# Patient Record
Sex: FEMALE | Race: WHITE | NOT HISPANIC OR LATINO | URBAN - METROPOLITAN AREA
[De-identification: names, ages, dates, MRNs, and addresses within clinical notes are randomized per-mention and may not be internally consistent; named-entity substitution may affect disease eponyms.]

---

## 2019-11-15 VITALS
SYSTOLIC BLOOD PRESSURE: 156 MMHG | DIASTOLIC BLOOD PRESSURE: 79 MMHG | TEMPERATURE: 98 F | WEIGHT: 125 LBS | RESPIRATION RATE: 19 BRPM | OXYGEN SATURATION: 98 % | HEART RATE: 91 BPM

## 2019-11-15 LAB
ALBUMIN SERPL ELPH-MCNC: 4.4 G/DL — SIGNIFICANT CHANGE UP (ref 3.3–5)
ALP SERPL-CCNC: 76 U/L — SIGNIFICANT CHANGE UP (ref 40–120)
ALT FLD-CCNC: 11 U/L — SIGNIFICANT CHANGE UP (ref 10–45)
ANION GAP SERPL CALC-SCNC: 12 MMOL/L — SIGNIFICANT CHANGE UP (ref 5–17)
APPEARANCE UR: CLEAR — SIGNIFICANT CHANGE UP
APTT BLD: 33 SEC — SIGNIFICANT CHANGE UP (ref 27.5–36.3)
AST SERPL-CCNC: 18 U/L — SIGNIFICANT CHANGE UP (ref 10–40)
BASOPHILS # BLD AUTO: 0.05 K/UL — SIGNIFICANT CHANGE UP (ref 0–0.2)
BASOPHILS NFR BLD AUTO: 0.6 % — SIGNIFICANT CHANGE UP (ref 0–2)
BILIRUB SERPL-MCNC: 0.3 MG/DL — SIGNIFICANT CHANGE UP (ref 0.2–1.2)
BILIRUB UR-MCNC: NEGATIVE — SIGNIFICANT CHANGE UP
BUN SERPL-MCNC: 20 MG/DL — SIGNIFICANT CHANGE UP (ref 7–23)
CALCIUM SERPL-MCNC: 10.1 MG/DL — SIGNIFICANT CHANGE UP (ref 8.4–10.5)
CHLORIDE SERPL-SCNC: 102 MMOL/L — SIGNIFICANT CHANGE UP (ref 96–108)
CO2 SERPL-SCNC: 26 MMOL/L — SIGNIFICANT CHANGE UP (ref 22–31)
COLOR SPEC: YELLOW — SIGNIFICANT CHANGE UP
CREAT SERPL-MCNC: 0.8 MG/DL — SIGNIFICANT CHANGE UP (ref 0.5–1.3)
DIFF PNL FLD: NEGATIVE — SIGNIFICANT CHANGE UP
EOSINOPHIL # BLD AUTO: 0.36 K/UL — SIGNIFICANT CHANGE UP (ref 0–0.5)
EOSINOPHIL NFR BLD AUTO: 4.3 % — SIGNIFICANT CHANGE UP (ref 0–6)
GLUCOSE SERPL-MCNC: 159 MG/DL — HIGH (ref 70–99)
GLUCOSE UR QL: NEGATIVE — SIGNIFICANT CHANGE UP
HCT VFR BLD CALC: 35.7 % — SIGNIFICANT CHANGE UP (ref 34.5–45)
HGB BLD-MCNC: 11.2 G/DL — LOW (ref 11.5–15.5)
IMM GRANULOCYTES NFR BLD AUTO: 0.5 % — SIGNIFICANT CHANGE UP (ref 0–1.5)
INR BLD: 0.97 — SIGNIFICANT CHANGE UP (ref 0.88–1.16)
KETONES UR-MCNC: 15 MG/DL
LEUKOCYTE ESTERASE UR-ACNC: NEGATIVE — SIGNIFICANT CHANGE UP
LIDOCAIN IGE QN: 30 U/L — SIGNIFICANT CHANGE UP (ref 7–60)
LYMPHOCYTES # BLD AUTO: 2.46 K/UL — SIGNIFICANT CHANGE UP (ref 1–3.3)
LYMPHOCYTES # BLD AUTO: 29.1 % — SIGNIFICANT CHANGE UP (ref 13–44)
MCHC RBC-ENTMCNC: 26.9 PG — LOW (ref 27–34)
MCHC RBC-ENTMCNC: 31.4 GM/DL — LOW (ref 32–36)
MCV RBC AUTO: 85.8 FL — SIGNIFICANT CHANGE UP (ref 80–100)
MONOCYTES # BLD AUTO: 0.76 K/UL — SIGNIFICANT CHANGE UP (ref 0–0.9)
MONOCYTES NFR BLD AUTO: 9 % — SIGNIFICANT CHANGE UP (ref 2–14)
NEUTROPHILS # BLD AUTO: 4.79 K/UL — SIGNIFICANT CHANGE UP (ref 1.8–7.4)
NEUTROPHILS NFR BLD AUTO: 56.5 % — SIGNIFICANT CHANGE UP (ref 43–77)
NITRITE UR-MCNC: NEGATIVE — SIGNIFICANT CHANGE UP
NRBC # BLD: 0 /100 WBCS — SIGNIFICANT CHANGE UP (ref 0–0)
PH UR: 5.5 — SIGNIFICANT CHANGE UP (ref 5–8)
PLATELET # BLD AUTO: 310 K/UL — SIGNIFICANT CHANGE UP (ref 150–400)
POTASSIUM SERPL-MCNC: 4 MMOL/L — SIGNIFICANT CHANGE UP (ref 3.5–5.3)
POTASSIUM SERPL-SCNC: 4 MMOL/L — SIGNIFICANT CHANGE UP (ref 3.5–5.3)
PROT SERPL-MCNC: 7.9 G/DL — SIGNIFICANT CHANGE UP (ref 6–8.3)
PROT UR-MCNC: 100 MG/DL
PROTHROM AB SERPL-ACNC: 11 SEC — SIGNIFICANT CHANGE UP (ref 10–12.9)
RBC # BLD: 4.16 M/UL — SIGNIFICANT CHANGE UP (ref 3.8–5.2)
RBC # FLD: 13.6 % — SIGNIFICANT CHANGE UP (ref 10.3–14.5)
SODIUM SERPL-SCNC: 140 MMOL/L — SIGNIFICANT CHANGE UP (ref 135–145)
SP GR SPEC: 1.02 — SIGNIFICANT CHANGE UP (ref 1–1.03)
UROBILINOGEN FLD QL: 0.2 E.U./DL — SIGNIFICANT CHANGE UP
WBC # BLD: 8.46 K/UL — SIGNIFICANT CHANGE UP (ref 3.8–10.5)
WBC # FLD AUTO: 8.46 K/UL — SIGNIFICANT CHANGE UP (ref 3.8–10.5)

## 2019-11-15 PROCEDURE — 74177 CT ABD & PELVIS W/CONTRAST: CPT | Mod: 26

## 2019-11-15 RX ORDER — FAMOTIDINE 10 MG/ML
20 INJECTION INTRAVENOUS ONCE
Refills: 0 | Status: COMPLETED | OUTPATIENT
Start: 2019-11-15 | End: 2019-11-15

## 2019-11-15 RX ORDER — SODIUM CHLORIDE 9 MG/ML
1000 INJECTION INTRAMUSCULAR; INTRAVENOUS; SUBCUTANEOUS ONCE
Refills: 0 | Status: COMPLETED | OUTPATIENT
Start: 2019-11-15 | End: 2019-11-15

## 2019-11-15 RX ORDER — IOHEXOL 300 MG/ML
30 INJECTION, SOLUTION INTRAVENOUS ONCE
Refills: 0 | Status: COMPLETED | OUTPATIENT
Start: 2019-11-15 | End: 2019-11-15

## 2019-11-15 RX ORDER — ONDANSETRON 8 MG/1
4 TABLET, FILM COATED ORAL ONCE
Refills: 0 | Status: COMPLETED | OUTPATIENT
Start: 2019-11-15 | End: 2019-11-15

## 2019-11-15 RX ADMIN — SODIUM CHLORIDE 1000 MILLILITER(S): 9 INJECTION INTRAMUSCULAR; INTRAVENOUS; SUBCUTANEOUS at 23:01

## 2019-11-15 RX ADMIN — ONDANSETRON 4 MILLIGRAM(S): 8 TABLET, FILM COATED ORAL at 21:48

## 2019-11-15 RX ADMIN — SODIUM CHLORIDE 1000 MILLILITER(S): 9 INJECTION INTRAMUSCULAR; INTRAVENOUS; SUBCUTANEOUS at 21:48

## 2019-11-15 RX ADMIN — FAMOTIDINE 20 MILLIGRAM(S): 10 INJECTION INTRAVENOUS at 21:48

## 2019-11-15 RX ADMIN — IOHEXOL 30 MILLILITER(S): 300 INJECTION, SOLUTION INTRAVENOUS at 21:48

## 2019-11-15 RX ADMIN — SODIUM CHLORIDE 1000 MILLILITER(S): 9 INJECTION INTRAMUSCULAR; INTRAVENOUS; SUBCUTANEOUS at 22:43

## 2019-11-15 NOTE — ED PROVIDER NOTE - CLINICAL SUMMARY MEDICAL DECISION MAKING FREE TEXT BOX
68 y/o F with PMHx of DM, HTN, HLD, hypothyroidism, diverticulitis with 4 days of intermittent worsening LLQ abdominal pain radiating to the RUQ. No fever, chills, diarrhea, nausea, vomiting, appetite changes, bloody urine or stool. LLQ and RUQ TTP on exam, no guarding, without CVAT. Plan for Abd CT with contrast, labs, meds for pain control. 68 y/o F with PMHx of diverticulosis with sudden onset of 4 days of intermittent worsening LLQ abdominal pain.. Concerned that pain persists and has become more diffuse and intense. No fever, chills, diarrhea, nausea, vomiting, appetite changes, bloody urine or stool. On exam, nontoxic appearing, abdomen soft but with LLQ and RUQ TTP, no guarding, no peritoneal signs, without CVAT. Plan for Abd CT with contrast, labs, meds, reevaluate.

## 2019-11-15 NOTE — ED PROVIDER NOTE - PROGRESS NOTE DETAILS
CT scan findings consistent with large renal mass with thrombus extending into the r renal vein and into IVC. additional thrombus is seen extending to the level of b/l common iliacs.  Vascular surgery and ortho team called for consults. as per consulting teams recommendation, will admit , start heparin.

## 2019-11-15 NOTE — ED PROVIDER NOTE - ATTENDING CONTRIBUTION TO CARE
69F htn, hld, dm, diverticulosis, c/o 4d acute intermittent nonradiating initially llq now diffuse abd pain. +abd distension. no fever/chills, no cp/sob, no n/v, no diarrhea/constipation, no hematochezi/melena, n dysuria, no phx ovarian cyst, no vaginal spotting, no rash, no trauma. avss. nontoxic. NAD. no acute surgical abd. 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 neurovasc intact. labs wnl. ua neg. urology/vascular consulted. s/p heparin. will admit to medicine w/ urology/vascular consult as per reccs.    I saw and discussed the care of the pt directly with the ACP while the pt was in the ED. i have reviewed the ACP note and agree w/ the history, exam and plan of care other than as noted above.

## 2019-11-15 NOTE — ED PROVIDER NOTE - GASTROINTESTINAL, MLM
Abdomen soft with diffuse tenderness, no guarding, no rebound. More tenderness to LLQ and RUQ. No CVAT.

## 2019-11-15 NOTE — ED PROVIDER NOTE - OBJECTIVE STATEMENT
68 y/o F with PMHx of DM, HTN, HLD, Hypothyroidism, diverticulitis, presents to the ED with complaints of sudden LLQ abdominal pain x 4 days, intermittent. Patient states it has begun radiating to her RUQ and has become more frequent. Pain is worsened with movement and improved with laying down. Patient feels bloated. Denies diarrhea, constipation, nausea, vomiting, fever, chills, appetite changes, urinary symptoms, blood in urine or stool. 70 y/o F with PMHx of DM, HTN, HLD, Hypothyroidism, diverticulosis, presents to the ED with complaints of sudden LLQ abdominal pain x 4 days, intermittent. Patient concerns that it has became more diffuse and has become more frequent. Pain is worsened with movement and improved with laying down. Patient feels bloated. Denies diarrhea, constipation, nausea, vomiting, fever, chills, appetite changes, urinary symptoms, blood in urine or stool.

## 2019-11-15 NOTE — ED PROVIDER NOTE - CHPI ED SYMPTOMS NEG
no burning urination/no chills/no vomiting/no blood in stool/no nausea/no dysuria/no diarrhea/no fever/no hematuria

## 2019-11-15 NOTE — ED ADULT TRIAGE NOTE - OTHER COMPLAINTS
CC of LLQ abd pain denies nausea and vomiting + flatus and last bowel movement is today, constipated. pan is aggravated by movement

## 2019-11-16 ENCOUNTER — INPATIENT (INPATIENT)
Facility: HOSPITAL | Age: 69
LOS: 10 days | Discharge: HOME | DRG: 657 | End: 2019-11-27
Attending: UROLOGY | Admitting: UROLOGY
Payer: MEDICARE

## 2019-11-16 DIAGNOSIS — E03.9 HYPOTHYROIDISM, UNSPECIFIED: ICD-10-CM

## 2019-11-16 DIAGNOSIS — Z91.89 OTHER SPECIFIED PERSONAL RISK FACTORS, NOT ELSEWHERE CLASSIFIED: ICD-10-CM

## 2019-11-16 DIAGNOSIS — I82.409 ACUTE EMBOLISM AND THROMBOSIS OF UNSPECIFIED DEEP VEINS OF UNSPECIFIED LOWER EXTREMITY: ICD-10-CM

## 2019-11-16 DIAGNOSIS — N28.89 OTHER SPECIFIED DISORDERS OF KIDNEY AND URETER: ICD-10-CM

## 2019-11-16 DIAGNOSIS — I10 ESSENTIAL (PRIMARY) HYPERTENSION: ICD-10-CM

## 2019-11-16 DIAGNOSIS — R10.9 UNSPECIFIED ABDOMINAL PAIN: ICD-10-CM

## 2019-11-16 DIAGNOSIS — R91.8 OTHER NONSPECIFIC ABNORMAL FINDING OF LUNG FIELD: ICD-10-CM

## 2019-11-16 DIAGNOSIS — E11.9 TYPE 2 DIABETES MELLITUS WITHOUT COMPLICATIONS: ICD-10-CM

## 2019-11-16 DIAGNOSIS — R63.8 OTHER SYMPTOMS AND SIGNS CONCERNING FOOD AND FLUID INTAKE: ICD-10-CM

## 2019-11-16 DIAGNOSIS — E78.5 HYPERLIPIDEMIA, UNSPECIFIED: ICD-10-CM

## 2019-11-16 LAB
ALBUMIN SERPL ELPH-MCNC: 4.4 G/DL — SIGNIFICANT CHANGE UP (ref 3.3–5)
ALP SERPL-CCNC: 75 U/L — SIGNIFICANT CHANGE UP (ref 40–120)
ALT FLD-CCNC: 10 U/L — SIGNIFICANT CHANGE UP (ref 10–45)
ANION GAP SERPL CALC-SCNC: 12 MMOL/L — SIGNIFICANT CHANGE UP (ref 5–17)
APTT BLD: 124 SEC — CRITICAL HIGH (ref 27.5–36.3)
APTT BLD: 80.3 SEC — HIGH (ref 27.5–36.3)
AST SERPL-CCNC: 15 U/L — SIGNIFICANT CHANGE UP (ref 10–40)
BASOPHILS # BLD AUTO: 0.04 K/UL — SIGNIFICANT CHANGE UP (ref 0–0.2)
BASOPHILS NFR BLD AUTO: 0.5 % — SIGNIFICANT CHANGE UP (ref 0–2)
BILIRUB SERPL-MCNC: 0.4 MG/DL — SIGNIFICANT CHANGE UP (ref 0.2–1.2)
BLD GP AB SCN SERPL QL: NEGATIVE — SIGNIFICANT CHANGE UP
BUN SERPL-MCNC: 14 MG/DL — SIGNIFICANT CHANGE UP (ref 7–23)
CALCIUM SERPL-MCNC: 9.8 MG/DL — SIGNIFICANT CHANGE UP (ref 8.4–10.5)
CHLORIDE SERPL-SCNC: 103 MMOL/L — SIGNIFICANT CHANGE UP (ref 96–108)
CO2 SERPL-SCNC: 26 MMOL/L — SIGNIFICANT CHANGE UP (ref 22–31)
CREAT SERPL-MCNC: 0.78 MG/DL — SIGNIFICANT CHANGE UP (ref 0.5–1.3)
EOSINOPHIL # BLD AUTO: 0.08 K/UL — SIGNIFICANT CHANGE UP (ref 0–0.5)
EOSINOPHIL NFR BLD AUTO: 1 % — SIGNIFICANT CHANGE UP (ref 0–6)
FERRITIN SERPL-MCNC: 89 NG/ML — SIGNIFICANT CHANGE UP (ref 15–150)
GLUCOSE BLDC GLUCOMTR-MCNC: 128 MG/DL — HIGH (ref 70–99)
GLUCOSE BLDC GLUCOMTR-MCNC: 134 MG/DL — HIGH (ref 70–99)
GLUCOSE BLDC GLUCOMTR-MCNC: 150 MG/DL — HIGH (ref 70–99)
GLUCOSE BLDC GLUCOMTR-MCNC: 192 MG/DL — HIGH (ref 70–99)
GLUCOSE SERPL-MCNC: 190 MG/DL — HIGH (ref 70–99)
HBA1C BLD-MCNC: 7 % — HIGH (ref 4–5.6)
HCT VFR BLD CALC: 35.6 % — SIGNIFICANT CHANGE UP (ref 34.5–45)
HCV AB S/CO SERPL IA: 0.16 S/CO — SIGNIFICANT CHANGE UP
HCV AB SERPL-IMP: SIGNIFICANT CHANGE UP
HGB BLD-MCNC: 11.1 G/DL — LOW (ref 11.5–15.5)
IMM GRANULOCYTES NFR BLD AUTO: 0.5 % — SIGNIFICANT CHANGE UP (ref 0–1.5)
IRON SATN MFR SERPL: 16 % — SIGNIFICANT CHANGE UP (ref 14–50)
IRON SATN MFR SERPL: 52 UG/DL — SIGNIFICANT CHANGE UP (ref 30–160)
LYMPHOCYTES # BLD AUTO: 1.52 K/UL — SIGNIFICANT CHANGE UP (ref 1–3.3)
LYMPHOCYTES # BLD AUTO: 18.7 % — SIGNIFICANT CHANGE UP (ref 13–44)
MCHC RBC-ENTMCNC: 26.7 PG — LOW (ref 27–34)
MCHC RBC-ENTMCNC: 31.2 GM/DL — LOW (ref 32–36)
MCV RBC AUTO: 85.8 FL — SIGNIFICANT CHANGE UP (ref 80–100)
MONOCYTES # BLD AUTO: 0.39 K/UL — SIGNIFICANT CHANGE UP (ref 0–0.9)
MONOCYTES NFR BLD AUTO: 4.8 % — SIGNIFICANT CHANGE UP (ref 2–14)
NEUTROPHILS # BLD AUTO: 6.05 K/UL — SIGNIFICANT CHANGE UP (ref 1.8–7.4)
NEUTROPHILS NFR BLD AUTO: 74.5 % — SIGNIFICANT CHANGE UP (ref 43–77)
NRBC # BLD: 0 /100 WBCS — SIGNIFICANT CHANGE UP (ref 0–0)
PLATELET # BLD AUTO: 309 K/UL — SIGNIFICANT CHANGE UP (ref 150–400)
POTASSIUM SERPL-MCNC: 4.3 MMOL/L — SIGNIFICANT CHANGE UP (ref 3.5–5.3)
POTASSIUM SERPL-SCNC: 4.3 MMOL/L — SIGNIFICANT CHANGE UP (ref 3.5–5.3)
PROT SERPL-MCNC: 7.7 G/DL — SIGNIFICANT CHANGE UP (ref 6–8.3)
RBC # BLD: 4.15 M/UL — SIGNIFICANT CHANGE UP (ref 3.8–5.2)
RBC # FLD: 13.7 % — SIGNIFICANT CHANGE UP (ref 10.3–14.5)
RH IG SCN BLD-IMP: POSITIVE — SIGNIFICANT CHANGE UP
SODIUM SERPL-SCNC: 141 MMOL/L — SIGNIFICANT CHANGE UP (ref 135–145)
TIBC SERPL-MCNC: 323 UG/DL — SIGNIFICANT CHANGE UP (ref 220–430)
TRANSFERRIN SERPL-MCNC: 283 MG/DL — SIGNIFICANT CHANGE UP (ref 200–360)
TSH SERPL-MCNC: 3.04 UIU/ML — SIGNIFICANT CHANGE UP (ref 0.35–4.94)
UIBC SERPL-MCNC: 271 UG/DL — SIGNIFICANT CHANGE UP (ref 110–370)
WBC # BLD: 8.12 K/UL — SIGNIFICANT CHANGE UP (ref 3.8–10.5)
WBC # FLD AUTO: 8.12 K/UL — SIGNIFICANT CHANGE UP (ref 3.8–10.5)

## 2019-11-16 PROCEDURE — 99285 EMERGENCY DEPT VISIT HI MDM: CPT

## 2019-11-16 PROCEDURE — 71260 CT THORAX DX C+: CPT | Mod: 26

## 2019-11-16 PROCEDURE — 99223 1ST HOSP IP/OBS HIGH 75: CPT | Mod: GC,AI

## 2019-11-16 PROCEDURE — 93970 EXTREMITY STUDY: CPT | Mod: 26

## 2019-11-16 RX ORDER — HEPARIN SODIUM 5000 [USP'U]/ML
1000 INJECTION INTRAVENOUS; SUBCUTANEOUS
Qty: 25000 | Refills: 0 | Status: DISCONTINUED | OUTPATIENT
Start: 2019-11-16 | End: 2019-11-17

## 2019-11-16 RX ORDER — GLUCAGON INJECTION, SOLUTION 0.5 MG/.1ML
1 INJECTION, SOLUTION SUBCUTANEOUS ONCE
Refills: 0 | Status: DISCONTINUED | OUTPATIENT
Start: 2019-11-16 | End: 2019-11-21

## 2019-11-16 RX ORDER — INSULIN LISPRO 100/ML
VIAL (ML) SUBCUTANEOUS
Refills: 0 | Status: DISCONTINUED | OUTPATIENT
Start: 2019-11-16 | End: 2019-11-21

## 2019-11-16 RX ORDER — LEVOTHYROXINE SODIUM 125 MCG
75 TABLET ORAL DAILY
Refills: 0 | Status: DISCONTINUED | OUTPATIENT
Start: 2019-11-16 | End: 2019-11-22

## 2019-11-16 RX ORDER — HEPARIN SODIUM 5000 [USP'U]/ML
900 INJECTION INTRAVENOUS; SUBCUTANEOUS
Qty: 25000 | Refills: 0 | Status: DISCONTINUED | OUTPATIENT
Start: 2019-11-16 | End: 2019-11-16

## 2019-11-16 RX ORDER — DEXTROSE 50 % IN WATER 50 %
12.5 SYRINGE (ML) INTRAVENOUS ONCE
Refills: 0 | Status: DISCONTINUED | OUTPATIENT
Start: 2019-11-16 | End: 2019-11-21

## 2019-11-16 RX ORDER — SIMVASTATIN 20 MG/1
1 TABLET, FILM COATED ORAL
Qty: 0 | Refills: 0 | DISCHARGE

## 2019-11-16 RX ORDER — AMLODIPINE BESYLATE 2.5 MG/1
5 TABLET ORAL DAILY
Refills: 0 | Status: DISCONTINUED | OUTPATIENT
Start: 2019-11-16 | End: 2019-11-22

## 2019-11-16 RX ORDER — SIMVASTATIN 20 MG/1
20 TABLET, FILM COATED ORAL AT BEDTIME
Refills: 0 | Status: DISCONTINUED | OUTPATIENT
Start: 2019-11-16 | End: 2019-11-16

## 2019-11-16 RX ORDER — HEPARIN SODIUM 5000 [USP'U]/ML
INJECTION INTRAVENOUS; SUBCUTANEOUS
Qty: 25000 | Refills: 0 | Status: DISCONTINUED | OUTPATIENT
Start: 2019-11-16 | End: 2019-11-16

## 2019-11-16 RX ORDER — DEXTROSE 50 % IN WATER 50 %
25 SYRINGE (ML) INTRAVENOUS ONCE
Refills: 0 | Status: DISCONTINUED | OUTPATIENT
Start: 2019-11-16 | End: 2019-11-21

## 2019-11-16 RX ORDER — SODIUM CHLORIDE 9 MG/ML
1000 INJECTION, SOLUTION INTRAVENOUS
Refills: 0 | Status: DISCONTINUED | OUTPATIENT
Start: 2019-11-16 | End: 2019-11-21

## 2019-11-16 RX ORDER — ACETAMINOPHEN 500 MG
650 TABLET ORAL EVERY 6 HOURS
Refills: 0 | Status: DISCONTINUED | OUTPATIENT
Start: 2019-11-16 | End: 2019-11-22

## 2019-11-16 RX ORDER — DEXTROSE 50 % IN WATER 50 %
15 SYRINGE (ML) INTRAVENOUS ONCE
Refills: 0 | Status: DISCONTINUED | OUTPATIENT
Start: 2019-11-16 | End: 2019-11-21

## 2019-11-16 RX ORDER — HEPARIN SODIUM 5000 [USP'U]/ML
4500 INJECTION INTRAVENOUS; SUBCUTANEOUS ONCE
Refills: 0 | Status: COMPLETED | OUTPATIENT
Start: 2019-11-16 | End: 2019-11-16

## 2019-11-16 RX ORDER — ATORVASTATIN CALCIUM 80 MG/1
20 TABLET, FILM COATED ORAL AT BEDTIME
Refills: 0 | Status: DISCONTINUED | OUTPATIENT
Start: 2019-11-16 | End: 2019-11-22

## 2019-11-16 RX ORDER — SIMVASTATIN 20 MG/1
10 TABLET, FILM COATED ORAL AT BEDTIME
Refills: 0 | Status: DISCONTINUED | OUTPATIENT
Start: 2019-11-16 | End: 2019-11-16

## 2019-11-16 RX ADMIN — HEPARIN SODIUM 4500 UNIT(S): 5000 INJECTION INTRAVENOUS; SUBCUTANEOUS at 03:27

## 2019-11-16 RX ADMIN — Medication 75 MICROGRAM(S): at 06:40

## 2019-11-16 RX ADMIN — ATORVASTATIN CALCIUM 20 MILLIGRAM(S): 80 TABLET, FILM COATED ORAL at 22:48

## 2019-11-16 RX ADMIN — AMLODIPINE BESYLATE 5 MILLIGRAM(S): 2.5 TABLET ORAL at 06:40

## 2019-11-16 RX ADMIN — Medication 2: at 09:15

## 2019-11-16 RX ADMIN — HEPARIN SODIUM 1000 UNIT(S)/HR: 5000 INJECTION INTRAVENOUS; SUBCUTANEOUS at 13:20

## 2019-11-16 RX ADMIN — HEPARIN SODIUM 1100 UNIT(S)/HR: 5000 INJECTION INTRAVENOUS; SUBCUTANEOUS at 03:28

## 2019-11-16 NOTE — CONSULT NOTE ADULT - SUBJECTIVE AND OBJECTIVE BOX
Vascular Attending:    Rachel    HPI:  69F, Azeri speaking, with PMH HTN, DM, HLD, hypothyroid, diverticulosis, presents to the ED with sudden LLQ abd pain for the past 4 days. States the pain has been intermittent, but has gotten progressively worse. States the pain is worse with movement but improved with laying down. Pt has pain to b/l LE x1wk.  She also feels associated bloating but denies any fevers, chills, weight loss, changes in appetite, dysuria, hematuria, urinary frequency, hematochezia, melena, n/v/d, constipation or any other complaints at this time.     PAST MEDICAL & SURGICAL HISTORY:  Diverticulosis  Hypothyroid  High cholesterol  HTN (hypertension)  Diabetes      REVIEW OF SYSTEMS  Neg Except as in HPI    MEDICATIONS  (STANDING):  amLODIPine   Tablet 5 milliGRAM(s) Oral daily  dextrose 5%. 1000 milliLiter(s) (50 mL/Hr) IV Continuous <Continuous>  dextrose 50% Injectable 12.5 Gram(s) IV Push once  dextrose 50% Injectable 25 Gram(s) IV Push once  dextrose 50% Injectable 25 Gram(s) IV Push once  heparin  Infusion.  Unit(s)/Hr (11 mL/Hr) IV Continuous <Continuous>  insulin lispro (HumaLOG) corrective regimen sliding scale   SubCutaneous Before meals and at bedtime  levothyroxine 75 MICROGram(s) Oral daily  simvastatin 10 milliGRAM(s) Oral at bedtime    MEDICATIONS  (PRN):  dextrose 40% Gel 15 Gram(s) Oral once PRN Blood Glucose LESS THAN 70 milliGRAM(s)/deciliter  glucagon  Injectable 1 milliGRAM(s) IntraMuscular once PRN Glucose LESS THAN 70 milligrams/deciliter      Allergies  No Known Allergies    Vital Signs Last 24 Hrs  T(C): 36.8 (2019 03:47), Max: 36.8 (2019 03:47)  T(F): 98.3 (2019 03:47), Max: 98.3 (2019 03:47)  HR: 76 (2019 03:47) (70 - 91)  BP: 148/73 (2019 03:47) (138/75 - 156/79)  BP(mean): --  RR: 18 (2019 03:47) (18 - 19)  SpO2: 96% (2019 03:47) (96% - 98%)    PHYSICAL EXAM:  Constitutional: Pt AXOX3 in NAD  Respiratory: Unlabored  Cardiovascular: S1S2  GI: nontender, soft,  Extremities: no edema or discolorations  Vascular: 2+ b/L    LABS:                        11.2   8.46  )-----------( 310      ( 15 Nov 2019 21:22 )             35.7     11-15    140  |  102  |  20  ----------------------------<  159<H>  4.0   |  26  |  0.80    Ca    10.1      15 Nov 2019 21:22    TPro  7.9  /  Alb  4.4  /  TBili  0.3  /  DBili  x   /  AST  18  /  ALT  11  /  AlkPhos  76  -15    PT/INR - ( 15 Nov 2019 21:22 )   PT: 11.0 sec;   INR: 0.97          PTT - ( 15 Nov 2019 21:22 )  PTT:33.0 sec  Urinalysis Basic - ( 15 Nov 2019 21:22 )    Color: Yellow / Appearance: Clear / S.025 / pH: x  Gluc: x / Ketone: 15 mg/dL  / Bili: Negative / Urobili: 0.2 E.U./dL   Blood: x / Protein: 100 mg/dL / Nitrite: NEGATIVE   Leuk Esterase: NEGATIVE / RBC: x / WBC < 5 /HPF   Sq Epi: x / Non Sq Epi: 0-5 /HPF / Bacteria: x        RADIOLOGY & ADDITIONAL STUDIES

## 2019-11-16 NOTE — PROGRESS NOTE ADULT - ASSESSMENT
69F, Syrian speaking, with PMH DM, HTN, HLD,  hypothyroid, diverticulosis, presents with 4 days of LLQ abd pain without associated signs of symptoms  admitted to [facility], found to have a new right upper pole renal mass with tumor thrombus extending in to bilateral renal veins and IVC, and multiple lung nodules, concerning for RCC with lung mets. Plan for IR-guided lung nodule biopsy on Monday 11/18

## 2019-11-16 NOTE — PROGRESS NOTE ADULT - PROBLEM SELECTOR PLAN 9
F: s/p 2L IV NS, none for now  E: replete PRN  N: DASH/TLC, carb consistent  DVT ppx: on heparin gtt  Code: FULL CODE (MOLST needs to be signed)  DIspo: [email or web address]

## 2019-11-16 NOTE — PROGRESS NOTE ADULT - PROBLEM SELECTOR PLAN 1
Pt with 4.2 cm right upper pole renal mass with central  necrosis, multiple pulmonary nodules seen in the lung bases, concerning for metastatic lesions. No lymphadenopathy, no lytic lesions. Patient will need biopsy of either renal mass or pulmonary nodule for diagnosis/staging.  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 Dr. Maldonado will see pt Mon  -f/u EPO level

## 2019-11-16 NOTE — PROGRESS NOTE ADULT - PROBLEM SELECTOR PLAN 3
CT Abd/Pelv with tumor thrombus extending in to bilateral renal veins and IVC, additional non-tumor thrombus seen extending inferiorly to level of bilateral common iliacs. s/p 4500 heparin IV bolus and started heparin gtt  - continue heparin gtt, f/u PTT and adjust per hep normogram  - f/u US dopplers LE  - Vascular following, f/u recs

## 2019-11-16 NOTE — H&P ADULT - PROBLEM SELECTOR PLAN 3
CT Abd/Pelv with tumor thrombus extending in to bilateral renal veins and IVC  - continue heparin gtt, f/u PTT  - f/u US dopplers  LE  - Vascular following, f/u recs CT Abd/Pelv with tumor thrombus extending in to bilateral renal veins and IVC  - continue heparin gtt, f/u PTT  - f/u US dopplers LE  - Vascular following, f/u recs CT Abd/Pelv with tumor thrombus extending in to bilateral renal veins and IVC, additional non-tumor thrombus seen extending inferiorly to level of bilateral common iliacs. s/p 4500 heparin IV bolus and started on heparin gtt 11/hr  - continue heparin gtt, f/u PTT at 10:00AM  - f/u US dopplers LE  - Vascular following, f/u recs

## 2019-11-16 NOTE — H&P ADULT - HISTORY OF PRESENT ILLNESS
69F, Polish speaking, with PMH HTN, DM, HLD, hypothyroid, diverticulosis, presents to the ED with sudden LLQ abd pain for the past 4 days. States the pain has been intermittent, but has gotten progressively worse. States the pain is worse with movement but improved with laying down. She also feel assocaited bloating but denies any fevers, chills, weight loss, changes in appetite, dysuria, hematuria, urinary frequency, hematochezia, melena, n/v/d, constipation or any other complaints at this time.   Colonoscopy?  Mammogram?  Pap smear?    In the ED, vitals were T: 98F, HR 91 /min, /79 mmHg, RR 19, O2 98% on room air. Labs were obtained, revealed Hb  11.2, unknown baseline. BMP unremarkable. CT Abd/pelv 69F, Wolof speaking, with PMH HTN, DM, HLD, hypothyroid, diverticulosis, presents to the ED with sudden LLQ abd pain for the past 4 days. States the pain has been intermittent, but has gotten progressively worse. States the pain is worse with movement but improved with laying down. She also feel assocaited bloating but denies any fevers, chills, weight loss, changes in appetite, dysuria, hematuria, urinary frequency, hematochezia, melena, n/v/d, constipation or any other complaints at this time.   Colonoscopy?  Mammogram?  Pap smear?    In the ED, vitals were T: 98F, HR 91 /min, /79 mmHg, RR 19, O2 98% on room air. Labs were obtained, revealed Hb  11.2, unknown baseline. BMP, coags, unremarkable. CT Abd/pelv performed which revealed RUL renal mass with tumor thrombus into bilateral renal veins. Urology and Vascular consulted. Patient started on heparin gtt, was also given heparin bolus prior to that. 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 States the pain has been intermittent, but has gotten progressively worse, and is now also in the RLQ. States the pain is worse with movement, radiates to the flanks bilaterally, but improved with laying down. She also feel associated bloating and reduced frequency of bowel movements,  but denies any fevers, chills, weight loss, changes in appetite, dysuria, hematuria, urinary frequency, hematochezia, melena, n/v/d, constipation or any other complaints at this time. Of note, patient follows pulmonologist Dr. Castaneda at [facility], pt has known pulmonary nodules and has serial CT Chest for monitoring. Pt's daughter states it may be from asbestosis, but this was never biopsied. For the patient's age appropriate cancer screening, her colonoscopy was done 2 years ago, which revealed benign polyps, last Mammogram 3 months ago which was negative, last pap smear a few months ago, positive for HPV.     In the ED, vitals were T: 98F, HR 91 /min, /79 mmHg, RR 19, O2 98% on room air. Labs were obtained, revealed Hb  11.2, unknown baseline. BMP, coags, unremarkable. CT Abd/pelv performed which revealed RUL renal mass with tumor thrombus into bilateral renal veins. Urology and Vascular consulted. Patient started on heparin gtt, was also given heparin bolus prior to that. 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 States the pain has been intermittent, but has gotten progressively worse, and is now also in the RLQ. States the pain is worse with movement, radiates to the flanks bilaterally, but improved with laying down. She also feel associated bloating and reduced frequency of bowel movements,  but denies any fevers, chills, weight loss, changes in appetite, dysuria, hematuria, urinary frequency, hematochezia, melena, n/v/d, constipation or any other complaints at this time. Of note, patient follows pulmonologist Dr. Castaneda at [facility], pt has known pulmonary nodules and has serial CT Chest for monitoring. Pt's daughter states it may be from asbestosis, but this was never biopsied. For the patient's age appropriate cancer screening, her colonoscopy was done 2 years ago, which revealed benign polyps, last Mammogram 3 months ago which was negative, last pap smear a few months ago, positive for HPV. Per the daughter, patient had similar symptoms 3-4 years ago, and at that time, her CT Abd /pelv revealed renal cysts. Had a PET/CT test ~6 years ago which was negative for any malignancy at that time.    In the ED, vitals were T: 98F, HR 91 /min, /79 mmHg, RR 19, O2 98% on room air. Labs were obtained, revealed Hb  11.2, unknown baseline. BMP, coags, unremarkable. CT Abd/pelv performed which revealed RUL renal mass with tumor thrombus into bilateral renal veins. Urology and Vascular consulted. Patient started on heparin gtt, was also given heparin bolus prior to that. 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 States the pain has been intermittent, but has gotten progressively worse, and is now also in the RLQ. States the pain is worse with movement, radiates to the flanks bilaterally, but improved with laying down. She states the sometimes the pain also radiates down the lower extremities bilaterally. She also feel associated bloating and reduced frequency of bowel movements,  but denies any fevers, chills, weight loss, changes in appetite, dysuria, hematuria, urinary frequency, hematochezia, melena, n/v/d, constipation or any other complaints at this time. Of note, patient follows pulmonologist Dr. Castaneda at [facility], pt has known pulmonary nodules and has serial CT Chest for monitoring. Pt's daughter states it may be from asbestosis, but this was never biopsied. For the patient's age appropriate cancer screening, her colonoscopy was done 2 years ago, which revealed benign polyps, last Mammogram 3 months ago which was negative, last pap smear a few months ago, positive for HPV. Per the daughter, patient had similar symptoms 3-4 years ago, and at that time, her CT Abd /pelv revealed renal cysts. Had a PET/CT test ~6 years ago which was negative for any malignancy at that time.    In the ED, vitals were T: 98F, HR 91 /min, /79 mmHg, RR 19, O2 98% on room air. Labs were obtained, revealed Hb  11.2, unknown baseline. BMP, coags, unremarkable. CT Abd/pelv performed which revealed RUL renal mass with tumor thrombus into bilateral renal veins. Urology and Vascular consulted. Patient started on heparin gtt, was also given heparin bolus prior to that.

## 2019-11-16 NOTE — PATIENT PROFILE ADULT - VISION (WITH CORRECTIVE LENSES IF THE PATIENT USUALLY WEARS THEM):
Partially impaired: cannot see medication labels or newsprint, but can see obstacles in path, and the surrounding layout; can count fingers at arm's length/left eye blind

## 2019-11-16 NOTE — PROGRESS NOTE ADULT - PROBLEM SELECTOR PLAN 2
LLQ and RLQ abd pain spasm like radiating to the flank. Pain likely related to newly found renal mass and tumor thrombus  - see plan as above  - tylenol for pain control

## 2019-11-16 NOTE — H&P ADULT - NSHPPHYSICALEXAM_GEN_ALL_CORE
.  VITAL SIGNS:  T(C): 36.4 (11-16-19 @ 00:32), Max: 36.7 (11-15-19 @ 20:43)  T(F): 97.6 (11-16-19 @ 00:32), Max: 98 (11-15-19 @ 20:43)  HR: 70 (11-16-19 @ 00:32) (70 - 91)  BP: 146/71 (11-16-19 @ 00:32) (138/75 - 156/79)  BP(mean): --  RR: 18 (11-16-19 @ 00:32) (18 - 19)  SpO2: 96% (11-16-19 @ 00:32) (96% - 98%)  Wt(kg): --    PHYSICAL EXAM:    Constitutional: WDWN resting comfortably in bed; NAD  Head: NC/AT  Eyes: PERRL, EOMI, anicteric sclera  ENT: no nasal discharge; uvula midline, no oropharyngeal erythema or exudates; MMM  Neck: supple; no JVD or thyromegaly  Respiratory: CTA B/L; no W/R/R, no retractions  Cardiac: +S1/S2; RRR; no M/R/G; PMI non-displaced  Gastrointestinal: soft, NT/ND; no rebound or guarding; +BSx4  Genitourinary: normal external genitalia  Back: spine midline, no bony tenderness or step-offs; no CVAT B/L  Extremities: WWP, no clubbing or cyanosis; no peripheral edema  Musculoskeletal: NROM x4; no joint swelling, tenderness or erythema  Vascular: 2+ radial, femoral, DP/PT pulses B/L  Dermatologic: skin warm, dry and intact; no rashes, wounds, or scars  Lymphatic: no submandibular or cervical LAD  Neurologic: AAOx3; CNII-XII grossly intact; no focal deficits  Psychiatric: affect and characteristics of appearance, verbalizations, behaviors are appropriate .  VITAL SIGNS:  T(C): 36.4 (11-16-19 @ 00:32), Max: 36.7 (11-15-19 @ 20:43)  T(F): 97.6 (11-16-19 @ 00:32), Max: 98 (11-15-19 @ 20:43)  HR: 70 (11-16-19 @ 00:32) (70 - 91)  BP: 146/71 (11-16-19 @ 00:32) (138/75 - 156/79)  BP(mean): --  RR: 18 (11-16-19 @ 00:32) (18 - 19)  SpO2: 96% (11-16-19 @ 00:32) (96% - 98%)  Wt(kg): --    PHYSICAL EXAM:    Constitutional: WDWN resting comfortably in bed; NAD  Head: NC/AT  Eyes: PERRL, EOMI, anicteric sclera  ENT: no nasal discharge; uvula midline, no oropharyngeal erythema or exudates; MMM  Neck: supple; no JVD or thyromegaly  Respiratory: CTA B/L; no W/R/R, no retractions  Cardiac: +S1/S2; RRR; no M/R/G; PMI non-displaced  Gastrointestinal: soft, RLQ/LLQ abd lower abd minimal tenderness to palpation, no rebound or guarding; +BSx4  Genitourinary: normal external genitalia  Back: spine midline, no bony tenderness or step-offs; no CVAT B/L  Extremities: WWP, no clubbing or cyanosis; no peripheral edema  Musculoskeletal: NROM x4; no joint swelling, tenderness or erythema  Vascular: 2+ radial, femoral, DP/PT pulses B/L  Dermatologic: skin warm, dry and intact; no rashes, wounds, or scars  Lymphatic: no submandibular or cervical LAD  Neurologic: AAOx3; CNII-XII grossly intact; no focal deficits  Psychiatric: affect and characteristics of appearance, verbalizations, behaviors are appropriate

## 2019-11-16 NOTE — PROGRESS NOTE ADULT - SUBJECTIVE AND OBJECTIVE BOX
OVERNIGHT EVENTS:  Patient arrived to the floors overnight    SUBJECTIVE/INTERVAL HPI: Patient was seen and examined at bedside this morning. Family/daughter at bedside providing translation. Pt reports no new complaints. Still endorses RLQ and LLQ abdominal pain with some radiation to L flank, not R flank. Denies any vision changes (baseline blindness in L eye), CP, SOB, back pain, dysuria, bleeding from any site.    VITALS  Vital Signs Last 24 Hrs  T(C): 36.9 (2019 10:13), Max: 36.9 (2019 10:13)  T(F): 98.4 (2019 10:13), Max: 98.4 (2019 10:13)  HR: 80 (2019 10:13) (70 - 91)  BP: 148/80 (2019 10:13) (138/75 - 162/74)  BP(mean): --  RR: 15 (2019 10:13) (15 - 19)  SpO2: 97% (2019 10:13) (95% - 98%)    I&O's Summary      CAPILLARY BLOOD GLUCOSE      POCT Blood Glucose.: 192 mg/dL (2019 08:43)      PHYSICAL EXAM  General: In NAD. Resting comfortably in bed.  HEENT: PERRL/ EOMI, no scleral icterus, MMM, no JVD. L eye blindness  Respiratory: Mild bibasilar crackles. No wheezing  Cardiovascular: RRR, +S1/S2. No murmurs, rubs, or gallops.  Abdomen: Soft, ND, normoactive bowel sounds, mild pain to palpation in lower abdominal quadrants. No rebound or guarding.  Extremities: WWP. No lower extremeity edema, pulses equal, no calf tenderness; no cyanosis  Skin: No rashes      MEDICATIONS  (STANDING):  amLODIPine   Tablet 5 milliGRAM(s) Oral daily  atorvastatin 20 milliGRAM(s) Oral at bedtime  dextrose 5%. 1000 milliLiter(s) (50 mL/Hr) IV Continuous <Continuous>  dextrose 50% Injectable 12.5 Gram(s) IV Push once  dextrose 50% Injectable 25 Gram(s) IV Push once  dextrose 50% Injectable 25 Gram(s) IV Push once  heparin  Infusion.  Unit(s)/Hr (11 mL/Hr) IV Continuous <Continuous>  insulin lispro (HumaLOG) corrective regimen sliding scale   SubCutaneous Before meals and at bedtime  levothyroxine 75 MICROGram(s) Oral daily    MEDICATIONS  (PRN):  acetaminophen   Tablet .. 650 milliGRAM(s) Oral every 6 hours PRN Mild Pain (1 - 3)  dextrose 40% Gel 15 Gram(s) Oral once PRN Blood Glucose LESS THAN 70 milliGRAM(s)/deciliter  glucagon  Injectable 1 milliGRAM(s) IntraMuscular once PRN Glucose LESS THAN 70 milligrams/deciliter      LABS                        11.1   8.12  )-----------( 309      ( 2019 07:08 )             35.6     11-    141  |  103  |  14  ----------------------------<  190<H>  4.3   |  26  |  0.78    Ca    9.8      2019 07:08    TPro  7.7  /  Alb  4.4  /  TBili  0.4  /  DBili  x   /  AST  15  /  ALT  10  /  AlkPhos  75  11-16    LIVER FUNCTIONS - ( 2019 07:08 )  Alb: 4.4 g/dL / Pro: 7.7 g/dL / ALK PHOS: 75 U/L / ALT: 10 U/L / AST: 15 U/L / GGT: x           PT/INR - ( 15 Nov 2019 21:22 )   PT: 11.0 sec;   INR: 0.97          PTT - ( 15 Nov 2019 21:22 )  PTT:33.0 sec  Urinalysis Basic - ( 15 Nov 2019 21:22 )    Color: Yellow / Appearance: Clear / S.025 / pH: x  Gluc: x / Ketone: 15 mg/dL  / Bili: Negative / Urobili: 0.2 E.U./dL   Blood: x / Protein: 100 mg/dL / Nitrite: NEGATIVE   Leuk Esterase: NEGATIVE / RBC: x / WBC < 5 /HPF   Sq Epi: x / Non Sq Epi: 0-5 /HPF / Bacteria: x        Radiology and other tests: Reviewed

## 2019-11-16 NOTE — H&P ADULT - NSHPSOCIALHISTORY_GEN_ALL_CORE
Marital Status:  (   )    (   ) Single    (   )    (  )   Lives with: (  ) alone  (  ) children   (  ) spouse   (  ) parents  (  ) other  Recent Travel: No recent travel  Occupation:    Substance Use (street drugs): ( x ) never used  (  ) other:  Tobacco Usage:  ( x  ) never smoked   (   ) former smoker   (   ) current smoker  (     ) pack year  Alcohol Usage: None Lives with: (  ) alone  ( X ) children   (  ) spouse   (  ) parents  (  ) other  Recent Travel: No recent travel  Functional, does not use any assistive devices.     Substance Use (street drugs): ( x ) never used  (  ) other:  Tobacco Usage:  (  ) never smoked   (  X ) former smoker   (   ) current smoker  (  46  ) pack year  Alcohol Usage: None

## 2019-11-16 NOTE — H&P ADULT - NSICDXPASTMEDICALHX_GEN_ALL_CORE_FT
PAST MEDICAL HISTORY:  Diabetes     Diverticulosis     High cholesterol     HTN (hypertension)     Hypothyroid

## 2019-11-16 NOTE — H&P ADULT - ASSESSMENT
69F, Lao speaking, with PMH DM, HTN, HLD,  hypothyroid, diverticulosis, presents with 4 days of LLQ abd pain without associated signs of symptoms  admitted to [facility], found to have a new right upper pole renal mass with tumor thrombus extending in to bilateral renal veins and  IVC. 69F, Azeri speaking, with PMH DM, HTN, HLD,  hypothyroid, diverticulosis, presents with 4 days of LLQ abd pain without associated signs of symptoms  admitted to [facility], found to have a new right upper pole renal mass with tumor thrombus extending in to bilateral renal veins and IVC.

## 2019-11-16 NOTE — H&P ADULT - PROBLEM SELECTOR PLAN 1
Pt with 4.2 cm right upper pole renal mass with central  necrosis, multiple pulmonary nodules seen in the lung bases, concerning for metastatic lesions. No lymphadenopathy, no lytic lesions. Patient will need biopsy of either renal mass or pulmonary nodule for diagnosis/staging.  DDx includes renal cell carcinoma, angiomyolipoma, renal adenoma.   - Urology following, pt may not be a surgical candidate if patient has metastases   - Heme/Onc consult in AM  - f/u CT chest w/ IV contrast. Pt with 4.2 cm right upper pole renal mass with central  necrosis, multiple pulmonary nodules seen in the lung bases, concerning for metastatic lesions. No lymphadenopathy, no lytic lesions. Patient will need biopsy of either renal mass or pulmonary nodule for diagnosis/staging.  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 Pt with 4.2 cm right upper pole renal mass with central  necrosis, multiple pulmonary nodules seen in the lung bases, concerning for metastatic lesions. No lymphadenopathy, no lytic lesions. Patient will need biopsy of either renal mass or pulmonary nodule for diagnosis/staging.  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

## 2019-11-16 NOTE — CONSULT NOTE ADULT - SUBJECTIVE AND OBJECTIVE BOX
Pt. is a 69yoF with a PMHJx. 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 States the pain has been intermittent, but has gotten progressively worse, and is now also in the RLQ. States the pain is worse with movement, radiates to the flanks bilaterally, but improved with laying down. She states the sometimes the pain also radiates down the lower extremities bilaterally. She also feel associated bloating and reduced frequency of bowel movements,  but denies any fevers, chills, weight loss, changes in appetite, dysuria, hematuria, urinary frequency, hematochezia, melena, n/v/d, constipation or any other complaints at this time. Of note, patient follows pulmonologist Dr. Castaneda at [facility], pt has known pulmonary nodules and has serial CT Chest for monitoring. Pt's daughter states it may be from asbestosis, but this was never biopsied. For the patient's age appropriate cancer screening, her colonoscopy was done 2 years ago, which revealed benign polyps, last Mammogram 3 months ago which was negative, last pap smear a few months ago, positive for HPV. Per the daughter, patient had similar symptoms 3-4 years ago, and at that time, her CT Abd /pelv revealed renal cysts. Had a PET/CT test ~6 years ago which was negative for any malignancy at that time.    In the ED, vitals were T: 98F, HR 91 /min, /79 mmHg, RR 19, O2 98% on room air. Labs were obtained, revealed Hb  11.2, unknown baseline. BMP, coags, unremarkable. CT Abd/pelv performed which revealed RUL renal mass with tumor thrombus into bilateral renal veins. Urology and Vascular consulted. Patient started on heparin gtt, was also given heparin bolus prior to that. (2019 03:09)    Urology Subjective  Patient with PMHx listed above presented with LLQ pain x 4 days. CT scan showed right renal mass with IVC thrombus and lower lung nodules suspicious for metastatic disease. Pt with previous PET/CT for lung nodules 6 years ago, however no renal mass at the time. no dysuria, hematuria, fevers. no family history of kidney cancer/ Previous smoker.      PAST MEDICAL & SURGICAL HISTORY:  Diverticulosis  Hypothyroid  High cholesterol  HTN (hypertension)  Diabetes          MEDICATIONS  (STANDING):  amLODIPine   Tablet 5 milliGRAM(s) Oral daily  atorvastatin 20 milliGRAM(s) Oral at bedtime  dextrose 5%. 1000 milliLiter(s) (50 mL/Hr) IV Continuous <Continuous>  dextrose 50% Injectable 12.5 Gram(s) IV Push once  dextrose 50% Injectable 25 Gram(s) IV Push once  dextrose 50% Injectable 25 Gram(s) IV Push once  heparin  Infusion.  Unit(s)/Hr (11 mL/Hr) IV Continuous <Continuous>  insulin lispro (HumaLOG) corrective regimen sliding scale   SubCutaneous Before meals and at bedtime  levothyroxine 75 MICROGram(s) Oral daily    MEDICATIONS  (PRN):  acetaminophen   Tablet .. 650 milliGRAM(s) Oral every 6 hours PRN Mild Pain (1 - 3)  dextrose 40% Gel 15 Gram(s) Oral once PRN Blood Glucose LESS THAN 70 milliGRAM(s)/deciliter  glucagon  Injectable 1 milliGRAM(s) IntraMuscular once PRN Glucose LESS THAN 70 milligrams/deciliter      Allergies    No Known Allergies    Intolerances        SOCIAL HISTORY:        Vital Signs Last 24 Hrs  T(C): 36.9 (2019 10:13), Max: 36.9 (2019 10:13)  T(F): 98.4 (2019 10:13), Max: 98.4 (2019 10:13)  HR: 80 (2019 10:13) (70 - 91)  BP: 148/80 (2019 10:13) (138/75 - 162/74)  BP(mean): --  RR: 15 (2019 10:13) (15 - 19)  SpO2: 97% (2019 10:13) (95% - 98%)    Physical Exam:  Gen: In bed, A/Ox3, NAD  Card: RRR, No m/r/g  Lungs: CTAB/L, no wheezes or crackles  Abdomen: Soft, nontender, nondistended  Extremities: No edema    LABS:                        11.1   8.12  )-----------( 309      ( 2019 07:08 )             35.6     11-16    141  |  103  |  14  ----------------------------<  190<H>  4.3   |  26  |  0.78    Ca    9.8      2019 07:08    TPro  7.7  /  Alb  4.4  /  TBili  0.4  /  DBili  x   /  AST  15  /  ALT  10  /  AlkPhos  75  11-16    PT/INR - ( 15 Nov 2019 21:22 )   PT: 11.0 sec;   INR: 0.97          PTT - ( 15 Nov 2019 21:22 )  PTT:33.0 sec  Urinalysis Basic - ( 15 Nov 2019 21:22 )    Color: Yellow / Appearance: Clear / S.025 / pH: x  Gluc: x / Ketone: 15 mg/dL  / Bili: Negative / Urobili: 0.2 E.U./dL   Blood: x / Protein: 100 mg/dL / Nitrite: NEGATIVE   Leuk Esterase: NEGATIVE / RBC: x / WBC < 5 /HPF   Sq Epi: x / Non Sq Epi: 0-5 /HPF / Bacteria: x        RADIOLOGY & ADDITIONAL STUDIES:

## 2019-11-16 NOTE — H&P ADULT - PROBLEM SELECTOR PLAN 10
1) PCP Contacted on Admission: (Y/N) --> Name & Phone #: Dr. Delaney Erazo   2) Date of Contact with PCP:  3) PCP Contacted at Discharge: (Y/N, N/A)  4) Summary of Handoff Given to PCP:   5) Post-Discharge Appointment Date and Location:

## 2019-11-16 NOTE — H&P ADULT - PROBLEM SELECTOR PLAN 2
Pt with 4.2 cm right upper pole renal mass with central  necrosis, multiple pulmonary nodules seen in the lung bases, concerning for metastatic lesions. No lymphadenopathy, no lytic lesions. Patient will need biopsy of either renal mass or pulmonary nodule for diagnosis   - Urology following, pt may not be a surgical candidate  - Heme/Onc consult in AM  - f/u CT chest w/ IV contrast LLQ and RLQ abd pain spasm like radiating to the flank. Pain likely related to newly found renal mass and tumor thrombus  - see plan as above  - tylenol for pain control

## 2019-11-16 NOTE — H&P ADULT - NSHPLABSRESULTS_GEN_ALL_CORE
11.2   8.46  )-----------( 310      ( 15 Nov 2019 21:22 )             35.7       11-15    140  |  102  |  20  ----------------------------<  159<H>  4.0   |  26  |  0.80    Ca    10.1      15 Nov 2019 21:22    TPro  7.9  /  Alb  4.4  /  TBili  0.3  /  DBili  x   /  AST  18  /  ALT  11  /  AlkPhos  76  11-15              Urinalysis Basic - ( 15 Nov 2019 21:22 )    Color: Yellow / Appearance: Clear / S.025 / pH: x  Gluc: x / Ketone: 15 mg/dL  / Bili: Negative / Urobili: 0.2 E.U./dL   Blood: x / Protein: 100 mg/dL / Nitrite: NEGATIVE   Leuk Esterase: NEGATIVE / RBC: x / WBC < 5 /HPF   Sq Epi: x / Non Sq Epi: 0-5 /HPF / Bacteria: x        PT/INR - ( 15 Nov 2019 21:22 )   PT: 11.0 sec;   INR: 0.97          PTT - ( 15 Nov 2019 21:22 )  PTT:33.0 sec    Lactate Trend            CAPILLARY BLOOD GLUCOSE 11.2   8.46  )-----------( 310      ( 15 Nov 2019 21:22 )             35.7       11-15    140  |  102  |  20  ----------------------------<  159<H>  4.0   |  26  |  0.80    Ca    10.1      15 Nov 2019 21:22    TPro  7.9  /  Alb  4.4  /  TBili  0.3  /  DBili  x   /  AST  18  /  ALT  11  /  AlkPhos  76  11-15        Urinalysis Basic - ( 15 Nov 2019 21:22 )    Color: Yellow / Appearance: Clear / S.025 / pH: x  Gluc: x / Ketone: 15 mg/dL  / Bili: Negative / Urobili: 0.2 E.U./dL   Blood: x / Protein: 100 mg/dL / Nitrite: NEGATIVE   Leuk Esterase: NEGATIVE / RBC: x / WBC < 5 /HPF   Sq Epi: x / Non Sq Epi: 0-5 /HPF / Bacteria: x        PT/INR - ( 15 Nov 2019 21:22 )   PT: 11.0 sec;   INR: 0.97          PTT - ( 15 Nov 2019 21:22 )  PTT:33.0 sec    Lactate Trend    CAPILLARY BLOOD GLUCOSE

## 2019-11-16 NOTE — H&P ADULT - PROBLEM SELECTOR PLAN 5
Pt with h/o hypothyroid.  - f/u TSH  - resume Levothyroxine 75mcg Pt with h/o HTN, on HCTZ and Norvasc at home  - resume Norvasc 5mg qd   - resume HCTZ 12.5mg qd

## 2019-11-16 NOTE — H&P ADULT - PROBLEM SELECTOR PLAN 7
Resume simvastatin 10mg qhs f/u A1c  Moderate ISS Pt with known history of diabetes, takes Januvia 100mg and Metformin 500mg BID at home. A1c 7.0   - hold oral antidiabetic agents   - Moderate ISS

## 2019-11-16 NOTE — PROGRESS NOTE ADULT - SUBJECTIVE AND OBJECTIVE BOX
Patient was seen and examined at bedside. No acute event overnight. No complaints. Mild dull pain on lower abdomen.     Vital Signs Last 24 Hrs  T(C): 36.9 (2019 10:13), Max: 36.9 (2019 10:13)  T(F): 98.4 (2019 10:13), Max: 98.4 (2019 10:13)  HR: 80 (2019 10:13) (70 - 91)  BP: 148/80 (2019 10:13) (138/75 - 162/74)  BP(mean): --  RR: 15 (2019 10:13) (15 - 19)  SpO2: 97% (2019 10:13) (95% - 98%)    Physical Exam:  General: NAD  Pulmonary: Nonlabored breathing, no respiratory distress  Cardiovascular: NSR  Abdominal: soft, NT/ND, Alicia negative  Extremities: WWP, normal strength, no clubbing/cyanosis/edema  Neuro: A/O x3, no focal deficits, normal sensation  Pulses: palpable distal pulses    Lines/drains/tubes:    I&O's Summary      LABS:                        11.1   8.12  )-----------( 309      ( 2019 07:08 )             35.6     11-16    141  |  103  |  14  ----------------------------<  190<H>  4.3   |  26  |  0.78    Ca    9.8      2019 07:08    TPro  7.7  /  Alb  4.4  /  TBili  0.4  /  DBili  x   /  AST  15  /  ALT  10  /  AlkPhos  75  11-16    PT/INR - ( 15 Nov 2019 21:22 )   PT: 11.0 sec;   INR: 0.97          PTT - ( 2019 12:20 )  PTT:124.0 sec  Urinalysis Basic - ( 15 Nov 2019 21:22 )    Color: Yellow / Appearance: Clear / S.025 / pH: x  Gluc: x / Ketone: 15 mg/dL  / Bili: Negative / Urobili: 0.2 E.U./dL   Blood: x / Protein: 100 mg/dL / Nitrite: NEGATIVE   Leuk Esterase: NEGATIVE / RBC: x / WBC < 5 /HPF   Sq Epi: x / Non Sq Epi: 0-5 /HPF / Bacteria: x      CAPILLARY BLOOD GLUCOSE      POCT Blood Glucose.: 128 mg/dL (2019 12:18)  POCT Blood Glucose.: 192 mg/dL (2019 08:43)    LIVER FUNCTIONS - ( 2019 07:08 )  Alb: 4.4 g/dL / Pro: 7.7 g/dL / ALK PHOS: 75 U/L / ALT: 10 U/L / AST: 15 U/L / GGT: x             RADIOLOGY & ADDITIONAL STUDIES:

## 2019-11-16 NOTE — CONSULT NOTE ADULT - SUBJECTIVE AND OBJECTIVE BOX
Hematology Oncology Consult Note (Dr. 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 States the pain has been intermittent, but has gotten progressively worse, and is now also in the RLQ. States the pain is worse with movement, radiates to the flanks bilaterally, but improved with laying down. She states the sometimes the pain also radiates down the lower extremities bilaterally. She also feel associated bloating and reduced frequency of bowel movements,  but denies any fevers, chills, weight loss, changes in appetite, dysuria, hematuria, urinary frequency, hematochezia, melena, n/v/d, constipation or any other complaints at this time. Of note, patient follows pulmonologist Dr. Castaneda at [facility], pt has known pulmonary nodules and has serial CT Chest for monitoring. Pt's daughter states it may be from asbestosis, but this was never biopsied. For the patient's age appropriate cancer screening, her colonoscopy was done 2 years ago, which revealed benign polyps, last Mammogram 3 months ago which was negative, last pap smear a few months ago, positive for HPV. Per the daughter, patient had similar symptoms 3-4 years ago, and at that time, her CT Abd /pelv revealed renal cysts. Had a PET/CT test ~6 years ago which was negative for any malignancy at that time.        PAST MEDICAL & SURGICAL HISTORY:  Diverticulosis  Hypothyroid  High cholesterol  HTN (hypertension)  Diabetes      Allergies: NKDA      Medications:  heparin  Infusion.  Unit(s)/Hr IV Continuous <Continuous>  MEDICATIONS  (STANDING):  amLODIPine   Tablet 5 milliGRAM(s) Oral daily  atorvastatin 20 milliGRAM(s) Oral at bedtime  dextrose 5%. 1000 milliLiter(s) (50 mL/Hr) IV Continuous <Continuous>  dextrose 50% Injectable 12.5 Gram(s) IV Push once  dextrose 50% Injectable 25 Gram(s) IV Push once  dextrose 50% Injectable 25 Gram(s) IV Push once  heparin  Infusion.  Unit(s)/Hr (11 mL/Hr) IV Continuous <Continuous>  insulin lispro (HumaLOG) corrective regimen sliding scale   SubCutaneous Before meals and at bedtime  levothyroxine 75 MICROGram(s) Oral daily    MEDICATIONS  (PRN):  acetaminophen   Tablet .. 650 milliGRAM(s) Oral every 6 hours PRN Mild Pain (1 - 3)  dextrose 40% Gel 15 Gram(s) Oral once PRN Blood Glucose LESS THAN 70 milliGRAM(s)/deciliter  glucagon  Injectable 1 milliGRAM(s) IntraMuscular once PRN Glucose LESS THAN 70 milligrams/deciliter      Social History: lives with children, former smoker, denies ilicit habits         PHYSICAL EXAM:    T(F): 98.4 (19 @ 10:13), Max: 98.4 (19 @ 10:13)  HR: 80 (19 @ 10:13) (70 - 91)  BP: 148/80 (19 @ 10:13) (138/75 - 162/74)  RR: 15 (19 @ 10:13) (15 - 19)  SpO2: 97% (19 @ 10:13) (95% - 98%)  Wt(kg): --    Daily Height in cm: 160.02 (2019 04:28)    Daily     GEN: NAD, resting  HEENT: AT/NC, EOMI  NECK: Supple  CVS:+S1S2   LUNG: CTA B/L   ABD: +BS, NT, ND  EXT: no c/c/e  NEURO: aaox3    Labs:                          11.1   8.12  )-----------( 309      ( 2019 07:08 )             35.6     CBC Full  -  ( 2019 07:08 )  WBC Count : 8.12 K/uL  RBC Count : 4.15 M/uL  Hemoglobin : 11.1 g/dL  Hematocrit : 35.6 %  Platelet Count - Automated : 309 K/uL  Mean Cell Volume : 85.8 fl  Mean Cell Hemoglobin : 26.7 pg  Mean Cell Hemoglobin Concentration : 31.2 gm/dL  Auto Neutrophil # : 6.05 K/uL  Auto Lymphocyte # : 1.52 K/uL  Auto Monocyte # : 0.39 K/uL  Auto Eosinophil # : 0.08 K/uL  Auto Basophil # : 0.04 K/uL  Auto Neutrophil % : 74.5 %  Auto Lymphocyte % : 18.7 %  Auto Monocyte % : 4.8 %  Auto Eosinophil % : 1.0 %  Auto Basophil % : 0.5 %    PT/INR - ( 15 Nov 2019 21:22 )   PT: 11.0 sec;   INR: 0.97          PTT - ( 2019 12:20 )  PTT:124.0 sec    11-    141  |  103  |  14  ----------------------------<  190<H>  4.3   |  26  |  0.78    Ca    9.8      2019 07:08    TPro  7.7  /  Alb  4.4  /  TBili  0.4  /  DBili  x   /  AST  15  /  ALT  10  /  AlkPhos  75  11-      Urinalysis Basic - ( 15 Nov 2019 21:22 )    Color: Yellow / Appearance: Clear / S.025 / pH: x  Gluc: x / Ketone: 15 mg/dL  / Bili: Negative / Urobili: 0.2 E.U./dL   Blood: x / Protein: 100 mg/dL / Nitrite: NEGATIVE   Leuk Esterase: NEGATIVE / RBC: x / WBC < 5 /HPF   Sq Epi: x / Non Sq Epi: 0-5 /HPF / Bacteria: x    11/15  CT CAP  Evaluation of the lower chest demonstrates normal heart size. There is no   pleural or pericardial effusion. There is coronary arterial   calcification. Innumerable bilateral pulmonary nodules along the pleural   surfaces, consistent with pleural-based metastasis measuring up to 15 mm.   Evaluation of the abdomen demonstrates that the liver is not enlarged.   The spleen, pancreas, gallbladder, bilateral adrenal glands and left   kidney are unremarkable aside from a cyst in the posterior interpolar   region of the left kidney measuring 4.5 cm. Evaluation of the right   kidney demonstrates an avidly enhancing solid and cystic mass within the   upper pole measuring 4.4 x 4.2 cm. There is enhancing tumor thrombus   extending into the right renal vein and superiorly into the infrahepatic   IVC; there is thrombus extending into the infracaval IVC and bilateral   common iliac veins with venous distention and surrounding edematous   infiltration. Tumor thrombosis also extends partially into the right   renal vein. Evaluation of the gastrointestinal tract demonstrates   diverticulosis. There is no bowel thickening or bowel obstruction. There   is appearance of the appendix. Evaluation of the pelvis demonstrates   hysterectomy. Bladder is unremarkable. Small left fat-containing inguinal   hernia. Edematous infiltration of the presacral region and   retroperitoneum. No adenopathy. Moderate aortic and arterial vascular   calcification. Opacified aspects of the portal, hepatic splenic superior   mesenteric veins demonstrates no sydney intraluminal thrombus. Evaluation   of the osseous structures are unremarkable    IMPRESSION:    Carcinoma of the upper pole of the right kidney with extensive tumor   thrombus extending into the left renal vein, IVC and bilateral iliac   veins.    Diffuse pleural-based pulmonary metastasis.    CT Chest w/ IVC #    Evaluation of the chest demonstrates that the visualized thyroid gland is   normal in appearance. There is normal heart size. There is moderate   coronary arterial calcification. Negative for thoracic inlet, axillary,   mediastinal or hilar lymphadenopathy. There is no sydney central or   segmental pulmonary embolus. Negative for intraluminal thrombus within   the left atrial appendage. Negative for mitral annular or aortic valvular   calcification. Evaluation of the lung parenchyma demonstrates mild   paraseptal and centrilobular edematous change. There are multiple   pleural-based pulmonary nodules which do not demonstrate avid   enhancement, predominantly along the bilateral lower lobes, largest which   measures 15 mm along the right lower lobe; there is a calcified   subpleural nodule along the right middle lobe measuring 6 mm. Some   pleural-based nodules demonstrates an oblong configuration and the   overall lack of avid enhancement suggests the possibility of multiple   nodular pleural plaques. There is no endobronchial lesion. Evaluation the   upper abdomen redemonstrates carcinoma within the upper pole the right   kidney measuring 4.3 cm with tumor thrombus extending into the IVC and   right renal vein. Evaluation of the osseous structures demonstrates no   destructive lesion.    IMPRESSION:    Pleural-based metastasis versus nodular pleural plaques; PET-CT   correlation is recommended.

## 2019-11-16 NOTE — H&P ADULT - PROBLEM SELECTOR PLAN 9
1) PCP Contacted on Admission: (Y/N) --> Name & Phone #:  2) Date of Contact with PCP:  3) PCP Contacted at Discharge: (Y/N, N/A)  4) Summary of Handoff Given to PCP:   5) Post-Discharge Appointment Date and Location: F: s/p 2L IV NS, none for now  E: replete PRN  N: DASH/TLC, carb consistent  DVT ppx: on heparin gtt  Code: FULL CODE  DIspo: ALBARO F: s/p 2L IV NS, none for now  E: replete PRN  N: DASH/TLC, carb consistent  DVT ppx: on heparin gtt  Code: FULL CODE (MOLST needs to be signed)  DIspo: [email or web address]

## 2019-11-16 NOTE — PROGRESS NOTE ADULT - ASSESSMENT
69F, Romansh speaking, with PMH HTN, DM, HLD, hypothyroid, diverticulosis, w/ 4.2 cm right upper pole renal mass with thrombus seen extending into   the right renal veins and into the IVC. Additional non-tumor thrombus is seen extending inferiorly to the level of the bilateral common iliacs.      Renal Mass extending to IVC-> Will coordinate w/ Urology if plans on taking to OR  Recommend HepGtt w/ Bolus for thrombus in b/l iliacs  Possible CTA chest/Abd/Pel once 24hr post contrast   vascular will follow

## 2019-11-16 NOTE — PROGRESS NOTE ADULT - PROBLEM SELECTOR PLAN 8
Pt on Simvastatin at home 20mg  - c/w lipitor 20mg qhs    #Normocytic anemia - Hb 11.2 on arrival, unknown baseline. No signs of bleeding  - monitor CBC  - f/u iron studies   - keep active type and screnn

## 2019-11-16 NOTE — H&P ADULT - PROBLEM SELECTOR PLAN 4
Pt with h/o HTN, on HCTZ and Norvasc at home  - resume Norvasc 5mg qd   - resume HCTZ 12.5mg qd Numerous subpleural and  parenchymal lung nodules in bilateral lung lobes, largest on right 11mm and left 9 mm. However patient has known history of pulmonary nodules, gets yearly CT scans for monitoring. Has a pulmonologist at [facility] Dr. Castaneda. Daughter to bring in report/CDs of prior scans for comparison  - obtain collateral from patient's pulmonologist  - Pt may need IR guided biopsy of pulmonary nodule   - Pulm consult in AM

## 2019-11-16 NOTE — H&P ADULT - PROBLEM SELECTOR PLAN 8
F: s/p 2L IV NS, none for now  E: replete PRN  N: DASH/TLC, carb consistent  DVT ppx: on heparin gtt  Code: FULL CODE  DIspo: ALBARO Resume simvastatin 10mg qhs Pt on Simvastatin at home 20mg  - will switch to lipitor 20mg qhs    #Normocytic anemia - Hb 11.2 on arrival, unknown baseline. No signs of bleeding  - monitor CBC  - f/u iron studies   - keep active type and screnn

## 2019-11-17 LAB
ANION GAP SERPL CALC-SCNC: 10 MMOL/L — SIGNIFICANT CHANGE UP (ref 5–17)
APTT BLD: 66.8 SEC — HIGH (ref 27.5–36.3)
APTT BLD: 74.8 SEC — HIGH (ref 27.5–36.3)
APTT BLD: 78.4 SEC — HIGH (ref 27.5–36.3)
BUN SERPL-MCNC: 11 MG/DL — SIGNIFICANT CHANGE UP (ref 7–23)
CALCIUM SERPL-MCNC: 9.6 MG/DL — SIGNIFICANT CHANGE UP (ref 8.4–10.5)
CHLORIDE SERPL-SCNC: 104 MMOL/L — SIGNIFICANT CHANGE UP (ref 96–108)
CO2 SERPL-SCNC: 29 MMOL/L — SIGNIFICANT CHANGE UP (ref 22–31)
CREAT SERPL-MCNC: 0.81 MG/DL — SIGNIFICANT CHANGE UP (ref 0.5–1.3)
CULTURE RESULTS: NO GROWTH — SIGNIFICANT CHANGE UP
GLUCOSE BLDC GLUCOMTR-MCNC: 122 MG/DL — HIGH (ref 70–99)
GLUCOSE BLDC GLUCOMTR-MCNC: 153 MG/DL — HIGH (ref 70–99)
GLUCOSE BLDC GLUCOMTR-MCNC: 184 MG/DL — HIGH (ref 70–99)
GLUCOSE BLDC GLUCOMTR-MCNC: 193 MG/DL — HIGH (ref 70–99)
GLUCOSE SERPL-MCNC: 163 MG/DL — HIGH (ref 70–99)
HCT VFR BLD CALC: 35.9 % — SIGNIFICANT CHANGE UP (ref 34.5–45)
HGB BLD-MCNC: 10.8 G/DL — LOW (ref 11.5–15.5)
MAGNESIUM SERPL-MCNC: 2.1 MG/DL — SIGNIFICANT CHANGE UP (ref 1.6–2.6)
MCHC RBC-ENTMCNC: 26.2 PG — LOW (ref 27–34)
MCHC RBC-ENTMCNC: 30.1 GM/DL — LOW (ref 32–36)
MCV RBC AUTO: 86.9 FL — SIGNIFICANT CHANGE UP (ref 80–100)
NRBC # BLD: 0 /100 WBCS — SIGNIFICANT CHANGE UP (ref 0–0)
PHOSPHATE SERPL-MCNC: 2.2 MG/DL — LOW (ref 2.5–4.5)
PLATELET # BLD AUTO: 300 K/UL — SIGNIFICANT CHANGE UP (ref 150–400)
POTASSIUM SERPL-MCNC: 4.6 MMOL/L — SIGNIFICANT CHANGE UP (ref 3.5–5.3)
POTASSIUM SERPL-SCNC: 4.6 MMOL/L — SIGNIFICANT CHANGE UP (ref 3.5–5.3)
RBC # BLD: 4.13 M/UL — SIGNIFICANT CHANGE UP (ref 3.8–5.2)
RBC # FLD: 13.5 % — SIGNIFICANT CHANGE UP (ref 10.3–14.5)
SODIUM SERPL-SCNC: 143 MMOL/L — SIGNIFICANT CHANGE UP (ref 135–145)
SPECIMEN SOURCE: SIGNIFICANT CHANGE UP
WBC # BLD: 5.83 K/UL — SIGNIFICANT CHANGE UP (ref 3.8–10.5)
WBC # FLD AUTO: 5.83 K/UL — SIGNIFICANT CHANGE UP (ref 3.8–10.5)

## 2019-11-17 PROCEDURE — 99231 SBSQ HOSP IP/OBS SF/LOW 25: CPT | Mod: GC

## 2019-11-17 PROCEDURE — 99233 SBSQ HOSP IP/OBS HIGH 50: CPT | Mod: GC

## 2019-11-17 RX ORDER — HEPARIN SODIUM 5000 [USP'U]/ML
1000 INJECTION INTRAVENOUS; SUBCUTANEOUS
Qty: 25000 | Refills: 0 | Status: DISCONTINUED | OUTPATIENT
Start: 2019-11-17 | End: 2019-11-18

## 2019-11-17 RX ADMIN — Medication 75 MICROGRAM(S): at 06:59

## 2019-11-17 RX ADMIN — HEPARIN SODIUM 1000 UNIT(S)/HR: 5000 INJECTION INTRAVENOUS; SUBCUTANEOUS at 13:01

## 2019-11-17 RX ADMIN — Medication 2: at 17:34

## 2019-11-17 RX ADMIN — ATORVASTATIN CALCIUM 20 MILLIGRAM(S): 80 TABLET, FILM COATED ORAL at 22:51

## 2019-11-17 RX ADMIN — Medication 2: at 12:53

## 2019-11-17 RX ADMIN — AMLODIPINE BESYLATE 5 MILLIGRAM(S): 2.5 TABLET ORAL at 06:59

## 2019-11-17 RX ADMIN — Medication 2: at 09:29

## 2019-11-17 NOTE — PROGRESS NOTE ADULT - ASSESSMENT
69F, [place] speaking, with PMH DM, HTN, HLD,  hypothyroid, diverticulosis, presents with 4 days of LLQ abd pain without associated signs of symptoms  admitted to [facility], found to have a new right upper pole renal mass with tumor thrombus extending in to bilateral renal veins and IVC, and multiple lung nodules, concerning for RCC with lung mets. Plan for IR-guided lung nodule biopsy on Monday 11/18

## 2019-11-17 NOTE — PROGRESS NOTE ADULT - SUBJECTIVE AND OBJECTIVE BOX
pt with no complaints  no bleeding  no sob/cp  ros otherwise negative      VITALS  Vital Signs Last 24 Hrs  T(C): 36.9 (16 Nov 2019 10:13), Max: 36.9 (16 Nov 2019 10:13)  T(F): 98.4 (16 Nov 2019 10:13), Max: 98.4 (16 Nov 2019 10:13)  HR: 80 (16 Nov 2019 10:13) (70 - 91)  BP: 148/80 (16 Nov 2019 10:13) (138/75 - 162/74)  BP(mean): --  RR: 15 (16 Nov 2019 10:13) (15 - 19)  SpO2: 97% (16 Nov 2019 10:13) (95% - 98%)    I&O's Summary      CAPILLARY BLOOD GLUCOSE      POCT Blood Glucose.: 192 mg/dL (16 Nov 2019 08:43)      PHYSICAL EXAM  General: In NAD. Resting comfortably in bed.  HEENT: PERRL/ EOMI, no scleral icterus, MMM, no JVD. L eye blindness  Respiratory: Mild bibasilar crackles. No wheezing  Cardiovascular: RRR, +S1/S2. No murmurs, rubs, or gallops.  Abdomen: Soft, ND, normoactive bowel sounds, mild pain to palpation in lower abdominal quadrants. No rebound or guarding.  Extremities: WWP. No lower extremeity edema, pulses equal, no calf tenderness; no cyanosis  Skin: No rashes          Gluc: x / Ketone: 15 mg/dL  / Bili: Negative / Urobili: 0.2 E.U./dL   Blood: x / Protein: 100 mg/dL / Nitrite: NEGATIVE   Leuk Esterase: NEGATIVE / RBC: x / WBC < 5 /HPF   Sq Epi: x / Non Sq Epi: 0-5 /HPF / Bacteria: x        Radiology and other tests: Reviewed

## 2019-11-18 PROBLEM — I10 ESSENTIAL (PRIMARY) HYPERTENSION: Chronic | Status: ACTIVE | Noted: 2019-11-15

## 2019-11-18 PROBLEM — E78.00 PURE HYPERCHOLESTEROLEMIA, UNSPECIFIED: Chronic | Status: ACTIVE | Noted: 2019-11-15

## 2019-11-18 PROBLEM — E03.9 HYPOTHYROIDISM, UNSPECIFIED: Chronic | Status: ACTIVE | Noted: 2019-11-15

## 2019-11-18 PROBLEM — E11.9 TYPE 2 DIABETES MELLITUS WITHOUT COMPLICATIONS: Chronic | Status: ACTIVE | Noted: 2019-11-15

## 2019-11-18 PROBLEM — K57.90 DIVERTICULOSIS OF INTESTINE, PART UNSPECIFIED, WITHOUT PERFORATION OR ABSCESS WITHOUT BLEEDING: Chronic | Status: ACTIVE | Noted: 2019-11-15

## 2019-11-18 LAB
ANION GAP SERPL CALC-SCNC: 10 MMOL/L — SIGNIFICANT CHANGE UP (ref 5–17)
APTT BLD: 54.1 SEC — HIGH (ref 27.5–36.3)
APTT BLD: 66.5 SEC — HIGH (ref 27.5–36.3)
APTT BLD: 70.3 SEC — HIGH (ref 27.5–36.3)
BUN SERPL-MCNC: 9 MG/DL — SIGNIFICANT CHANGE UP (ref 7–23)
CALCIUM SERPL-MCNC: 9.5 MG/DL — SIGNIFICANT CHANGE UP (ref 8.4–10.5)
CHLORIDE SERPL-SCNC: 105 MMOL/L — SIGNIFICANT CHANGE UP (ref 96–108)
CO2 SERPL-SCNC: 26 MMOL/L — SIGNIFICANT CHANGE UP (ref 22–31)
CREAT SERPL-MCNC: 0.83 MG/DL — SIGNIFICANT CHANGE UP (ref 0.5–1.3)
GLUCOSE BLDC GLUCOMTR-MCNC: 111 MG/DL — HIGH (ref 70–99)
GLUCOSE BLDC GLUCOMTR-MCNC: 138 MG/DL — HIGH (ref 70–99)
GLUCOSE BLDC GLUCOMTR-MCNC: 167 MG/DL — HIGH (ref 70–99)
GLUCOSE BLDC GLUCOMTR-MCNC: 170 MG/DL — HIGH (ref 70–99)
GLUCOSE SERPL-MCNC: 170 MG/DL — HIGH (ref 70–99)
HCT VFR BLD CALC: 36.6 % — SIGNIFICANT CHANGE UP (ref 34.5–45)
HGB BLD-MCNC: 11 G/DL — LOW (ref 11.5–15.5)
MAGNESIUM SERPL-MCNC: 2 MG/DL — SIGNIFICANT CHANGE UP (ref 1.6–2.6)
MCHC RBC-ENTMCNC: 26.1 PG — LOW (ref 27–34)
MCHC RBC-ENTMCNC: 30.1 GM/DL — LOW (ref 32–36)
MCV RBC AUTO: 86.9 FL — SIGNIFICANT CHANGE UP (ref 80–100)
NRBC # BLD: 0 /100 WBCS — SIGNIFICANT CHANGE UP (ref 0–0)
PLATELET # BLD AUTO: 315 K/UL — SIGNIFICANT CHANGE UP (ref 150–400)
POTASSIUM SERPL-MCNC: 4.2 MMOL/L — SIGNIFICANT CHANGE UP (ref 3.5–5.3)
POTASSIUM SERPL-SCNC: 4.2 MMOL/L — SIGNIFICANT CHANGE UP (ref 3.5–5.3)
RBC # BLD: 4.21 M/UL — SIGNIFICANT CHANGE UP (ref 3.8–5.2)
RBC # FLD: 13.5 % — SIGNIFICANT CHANGE UP (ref 10.3–14.5)
SODIUM SERPL-SCNC: 141 MMOL/L — SIGNIFICANT CHANGE UP (ref 135–145)
WBC # BLD: 6.5 K/UL — SIGNIFICANT CHANGE UP (ref 3.8–10.5)
WBC # FLD AUTO: 6.5 K/UL — SIGNIFICANT CHANGE UP (ref 3.8–10.5)

## 2019-11-18 PROCEDURE — 99223 1ST HOSP IP/OBS HIGH 75: CPT | Mod: 57

## 2019-11-18 PROCEDURE — 99233 SBSQ HOSP IP/OBS HIGH 50: CPT | Mod: GC

## 2019-11-18 RX ORDER — HEPARIN SODIUM 5000 [USP'U]/ML
1000 INJECTION INTRAVENOUS; SUBCUTANEOUS
Qty: 25000 | Refills: 0 | Status: DISCONTINUED | OUTPATIENT
Start: 2019-11-18 | End: 2019-11-19

## 2019-11-18 RX ADMIN — Medication 2: at 09:17

## 2019-11-18 RX ADMIN — Medication 75 MICROGRAM(S): at 07:10

## 2019-11-18 RX ADMIN — Medication 2: at 22:40

## 2019-11-18 RX ADMIN — ATORVASTATIN CALCIUM 20 MILLIGRAM(S): 80 TABLET, FILM COATED ORAL at 22:40

## 2019-11-18 RX ADMIN — HEPARIN SODIUM 1000 UNIT(S)/HR: 5000 INJECTION INTRAVENOUS; SUBCUTANEOUS at 15:27

## 2019-11-18 RX ADMIN — AMLODIPINE BESYLATE 5 MILLIGRAM(S): 2.5 TABLET ORAL at 07:10

## 2019-11-18 NOTE — PROGRESS NOTE ADULT - PROBLEM SELECTOR PLAN 2
Numerous subpleural and  parenchymal lung nodules in bilateral lung lobes, largest on right 11mm and left 9 mm. However patient has known history of pulmonary nodules, gets yearly CT scans for monitoring. Has a pulmonologist at [facility] Dr. Castaneda.  Plan same as above for renal mass  -IR guided pulm nodule biopsy Numerous subpleural and  parenchymal lung nodules in bilateral lung lobes, largest on right 11mm and left 9 mm. However patient has known history of pulmonary nodules, gets yearly CT scans for monitoring. Has a pulmonologist at [facility] Dr. Castaneda.  Plan same as above for renal mass  -IR guided pulm nodule biopsy today Numerous subpleural and  parenchymal lung nodules in bilateral lung lobes, largest on right 11mm and left 9 mm. However patient has known history of pulmonary nodules, gets yearly CT scans for monitoring. Has a pulmonologist at [facility] Dr. Castaneda.  - IR guided biopsy planned for tomorrow  - plan as per above

## 2019-11-18 NOTE — PROGRESS NOTE ADULT - ASSESSMENT
69F, Irish speaking, with PMH DM, HTN, HLD,  hypothyroid, diverticulosis, presents with 4 days of LLQ abd pain without associated signs of symptoms  admitted to [facility], found to have a new right upper pole renal mass with tumor thrombus extending in to bilateral renal veins and IVC, and multiple lung nodules, concerning for RCC with lung mets. 69F, Japanese speaking, with PMH DM, HTN, HLD,  hypothyroid, diverticulosis, presents with 4 days of LLQ abd pain without associated signs of symptoms  admitted to [facility], found to have a new right upper pole renal mass with tumor thrombus extending in to bilateral renal veins and IVC, and multiple lung nodules, concerning for RCC with lung mets. Currently pending IR-guided biopsy of lung nodule.

## 2019-11-18 NOTE — CONSULT NOTE ADULT - SUBJECTIVE AND OBJECTIVE BOX
Surgeon: Dr. Feliciano    Requesting Physician: Dr. 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 Also notes the pain radiating down both legs. The pain progressively worsened and  she presented to [facility] ED. She denies any associated symptoms including fevers, chills, weight loss, dysuria, hematuria. Of note, patient follows pulmonologist Dr. Castaneda at [facility] for known pulmonary nodules and has serial CT Chest for monitoring. Pt's daughter states it may be from asbestosis, but this was never biopsied. CT chest/abd/pelvis significant for carcinoma of upper pole of right kidney measuring 4.3cm and tumor thrombus extending into renal vein, infrarenal IVC, b/l common iliac veins. Vascular and Urology follow, CT surgery consulted for possible surgical intervention.     For the patient's age appropriate cancer screening, her colonoscopy was done 2 years ago, which revealed benign polyps, last Mammogram 3 months ago which was negative, last pap smear a few months ago, positive for HPV. Per the daughter, patient had similar symptoms 3-4 years ago, and at that time, her CT Abd /pelv revealed renal cysts. Had a PET/CT test ~6 years ago which was negative for any malignancy at that time.      PAST MEDICAL & SURGICAL HISTORY:  Diverticulosis  Hypothyroid  High cholesterol  HTN (hypertension)  Diabetes      MEDICATIONS  (STANDING):  amLODIPine   Tablet 5 milliGRAM(s) Oral daily  atorvastatin 20 milliGRAM(s) Oral at bedtime  dextrose 5%. 1000 milliLiter(s) (50 mL/Hr) IV Continuous <Continuous>  dextrose 50% Injectable 12.5 Gram(s) IV Push once  dextrose 50% Injectable 25 Gram(s) IV Push once  dextrose 50% Injectable 25 Gram(s) IV Push once  heparin  Infusion. 1000 Unit(s)/Hr (10 mL/Hr) IV Continuous <Continuous>  insulin lispro (HumaLOG) corrective regimen sliding scale   SubCutaneous Before meals and at bedtime  levothyroxine 75 MICROGram(s) Oral daily    MEDICATIONS  (PRN):  acetaminophen   Tablet .. 650 milliGRAM(s) Oral every 6 hours PRN Mild Pain (1 - 3)  dextrose 40% Gel 15 Gram(s) Oral once PRN Blood Glucose LESS THAN 70 milliGRAM(s)/deciliter  glucagon  Injectable 1 milliGRAM(s) IntraMuscular once PRN Glucose LESS THAN 70 milligrams/deciliter      Allergies    No Known Allergies    Intolerances        SOCIAL HISTORY:  Smoker: yes former smoker, 1PPDx 40 years, quit 1 year ago  ETOH use:no  Ilicit Drug use:  no      FAMILY HISTORY:      Review of Systems (Need 10):  CONSTITUTIONAL: Denies fevers / chills, sweats, fatigue, weight loss, weight gain                                       NEURO:  Denies parathesias, seizures, syncope, confusion                                                                                  EYES:  Denies blurry vision, discharge, pain, loss of vision                                                                                    ENMT:  Denies difficulty hearing, vertigo, dysphagia, epistaxis, recent dental work                                       CV:  Denies chest pain, palpitations, [place], orthopnea                                                                                           RESPIRATORY:  Denies wWheezing, SOB, cough / sputum, hemoptysis                                                               GI:  Denies nausea, vomiting, diarrhea, constipation, melena                                                                          : Denies hematuria, dysuria, urgency, incontinence                                                                                          MUSKULOSKELETAL:  Denies arthritis, joint swelling, muscle weakness                                                             SKIN/BREAST:  Denies rash, itching, hair loss, masses                                                                                              PSYCH:  Denies depression, anxiety, suicidal ideation                                                                                                HEME/LYMPH:  Denies bruises easily, enlarged lymph nodes, tender lymph nodes                                          ENDOCRINE:  Denies cold intolerance, heat intolerance, polydipsia                                                                      Vital Signs Last 24 Hrs  T(C): 36.8 (18 Nov 2019 10:27), Max: 36.8 (18 Nov 2019 10:27)  T(F): 98.2 (18 Nov 2019 10:27), Max: 98.2 (18 Nov 2019 10:27)  HR: 72 (18 Nov 2019 10:27) (72 - 81)  BP: 140/72 (18 Nov 2019 10:27) (120/80 - 148/76)  BP(mean): --  RR: 16 (18 Nov 2019 10:27) (16 - 18)  SpO2: 96% (18 Nov 2019 10:27) (96% - 97%)    Physical Exam (Need 8)  General: Lying comfortably in bed, NAD    Neurological: visual deficit in L eye, alert and oriented, no focal deficits    Cardiovascular: RRR, normal s1 s2, no M/R/G    Respiratory: Non-labored breathing, chest expansion symmetric, CTA b/l, no W/R/R    Gastrointestinal: +BS x4 quadrant, soft, mildly tender to palpation, non-distended    Extremities: WWP, no pitting edema, no calf tenderness or erythema    Vascular: 2+radial pulses b/l, 2+PT pulses b/l                                                            LABS:                        11.0   6.50  )-----------( 315      ( 18 Nov 2019 06:43 )             36.6     11-18    141  |  105  |  9   ----------------------------<  170<H>  4.2   |  26  |  0.83    Ca    9.5      18 Nov 2019 06:43  Phos  2.2     11-17  Mg     2.0     11-18      PTT - ( 18 Nov 2019 06:43 )  PTT:70.3 sec            RADIOLOGY & ADDITIONAL STUDIES:  CT scan < from: CT Chest w/ IV Cont (11.16.19 @ 11:14) >  INTERPRETATION:  CLINICAL INDICATION: 69-year-old with pulmonary nodules   and renal mass.        FINDINGS: CT of the chest was performed with the administration of   intravenous contrast. Reconstructions were performed in the sagittal and   coronal planes. Reference is made to prior CT dated 11/15/2019.    Evaluation of the chest demonstrates that the visualized thyroid gland is   normal in appearance. There is normal heart size. There is moderate   coronary arterial calcification. Negative for thoracic inlet, axillary,   mediastinal or hilar lymphadenopathy. There is no sydney central or   segmental pulmonary embolus. Negative for intraluminal thrombus within   the left atrial appendage. Negative for mitral annular or aortic valvular   calcification. Evaluation of the lung parenchyma demonstrates mild   paraseptal and centrilobular edematous change. There are multiple   pleural-based pulmonary nodules which do not demonstrate avid   enhancement, predominantly along the bilateral lower lobes, largest which   measures 15 mm along the right lower lobe; there is a calcified   subpleural nodule along the right middle lobe measuring 6 mm. Some   pleural-based nodules demonstrates an oblong configuration and the   overall lack of avid enhancement suggests the possibility of multiple   nodular pleural plaques. There is no endobronchial lesion. Evaluation the   upper abdomen redemonstrates carcinoma within the upper pole the right   kidney measuring 4.3 cm with tumor thrombus extending into the IVC and   right renal vein. Evaluation of the osseous structures demonstrates no   destructive lesion.          IMPRESSION:    Pleural-based metastasis versus nodular pleural plaques; PET-CT   correlation is recommended.      < end of copied text >

## 2019-11-18 NOTE — PROGRESS NOTE ADULT - SUBJECTIVE AND OBJECTIVE BOX
Discussed case with Dr. Villafuerte. Pleural nodules are likely old. Will talk to daughter about obtaining old images for comparison. Will attempt IR biopsy of pleural nodules, however CT scanner for IR non-functional currently. If imaging shows that nodules existed before renal mass then likely can proceed with nephrectomy, ivc thrombectomy on Friday. Needs medical and cardiac clearance. Needs CT venogram of abdomen and pelvis to further evaluate IVC thrombus. Discussed with attending. Discussed case with Dr. Villafuerte. Pleural nodules are likely old. Will talk to daughter about obtaining old images for comparison. Will attempt IR biopsy of pleural nodules, however CT scanner for IR non-functional currently. If imaging shows that nodules existed before renal mass then likely can proceed with nephrectomy, ivc thrombectomy on Friday. Needs medical and cardiac clearance. Needs MR venogram of abdomen and pelvis to further evaluate IVC thrombus. Discussed with attending.

## 2019-11-18 NOTE — CONSULT NOTE ADULT - SUBJECTIVE AND OBJECTIVE BOX
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 States the pain has been intermittent, but has gotten progressively worse, and is now also in the RLQ. States the pain is worse with movement, radiates to the flanks bilaterally, but improved with laying down. She states the sometimes the pain also radiates down the lower extremities bilaterally. She also feel associated bloating and reduced frequency of bowel movements,  but denies any fevers, chills, weight loss, changes in appetite, dysuria, hematuria, urinary frequency, hematochezia, melena, n/v/d, constipation or any other complaints at this time. Of note, patient follows pulmonologist Dr. Castaneda at [facility], pt has known pulmonary nodules and has serial CT Chest for monitoring. Pt's daughter states it may be from asbestosis, but this was never biopsied. For the patient's age appropriate cancer screening, her colonoscopy was done 2 years ago, which revealed benign polyps, last Mammogram 3 months ago which was negative, last pap smear a few months ago, positive for HPV. Per the daughter, patient had similar symptoms 3-4 years ago, and at that time, her CT Abd /pelv revealed renal cysts. Had a PET/CT test ~6 years ago which was negative for any malignancy at that time.    In the ED, vitals were T: 98F, HR 91 /min, /79 mmHg, RR 19, O2 98% on room air. Labs were obtained, revealed Hb  11.2, unknown baseline. BMP, coags, unremarkable. CT Abd/pelv performed which revealed RUL renal mass with tumor thrombus into bilateral renal veins. Urology and Vascular consulted. Patient started on heparin gtt, was also given heparin bolus prior to that. 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   Urology, vascular surgery, cardiac surgery and hepato-biliary involved.     PAST MEDICAL & SURGICAL HISTORY:  Diverticulosis  Hypothyroid  High cholesterol  HTN (hypertension)  Diabetes      MEDICATIONS  (STANDING):  amLODIPine   Tablet 5 milliGRAM(s) Oral daily  atorvastatin 20 milliGRAM(s) Oral at bedtime  dextrose 5%. 1000 milliLiter(s) (50 mL/Hr) IV Continuous <Continuous>  dextrose 50% Injectable 12.5 Gram(s) IV Push once  dextrose 50% Injectable 25 Gram(s) IV Push once  dextrose 50% Injectable 25 Gram(s) IV Push once  heparin  Infusion. 1000 Unit(s)/Hr (10 mL/Hr) IV Continuous <Continuous>  insulin lispro (HumaLOG) corrective regimen sliding scale   SubCutaneous Before meals and at bedtime  levothyroxine 75 MICROGram(s) Oral daily    MEDICATIONS  (PRN):  acetaminophen   Tablet .. 650 milliGRAM(s) Oral every 6 hours PRN Mild Pain (1 - 3)  dextrose 40% Gel 15 Gram(s) Oral once PRN Blood Glucose LESS THAN 70 milliGRAM(s)/deciliter  glucagon  Injectable 1 milliGRAM(s) IntraMuscular once PRN Glucose LESS THAN 70 milligrams/deciliter      Allergies    No Known Allergies    Intolerances        SOCIAL HISTORY: 45 PY smoking history     FAMILY HISTORY: no history of malignant hyperthermia       Vital Signs Last 24 Hrs  T(C): 36.7 (18 Nov 2019 15:46), Max: 36.8 (18 Nov 2019 10:27)  T(F): 98.1 (18 Nov 2019 15:46), Max: 98.2 (18 Nov 2019 10:27)  HR: 83 (18 Nov 2019 15:46) (72 - 83)  BP: 131/79 (18 Nov 2019 15:46) (131/79 - 148/76)  BP(mean): --  RR: 16 (18 Nov 2019 15:46) (16 - 17)  SpO2: 96% (18 Nov 2019 15:46) (96% - 97%)    PHYSICAL EXAM  Neuro: awake and alert, no focal deficit   HEENT: normocephalic, no scleral ictera   Pulm: non labored breathing on room air, lungs are clear to auscultation   CV: regular rate and rhythm, no murmur to ausculation, no carotid bruit   GI: abdomen is soft non tender to palaption, no hepatomegaly  MSK: no swelling, no deformity  Skin: no rash, no wound     LABS:                        11.0   6.50  )-----------( 315      ( 18 Nov 2019 06:43 )             36.6     11-18    141  |  105  |  9   ----------------------------<  170<H>  4.2   |  26  |  0.83    Ca    9.5      18 Nov 2019 06:43  Phos  2.2     11-17  Mg     2.0     11-18      PTT - ( 18 Nov 2019 06:43 )  PTT:70.3 sec      RADIOLOGY & ADDITIONAL STUDIES:

## 2019-11-18 NOTE — PROGRESS NOTE ADULT - PROBLEM SELECTOR PLAN 1
Pt with 4.2 cm right upper pole renal mass with central  necrosis, multiple pulmonary nodules seen in the lung bases, concerning for metastatic lesions. No lymphadenopathy, no lytic lesions.  -IR guided biopsy of pulmonary nodule to r/o RCC with lung mets. If no mets, consider possible right nephroectomy and IVC thromboectomy  -F/u with urology, vascular, and heme-onc Pt with 4.2 cm right upper pole renal mass with central  necrosis, multiple pulmonary nodules seen in the lung bases, concerning for metastatic lesions. No lymphadenopathy, no lytic lesions.  -IR guided biopsy of pulmonary nodule to r/o RCC with lung mets. If no mets, consider possible right nephroectomy and IVC thromboectomy  -D/c heparin infusion to prevent bleeding risk from biopsy  -F/u with urology, vascular, and heme-onc Pt with 4.2 cm right upper pole renal mass with central  necrosis, multiple pulmonary nodules seen in the lung bases, concerning for metastatic lesions. No lymphadenopathy, no lytic lesions.  -IR guided biopsy of pulmonary nodule to r/o RCC with lung mets. If no mets, consider possible right nephroectomy and IVC thromboectomy. Procedure delay to tomorrow (11/19/19) due to CT scanner being down  -D/c heparin infusion to prevent bleeding risk from biopsy   -F/u with vascular to determine when heparin can be restarted   -F/u with heme-onc to determine If patient can be discharge for biopsy results f/u Pt with 4.2 cm right upper pole renal mass with central  necrosis, multiple pulmonary nodules seen in the lung bases, concerning for metastatic lesions. No lymphadenopathy, no lytic lesions.  -IR guided biopsy of pulmonary nodule to r/o RCC with lung mets. If no mets, consider possible right nephroectomy and IVC thromboectomy. Procedure delay to tomorrow (11/19/19) due to CT scanner being down  -heparin infusion restarted  -F/u with vascular to determine if repeat CT scan  -F/u  urology to see if PET is needed  -F/u with heme-onc to determine which anticoagulation pt should be on and If pt can be discharge while waiting for biopsy results Pt with 4.2 cm right upper pole renal mass with central  necrosis, multiple pulmonary nodules seen in the lung bases, concerning for metastatic lesions. No lymphadenopathy, no lytic lesions.  -IR guided biopsy of pulmonary nodule to r/o RCC with lung mets. If no mets, consider possible right nephroectomy and IVC thromboectomy.   - urology, vascular, heme/onc following  -IR guided biopsy delayed to tomorrow (11/19/19) due to CT scanner being down  -heparin gtt restarted, will stop at least 2 hrs prior to procedure tomorrow AM  -F/u with vascular to determine if repeat CT scan is needed  -F/u  urology to see if PET is needed  -F/u with heme-onc to determine which anticoagulation pt should be on and if pt can be discharge while waiting for biopsy results

## 2019-11-18 NOTE — PROGRESS NOTE ADULT - PROBLEM SELECTOR PLAN 5
Pt with known history of diabetes, takes Januvia 100mg and Metformin 500mg BID at home.  - hold oral antidiabetic agents   - Moderate ISS. Pt with known history of diabetes, takes Januvia 100mg and Metformin 500mg BID at home.  - hold oral antidiabetic agents   - c/w Moderate ISS.

## 2019-11-18 NOTE — CONSULT NOTE ADULT - SUBJECTIVE AND OBJECTIVE BOX
70 yo F presented with left abd pain. Imaging showed right kidney mass with extensive tumor thrombus along with what appears to be like pleural mets.     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 States the pain has been intermittent, but has gotten progressively worse, and is now also in the RLQ. States the pain is worse with movement, radiates to the flanks bilaterally, but improved with laying down. She states the sometimes the pain also radiates down the lower extremities bilaterally. She also feel associated bloating and reduced frequency of bowel movements,  but denies any fevers, chills, weight loss, changes in appetite, dysuria, hematuria, urinary frequency, hematochezia, melena, n/v/d, constipation or any other complaints at this time. Of note, patient follows pulmonologist Dr. Castaneda at [facility], pt has known pulmonary nodules and has serial CT Chest for monitoring. Pt's daughter states it may be from asbestosis, but this was never biopsied. For the patient's age appropriate cancer screening, her colonoscopy was done 2 years ago, which revealed benign polyps, last Mammogram 3 months ago which was negative, last pap smear a few months ago, positive for HPV. Per the daughter, patient had similar symptoms 3-4 years ago, and at that time, her CT Abd /pelv revealed renal cysts. Had a PET/CT test ~6 years ago which was negative for any malignancy at that time.    PAST MEDICAL & SURGICAL HISTORY:  Diverticulosis  Hypothyroid  High cholesterol  HTN (hypertension)  Diabetes    Social History: lives with children, former smoker, denies ilicit habits     Home Medications:  hydroCHLOROthiazide 12.5 mg oral capsule: 1 cap(s) orally once a day (16 Nov 2019 04:33)  Januvia 100 mg oral tablet: 1 tab(s) orally once a day (16 Nov 2019 04:33)  metFORMIN 500 mg oral tablet: 1 tab(s) orally 2 times a day (16 Nov 2019 04:33)  Norvasc 5 mg oral tablet: 1 tab(s) orally once a day (16 Nov 2019 04:33)  Prolia 60 mg/mL subcutaneous solution: subcutaneous every 6 months (16 Nov 2019 04:33)  simvastatin 20 mg oral tablet: 1 tab(s) orally once a day (at bedtime) (16 Nov 2019 04:33)  Synthroid 75 mcg (0.075 mg) oral tablet: 1 tab(s) orally once a day (16 Nov 2019 04:33)    Allergies  No Known Allergies    Exam:  Vital Signs Last 24 Hrs  T(C): 36.7 (18 Nov 2019 06:02), Max: 36.8 (17 Nov 2019 11:02)  T(F): 98.1 (18 Nov 2019 06:02), Max: 98.3 (17 Nov 2019 11:02)  HR: 75 (18 Nov 2019 06:02) (73 - 81)  BP: 148/76 (18 Nov 2019 06:02) (120/80 - 148/76)  BP(mean): --  RR: 17 (18 Nov 2019 06:02) (17 - 18)  SpO2: 96% (18 Nov 2019 06:02) (95% - 97%)  AAOx3- NAD, mainly Slovenian speaking.  Daughter by bedside this AM.  No scleral icterus  Reg rate.  Bibasilar crackles mild  +BS, soft, abd discomfort on left  +Pulses, equal.    Labs:  Reviewed.     Imaging reviewed.

## 2019-11-18 NOTE — PROGRESS NOTE ADULT - SUBJECTIVE AND OBJECTIVE BOX
Vascular Surgery Consult - Progress Note    Subjective:  Feels about the same but overall ok. Still has some abdominal pain. Denies CP/SOB, N/V.    Vital Signs Last 24 Hrs  T(C): 36.7 (18 Nov 2019 15:46), Max: 36.8 (18 Nov 2019 10:27)  T(F): 98.1 (18 Nov 2019 15:46), Max: 98.2 (18 Nov 2019 10:27)  HR: 83 (18 Nov 2019 15:46) (72 - 83)  BP: 131/79 (18 Nov 2019 15:46) (131/79 - 148/76)  BP(mean): --  RR: 16 (18 Nov 2019 15:46) (16 - 17)  SpO2: 96% (18 Nov 2019 15:46) (96% - 97%)    I&O's Summary  17 Nov 2019 07:01  -  18 Nov 2019 07:00  --------------------------------------------------------  IN: 240 mL / OUT: 0 mL / NET: 240 mL        Physical Exam:  General: NAD  Pulmonary: Nonlabored breathing, no respiratory distress  Cardiovascular: NSR  Abdominal: soft, NT/ND,  Extremities: WWP, normal strength, no clubbing/cyanosis/edema  Neuro: A/O x3, no focal deficits, normal sensation  Pulses: palpable distal pulses    LABS:                    11.0   6.50  )-----------( 315      ( 18 Nov 2019 06:43 )             36.6     11-18  141  |  105  |  9   ----------------------------<  170<H>  4.2   |  26  |  0.83    Ca    9.5      18 Nov 2019 06:43  Phos  2.2     11-17  Mg     2.0     11-18      PTT - ( 18 Nov 2019 16:51 )  PTT:54.1 sec    CAPILLARY BLOOD GLUCOSE  POCT Blood Glucose.: 138 mg/dL (18 Nov 2019 17:19)  POCT Blood Glucose.: 111 mg/dL (18 Nov 2019 12:08)  POCT Blood Glucose.: 170 mg/dL (18 Nov 2019 08:20)  POCT Blood Glucose.: 122 mg/dL (17 Nov 2019 22:16)

## 2019-11-18 NOTE — CONSULT NOTE ADULT - ASSESSMENT
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   Heme-onc, Vascular, Urology and Cardiac surgery involved.   Hepato-biliary consulted for help in mobilization of retrohepatic vena-cava and possible total vascular isolation of the Liver     Plan:  - Await result of the staging work up   - Surgery team 1c will follow
68 yo F with renal mass/ tumor thrombus/ pleural lesions.    Imaging findings consistent with malignant process.  Will have to determine if primary kidney cancer is localized vs metastatic vs 2 primary malignancies (1 kidney ca and 2 lung ca).  Agree with biopsy of lung lesions.   If negative pt may potentially be a candidate for nephrectomy.   Tumor thrombus is of concern.   Urology following.   Vascular following.   Will follow closely with you.   Discussed with pt's daughter today by bedside.   Cont supportive care.     Kirk Jasmine MD.
69F, Georgian speaking with multiple comorbidities, presenting with 4 days of LLQ abd pain,, found to have a new right upper pole renal mass with tumor thrombus extending in to bilateral renal veins and IVC.    #R Renal Mass   #Extensive Tumor thrombus   #Multiple pleural based lesions/nodules     -suspicion for advanced renal malignancy, however given long standing history of copd/nodules and possible asbestus exposure, will require biopsy most adequate pulmonary site   -obtain collateral CT scan from [facility] to have radiology compare imaging   -plan for pulmonary biopsy, npo Sunday   -if c/w primary renal cell carcinoma, pt likely to fall in favorable/intermediate risk based on IMDC criteria  -will consider brain MRI if stage IV disease confirmed given high rate of neuro mets  -f/u urology, f/u vascular - currently on Hep gtt for tumor thrombus   -add on ldh, uric acid, monitor for B symptoms  -c/w dvt ppx on AC        to d/w Dr. Yanes
69F, German speaking, with PMH HTN, DM, HLD, hypothyroid, diverticulosis, w/ 4.2 cm right upper pole renal mass with thrombus seen extending into   the right renal veins and into the IVC. Additional non-tumor thrombus is seen extending inferiorly to the level of the bilateral common iliacs.      Renal Mass extending to IVC-> Will coordinate w/ Urology if plans on taking to OR  Recommend HepGtt w/ Bolus for thrombus in b/l iliacs  Duplex of LE  Possible CTA chest/Abd/Pel once 24hr post contrast
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 Castaneda at [facility] for known pulmonary nodules and has serial CT Chest for monitoring. per pt's daughter likely related to asbestosis, but this was never biopsied. CT chest/abd/pelvis significant for carcinoma of upper pole of right kidney measuring 4.3cm and tumor thrombus extending from renal vein, infrarenal IVC, b/l common iliac veins. Vascular and Urology follow, CT surgery consulted for possible combination surgical intervention, nephrectomy and IVC reconstruction      Plan:  Problem 1: Renal vein/IVC, common iliac tumor thrombus  - Vascular, urology following for possible surgical intervention, appreciate reccs  - Pt may need combination nephrectomy and IVC thrombectomy and IVC reconstruction  - Please obtain echocardiogram  -Continue heparin drip, PTT therapeutic today  - Dr. Feliciano to see pt    Problem 2: Lung nodules  - Followed by Dr. Castaneda for lung nodules with serial CT scans. Per pt daughter lung nodules likely related to asbestos exposure. Nodules have no bee biopsied   - PET scan 2012 negative  - Pt planned to go for IR guided lung biopsy however CT scanner not functioning and procedure postponed   - If lung nodules benign pt candidate for above procedure    Problem 3: Renal carinoma  - Heme/onc following. At this time uncertain is renal cancer is a metastatic or primary process      Problem 4: Hypothyroidism  - Continue home dose of synthroid    Problem 5: DM  - Hga1c 7.0  - Pt on mISS, blood sugars 110-170s today. Management per primary team     I have reviewed clinical labs tests and reports, radiology tests and reports, as well as old patient medical records, and discussed with the refering physician.
69F with right renal mass supicious for T3+RCC with thrombus into renal vein and IVC with possible lung metastasis    Needs staging imaging with Chest CT with IV contrast  medical oncology  Vascular recs for IVC thrombus  Likely will need systemic therapy  no surgical plan at this time unless metasatic workup with IR biopsy is negative  if significant metastatic disease would not benefit from surgical treatment  will follow

## 2019-11-18 NOTE — PROGRESS NOTE ADULT - ASSESSMENT
69F, Khmer speaking, with PMH HTN, DM, HLD, hypothyroid, diverticulosis, w/ 4.2 cm right upper pole renal mass with thrombus seen extending into   the right renal veins and into the IVC. Additional non-tumor thrombus is seen extending inferiorly to the level of the bilateral common iliacs.    Renal Mass extending to IVC-> Will coordinate w/ Urology likely OR this Friday  Continue Hep gtt  Will continue to follow  Call [phone] with questions

## 2019-11-18 NOTE — PROGRESS NOTE ADULT - PROBLEM SELECTOR PLAN 6
Pt on Simvastatin at home 20mg  - c/w lipitor 20mg qhs    #Normocytic anemia - Hb 11.2 on arrival, unknown baseline. No signs of bleeding  - monitor CBC  - f/u iron studies   - keep active type and screen. Pt on Simvastatin at home 20mg  - c/w lipitor 20mg qhs    #Normocytic anemia - Hb 11.2 on arrival, unknown baseline. No signs of bleeding  - monitor CBC  - iron studies wnl, suggestive of AOCD  - keep active type and screen.

## 2019-11-18 NOTE — PROGRESS NOTE ADULT - SUBJECTIVE AND OBJECTIVE BOX
OVERNIGHT EVENTS:    SUBJECTIVE/INTERVAL HPI: Patient was seen and examined at bedside.    VITALS  Vital Signs Last 24 Hrs  T(C): 36.8 (18 Nov 2019 10:27), Max: 36.8 (17 Nov 2019 11:02)  T(F): 98.2 (18 Nov 2019 10:27), Max: 98.3 (17 Nov 2019 11:02)  HR: 72 (18 Nov 2019 10:27) (72 - 81)  BP: 140/72 (18 Nov 2019 10:27) (120/80 - 148/76)  BP(mean): --  RR: 16 (18 Nov 2019 10:27) (16 - 18)  SpO2: 96% (18 Nov 2019 10:27) (95% - 97%)    I&O's Summary    17 Nov 2019 07:01  -  18 Nov 2019 07:00  --------------------------------------------------------  IN: 240 mL / OUT: 0 mL / NET: 240 mL        CAPILLARY BLOOD GLUCOSE      POCT Blood Glucose.: 170 mg/dL (18 Nov 2019 08:20)  POCT Blood Glucose.: 122 mg/dL (17 Nov 2019 22:16)  POCT Blood Glucose.: 193 mg/dL (17 Nov 2019 17:19)  POCT Blood Glucose.: 184 mg/dL (17 Nov 2019 12:20)      PHYSICAL EXAM      MEDICATIONS  (STANDING):  amLODIPine   Tablet 5 milliGRAM(s) Oral daily  atorvastatin 20 milliGRAM(s) Oral at bedtime  dextrose 5%. 1000 milliLiter(s) (50 mL/Hr) IV Continuous <Continuous>  dextrose 50% Injectable 12.5 Gram(s) IV Push once  dextrose 50% Injectable 25 Gram(s) IV Push once  dextrose 50% Injectable 25 Gram(s) IV Push once  insulin lispro (HumaLOG) corrective regimen sliding scale   SubCutaneous Before meals and at bedtime  levothyroxine 75 MICROGram(s) Oral daily    MEDICATIONS  (PRN):  acetaminophen   Tablet .. 650 milliGRAM(s) Oral every 6 hours PRN Mild Pain (1 - 3)  dextrose 40% Gel 15 Gram(s) Oral once PRN Blood Glucose LESS THAN 70 milliGRAM(s)/deciliter  glucagon  Injectable 1 milliGRAM(s) IntraMuscular once PRN Glucose LESS THAN 70 milligrams/deciliter      LABS                        11.0   6.50  )-----------( 315      ( 18 Nov 2019 06:43 )             36.6     11-18    141  |  105  |  9   ----------------------------<  170<H>  4.2   |  26  |  0.83    Ca    9.5      18 Nov 2019 06:43  Phos  2.2     11-17  Mg     2.0     11-18        PTT - ( 18 Nov 2019 06:43 )  PTT:70.3 sec          Radiology and other tests: Reviewed OVERNIGHT EVENTS: No acute overnight events    SUBJECTIVE/INTERVAL HPI: Patient was seen and examined at bedside. No new complaints. Patient denies changes in vision (baseline blindness in L eye), abdominal pain. Denies SOB, CP, SOB, N/V/D, fevers and chills.     VITALS  Vital Signs Last 24 Hrs  T(C): 36.8 (18 Nov 2019 10:27), Max: 36.8 (17 Nov 2019 11:02)  T(F): 98.2 (18 Nov 2019 10:27), Max: 98.3 (17 Nov 2019 11:02)  HR: 72 (18 Nov 2019 10:27) (72 - 81)  BP: 140/72 (18 Nov 2019 10:27) (120/80 - 148/76)  BP(mean): --  RR: 16 (18 Nov 2019 10:27) (16 - 18)  SpO2: 96% (18 Nov 2019 10:27) (95% - 97%)    I&O's Summary    17 Nov 2019 07:01  -  18 Nov 2019 07:00  --------------------------------------------------------  IN: 240 mL / OUT: 0 mL / NET: 240 mL        CAPILLARY BLOOD GLUCOSE      POCT Blood Glucose.: 170 mg/dL (18 Nov 2019 08:20)  POCT Blood Glucose.: 122 mg/dL (17 Nov 2019 22:16)  POCT Blood Glucose.: 193 mg/dL (17 Nov 2019 17:19)  POCT Blood Glucose.: 184 mg/dL (17 Nov 2019 12:20)      PHYSICAL EXAM  General: In NAD. Resting comfortably in bed.  HEENT: PERRL/ EOMI, no scleral icterus, MMM, no JVD. L eye blindness  Respiratory: Mild bibasilar crackles. No wheezing  Cardiovascular: RRR, +S1/S2. No murmurs, rubs, or gallops.  Abdomen: Soft, ND, normoactive bowel sounds, No tenderness to palpation in all four quadrants. No rebound or guarding.  Extremities: WWP. No lower extremeity edema, pulses equal, no calf tenderness; no cyanosis    MEDICATIONS  (STANDING):  amLODIPine   Tablet 5 milliGRAM(s) Oral daily  atorvastatin 20 milliGRAM(s) Oral at bedtime  dextrose 5%. 1000 milliLiter(s) (50 mL/Hr) IV Continuous <Continuous>  dextrose 50% Injectable 12.5 Gram(s) IV Push once  dextrose 50% Injectable 25 Gram(s) IV Push once  dextrose 50% Injectable 25 Gram(s) IV Push once  insulin lispro (HumaLOG) corrective regimen sliding scale   SubCutaneous Before meals and at bedtime  levothyroxine 75 MICROGram(s) Oral daily    MEDICATIONS  (PRN):  acetaminophen   Tablet .. 650 milliGRAM(s) Oral every 6 hours PRN Mild Pain (1 - 3)  dextrose 40% Gel 15 Gram(s) Oral once PRN Blood Glucose LESS THAN 70 milliGRAM(s)/deciliter  glucagon  Injectable 1 milliGRAM(s) IntraMuscular once PRN Glucose LESS THAN 70 milligrams/deciliter      LABS                        11.0   6.50  )-----------( 315      ( 18 Nov 2019 06:43 )             36.6     11-18    141  |  105  |  9   ----------------------------<  170<H>  4.2   |  26  |  0.83    Ca    9.5      18 Nov 2019 06:43  Phos  2.2     11-17  Mg     2.0     11-18        PTT - ( 18 Nov 2019 06:43 )  PTT:70.3 sec          Radiology and other tests: Reviewed

## 2019-11-18 NOTE — CONSULT NOTE ADULT - ATTENDING COMMENTS
Thank you.  History and exam as noted.  Right renal mass with caval extension, up into the intrahepatic cava.  Currently being worked up for surgical intervention.    Long discussion with patient and her daughter about potential need for median sternotomy/cardiopulmonary bypass/circulatory arrest if surgery should proceed; discussed potential indications thereof, and risks involved.    Will continue to follow and help facilitate OR as appropriate.

## 2019-11-19 ENCOUNTER — RESULT REVIEW (OUTPATIENT)
Age: 69
End: 2019-11-19

## 2019-11-19 LAB
ANION GAP SERPL CALC-SCNC: 10 MMOL/L — SIGNIFICANT CHANGE UP (ref 5–17)
APTT BLD: 30.8 SEC — SIGNIFICANT CHANGE UP (ref 27.5–36.3)
APTT BLD: 50.1 SEC — HIGH (ref 27.5–36.3)
BUN SERPL-MCNC: 9 MG/DL — SIGNIFICANT CHANGE UP (ref 7–23)
CALCIUM SERPL-MCNC: 9.2 MG/DL — SIGNIFICANT CHANGE UP (ref 8.4–10.5)
CHLORIDE SERPL-SCNC: 105 MMOL/L — SIGNIFICANT CHANGE UP (ref 96–108)
CO2 SERPL-SCNC: 27 MMOL/L — SIGNIFICANT CHANGE UP (ref 22–31)
CREAT SERPL-MCNC: 0.75 MG/DL — SIGNIFICANT CHANGE UP (ref 0.5–1.3)
EPO SERPL-MCNC: 5.5 MIU/ML — SIGNIFICANT CHANGE UP (ref 2.6–18.5)
GLUCOSE BLDC GLUCOMTR-MCNC: 129 MG/DL — HIGH (ref 70–99)
GLUCOSE BLDC GLUCOMTR-MCNC: 129 MG/DL — HIGH (ref 70–99)
GLUCOSE BLDC GLUCOMTR-MCNC: 173 MG/DL — HIGH (ref 70–99)
GLUCOSE BLDC GLUCOMTR-MCNC: 184 MG/DL — HIGH (ref 70–99)
GLUCOSE SERPL-MCNC: 167 MG/DL — HIGH (ref 70–99)
HCT VFR BLD CALC: 37.9 % — SIGNIFICANT CHANGE UP (ref 34.5–45)
HGB BLD-MCNC: 11.6 G/DL — SIGNIFICANT CHANGE UP (ref 11.5–15.5)
MAGNESIUM SERPL-MCNC: 2 MG/DL — SIGNIFICANT CHANGE UP (ref 1.6–2.6)
MCHC RBC-ENTMCNC: 26.2 PG — LOW (ref 27–34)
MCHC RBC-ENTMCNC: 30.6 GM/DL — LOW (ref 32–36)
MCV RBC AUTO: 85.6 FL — SIGNIFICANT CHANGE UP (ref 80–100)
NRBC # BLD: 0 /100 WBCS — SIGNIFICANT CHANGE UP (ref 0–0)
PHOSPHATE SERPL-MCNC: 2.5 MG/DL — SIGNIFICANT CHANGE UP (ref 2.5–4.5)
PLATELET # BLD AUTO: 317 K/UL — SIGNIFICANT CHANGE UP (ref 150–400)
POTASSIUM SERPL-MCNC: 4.1 MMOL/L — SIGNIFICANT CHANGE UP (ref 3.5–5.3)
POTASSIUM SERPL-SCNC: 4.1 MMOL/L — SIGNIFICANT CHANGE UP (ref 3.5–5.3)
RBC # BLD: 4.43 M/UL — SIGNIFICANT CHANGE UP (ref 3.8–5.2)
RBC # FLD: 13.5 % — SIGNIFICANT CHANGE UP (ref 10.3–14.5)
SODIUM SERPL-SCNC: 142 MMOL/L — SIGNIFICANT CHANGE UP (ref 135–145)
WBC # BLD: 6.65 K/UL — SIGNIFICANT CHANGE UP (ref 3.8–10.5)
WBC # FLD AUTO: 6.65 K/UL — SIGNIFICANT CHANGE UP (ref 3.8–10.5)

## 2019-11-19 PROCEDURE — 71045 X-RAY EXAM CHEST 1 VIEW: CPT | Mod: 26

## 2019-11-19 PROCEDURE — 77012 CT SCAN FOR NEEDLE BIOPSY: CPT | Mod: 26

## 2019-11-19 PROCEDURE — 99231 SBSQ HOSP IP/OBS SF/LOW 25: CPT

## 2019-11-19 PROCEDURE — 99152 MOD SED SAME PHYS/QHP 5/>YRS: CPT

## 2019-11-19 PROCEDURE — 99222 1ST HOSP IP/OBS MODERATE 55: CPT

## 2019-11-19 PROCEDURE — 32400 NEEDLE BIOPSY CHEST LINING: CPT | Mod: LT

## 2019-11-19 PROCEDURE — 93306 TTE W/DOPPLER COMPLETE: CPT | Mod: 26

## 2019-11-19 PROCEDURE — 32405: CPT | Mod: 53

## 2019-11-19 PROCEDURE — 99233 SBSQ HOSP IP/OBS HIGH 50: CPT | Mod: GC

## 2019-11-19 RX ORDER — METHOCARBAMOL 500 MG/1
1000 TABLET, FILM COATED ORAL EVERY 8 HOURS
Refills: 0 | Status: DISCONTINUED | OUTPATIENT
Start: 2019-11-19 | End: 2019-11-22

## 2019-11-19 RX ORDER — HEPARIN SODIUM 5000 [USP'U]/ML
1100 INJECTION INTRAVENOUS; SUBCUTANEOUS
Qty: 25000 | Refills: 0 | Status: DISCONTINUED | OUTPATIENT
Start: 2019-11-19 | End: 2019-11-20

## 2019-11-19 RX ORDER — HEPARIN SODIUM 5000 [USP'U]/ML
1000 INJECTION INTRAVENOUS; SUBCUTANEOUS
Qty: 25000 | Refills: 0 | Status: DISCONTINUED | OUTPATIENT
Start: 2019-11-19 | End: 2019-11-19

## 2019-11-19 RX ADMIN — Medication 2: at 22:20

## 2019-11-19 RX ADMIN — AMLODIPINE BESYLATE 5 MILLIGRAM(S): 2.5 TABLET ORAL at 06:45

## 2019-11-19 RX ADMIN — HEPARIN SODIUM 10 UNIT(S)/HR: 5000 INJECTION INTRAVENOUS; SUBCUTANEOUS at 14:25

## 2019-11-19 RX ADMIN — METHOCARBAMOL 1000 MILLIGRAM(S): 500 TABLET, FILM COATED ORAL at 23:07

## 2019-11-19 RX ADMIN — Medication 2: at 08:38

## 2019-11-19 RX ADMIN — ATORVASTATIN CALCIUM 20 MILLIGRAM(S): 80 TABLET, FILM COATED ORAL at 21:50

## 2019-11-19 RX ADMIN — Medication 75 MICROGRAM(S): at 06:45

## 2019-11-19 RX ADMIN — HEPARIN SODIUM 1100 UNIT(S)/HR: 5000 INJECTION INTRAVENOUS; SUBCUTANEOUS at 20:04

## 2019-11-19 NOTE — PROGRESS NOTE ADULT - ASSESSMENT
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 Castaneda at [facility] for known pulmonary nodules and has serial CT Chest for monitoring. Per pt's daughter likely related to asbestosis, but this was never biopsied. CT chest/abd/pelvis significant for carcinoma of upper pole of right kidney measuring 4.3cm and tumor thrombus extending from renal vein, infrarenal IVC, b/l common iliac veins. Vascular and Urology follow, CT surgery consulted for possible combination surgical intervention, nephrectomy and IVC reconstruction      Plan:  Problem 1: Renal vein/IVC, common iliac tumor thrombus  - Vascular, urology following for possible surgical intervention, appreciate reccs  - Pt may need combination nephrectomy and IVC thrombectomy and IVC reconstruction   - Please obtain echocardiogram  - Awaiting venogram af abd/pelvis to evaluate IVC thrombus, will f/u results  - Continue heparin drip    Problem 2: Lung nodules  - Pt underwent biopsy of lung nodules today to help determined if renal cancer is primary or metastatic process  - F/u bx reults  - Heme/Onc following, appreciate reccs   - Followed by Dr. Castaneda for lung nodules with serial CT scans. Per pt daughter lung nodules likely related to asbestos exposure. Nodules have no bee biopsied   - PET scan 2012 negative    Problem 3: Renal carcinoma  - Heme/onc and Urology following. At this time uncertain is renal cancer is a metastatic or primary process    Problem 4: Hypothyroidism  - Continue home dose of synthroid    Problem 5: DM  - Hga1c 7.0  - Pt on mISS, blood sugars 120-170s today. Management per primary team

## 2019-11-19 NOTE — PROGRESS NOTE ADULT - SUBJECTIVE AND OBJECTIVE BOX
Patient discussed on morning rounds with Dr. Feliciano    IVC thrombus, renal carcinoma    SUBJECTIVE ASSESSMENT:  69y Female seen and examined at the bedside with daughter. She reports having continued abdominal pain, unchanged in nature or severity. She denies n/v. States she has been eating well but is hungry from being NPO today.       Vital Signs Last 24 Hrs  T(C): 36.5 (19 Nov 2019 15:23), Max: 36.7 (19 Nov 2019 09:19)  T(F): 97.7 (19 Nov 2019 15:23), Max: 98.1 (19 Nov 2019 09:19)  HR: 75 (19 Nov 2019 15:23) (71 - 76)  BP: 129/79 (19 Nov 2019 15:23) (129/79 - 156/88)  BP(mean): --  RR: 18 (19 Nov 2019 15:23) (17 - 18)  SpO2: 99% (19 Nov 2019 15:23) (97% - 99%)  I&O's Detail    18 Nov 2019 07:01  -  19 Nov 2019 07:00  --------------------------------------------------------  IN:    heparin  Infusion.: 80 mL  Total IN: 80 mL    OUT:  Total OUT: 0 mL    Total NET: 80 mL          CHEST TUBE:no  NORMA DRAIN:  No.  EPICARDIAL WIRES: No.  TIE DOWNS: No.  CHANDLER: No.    PHYSICAL EXAM:    General: Lying comfortably in bed, NAD    HEENT: NC/AT, MMM    Neurological: A&O x3, no focal deficits, strength 5/5 throughout    Cardiovascular: RRR, normal s1 s2, no M/R/G    Respiratory: Non-labored breathing, chest expansion symmetric, CTA b/l, no W/R/R    Gastrointestinal: +BS x4 quadrant, soft, mildly tender to palpation in RLQ and LLQ, non-distended    Extremities: WWP, no pitting edema, no calf tenderness or erythema    Vascular: 2+radial pulses b/l, 2+DP pulses b/l    LABS:                        11.6   6.65  )-----------( 317      ( 19 Nov 2019 07:19 )             37.9       COUMADIN:  NO    PTT - ( 19 Nov 2019 07:19 )  PTT:30.8 sec    11-19    142  |  105  |  9   ----------------------------<  167<H>  4.1   |  27  |  0.75    Ca    9.2      19 Nov 2019 07:19  Phos  2.5     11-19  Mg     2.0     11-19            MEDICATIONS  (STANDING):  amLODIPine   Tablet 5 milliGRAM(s) Oral daily  atorvastatin 20 milliGRAM(s) Oral at bedtime  dextrose 5%. 1000 milliLiter(s) (50 mL/Hr) IV Continuous <Continuous>  dextrose 50% Injectable 12.5 Gram(s) IV Push once  dextrose 50% Injectable 25 Gram(s) IV Push once  dextrose 50% Injectable 25 Gram(s) IV Push once  heparin  Infusion 1000 Unit(s)/Hr (10 mL/Hr) IV Continuous <Continuous>  insulin lispro (HumaLOG) corrective regimen sliding scale   SubCutaneous Before meals and at bedtime  levothyroxine 75 MICROGram(s) Oral daily    MEDICATIONS  (PRN):  acetaminophen   Tablet .. 650 milliGRAM(s) Oral every 6 hours PRN Mild Pain (1 - 3)  dextrose 40% Gel 15 Gram(s) Oral once PRN Blood Glucose LESS THAN 70 milliGRAM(s)/deciliter  glucagon  Injectable 1 milliGRAM(s) IntraMuscular once PRN Glucose LESS THAN 70 milligrams/deciliter        RADIOLOGY & ADDITIONAL TESTS:    < from: CT Chest w/ IV Cont (11.16.19 @ 11:14) >      FINDINGS: CT of the chest was performed with the administration of   intravenous contrast. Reconstructions were performed in the sagittal and   coronal planes. Reference is made to prior CT dated 11/15/2019.    Evaluation of the chest demonstrates that the visualized thyroid gland is   normal in appearance. There is normal heart size. There is moderate   coronary arterial calcification. Negative for thoracic inlet, axillary,   mediastinal or hilar lymphadenopathy. There is no sydney central or   segmental pulmonary embolus. Negative for intraluminal thrombus within   the left atrial appendage. Negative for mitral annular or aortic valvular   calcification. Evaluation of the lung parenchyma demonstrates mild   paraseptal and centrilobular edematous change. There are multiple   pleural-based pulmonary nodules which do not demonstrate avid   enhancement, predominantly along the bilateral lower lobes, largest which   measures 15 mm along the right lower lobe; there is a calcified   subpleural nodule along the right middle lobe measuring 6 mm. Some   pleural-based nodules demonstrates an oblong configuration and the   overall lack of avid enhancement suggests the possibility of multiple   nodular pleural plaques. There is no endobronchial lesion. Evaluation the   upper abdomen redemonstrates carcinoma within the upper pole the right   kidney measuring 4.3 cm with tumor thrombus extending into the IVC and   right renal vein. Evaluation of the osseous structures demonstrates no     < end of copied text >

## 2019-11-19 NOTE — PROGRESS NOTE ADULT - PROBLEM SELECTOR PLAN 6
Pt on Simvastatin at home 20mg  - c/w lipitor 20mg qhs    #Normocytic anemia - Hb 11.2 on arrival, unknown baseline. No signs of bleeding  - monitor CBC  - iron studies wnl, suggestive of AOCD  - keep active type and screen.

## 2019-11-19 NOTE — PROGRESS NOTE ADULT - SUBJECTIVE AND OBJECTIVE BOX
Plan as per Urology is potential OR Friday. Patient is to have IR biopsy of lung nodules today and have the surgical plan clarified following the results.   Surgery is waiting on the results of the biopsy and will be involved in the OR if the patient does indeed, have the procedure done on Friday.   - Consider CTV to evaluate vena cava in light of significant tumor thrombus burden as seen on the CT scan.   - Discussed with attending.

## 2019-11-19 NOTE — PROGRESS NOTE ADULT - PROBLEM SELECTOR PLAN 1
Pt with 4.2 cm right upper pole renal mass with central  necrosis, multiple pulmonary nodules seen in the lung bases, concerning for metastatic lesions. No lymphadenopathy, no lytic lesions.  -IR guided biopsy of pulmonary nodule to r/o RCC with lung mets. If no mets, consider possible right nephroectomy and IVC thromboectomy.   - urology, vascular, heme/onc following  -IR guided biopsy delayed to tomorrow (11/19/19) due to CT scanner being down  -heparin gtt restarted, will stop at least 2 hrs prior to procedure tomorrow AM  -F/u with vascular to determine if repeat CT scan is needed  -F/u  urology to see if PET is needed  -F/u with heme-onc to determine which anticoagulation pt should be on and if pt can be discharge while waiting for biopsy results Pt with 4.2 cm right upper pole renal mass with central  necrosis, multiple pulmonary nodules seen in the lung bases.  - IR guided biopsy of pulmonary nodule to r/o RCC with lung mets today  - urology, vascular, CT surgery, Gen surgery, heme/onc following  - possible right nephroectomy and IVC thromboectomy planned for this Friday  - PET scan scheduled for 11/20 in the PM  - pending MR venogram of abdomen/pelvis for thrombus mapping  - hep gtt restarted today after IR guided biopsy, f/u PTT

## 2019-11-19 NOTE — PROGRESS NOTE ADULT - SUBJECTIVE AND OBJECTIVE BOX
OVERNIGHT EVENTS:    SUBJECTIVE/INTERVAL HPI: Patient was seen and examined at bedside this morning. Patient reports no new complaints and is currently waiting for IR-guided biopsy of lung nodule. Denies any fever, chills, CP, SOB, abdominal pain, dysuria.    VITALS  Vital Signs Last 24 Hrs  T(C): 36.3 (19 Nov 2019 04:57), Max: 36.8 (18 Nov 2019 10:27)  T(F): 97.4 (19 Nov 2019 04:57), Max: 98.2 (18 Nov 2019 10:27)  HR: 76 (19 Nov 2019 04:57) (71 - 83)  BP: 146/79 (19 Nov 2019 04:57) (131/79 - 156/88)  BP(mean): --  RR: 18 (19 Nov 2019 04:57) (16 - 18)  SpO2: 98% (19 Nov 2019 04:57) (96% - 98%)    I&O's Summary    18 Nov 2019 07:01  -  19 Nov 2019 07:00  --------------------------------------------------------  IN: 80 mL / OUT: 0 mL / NET: 80 mL        CAPILLARY BLOOD GLUCOSE      POCT Blood Glucose.: 173 mg/dL (19 Nov 2019 08:13)  POCT Blood Glucose.: 167 mg/dL (18 Nov 2019 22:05)  POCT Blood Glucose.: 138 mg/dL (18 Nov 2019 17:19)  POCT Blood Glucose.: 111 mg/dL (18 Nov 2019 12:08)      PHYSICAL EXAM    MEDICATIONS  (STANDING):  amLODIPine   Tablet 5 milliGRAM(s) Oral daily  atorvastatin 20 milliGRAM(s) Oral at bedtime  dextrose 5%. 1000 milliLiter(s) (50 mL/Hr) IV Continuous <Continuous>  dextrose 50% Injectable 12.5 Gram(s) IV Push once  dextrose 50% Injectable 25 Gram(s) IV Push once  dextrose 50% Injectable 25 Gram(s) IV Push once  insulin lispro (HumaLOG) corrective regimen sliding scale   SubCutaneous Before meals and at bedtime  levothyroxine 75 MICROGram(s) Oral daily    MEDICATIONS  (PRN):  acetaminophen   Tablet .. 650 milliGRAM(s) Oral every 6 hours PRN Mild Pain (1 - 3)  dextrose 40% Gel 15 Gram(s) Oral once PRN Blood Glucose LESS THAN 70 milliGRAM(s)/deciliter  glucagon  Injectable 1 milliGRAM(s) IntraMuscular once PRN Glucose LESS THAN 70 milligrams/deciliter      LABS                        11.6   6.65  )-----------( 317      ( 19 Nov 2019 07:19 )             37.9     11-19    142  |  105  |  9   ----------------------------<  167<H>  4.1   |  27  |  0.75    Ca    9.2      19 Nov 2019 07:19  Phos  2.5     11-19  Mg     2.0     11-19        PTT - ( 19 Nov 2019 07:19 )  PTT:30.8 sec          Radiology and other tests: Reviewed 4UR TO UROLOGY TRANSFER NOTE    HOSPITAL COURSE:      OVERNIGHT EVENTS:    SUBJECTIVE/INTERVAL HPI: Patient was seen and examined at bedside this morning. Patient reports no new complaints and is currently waiting for IR-guided biopsy of lung nodule. Denies any fever, chills, CP, SOB, abdominal pain, dysuria.    VITALS  Vital Signs Last 24 Hrs  T(C): 36.3 (19 Nov 2019 04:57), Max: 36.8 (18 Nov 2019 10:27)  T(F): 97.4 (19 Nov 2019 04:57), Max: 98.2 (18 Nov 2019 10:27)  HR: 76 (19 Nov 2019 04:57) (71 - 83)  BP: 146/79 (19 Nov 2019 04:57) (131/79 - 156/88)  BP(mean): --  RR: 18 (19 Nov 2019 04:57) (16 - 18)  SpO2: 98% (19 Nov 2019 04:57) (96% - 98%)    I&O's Summary    18 Nov 2019 07:01  -  19 Nov 2019 07:00  --------------------------------------------------------  IN: 80 mL / OUT: 0 mL / NET: 80 mL        CAPILLARY BLOOD GLUCOSE      POCT Blood Glucose.: 173 mg/dL (19 Nov 2019 08:13)  POCT Blood Glucose.: 167 mg/dL (18 Nov 2019 22:05)  POCT Blood Glucose.: 138 mg/dL (18 Nov 2019 17:19)  POCT Blood Glucose.: 111 mg/dL (18 Nov 2019 12:08)      PHYSICAL EXAM  General: In NAD. Resting comfortably in bed.  HEENT: PERRL/ EOMI, no scleral icterus, MMM, no JVD. L eye blindness  Respiratory: Mild bibasilar crackles. No wheezing  Cardiovascular: RRR, +S1/S2. No murmurs, rubs, or gallops.  Abdomen: Soft, ND, normoactive bowel sounds, No tenderness to palpation in all four quadrants. No rebound or guarding.  Extremities: WWP. No lower extremeity edema, pulses equal, no calf tenderness; no cyanosis    MEDICATIONS  (STANDING):  amLODIPine   Tablet 5 milliGRAM(s) Oral daily  atorvastatin 20 milliGRAM(s) Oral at bedtime  dextrose 5%. 1000 milliLiter(s) (50 mL/Hr) IV Continuous <Continuous>  dextrose 50% Injectable 12.5 Gram(s) IV Push once  dextrose 50% Injectable 25 Gram(s) IV Push once  dextrose 50% Injectable 25 Gram(s) IV Push once  insulin lispro (HumaLOG) corrective regimen sliding scale   SubCutaneous Before meals and at bedtime  levothyroxine 75 MICROGram(s) Oral daily    MEDICATIONS  (PRN):  acetaminophen   Tablet .. 650 milliGRAM(s) Oral every 6 hours PRN Mild Pain (1 - 3)  dextrose 40% Gel 15 Gram(s) Oral once PRN Blood Glucose LESS THAN 70 milliGRAM(s)/deciliter  glucagon  Injectable 1 milliGRAM(s) IntraMuscular once PRN Glucose LESS THAN 70 milligrams/deciliter      LABS                        11.6   6.65  )-----------( 317      ( 19 Nov 2019 07:19 )             37.9     11-19    142  |  105  |  9   ----------------------------<  167<H>  4.1   |  27  |  0.75    Ca    9.2      19 Nov 2019 07:19  Phos  2.5     11-19  Mg     2.0     11-19        PTT - ( 19 Nov 2019 07:19 )  PTT:30.8 sec          Radiology and other tests: Reviewed 4UR TO UROLOGY TRANSFER NOTE    HOSPITAL COURSE:  69F, Brazilian speaking, with PMH DM, HTN, HLD, hypothyroid, diverticulosis, presents with 4 days of LLQ abd pain, found to have R renal mass with thrombus from b/l renal veins extending to IVC and multiple lung nodules on CT imaging. Urology, vascular, and heme/onc consulted. Hep gtt initiated for thrombus. Patient is s/p IR guided biopsy of lung nodule on 11/19. Patient is currently pending PET scan scheduled for 11/19, and cardiac clearance for possible surgical intervention of thrombus and R renal mass. Patient is medically optimized for the procedures at this time and is being transferred to Urology under Dr. Villafuerte's service.      OVERNIGHT EVENTS:    SUBJECTIVE/INTERVAL HPI: Patient was seen and examined at bedside this morning. Patient reports no new complaints and is currently waiting for IR-guided biopsy of lung nodule. Denies any fever, chills, CP, SOB, abdominal pain, dysuria.    VITALS  Vital Signs Last 24 Hrs  T(C): 36.3 (19 Nov 2019 04:57), Max: 36.8 (18 Nov 2019 10:27)  T(F): 97.4 (19 Nov 2019 04:57), Max: 98.2 (18 Nov 2019 10:27)  HR: 76 (19 Nov 2019 04:57) (71 - 83)  BP: 146/79 (19 Nov 2019 04:57) (131/79 - 156/88)  BP(mean): --  RR: 18 (19 Nov 2019 04:57) (16 - 18)  SpO2: 98% (19 Nov 2019 04:57) (96% - 98%)    I&O's Summary    18 Nov 2019 07:01  -  19 Nov 2019 07:00  --------------------------------------------------------  IN: 80 mL / OUT: 0 mL / NET: 80 mL        CAPILLARY BLOOD GLUCOSE      POCT Blood Glucose.: 173 mg/dL (19 Nov 2019 08:13)  POCT Blood Glucose.: 167 mg/dL (18 Nov 2019 22:05)  POCT Blood Glucose.: 138 mg/dL (18 Nov 2019 17:19)  POCT Blood Glucose.: 111 mg/dL (18 Nov 2019 12:08)      PHYSICAL EXAM  General: In NAD. Resting comfortably in bed.  HEENT: PERRL/ EOMI, no scleral icterus, MMM, no JVD. L eye blindness  Respiratory: Mild bibasilar crackles. No wheezing  Cardiovascular: RRR, +S1/S2. No murmurs, rubs, or gallops.  Abdomen: Soft, ND, normoactive bowel sounds, No tenderness to palpation in all four quadrants. No rebound or guarding.  Extremities: WWP. No lower extremeity edema, pulses equal, no calf tenderness; no cyanosis    MEDICATIONS  (STANDING):  amLODIPine   Tablet 5 milliGRAM(s) Oral daily  atorvastatin 20 milliGRAM(s) Oral at bedtime  dextrose 5%. 1000 milliLiter(s) (50 mL/Hr) IV Continuous <Continuous>  dextrose 50% Injectable 12.5 Gram(s) IV Push once  dextrose 50% Injectable 25 Gram(s) IV Push once  dextrose 50% Injectable 25 Gram(s) IV Push once  insulin lispro (HumaLOG) corrective regimen sliding scale   SubCutaneous Before meals and at bedtime  levothyroxine 75 MICROGram(s) Oral daily    MEDICATIONS  (PRN):  acetaminophen   Tablet .. 650 milliGRAM(s) Oral every 6 hours PRN Mild Pain (1 - 3)  dextrose 40% Gel 15 Gram(s) Oral once PRN Blood Glucose LESS THAN 70 milliGRAM(s)/deciliter  glucagon  Injectable 1 milliGRAM(s) IntraMuscular once PRN Glucose LESS THAN 70 milligrams/deciliter      LABS                        11.6   6.65  )-----------( 317      ( 19 Nov 2019 07:19 )             37.9     11-19    142  |  105  |  9   ----------------------------<  167<H>  4.1   |  27  |  0.75    Ca    9.2      19 Nov 2019 07:19  Phos  2.5     11-19  Mg     2.0     11-19        PTT - ( 19 Nov 2019 07:19 )  PTT:30.8 sec          Radiology and other tests: Reviewed 4UR TO UROLOGY TRANSFER NOTE    HOSPITAL COURSE:  69F, Guatemalan speaking, with PMH DM, HTN, HLD, hypothyroid, diverticulosis, presents with 4 days of LLQ abd pain, found to have R renal mass with thrombus from b/l renal veins extending to IVC and multiple lung nodules on CT imaging. Urology, vascular, and heme/onc consulted. Hep gtt initiated for thrombus. Patient is s/p IR guided biopsy of lung nodule on 11/19. Patient is currently pending PET scan scheduled for 11/19, and cardiac clearance for possible surgical intervention of thrombus and R renal mass. Patient is medically optimized for the procedures at this time and is being transferred to Urology under Dr. Villafuerte's service.      OVERNIGHT EVENTS: ENRIQUE overnight.    SUBJECTIVE/INTERVAL HPI: Patient was seen and examined at bedside this morning. Patient reports no new complaints and is currently waiting for IR-guided biopsy of lung nodule. Denies any fever, chills, CP, SOB, abdominal pain, dysuria.    VITALS  Vital Signs Last 24 Hrs  T(C): 36.3 (19 Nov 2019 04:57), Max: 36.8 (18 Nov 2019 10:27)  T(F): 97.4 (19 Nov 2019 04:57), Max: 98.2 (18 Nov 2019 10:27)  HR: 76 (19 Nov 2019 04:57) (71 - 83)  BP: 146/79 (19 Nov 2019 04:57) (131/79 - 156/88)  BP(mean): --  RR: 18 (19 Nov 2019 04:57) (16 - 18)  SpO2: 98% (19 Nov 2019 04:57) (96% - 98%)    I&O's Summary    18 Nov 2019 07:01  -  19 Nov 2019 07:00  --------------------------------------------------------  IN: 80 mL / OUT: 0 mL / NET: 80 mL        CAPILLARY BLOOD GLUCOSE      POCT Blood Glucose.: 173 mg/dL (19 Nov 2019 08:13)  POCT Blood Glucose.: 167 mg/dL (18 Nov 2019 22:05)  POCT Blood Glucose.: 138 mg/dL (18 Nov 2019 17:19)  POCT Blood Glucose.: 111 mg/dL (18 Nov 2019 12:08)      PHYSICAL EXAM  General: In NAD. Resting comfortably in bed.  HEENT: PERRL/ EOMI, no scleral icterus, MMM, no JVD. L eye blindness (baseline)  Respiratory: CTAB. No wheezing, rubs, gallops.  Cardiovascular: RRR, +S1/S2. No murmurs, rubs, or gallops.  Abdomen: Soft, ND, normoactive bowel sounds, No tenderness to palpation in all four quadrants. No rebound or guarding.  Extremities: WWP. No lower extremity edema, pulses equal, no calf tenderness; no cyanosis    MEDICATIONS  (STANDING):  amLODIPine   Tablet 5 milliGRAM(s) Oral daily  atorvastatin 20 milliGRAM(s) Oral at bedtime  dextrose 5%. 1000 milliLiter(s) (50 mL/Hr) IV Continuous <Continuous>  dextrose 50% Injectable 12.5 Gram(s) IV Push once  dextrose 50% Injectable 25 Gram(s) IV Push once  dextrose 50% Injectable 25 Gram(s) IV Push once  insulin lispro (HumaLOG) corrective regimen sliding scale   SubCutaneous Before meals and at bedtime  levothyroxine 75 MICROGram(s) Oral daily    MEDICATIONS  (PRN):  acetaminophen   Tablet .. 650 milliGRAM(s) Oral every 6 hours PRN Mild Pain (1 - 3)  dextrose 40% Gel 15 Gram(s) Oral once PRN Blood Glucose LESS THAN 70 milliGRAM(s)/deciliter  glucagon  Injectable 1 milliGRAM(s) IntraMuscular once PRN Glucose LESS THAN 70 milligrams/deciliter      LABS                        11.6   6.65  )-----------( 317      ( 19 Nov 2019 07:19 )             37.9     11-19    142  |  105  |  9   ----------------------------<  167<H>  4.1   |  27  |  0.75    Ca    9.2      19 Nov 2019 07:19  Phos  2.5     11-19  Mg     2.0     11-19        PTT - ( 19 Nov 2019 07:19 )  PTT:30.8 sec      Radiology and other tests: Reviewed

## 2019-11-19 NOTE — PROGRESS NOTE ADULT - PROBLEM SELECTOR PLAN 3
Pt with h/o HTN, on HCTZ and Norvasc at home  - c/w Norvasc 5mg qd and HCTZ 12.5mg qd. Pt with h/o HTN, on HCTZ and Norvasc at home  - c/w Norvasc 5mg qd and HCTZ 12.5mg qd.  - please hold HCTZ 24 hours prior to planned surgical procedure

## 2019-11-19 NOTE — PROGRESS NOTE ADULT - PROBLEM SELECTOR PLAN 5
Pt with known history of diabetes, takes Januvia 100mg and Metformin 500mg BID at home.  - c/w Moderate ISS.

## 2019-11-19 NOTE — PROGRESS NOTE ADULT - SUBJECTIVE AND OBJECTIVE BOX
Heme/Onc Progress Note (Dr. Jasmine)    Interval History: Pt seen and examined. No new complaints overnight. Patient denies any bleeding/bruising. No fevers noted, no b symptoms.     ROS is otherwise negative.    Allergies    No Known Allergies    Intolerances        Medications:  MEDICATIONS  (STANDING):  amLODIPine   Tablet 5 milliGRAM(s) Oral daily  atorvastatin 20 milliGRAM(s) Oral at bedtime  dextrose 5%. 1000 milliLiter(s) (50 mL/Hr) IV Continuous <Continuous>  dextrose 50% Injectable 12.5 Gram(s) IV Push once  dextrose 50% Injectable 25 Gram(s) IV Push once  dextrose 50% Injectable 25 Gram(s) IV Push once  insulin lispro (HumaLOG) corrective regimen sliding scale   SubCutaneous Before meals and at bedtime  levothyroxine 75 MICROGram(s) Oral daily    MEDICATIONS  (PRN):  acetaminophen   Tablet .. 650 milliGRAM(s) Oral every 6 hours PRN Mild Pain (1 - 3)  dextrose 40% Gel 15 Gram(s) Oral once PRN Blood Glucose LESS THAN 70 milliGRAM(s)/deciliter  glucagon  Injectable 1 milliGRAM(s) IntraMuscular once PRN Glucose LESS THAN 70 milligrams/deciliter    PHYSICAL EXAM:    T(F): 98.1 (11-19-19 @ 09:19), Max: 98.1 (11-18-19 @ 15:46)  HR: 76 (11-19-19 @ 09:19) (71 - 83)  BP: 135/75 (11-19-19 @ 09:19) (131/79 - 156/88)  RR: 18 (11-19-19 @ 09:19) (16 - 18)  SpO2: 98% (11-19-19 @ 09:19) (96% - 98%)  Wt(kg): --    Daily     Daily     GEN: NAD, resting  HEENT: AT/NC, EOMI  NECK: Supple  CVS:+S1S2   LUNG: CTA B/L   ABD: +BS, NT, ND  EXT: no c/c/e  NEURO: aaox3      Labs:                          11.6   6.65  )-----------( 317      ( 19 Nov 2019 07:19 )             37.9     CBC Full  -  ( 19 Nov 2019 07:19 )  WBC Count : 6.65 K/uL  RBC Count : 4.43 M/uL  Hemoglobin : 11.6 g/dL  Hematocrit : 37.9 %  Platelet Count - Automated : 317 K/uL  Mean Cell Volume : 85.6 fl  Mean Cell Hemoglobin : 26.2 pg  Mean Cell Hemoglobin Concentration : 30.6 gm/dL  Auto Neutrophil # : x  Auto Lymphocyte # : x  Auto Monocyte # : x  Auto Eosinophil # : x  Auto Basophil # : x  Auto Neutrophil % : x  Auto Lymphocyte % : x  Auto Monocyte % : x  Auto Eosinophil % : x  Auto Basophil % : x    PTT - ( 19 Nov 2019 07:19 )  PTT:30.8 sec    11-19    142  |  105  |  9   ----------------------------<  167<H>  4.1   |  27  |  0.75    Ca    9.2      19 Nov 2019 07:19  Phos  2.5     11-19  Mg     2.0     11-19          11/15  CT CAP  Evaluation of the lower chest demonstrates normal heart size. There is no   pleural or pericardial effusion. There is coronary arterial   calcification. Innumerable bilateral pulmonary nodules along the pleural   surfaces, consistent with pleural-based metastasis measuring up to 15 mm.   Evaluation of the abdomen demonstrates that the liver is not enlarged.   The spleen, pancreas, gallbladder, bilateral adrenal glands and left   kidney are unremarkable aside from a cyst in the posterior interpolar   region of the left kidney measuring 4.5 cm. Evaluation of the right   kidney demonstrates an avidly enhancing solid and cystic mass within the   upper pole measuring 4.4 x 4.2 cm. There is enhancing tumor thrombus   extending into the right renal vein and superiorly into the infrahepatic   IVC; there is thrombus extending into the infracaval IVC and bilateral   common iliac veins with venous distention and surrounding edematous   infiltration. Tumor thrombosis also extends partially into the right   renal vein. Evaluation of the gastrointestinal tract demonstrates   diverticulosis. There is no bowel thickening or bowel obstruction. There   is appearance of the appendix. Evaluation of the pelvis demonstrates   hysterectomy. Bladder is unremarkable. Small left fat-containing inguinal   hernia. Edematous infiltration of the presacral region and   retroperitoneum. No adenopathy. Moderate aortic and arterial vascular   calcification. Opacified aspects of the portal, hepatic splenic superior   mesenteric veins demonstrates no sydney intraluminal thrombus. Evaluation   of the osseous structures are unremarkable    IMPRESSION:    Carcinoma of the upper pole of the right kidney with extensive tumor   thrombus extending into the left renal vein, IVC and bilateral iliac   veins.    Diffuse pleural-based pulmonary metastasis.    CT Chest w/ IVC 11/16#    Evaluation of the chest demonstrates that the visualized thyroid gland is   normal in appearance. There is normal heart size. There is moderate   coronary arterial calcification. Negative for thoracic inlet, axillary,   mediastinal or hilar lymphadenopathy. There is no sydney central or   segmental pulmonary embolus. Negative for intraluminal thrombus within   the left atrial appendage. Negative for mitral annular or aortic valvular   calcification. Evaluation of the lung parenchyma demonstrates mild   paraseptal and centrilobular edematous change. There are multiple   pleural-based pulmonary nodules which do not demonstrate avid   enhancement, predominantly along the bilateral lower lobes, largest which   measures 15 mm along the right lower lobe; there is a calcified   subpleural nodule along the right middle lobe measuring 6 mm. Some   pleural-based nodules demonstrates an oblong configuration and the   overall lack of avid enhancement suggests the possibility of multiple   nodular pleural plaques. There is no endobronchial lesion. Evaluation the   upper abdomen redemonstrates carcinoma within the upper pole the right   kidney measuring 4.3 cm with tumor thrombus extending into the IVC and   right renal vein. Evaluation of the osseous structures demonstrates no   destructive lesion.    IMPRESSION:    Pleural-based metastasis versus nodular pleural plaques; PET-CT   correlation is recommended.

## 2019-11-19 NOTE — CONSULT NOTE ADULT - SUBJECTIVE AND OBJECTIVE BOX
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 Also notes the pain radiating down both legs. The pain progressively worsened and  she presented to [facility] ED. She denies any associated symptoms including fevers, chills, weight loss, dysuria, hematuria. Of note, patient follows pulmonologist Dr. Castaneda at [facility] for known pulmonary nodules and has serial CT Chest for monitoring. Pt's daughter states it may be from asbestosis, but this was never biopsied. CT chest/abd/pelvis significant for carcinoma of upper pole of right kidney measuring 4.3cm and tumor thrombus extending into renal vein, infrarenal IVC, b/l common iliac veins. Vascular and Urology follow, CT surgery consulted for possible surgical intervention.     PAST MEDICAL & SURGICAL HISTORY:  Diverticulosis  Hypothyroid  High cholesterol  HTN (hypertension)  Diabetes      [ ] Diabetes   [ ] Hypertension  [ ] Hyperlipidemia  [ ] CAD  [ ] PCI  [ ] CABG    PREVIOUS DIAGNOSTIC TESTING:    [ ] Echocardiogram:  [ ]  Catheterization:  [ ] Stress Test:  	    MEDICATIONS:  amLODIPine   Tablet 5 milliGRAM(s) Oral daily        acetaminophen   Tablet .. 650 milliGRAM(s) Oral every 6 hours PRN      atorvastatin 20 milliGRAM(s) Oral at bedtime  dextrose 40% Gel 15 Gram(s) Oral once PRN  dextrose 50% Injectable 12.5 Gram(s) IV Push once  dextrose 50% Injectable 25 Gram(s) IV Push once  dextrose 50% Injectable 25 Gram(s) IV Push once  glucagon  Injectable 1 milliGRAM(s) IntraMuscular once PRN  insulin lispro (HumaLOG) corrective regimen sliding scale   SubCutaneous Before meals and at bedtime  levothyroxine 75 MICROGram(s) Oral daily    dextrose 5%. 1000 milliLiter(s) IV Continuous <Continuous>      FAMILY HISTORY:      SOCIAL HISTORY:    [ x] Non-smoker  [ ] Smoker  [ ] Alcohol    FAMILY HX: no cad in first degree relatives    Allergies    No Known Allergies    Intolerances    	    REVIEW OF SYSTEMS:    [x] as per HPI  CONSTITUTIONAL: No fever, weight loss, or fatigue  ENT:  No difficulty hearing, tinnitus, vertigo; No sinus or throat pain  RESPIRATORY: No cough, wheezing, chills or hemoptysis; No Shortness of Breath  CARDIOVASCULAR: No chest pain, palpitations, dizziness, or leg swelling  GASTROINTESTINAL: No abdominal or epigastric pain. No nausea, vomiting, or hematemesis; No diarrhea or constipation. No melena or hematochezia.  GENITOURINARY: No dysuria, frequency, hematuria, or incontinence  NEUROLOGICAL: No headaches, memory loss, loss of strength, numbness, or tremors  MUSCULOSKELETAL: No joint pain or swelling; No muscle, back, or extremity pain  [x] All others negative	  [ ] Unable to obtain    PHYSICAL EXAM:  T(C): 36.7 (11-19-19 @ 09:19), Max: 36.7 (11-18-19 @ 15:46)  HR: 76 (11-19-19 @ 09:19) (71 - 83)  BP: 135/75 (11-19-19 @ 09:19) (131/79 - 156/88)  RR: 18 (11-19-19 @ 09:19) (16 - 18)  SpO2: 98% (11-19-19 @ 09:19) (96% - 98%)  Wt(kg): --  I&O's Summary    18 Nov 2019 07:01  -  19 Nov 2019 07:00  --------------------------------------------------------  IN: 80 mL / OUT: 0 mL / NET: 80 mL        Appearance: Normal	  HEENT:   Normal oral mucosa, PERRL, EOMI	  Lymphatic: No lymphadenopathy  Cardiovascular: Normal S1 S2, No JVD, No murmurs, No edema  Respiratory: Lungs clear to auscultation	  Psychiatry: A & O x 3, Mood & affect appropriate  Gastrointestinal:  Soft, Non-tender, + BS	  Skin: No rashes, No ecchymoses, No cyanosis	  Neurologic: Non-focal  Extremities: Normal range of motion, No clubbing, cyanosis or edema  Vascular: Peripheral pulses palpable 2+ bilaterally    TELEMETRY: 	    ECG:    ECHO:  STRESS:  CATH:  	  RADIOLOGY:  CXR:  CT:  US:   	  	  LABS:	 	    CARDIAC MARKERS:                                  11.6   6.65  )-----------( 317      ( 19 Nov 2019 07:19 )             37.9     11-19    142  |  105  |  9   ----------------------------<  167<H>  4.1   |  27  |  0.75    Ca    9.2      19 Nov 2019 07:19  Phos  2.5     11-19  Mg     2.0     11-19      proBNP:   Lipid Profile:   HgA1c:   TSH:     ASSESSMENT/PLAN: 	      1. Preoperative eval - RCRI is one for DM, Class 2 Risk, surgery will be prolonged, recommend baseline 12 lead ecg and 2D echo to eval LVEF central pressures and PASP for anesthesia (ordered)    2. Renal vein/IVC, common iliac tumor thrombus  - Vascular, urology following  - Please obtain echocardiogram  -Continue heparin drip will need to d/w primary team re: long term AC and DOAC therapy    3. DM  - Hga1c 7.0  - Pt on mISS, blood sugars 110-170s today. Management per primary team

## 2019-11-19 NOTE — PROGRESS NOTE ADULT - ASSESSMENT
69F, [place] Islander speaking with multiple comorbidities, presenting with 4 days of LLQ abd pain,, found to have a new right upper pole renal mass with tumor thrombus extending in to bilateral renal veins and IVC.    #R Renal Mass   #Extensive Tumor thrombus   #Multiple pleural based lesions/nodules     -suspicion for advanced renal malignancy, however given long standing history of copd/nodules and possible asbestus exposure, will require biopsy most adequate pulmonary site, this will help determine whether pt has metastatic renal ca vs. localized vs. possibly two malignancies (renal+ lung), if metastatic disease is confirmed patient would candidate for systemic therapy, otherwise would be candidate for nephrectomy +thrombectomy  -collateral CT [facility] from [facility] for radiology   -plan for IR guided biospy today, planned for MR and PET imaging   -eval of osseous structures remains unremarkable on imaging   -if path c/w renal cell carcinoma, pt likely to fall in favorable/intermediate risk based on IMDC criteria  -will consider brain MRI if stage IV disease confirmed given high rate of neuro mets  -f/u urology, f/u vascular recs- currently on Hep gtt for tumor thrombus would likely need long term Lovenox   -c/w dvt ppx on AC    d/w Dr. Jasmine  arrange outpt f/u with Dr. Jasmine when planned for dc

## 2019-11-19 NOTE — PROGRESS NOTE ADULT - ASSESSMENT
69F, Uzbek speaking, with PMH DM, HTN, HLD, hypothyroid, diverticulosis, presents with 4 days of LLQ abd pain, found to have R renal mass with thrombus from b/l renal veins extending to IVC and multiple lung nodules on CT imaging. Urology, vascular, and heme/onc consulted. Hep gtt initiated for thrombus. Patient is s/p IR guided biopsy of lung nodule on 11/19. Patient is currently pending PET scan scheduled for 11/19, and cardiac clearance for possible surgical intervention of thrombus and R renal mass. Patient is medically optimized for the procedures at this time and is being transferred to Urology under Dr. Villafuerte's service. 69F, Gabonese speaking, with PMH DM, HTN, HLD, hypothyroid, diverticulosis, presents with 4 days of LLQ abd pain, found to have R renal mass with thrombus from b/l renal veins extending to IVC and multiple lung nodules on CT imaging. Urology, vascular, and heme/onc consulted. Hep gtt initiated for thrombus. Patient is s/p IR guided biopsy of lung nodule on 11/19. Patient is currently pending PET scan scheduled for 11/19, and cardiac clearance for possible surgical intervention of thrombus and R renal mass. Patient is medically optimized for the procedures at this time and is being transferred to Urology under Dr. Villafuerte's service. Patient is medically

## 2019-11-19 NOTE — PROGRESS NOTE ADULT - PROBLEM SELECTOR PLAN 2
Numerous subpleural and  parenchymal lung nodules in bilateral lung lobes, largest on right 11mm and left 9 mm. However patient has known history of pulmonary nodules, gets yearly CT scans for monitoring. Has a pulmonologist at [facility] Dr. Castaneda.  - IR guided biopsy planned for tomorrow  - plan as per above Numerous subpleural and  parenchymal lung nodules in bilateral lung lobes, largest on right 11mm and left 9 mm. However patient has known history of pulmonary nodules, gets yearly CT scans for monitoring. Has a pulmonologist at [facility] Dr. Castaneda.  - prior old CT chest and PET scan uploaded to [facility] system today  - s/p IR-guided biopsy today  - plan as per above

## 2019-11-20 DIAGNOSIS — D64.9 ANEMIA, UNSPECIFIED: ICD-10-CM

## 2019-11-20 DIAGNOSIS — I82.90 ACUTE EMBOLISM AND THROMBOSIS OF UNSPECIFIED VEIN: ICD-10-CM

## 2019-11-20 LAB
ANION GAP SERPL CALC-SCNC: 11 MMOL/L — SIGNIFICANT CHANGE UP (ref 5–17)
APTT BLD: 65.6 SEC — HIGH (ref 27.5–36.3)
APTT BLD: 83.1 SEC — HIGH (ref 27.5–36.3)
BUN SERPL-MCNC: 8 MG/DL — SIGNIFICANT CHANGE UP (ref 7–23)
CALCIUM SERPL-MCNC: 9 MG/DL — SIGNIFICANT CHANGE UP (ref 8.4–10.5)
CHLORIDE SERPL-SCNC: 105 MMOL/L — SIGNIFICANT CHANGE UP (ref 96–108)
CO2 SERPL-SCNC: 23 MMOL/L — SIGNIFICANT CHANGE UP (ref 22–31)
CREAT SERPL-MCNC: 0.78 MG/DL — SIGNIFICANT CHANGE UP (ref 0.5–1.3)
GLUCOSE BLDC GLUCOMTR-MCNC: 145 MG/DL — HIGH (ref 70–99)
GLUCOSE BLDC GLUCOMTR-MCNC: 152 MG/DL — HIGH (ref 70–99)
GLUCOSE BLDC GLUCOMTR-MCNC: 179 MG/DL — HIGH (ref 70–99)
GLUCOSE BLDC GLUCOMTR-MCNC: 201 MG/DL — HIGH (ref 70–99)
GLUCOSE SERPL-MCNC: 161 MG/DL — HIGH (ref 70–99)
GRAM STN FLD: SIGNIFICANT CHANGE UP
HCT VFR BLD CALC: 37.7 % — SIGNIFICANT CHANGE UP (ref 34.5–45)
HGB BLD-MCNC: 11.4 G/DL — LOW (ref 11.5–15.5)
MAGNESIUM SERPL-MCNC: 2 MG/DL — SIGNIFICANT CHANGE UP (ref 1.6–2.6)
MCHC RBC-ENTMCNC: 26.5 PG — LOW (ref 27–34)
MCHC RBC-ENTMCNC: 30.2 GM/DL — LOW (ref 32–36)
MCV RBC AUTO: 87.5 FL — SIGNIFICANT CHANGE UP (ref 80–100)
NON-GYNECOLOGICAL CYTOLOGY STUDY: SIGNIFICANT CHANGE UP
NRBC # BLD: 0 /100 WBCS — SIGNIFICANT CHANGE UP (ref 0–0)
PHOSPHATE SERPL-MCNC: 2.4 MG/DL — LOW (ref 2.5–4.5)
PLATELET # BLD AUTO: 319 K/UL — SIGNIFICANT CHANGE UP (ref 150–400)
POTASSIUM SERPL-MCNC: 4 MMOL/L — SIGNIFICANT CHANGE UP (ref 3.5–5.3)
POTASSIUM SERPL-SCNC: 4 MMOL/L — SIGNIFICANT CHANGE UP (ref 3.5–5.3)
RBC # BLD: 4.31 M/UL — SIGNIFICANT CHANGE UP (ref 3.8–5.2)
RBC # FLD: 13.7 % — SIGNIFICANT CHANGE UP (ref 10.3–14.5)
SODIUM SERPL-SCNC: 139 MMOL/L — SIGNIFICANT CHANGE UP (ref 135–145)
SPECIMEN SOURCE: SIGNIFICANT CHANGE UP
SURGICAL PATHOLOGY STUDY: SIGNIFICANT CHANGE UP
WBC # BLD: 7.34 K/UL — SIGNIFICANT CHANGE UP (ref 3.8–10.5)
WBC # FLD AUTO: 7.34 K/UL — SIGNIFICANT CHANGE UP (ref 3.8–10.5)

## 2019-11-20 PROCEDURE — 72198 MR ANGIO PELVIS W/O & W/DYE: CPT | Mod: 26

## 2019-11-20 PROCEDURE — 99231 SBSQ HOSP IP/OBS SF/LOW 25: CPT

## 2019-11-20 PROCEDURE — 99233 SBSQ HOSP IP/OBS HIGH 50: CPT | Mod: GC

## 2019-11-20 PROCEDURE — 74185 MRA ABD W OR W/O CNTRST: CPT | Mod: 26

## 2019-11-20 RX ORDER — SODIUM CHLORIDE 9 MG/ML
1000 INJECTION INTRAMUSCULAR; INTRAVENOUS; SUBCUTANEOUS
Refills: 0 | Status: DISCONTINUED | OUTPATIENT
Start: 2019-11-20 | End: 2019-11-22

## 2019-11-20 RX ORDER — LIDOCAINE 4 G/100G
1 CREAM TOPICAL DAILY
Refills: 0 | Status: DISCONTINUED | OUTPATIENT
Start: 2019-11-20 | End: 2019-11-20

## 2019-11-20 RX ORDER — HEPARIN SODIUM 5000 [USP'U]/ML
INJECTION INTRAVENOUS; SUBCUTANEOUS
Qty: 25000 | Refills: 0 | Status: DISCONTINUED | OUTPATIENT
Start: 2019-11-20 | End: 2019-11-21

## 2019-11-20 RX ADMIN — ATORVASTATIN CALCIUM 20 MILLIGRAM(S): 80 TABLET, FILM COATED ORAL at 21:33

## 2019-11-20 RX ADMIN — HEPARIN SODIUM 1100 UNIT(S)/HR: 5000 INJECTION INTRAVENOUS; SUBCUTANEOUS at 13:32

## 2019-11-20 RX ADMIN — Medication 75 MICROGRAM(S): at 06:49

## 2019-11-20 RX ADMIN — SODIUM CHLORIDE 75 MILLILITER(S): 9 INJECTION INTRAMUSCULAR; INTRAVENOUS; SUBCUTANEOUS at 22:26

## 2019-11-20 RX ADMIN — AMLODIPINE BESYLATE 5 MILLIGRAM(S): 2.5 TABLET ORAL at 06:49

## 2019-11-20 RX ADMIN — Medication 4: at 21:32

## 2019-11-20 NOTE — PROGRESS NOTE ADULT - SUBJECTIVE AND OBJECTIVE BOX
Surgery awaiting additional imaging of MRV to further evaluate the tumor thrombus in the IVC and bilateral renal veins.   Patient is scheduled for tentative surgery on Friday with urology, vascular and general surgery.   Surgery will follow up the imaging results. No additional recs at this time. Discussed with Dr. Ortega

## 2019-11-20 NOTE — PROGRESS NOTE ADULT - SUBJECTIVE AND OBJECTIVE BOX
GENERAL SURGERY CONSULT PROGRESS NOTE    SUBJECTIVE:   Pt seen & examined at bedside with daughter present for translation. Patient endorses pain on the right side of the abdomen when she is standing or walking, but states that the pain does not bother her when she is sitting or lying down in bed.    MEDICATIONS:  ---NEURO-  acetaminophen   Tablet .. 650 milliGRAM(s) Oral every 6 hours PRN  methocarbamol 1000 milliGRAM(s) Oral every 8 hours PRN  ---CV-  amLODIPine   Tablet 5 milliGRAM(s) Oral daily  ---PULM-  ---GI/FEN-  dextrose 5%. 1000 milliLiter(s) IV Continuous <Continuous>  sodium chloride 0.9%. 1000 milliLiter(s) IV Continuous <Continuous>  ----  ---ID-   ---ENDO-  atorvastatin 20 milliGRAM(s) Oral at bedtime  dextrose 40% Gel 15 Gram(s) Oral once PRN  dextrose 50% Injectable 12.5 Gram(s) IV Push once  dextrose 50% Injectable 25 Gram(s) IV Push once  dextrose 50% Injectable 25 Gram(s) IV Push once  glucagon  Injectable 1 milliGRAM(s) IntraMuscular once PRN  insulin lispro (HumaLOG) corrective regimen sliding scale   SubCutaneous Before meals and at bedtime  levothyroxine 75 MICROGram(s) Oral daily  ---HEME-  heparin  Infusion.  Unit(s)/Hr IV Continuous <Continuous>  ---OTHER-    VOLUME STATUS:  I&O's Detail    19 Nov 2019 07:01  -  20 Nov 2019 07:00  --------------------------------------------------------  IN:    heparin  Infusion.: 121 mL  Total IN: 121 mL    OUT:    Voided: 1 mL  Total OUT: 1 mL    Total NET: 120 mL      VITALS:  Vital Signs Last 24 Hrs  T(C): 36.7 (20 Nov 2019 10:04), Max: 36.8 (20 Nov 2019 05:31)  T(F): 98.1 (20 Nov 2019 10:04), Max: 98.3 (20 Nov 2019 05:31)  HR: 81 (20 Nov 2019 10:04) (71 - 86)  BP: 125/81 (20 Nov 2019 10:04) (118/83 - 153/77)  BP(mean): --  RR: 16 (20 Nov 2019 10:04) (16 - 18)  SpO2: 96% (20 Nov 2019 10:04) (96% - 99%)    PHYSICAL EXAM:    Gen: awake, alert  Neuro: NAD  Resp: No incr WOB  CV: NSR, RRR   Abd: Soft, NT, ND  Extr: WWP, no edema      LABS:                        11.4   7.34  )-----------( 319      ( 20 Nov 2019 07:42 )             37.7     11-20    139  |  105  |  8   ----------------------------<  161<H>  4.0   |  23  |  0.78    Ca    9.0      20 Nov 2019 07:42  Phos  2.4     11-20  Mg     2.0     11-20      PTT - ( 20 Nov 2019 07:42 )  PTT:83.1 sec

## 2019-11-20 NOTE — PROGRESS NOTE ADULT - ASSESSMENT
69y Femal, Wolof speaking,  PMH HTN, DM, HLD, hypothyroid, diverticulosis, COPD/emphysema, former smoker (1ppd x 45 years, quit 1 year ago), with right renal mass.

## 2019-11-20 NOTE — PROGRESS NOTE ADULT - ASSESSMENT
69F, Lao speaking, with PMH HTN, DM, HLD, hypothyroid, diverticulosis, w/ 4.2 cm right upper pole renal mass with thrombus seen extending into   the left renal vein, infrahepatic IVC and bilateral iliac veins, and partially extending into the right renal vein.    Pending MRV abd/pelvis to further evaluate the tumor thrombus in IVC  Tentative plan for surgery coordinated with urology, general surgery and vascular this Friday for possible combination nephrectomy, IVC thrombectomy and IVC reconstruction.    Continue heparin gtt, therapeutic at PTT 83.1 this morning.

## 2019-11-20 NOTE — PROGRESS NOTE ADULT - PROBLEM SELECTOR PLAN 3
-Patient found to have extensive tumor thrombus extending into the left renal vein, IVC and bilateral iliac veins.  Currently on heparin drip.  Vascular following, patient likely for OR on Friday for thrombectomy and IVC reconstruction.

## 2019-11-20 NOTE — PROGRESS NOTE ADULT - PROBLEM SELECTOR PLAN 8
-Patient with normocytic anemia with Hemoglobin 11.2 on arrival, unknown baseline. Patient hemodynamically stable, no signs/symptoms of active bleeding.  Iron studies within normal limits.  Continue to trend CBC, maintain active type and screen.

## 2019-11-20 NOTE — PROGRESS NOTE ADULT - ASSESSMENT
69 year old female with PMH DM, HTN, HLD, hypothyroid, diverticulosis, presents with 4 days of LLQ abd pain, found to have R renal mass with thrombus from b/l renal veins extending to IVC and multiple lung nodules on CT imaging s/p lung nodule biopsy transferred from medicine to urology for possible OR on Friday for possible nephrectomy, thrombectomy and IVC reconstruction.

## 2019-11-20 NOTE — PROGRESS NOTE ADULT - SUBJECTIVE AND OBJECTIVE BOX
Heme/Onc Progress Note (Dr. Jasmine)    Interval History: Patient seen and examined. No acute events at this time. No fevers, no bleeding or bruising. No acute pain currently, resolved.     Allergies    No Known Allergies    Intolerances    Medications:  MEDICATIONS  (STANDING):  amLODIPine   Tablet 5 milliGRAM(s) Oral daily  atorvastatin 20 milliGRAM(s) Oral at bedtime  dextrose 5%. 1000 milliLiter(s) (50 mL/Hr) IV Continuous <Continuous>  dextrose 50% Injectable 12.5 Gram(s) IV Push once  dextrose 50% Injectable 25 Gram(s) IV Push once  dextrose 50% Injectable 25 Gram(s) IV Push once  heparin  Infusion.  Unit(s)/Hr (11 mL/Hr) IV Continuous <Continuous>  insulin lispro (HumaLOG) corrective regimen sliding scale   SubCutaneous Before meals and at bedtime  levothyroxine 75 MICROGram(s) Oral daily  sodium chloride 0.9%. 1000 milliLiter(s) (75 mL/Hr) IV Continuous <Continuous>    MEDICATIONS  (PRN):  acetaminophen   Tablet .. 650 milliGRAM(s) Oral every 6 hours PRN Mild Pain (1 - 3)  dextrose 40% Gel 15 Gram(s) Oral once PRN Blood Glucose LESS THAN 70 milliGRAM(s)/deciliter  glucagon  Injectable 1 milliGRAM(s) IntraMuscular once PRN Glucose LESS THAN 70 milligrams/deciliter  methocarbamol 1000 milliGRAM(s) Oral every 8 hours PRN neck pain    heparin  Infusion.  Unit(s)/Hr IV Continuous <Continuous>      PHYSICAL EXAM:    T(F): 98.9 (11-20-19 @ 15:32), Max: 98.9 (11-20-19 @ 15:32)  HR: 79 (11-20-19 @ 15:32) (71 - 86)  BP: 156/79 (11-20-19 @ 15:32) (118/83 - 156/79)  RR: 18 (11-20-19 @ 15:32) (16 - 18)  SpO2: 97% (11-20-19 @ 15:32) (96% - 97%)  Wt(kg): --    Daily     Daily     GEN: NAD, resting  HEENT: AT/NC, EOMI  NECK: Supple  CVS:+S1S2   LUNG: CTA B/L s/p bx site clean  ABD: +BS, NT, ND  EXT: no c/c/e  NEURO: aaox3    Labs:                          11.4   7.34  )-----------( 319      ( 20 Nov 2019 07:42 )             37.7     CBC Full  -  ( 20 Nov 2019 07:42 )  WBC Count : 7.34 K/uL  RBC Count : 4.31 M/uL  Hemoglobin : 11.4 g/dL  Hematocrit : 37.7 %  Platelet Count - Automated : 319 K/uL  Mean Cell Volume : 87.5 fl  Mean Cell Hemoglobin : 26.5 pg  Mean Cell Hemoglobin Concentration : 30.2 gm/dL  Auto Neutrophil # : x  Auto Lymphocyte # : x  Auto Monocyte # : x  Auto Eosinophil # : x  Auto Basophil # : x  Auto Neutrophil % : x  Auto Lymphocyte % : x  Auto Monocyte % : x  Auto Eosinophil % : x  Auto Basophil % : x    PTT - ( 20 Nov 2019 07:42 )  PTT:83.1 sec    11-20    139  |  105  |  8   ----------------------------<  161<H>  4.0   |  23  |  0.78    Ca    9.0      20 Nov 2019 07:42  Phos  2.4     11-20  Mg     2.0     11-20        11/15  CT CAP  Evaluation of the lower chest demonstrates normal heart size. There is no   pleural or pericardial effusion. There is coronary arterial   calcification. Innumerable bilateral pulmonary nodules along the pleural   surfaces, consistent with pleural-based metastasis measuring up to 15 mm.   Evaluation of the abdomen demonstrates that the liver is not enlarged.   The spleen, pancreas, gallbladder, bilateral adrenal glands and left   kidney are unremarkable aside from a cyst in the posterior interpolar   region of the left kidney measuring 4.5 cm. Evaluation of the right   kidney demonstrates an avidly enhancing solid and cystic mass within the   upper pole measuring 4.4 x 4.2 cm. There is enhancing tumor thrombus   extending into the right renal vein and superiorly into the infrahepatic   IVC; there is thrombus extending into the infracaval IVC and bilateral   common iliac veins with venous distention and surrounding edematous   infiltration. Tumor thrombosis also extends partially into the right   renal vein. Evaluation of the gastrointestinal tract demonstrates   diverticulosis. There is no bowel thickening or bowel obstruction. There   is appearance of the appendix. Evaluation of the pelvis demonstrates   hysterectomy. Bladder is unremarkable. Small left fat-containing inguinal   hernia. Edematous infiltration of the presacral region and   retroperitoneum. No adenopathy. Moderate aortic and arterial vascular   calcification. Opacified aspects of the portal, hepatic splenic superior   mesenteric veins demonstrates no sydney intraluminal thrombus. Evaluation   of the osseous structures are unremarkable    IMPRESSION:    Carcinoma of the upper pole of the right kidney with extensive tumor   thrombus extending into the left renal vein, IVC and bilateral iliac   veins.    Diffuse pleural-based pulmonary metastasis.    CT Chest w/ IVC 11/16#    Evaluation of the chest demonstrates that the visualized thyroid gland is   normal in appearance. There is normal heart size. There is moderate   coronary arterial calcification. Negative for thoracic inlet, axillary,   mediastinal or hilar lymphadenopathy. There is no sydney central or   segmental pulmonary embolus. Negative for intraluminal thrombus within   the left atrial appendage. Negative for mitral annular or aortic valvular   calcification. Evaluation of the lung parenchyma demonstrates mild   paraseptal and centrilobular edematous change. There are multiple   pleural-based pulmonary nodules which do not demonstrate avid   enhancement, predominantly along the bilateral lower lobes, largest which   measures 15 mm along the right lower lobe; there is a calcified   subpleural nodule along the right middle lobe measuring 6 mm. Some   pleural-based nodules demonstrates an oblong configuration and the   overall lack of avid enhancement suggests the possibility of multiple   nodular pleural plaques. There is no endobronchial lesion. Evaluation the   upper abdomen redemonstrates carcinoma within the upper pole the right   kidney measuring 4.3 cm with tumor thrombus extending into the IVC and   right renal vein. Evaluation of the osseous structures demonstrates no   destructive lesion.    IMPRESSION:    Pleural-based metastasis versus nodular pleural plaques; PET-CT   correlation is recommended.    Pathology 11/19    Final Diagnosis    Lung, left lower lobe, pleural based; core biopsy:  - Hyalinized and fibrotic tissue with adjacent chronic  inflammation, consistent with pleural plaque.  - Negative for carcinoma.

## 2019-11-20 NOTE — PROGRESS NOTE ADULT - PROBLEM SELECTOR PLAN 5
-Patient with history of hypothyroidism, TSH within normal limits, continue with synthroid 75mcg daily.

## 2019-11-20 NOTE — PROGRESS NOTE ADULT - PROBLEM SELECTOR PLAN 2
-Patient found to have numerous subpleural and  parenchymal lung nodules in bilateral lung lobes, largest on right 11mm and left 9 mm.  However patient has known history of pulmonary nodules, gets yearly CT scans for monitoring.  Has a pulmonologist at [facility] Dr. Castaneda.  Prior old CT chest and PET scan uploaded to [facility] system today.  S/p IR-guided biopsy 11/19-follow up biopsy results.

## 2019-11-20 NOTE — PROGRESS NOTE ADULT - PROBLEM SELECTOR PLAN 1
-Patient found to have 4.2 cm right upper pole renal mass with central necrosis, multiple pulmonary nodules seen in the lung bases.  Patient had IR guided biopsy of pulmonary nodule to rule out lung mets 11/19-follow up biopsy results.  Also found to have extensive tumor thrombus extending into the left renal vein, IVC and bilateral iliac veins.  Patient transferred to urology yesterday, patient possibly for OR Friday for nephrectomy, thrombectomy and IVC reconstruction with urology and vascular services.  For PET scan and MR venogram today, follow up results.  Heparin drip for extensive thrombus.

## 2019-11-20 NOTE — PROGRESS NOTE ADULT - PROBLEM SELECTOR PLAN 6
-Patient with history of DM, takes Januvia 100mg and Metformin 500mg BID at home.  Continue with moderate sliding scale.

## 2019-11-20 NOTE — PROGRESS NOTE ADULT - SUBJECTIVE AND OBJECTIVE BOX
INTERVAL HPI/OVERNIGHT EVENTS:  No acute events overnight. Patient reports to mild neck discomfort described as a muscle spasm.     VITALS:    T(F): 98.3 (11-20-19 @ 05:31), Max: 98.3 (11-20-19 @ 05:31)  HR: 86 (11-20-19 @ 05:31) (71 - 86)  BP: 118/83 (11-20-19 @ 05:31) (118/83 - 153/77)  RR: 16 (11-20-19 @ 05:31) (16 - 18)  SpO2: 96% (11-20-19 @ 05:31) (96% - 99%)  Wt(kg): --    I&O's Detail    18 Nov 2019 07:01  -  19 Nov 2019 07:00  --------------------------------------------------------  IN:    heparin  Infusion.: 80 mL  Total IN: 80 mL    OUT:  Total OUT: 0 mL    Total NET: 80 mL      19 Nov 2019 07:01  -  20 Nov 2019 05:52  --------------------------------------------------------  IN:    heparin  Infusion.: 77 mL  Total IN: 77 mL    OUT:    Voided: 1 mL  Total OUT: 1 mL    Total NET: 76 mL          MEDICATIONS:    ANTIBIOTICS:      PAIN CONTROL:  acetaminophen   Tablet .. 650 milliGRAM(s) Oral every 6 hours PRN  methocarbamol 1000 milliGRAM(s) Oral every 8 hours PRN       MEDS:      HEME/ONC  heparin  Infusion. 1100 Unit(s)/Hr IV Continuous <Continuous>        PHYSICAL EXAM:  General: No acute distress.  Alert and Oriented  Abdominal Exam: soft, ntnd   Exam: WNL, voiding clear      LABS:                        11.6   6.65  )-----------( 317      ( 19 Nov 2019 07:19 )             37.9     11-19    142  |  105  |  9   ----------------------------<  167<H>  4.1   |  27  |  0.75    Ca    9.2      19 Nov 2019 07:19  Phos  2.5     11-19  Mg     2.0     11-19      PTT - ( 20 Nov 2019 00:11 )  PTT:65.6 sec

## 2019-11-20 NOTE — PROGRESS NOTE ADULT - ASSESSMENT
69F, Czech speaking with multiple comorbidities, presenting with 4 days of LLQ abd pain,, found to have a new right upper pole renal mass with tumor thrombus extending in to bilateral renal veins and IVC.    #R Renal Mass   #Extensive Tumor thrombus   #Multiple pleural based lesions       -Upper pole R Kidney mass highly suspicious for malignancy, w/ extensive tumor thrombus extending to L renal vein, IVC and bilateral iliac veins   -awaiting MRV a/p per surgical service, tentatively planned for OR Friday for possible combination nephrectomy, IVC thrombectomy +reconstruction  -patient c/w Hep gtt (likely Lovenox for extended duration upon d/c)   -left lower lobe pleural based nodule core biopsy, negative for carcinoma, c/w hyalinized +fibrotic tissue, chronic inflammation  -collateral CT scan from [facility] for radiology received   -eval of osseous structures remains unremarkable on imaging   -urology/vascular recs   -dvt ppx on ac      d/w Dr. Jasmine  arrange outpt f/u with Dr. Jasmine when planned for dc

## 2019-11-20 NOTE — PROGRESS NOTE ADULT - SUBJECTIVE AND OBJECTIVE BOX
O/N Events: No acute events overnight    Review of systems: 12 point review of systems negative    VITALS  Vital Signs Last 24 Hrs  T(C): 36.7 (20 Nov 2019 10:04), Max: 36.8 (20 Nov 2019 05:31)  T(F): 98.1 (20 Nov 2019 10:04), Max: 98.3 (20 Nov 2019 05:31)  HR: 81 (20 Nov 2019 10:04) (71 - 86)  BP: 125/81 (20 Nov 2019 10:04) (118/83 - 153/77)  BP(mean): --  RR: 16 (20 Nov 2019 10:04) (16 - 18)  SpO2: 96% (20 Nov 2019 10:04) (96% - 99%)    I&O's Summary    19 Nov 2019 07:01  -  20 Nov 2019 07:00  --------------------------------------------------------  IN: 121 mL / OUT: 1 mL / NET: 120 mL        CAPILLARY BLOOD GLUCOSE      POCT Blood Glucose.: 152 mg/dL (20 Nov 2019 08:31)  POCT Blood Glucose.: 184 mg/dL (19 Nov 2019 22:10)  POCT Blood Glucose.: 129 mg/dL (19 Nov 2019 17:27)  POCT Blood Glucose.: 129 mg/dL (19 Nov 2019 13:41)      PHYSICAL EXAM  General: A&Ox 3; NAD  Head: NC/AT;   Lymph: No LAD  Eyes: PERRL; EOMI; anicteric sclera  Neck: Supple; no JVD  Respiratory: CTA B/L; no wheezes/crackles/rales auscultated w/ good air movement  Cardiovascular: Regular rhythm/rate; S1/S2; no gallops or murmurs auscultated  Gastrointestinal: Soft; NTND w/out rebound tenderness or guarding; bowel sounds normal  Extremities: WWP; no edema or cyanosis; radial/pedal pulses palpable  Musculoskeletal: No joint swelling  Neurological:  CNII-XII grossly intact; no obvious focal deficits    MEDICATIONS  (STANDING):  amLODIPine   Tablet 5 milliGRAM(s) Oral daily  atorvastatin 20 milliGRAM(s) Oral at bedtime  dextrose 5%. 1000 milliLiter(s) (50 mL/Hr) IV Continuous <Continuous>  dextrose 50% Injectable 12.5 Gram(s) IV Push once  dextrose 50% Injectable 25 Gram(s) IV Push once  dextrose 50% Injectable 25 Gram(s) IV Push once  heparin  Infusion. 1100 Unit(s)/Hr (11 mL/Hr) IV Continuous <Continuous>  insulin lispro (HumaLOG) corrective regimen sliding scale   SubCutaneous Before meals and at bedtime  levothyroxine 75 MICROGram(s) Oral daily  sodium chloride 0.9%. 1000 milliLiter(s) (75 mL/Hr) IV Continuous <Continuous>    MEDICATIONS  (PRN):  acetaminophen   Tablet .. 650 milliGRAM(s) Oral every 6 hours PRN Mild Pain (1 - 3)  dextrose 40% Gel 15 Gram(s) Oral once PRN Blood Glucose LESS THAN 70 milliGRAM(s)/deciliter  glucagon  Injectable 1 milliGRAM(s) IntraMuscular once PRN Glucose LESS THAN 70 milligrams/deciliter  methocarbamol 1000 milliGRAM(s) Oral every 8 hours PRN neck pain      LABS                        11.4   7.34  )-----------( 319      ( 20 Nov 2019 07:42 )             37.7     11-20    139  |  105  |  8   ----------------------------<  161<H>  4.0   |  23  |  0.78    Ca    9.0      20 Nov 2019 07:42  Phos  2.4     11-20  Mg     2.0     11-20        PTT - ( 20 Nov 2019 07:42 )  PTT:83.1 sec    < from: CT Chest w/ IV Cont (11.16.19 @ 11:14) >  EXAM:  CT CHEST IC                          PROCEDURE DATE:  11/16/2019          INTERPRETATION:  CLINICAL INDICATION: 69-year-old with pulmonary nodules   and renal mass.        FINDINGS: CT of the chest was performed with the administration of   intravenous contrast. Reconstructions were performed in the sagittal and   coronal planes. Reference is made to prior CT dated 11/15/2019.    Evaluation of the chest demonstrates that the visualized thyroid gland is   normal in appearance. There is normal heart size. There is moderate   coronary arterial calcification. Negative for thoracic inlet, axillary,   mediastinal or hilar lymphadenopathy. There is no sydney central or   segmental pulmonary embolus. Negative for intraluminal thrombus within   the left atrial appendage. Negative for mitral annular or aortic valvular   calcification. Evaluation of the lung parenchyma demonstrates mild   paraseptal and centrilobular edematous change. There are multiple   pleural-based pulmonary nodules which do not demonstrate avid   enhancement, predominantly along the bilateral lower lobes, largest which   measures 15 mm along the right lower lobe; there is a calcified   subpleural nodule along the right middle lobe measuring 6 mm. Some   pleural-based nodules demonstrates an oblong configuration and the   overall lack of avid enhancement suggests the possibility of multiple   nodular pleural plaques. There is no endobronchial lesion. Evaluation the   upper abdomen redemonstrates carcinoma within the upper pole the right   kidney measuring 4.3 cm with tumor thrombus extending into the IVC and   right renal vein. Evaluation of the osseous structures demonstrates no   destructive lesion.          IMPRESSION:    Pleural-based metastasis versus nodular pleural plaques; PET-CT   correlation is recommended.            Dr. Marlow can be contacted at [email or web address] with questions   regarding this report.            Thank you for the opportunity to participate in the care of this patient.        YADY MARLOW M.D., ATTENDING RADIOLOGIST  This document has been electronically signed. Nov 16 2019 11:43AM    < end of copied text >    < from: CT Abdomen and Pelvis w/ Oral Cont and w/ IV Cont (11.15.19 @ 22:52) >  EXAM:  CT ABDOMEN AND PELVIS OC IC                          PROCEDURE DATE:  11/15/2019          INTERPRETATION:  CLINICAL INDICATION: 69-year-old with diverticulosis,   left lower quadrant abdominal pain.        FINDINGS: CT of the abdomen and pelvis was performed with the   administration of intravenous contrast. Reconstructions were in the   sagittal and coronal planes. No prior studies are available for   comparison.    Evaluation of the lower chest demonstrates normal heart size. There is no   pleural or pericardial effusion. There is coronary arterial   calcification. Innumerable bilateral pulmonary nodules along the pleural   surfaces, consistent with pleural-based metastasis measuring up to 15 mm.   Evaluation of the abdomen demonstrates that the liver is not enlarged.   The spleen, pancreas, gallbladder, bilateral adrenal glands and left   kidney are unremarkable aside from a cyst in the posterior interpolar   region of the left kidney measuring 4.5 cm. Evaluation of the right   kidney demonstrates an avidly enhancing solid and cystic mass within the   upper pole measuring 4.4 x 4.2 cm. There is enhancing tumor thrombus   extending into the right renal vein and superiorly into the infrahepatic   IVC; there is thrombus extending into the infracaval IVC and bilateral   common iliac veins with venous distention and surrounding edematous   infiltration. Tumor thrombosis also extends partially into the right   renal vein. Evaluation of the gastrointestinal tract demonstrates  diverticulosis. There is no bowel thickening or bowel obstruction. There   is appearance of the appendix. Evaluation of the pelvis demonstrates   hysterectomy. Bladder is unremarkable. Small left fat-containing inguinal   hernia. Edematous infiltration of the presacral region and   retroperitoneum. No adenopathy. Moderate aortic and arterial vascular   calcification. Opacified aspects of the portal, hepatic splenic superior   mesenteric veins demonstrates no sydney intraluminal thrombus. Evaluation   of the osseous structures are unremarkable.          IMPRESSION:    Carcinoma of the upper pole of the right kidney with extensive tumor   thrombus extending into the left renal vein, IVC and bilateral iliac   veins.    Diffuse pleural-based pulmonary metastasis.    Findings were discussed with Dr. Beth at 12:15am on 11/16/19.           Dr. Marlow can be contacted at [email or web address] with questions   regarding this report.                 Thank you for the opportunity to participatein the care of this patient.    RAY MARIN M.D., RADIOLOGY RESIDENT  This document has been electronically signed.  YADY MARLOW M.D., ATTENDING RADIOLOGIST  This document has been electronically signed. Nov 16 2019 10:33AM    < end of copied text >

## 2019-11-21 LAB
ANION GAP SERPL CALC-SCNC: 8 MMOL/L — SIGNIFICANT CHANGE UP (ref 5–17)
APTT BLD: 80.5 SEC — HIGH (ref 27.5–36.3)
APTT BLD: 80.9 SEC — HIGH (ref 27.5–36.3)
APTT BLD: 81.5 SEC — HIGH (ref 27.5–36.3)
BLD GP AB SCN SERPL QL: NEGATIVE — SIGNIFICANT CHANGE UP
BUN SERPL-MCNC: 9 MG/DL — SIGNIFICANT CHANGE UP (ref 7–23)
CALCIUM SERPL-MCNC: 8.8 MG/DL — SIGNIFICANT CHANGE UP (ref 8.4–10.5)
CHLORIDE SERPL-SCNC: 106 MMOL/L — SIGNIFICANT CHANGE UP (ref 96–108)
CO2 SERPL-SCNC: 26 MMOL/L — SIGNIFICANT CHANGE UP (ref 22–31)
CREAT SERPL-MCNC: 0.74 MG/DL — SIGNIFICANT CHANGE UP (ref 0.5–1.3)
GLUCOSE BLDC GLUCOMTR-MCNC: 117 MG/DL — HIGH (ref 70–99)
GLUCOSE BLDC GLUCOMTR-MCNC: 150 MG/DL — HIGH (ref 70–99)
GLUCOSE SERPL-MCNC: 165 MG/DL — HIGH (ref 70–99)
HCT VFR BLD CALC: 37.1 % — SIGNIFICANT CHANGE UP (ref 34.5–45)
HGB BLD-MCNC: 11.1 G/DL — LOW (ref 11.5–15.5)
INR BLD: 1.1 — SIGNIFICANT CHANGE UP (ref 0.88–1.16)
MAGNESIUM SERPL-MCNC: 2.1 MG/DL — SIGNIFICANT CHANGE UP (ref 1.6–2.6)
MCHC RBC-ENTMCNC: 26.1 PG — LOW (ref 27–34)
MCHC RBC-ENTMCNC: 29.9 GM/DL — LOW (ref 32–36)
MCV RBC AUTO: 87.1 FL — SIGNIFICANT CHANGE UP (ref 80–100)
NRBC # BLD: 0 /100 WBCS — SIGNIFICANT CHANGE UP (ref 0–0)
PHOSPHATE SERPL-MCNC: 2.4 MG/DL — LOW (ref 2.5–4.5)
PLATELET # BLD AUTO: 306 K/UL — SIGNIFICANT CHANGE UP (ref 150–400)
POTASSIUM SERPL-MCNC: 4.3 MMOL/L — SIGNIFICANT CHANGE UP (ref 3.5–5.3)
POTASSIUM SERPL-SCNC: 4.3 MMOL/L — SIGNIFICANT CHANGE UP (ref 3.5–5.3)
PROTHROM AB SERPL-ACNC: 12.5 SEC — SIGNIFICANT CHANGE UP (ref 10–12.9)
RBC # BLD: 4.26 M/UL — SIGNIFICANT CHANGE UP (ref 3.8–5.2)
RBC # FLD: 13.9 % — SIGNIFICANT CHANGE UP (ref 10.3–14.5)
RH IG SCN BLD-IMP: POSITIVE — SIGNIFICANT CHANGE UP
SODIUM SERPL-SCNC: 140 MMOL/L — SIGNIFICANT CHANGE UP (ref 135–145)
WBC # BLD: 7.1 K/UL — SIGNIFICANT CHANGE UP (ref 3.8–10.5)
WBC # FLD AUTO: 7.1 K/UL — SIGNIFICANT CHANGE UP (ref 3.8–10.5)

## 2019-11-21 PROCEDURE — 78815 PET IMAGE W/CT SKULL-THIGH: CPT | Mod: 26

## 2019-11-21 RX ORDER — CHLORHEXIDINE GLUCONATE 213 G/1000ML
1 SOLUTION TOPICAL ONCE
Refills: 0 | Status: COMPLETED | OUTPATIENT
Start: 2019-11-21 | End: 2019-11-21

## 2019-11-21 RX ORDER — MULTIVIT WITH MIN/MFOLATE/K2 340-15/3 G
1 POWDER (GRAM) ORAL ONCE
Refills: 0 | Status: COMPLETED | OUTPATIENT
Start: 2019-11-21 | End: 2019-11-21

## 2019-11-21 RX ORDER — CHLORHEXIDINE GLUCONATE 213 G/1000ML
1 SOLUTION TOPICAL ONCE
Refills: 0 | Status: COMPLETED | OUTPATIENT
Start: 2019-11-21 | End: 2019-11-22

## 2019-11-21 RX ORDER — CHLORHEXIDINE GLUCONATE 213 G/1000ML
30 SOLUTION TOPICAL ONCE
Refills: 0 | Status: COMPLETED | OUTPATIENT
Start: 2019-11-21 | End: 2019-11-21

## 2019-11-21 RX ADMIN — Medication 75 MICROGRAM(S): at 05:22

## 2019-11-21 RX ADMIN — ATORVASTATIN CALCIUM 20 MILLIGRAM(S): 80 TABLET, FILM COATED ORAL at 21:44

## 2019-11-21 RX ADMIN — CHLORHEXIDINE GLUCONATE 30 MILLILITER(S): 213 SOLUTION TOPICAL at 12:07

## 2019-11-21 RX ADMIN — AMLODIPINE BESYLATE 5 MILLIGRAM(S): 2.5 TABLET ORAL at 05:22

## 2019-11-21 RX ADMIN — HEPARIN SODIUM 1100 UNIT(S)/HR: 5000 INJECTION INTRAVENOUS; SUBCUTANEOUS at 12:11

## 2019-11-21 RX ADMIN — Medication 1 BOTTLE: at 17:06

## 2019-11-21 RX ADMIN — CHLORHEXIDINE GLUCONATE 1 APPLICATION(S): 213 SOLUTION TOPICAL at 21:44

## 2019-11-21 RX ADMIN — Medication 62.5 MILLIMOLE(S): at 12:07

## 2019-11-21 NOTE — PROGRESS NOTE ADULT - ASSESSMENT
69F, Urdu speaking, with PMH HTN, DM, HLD, hypothyroid, diverticulosis, w/ 4.2 cm right upper pole renal mass with thrombus seen extending into   the left renal vein, infrahepatic IVC and bilateral iliac veins, and partially extending into the right renal vein.    Pending final read MRV abd/pelvis to further evaluate the tumor thrombus in IVC  Tentative plan for surgery coordinated with urology, general surgery and vascular this Friday for possible combination nephrectomy, IVC thrombectomy and IVC reconstruction.    Continue heparin gtt, therapeutic at PTT 81.5 this morning.

## 2019-11-21 NOTE — PROGRESS NOTE ADULT - SUBJECTIVE AND OBJECTIVE BOX
O/N Events: No acute events overnight    Review of systems: 12 point review of systems negative    VITALS  Vital Signs Last 24 Hrs  T(C): 36.5 (21 Nov 2019 05:53), Max: 37.2 (20 Nov 2019 15:32)  T(F): 97.7 (21 Nov 2019 05:53), Max: 98.9 (20 Nov 2019 15:32)  HR: 61 (21 Nov 2019 05:53) (61 - 86)  BP: 126/74 (21 Nov 2019 05:53) (125/81 - 156/79)  BP(mean): --  RR: 17 (21 Nov 2019 05:53) (16 - 18)  SpO2: 96% (21 Nov 2019 05:53) (95% - 97%)    I&O's Summary    20 Nov 2019 07:01  -  21 Nov 2019 07:00  --------------------------------------------------------  IN: 0 mL / OUT: 1000 mL / NET: -1000 mL    21 Nov 2019 07:01  -  21 Nov 2019 09:26  --------------------------------------------------------  IN: 0 mL / OUT: 150 mL / NET: -150 mL        CAPILLARY BLOOD GLUCOSE      POCT Blood Glucose.: 150 mg/dL (21 Nov 2019 07:19)  POCT Blood Glucose.: 201 mg/dL (20 Nov 2019 21:15)  POCT Blood Glucose.: 145 mg/dL (20 Nov 2019 17:04)  POCT Blood Glucose.: 179 mg/dL (20 Nov 2019 12:16)    PHYSICAL EXAM  General: A&Ox 3; NAD  Head: NC/AT;   Lymph: No LAD  Eyes: PERRL; EOMI; anicteric sclera  Neck: Supple; no JVD  Respiratory: CTA B/L; no wheezes/crackles/rales auscultated w/ good air movement  Cardiovascular: Regular rhythm/rate; S1/S2; no gallops or murmurs auscultated  Gastrointestinal: Soft; NTND w/out rebound tenderness or guarding; bowel sounds normal  Extremities: WWP; no edema or cyanosis; radial/pedal pulses palpable  Musculoskeletal: No joint swelling  Neurological:  CNII-XII grossly intact; no obvious focal deficits      MEDICATIONS  (STANDING):  amLODIPine   Tablet 5 milliGRAM(s) Oral daily  atorvastatin 20 milliGRAM(s) Oral at bedtime  heparin  Infusion.  Unit(s)/Hr (11 mL/Hr) IV Continuous <Continuous>  levothyroxine 75 MICROGram(s) Oral daily  sodium chloride 0.9%. 1000 milliLiter(s) (75 mL/Hr) IV Continuous <Continuous>  sodium phosphate IVPB 15 milliMole(s) IV Intermittent once    MEDICATIONS  (PRN):  acetaminophen   Tablet .. 650 milliGRAM(s) Oral every 6 hours PRN Mild Pain (1 - 3)  methocarbamol 1000 milliGRAM(s) Oral every 8 hours PRN neck pain      LABS                        11.1   7.10  )-----------( 306      ( 21 Nov 2019 06:28 )             37.1     11-21    140  |  106  |  9   ----------------------------<  165<H>  4.3   |  26  |  0.74    Ca    8.8      21 Nov 2019 06:28  Phos  2.4     11-21  Mg     2.1     11-21        PT/INR - ( 21 Nov 2019 06:28 )   PT: 12.5 sec;   INR: 1.10          PTT - ( 21 Nov 2019 06:28 )  PTT:81.5 sec    < from: CT Chest w/ IV Cont (11.16.19 @ 11:14) >  EXAM:  CT CHEST IC                          PROCEDURE DATE:  11/16/2019          INTERPRETATION:  CLINICAL INDICATION: 69-year-old with pulmonary nodules   and renal mass.        FINDINGS: CT of the chest was performed with the administration of   intravenous contrast. Reconstructions were performed in the sagittal and   coronal planes. Reference is made to prior CT dated 11/15/2019.    Evaluation of the chest demonstrates that the visualized thyroid gland is   normal in appearance. There is normal heart size. There is moderate   coronary arterial calcification. Negative for thoracic inlet, axillary,   mediastinal or hilar lymphadenopathy. There is no sydney central or   segmental pulmonary embolus. Negative for intraluminal thrombus within   the left atrial appendage. Negative for mitral annular or aortic valvular   calcification. Evaluation of the lung parenchyma demonstrates mild   paraseptal and centrilobular edematous change. There are multiple   pleural-based pulmonary nodules which do not demonstrate avid   enhancement, predominantly along the bilateral lower lobes, largest which   measures 15 mm along the right lower lobe; there is a calcified   subpleural nodule along the right middle lobe measuring 6 mm. Some   pleural-based nodules demonstrates an oblong configuration and the   overall lack of avid enhancement suggests the possibility of multiple   nodular pleural plaques. There is no endobronchial lesion. Evaluation the   upper abdomen redemonstrates carcinoma within the upper pole the right   kidney measuring 4.3 cm with tumor thrombus extending into the IVC and   right renal vein. Evaluation of the osseous structures demonstrates no   destructive lesion.          IMPRESSION:    Pleural-based metastasis versus nodular pleural plaques; PET-CT   correlation is recommended.            Dr. Kee can be contacted at [email or web address] with questions   regarding this report.            Thank you for the opportunity to participate in the care of this patient.        YADY KEE M.D., ATTENDING RADIOLOGIST  This document has been electronically signed. Nov 16 2019 11:43AM    < end of copied text >    < from: CT Abdomen and Pelvis w/ Oral Cont and w/ IV Cont (11.15.19 @ 22:52) >  EXAM:  CT ABDOMEN AND PELVIS OC IC                          PROCEDURE DATE:  11/15/2019          INTERPRETATION:  CLINICAL INDICATION: 69-year-old with diverticulosis,   left lower quadrant abdominal pain.        FINDINGS: CT of the abdomen and pelvis was performed with the   administration of intravenous contrast. Reconstructions were in the   sagittal and coronal planes. No prior studies are available for   comparison.    Evaluation of the lower chest demonstrates normal heart size. There is no   pleural or pericardial effusion. There is coronary arterial   calcification. Innumerable bilateral pulmonary nodules along the pleural   surfaces, consistent with pleural-based metastasis measuring up to 15 mm.   Evaluation of the abdomen demonstrates that the liver is not enlarged.   The spleen, pancreas, gallbladder, bilateral adrenal glands and left   kidney are unremarkable aside from a cyst in the posterior interpolar   region of the left kidney measuring 4.5 cm. Evaluation of the right   kidney demonstrates an avidly enhancing solid and cystic mass within the   upper pole measuring 4.4 x 4.2 cm. There is enhancing tumor thrombus   extending into the right renal vein and superiorly into the infrahepatic   IVC; there is thrombus extending into the infracaval IVC and bilateral   common iliac veins with venous distention and surrounding edematous   infiltration. Tumor thrombosis also extends partially into the right   renal vein. Evaluation of the gastrointestinal tract demonstrates  diverticulosis. There is no bowel thickening or bowel obstruction. There   is appearance of the appendix. Evaluation of the pelvis demonstrates   hysterectomy. Bladder is unremarkable. Small left fat-containing inguinal   hernia. Edematous infiltration of the presacral region and   retroperitoneum. No adenopathy. Moderate aortic and arterial vascular   calcification. Opacified aspects of the portal, hepatic splenic superior   mesenteric veins demonstrates no sydney intraluminal thrombus. Evaluation   of the osseous structures are unremarkable.          IMPRESSION:    Carcinoma of the upper pole of the right kidney with extensive tumor   thrombus extending into the left renal vein, IVC and bilateral iliac   veins.    Diffuse pleural-based pulmonary metastasis.    Findings were discussed with Dr. Beth at 12:15am on 11/16/19.           Dr. Kee can be contacted at [email or web address] with questions   regarding this report.                 Thank you for the opportunity to participatein the care of this patient.    RAY MARIN M.D., RADIOLOGY RESIDENT  This document has been electronically signed.  YADY KEE M.D., ATTENDING RADIOLOGIST  This document has been electronically signed. Nov 16 2019 10:33AM    < end of copied text >    < from: MR Venogram Pelvis w/ IV Cont (11.20.19 @ 23:55) >    ******PRELIMINARY REPORT******    ******PRELIMINARY REPORT******            EXAM:  MR VENOGRAM PELVIS IC                          EXAM:  MR VENOGRAM ABD IC                          PROCEDURE DATE:  11/20/2019    ******PRELIMINARY REPORT******    ******PRELIMINARY REPORT******              INTERPRETATION:  Brief resident preliminary report. Full dictation to be   issued in 24 hours or sooner upon request.  4.2 x 3.7 cm heterogeneous mass upper pole right kidney. Tumor thrombus   involves the inferior vena cava and extends from just above the   infrahepatic IVC to the level of the lowermost portion of the right renal   vein. The infrarenal IVC and iliac veins appear patent on this MRV study.            Thank you for the opportunity to participate in the care of this patient.  ******PRELIMINARY REPORT******    ******PRELIMINARY REPORT******          RONALD PICKERING M.D., RADIOLOGY RESIDENT    < end of copied text >

## 2019-11-21 NOTE — PROGRESS NOTE ADULT - SUBJECTIVE AND OBJECTIVE BOX
24 hr events    Subjective: Patient seen and examined bedside.       Vital Signs Last 24 Hrs  T(C): 36.3 (21 Nov 2019 09:10), Max: 37.2 (20 Nov 2019 15:32)  T(F): 97.3 (21 Nov 2019 09:10), Max: 98.9 (20 Nov 2019 15:32)  HR: 75 (21 Nov 2019 09:10) (61 - 86)  BP: 103/68 (21 Nov 2019 09:10) (103/68 - 156/79)  BP(mean): --  RR: 17 (21 Nov 2019 09:10) (17 - 18)  SpO2: 96% (21 Nov 2019 09:10) (95% - 97%)    I&O's Summary    20 Nov 2019 07:01  -  21 Nov 2019 07:00  --------------------------------------------------------  IN: 0 mL / OUT: 1000 mL / NET: -1000 mL    21 Nov 2019 07:01  -  21 Nov 2019 10:25  --------------------------------------------------------  IN: 0 mL / OUT: 150 mL / NET: -150 mL        Physical Exam:  General: NAD, resting comfortably  Pulmonary: normal resp effort  Cardiovascular: NSR  Abdominal: soft, NT/ND  Extremities: WWP, normal strength, no clubbing/cyanosis/edema  Neuro: A/O x 3, no focal deficits, sensation normal    Lines/drains/tubes:    LABS:                        11.1   7.10  )-----------( 306      ( 21 Nov 2019 06:28 )             37.1     11-21    140  |  106  |  9   ----------------------------<  165<H>  4.3   |  26  |  0.74    Ca    8.8      21 Nov 2019 06:28  Phos  2.4     11-21  Mg     2.1     11-21      PT/INR - ( 21 Nov 2019 06:28 )   PT: 12.5 sec;   INR: 1.10          PTT - ( 21 Nov 2019 06:28 )  PTT:81.5 sec      CAPILLARY BLOOD GLUCOSE      POCT Blood Glucose.: 150 mg/dL (21 Nov 2019 07:19)  POCT Blood Glucose.: 201 mg/dL (20 Nov 2019 21:15)  POCT Blood Glucose.: 145 mg/dL (20 Nov 2019 17:04)  POCT Blood Glucose.: 179 mg/dL (20 Nov 2019 12:16)      RADIOLOGY & ADDITIONAL TESTS:  ******PRELIMINARY REPORT******    ******PRELIMINARY REPORT******            EXAM:  MR VENOGRAM PELVIS IC                          EXAM:  MR VENOGRAM ABD IC                          PROCEDURE DATE:  11/20/2019    ******PRELIMINARY REPORT******    ******PRELIMINARY REPORT******              INTERPRETATION:  Brief resident preliminary report. Full dictation to be   issued in 24 hours or sooner upon request.  4.2 x 3.7 cm heterogeneous mass upper pole right kidney. Tumor thrombus   involves the inferior vena cava and extends from just above the   infrahepatic IVC to the level of the lowermost portion of the right renal   vein. The infrarenal IVC and iliac veins appear patent on this MRV study.            Thank you for the opportunity to participate in the care of this patient.  ******PRELIMINARY REPORT******    ******PRELIMINARY REPORT******

## 2019-11-21 NOTE — PROGRESS NOTE ADULT - PROBLEM SELECTOR PLAN 1
-stable  -Patient scheduled for PET SCAN thursday 11/21/19. To be NPO after midnight tonight (order in) and no insulin for 4hrs prior to 10am 11/21 and no medication/fluids with D5w/dextrose to be given 4hrs prior  -Diet: NPO since midnight  -OOB, IS  -DVT prophylaxis   -continue heparin-monitor PTT  -f/u am labs  -f/u MR venogram full report

## 2019-11-21 NOTE — DIETITIAN INITIAL EVALUATION ADULT. - PROBLEM SELECTOR PLAN 8
Pt on Simvastatin at home 20mg  - will switch to lipitor 20mg qhs    #Normocytic anemia - Hb 11.2 on arrival, unknown baseline. No signs of bleeding  - monitor CBC  - f/u iron studies   - keep active type and screnn

## 2019-11-21 NOTE — PROGRESS NOTE ADULT - PROBLEM SELECTOR PLAN 1
Ms. Radha Villarreal is a 54 y.o. female who is newly enrolled in the [facility].     The following information was reviewed/updated:  Preferred pharmacy   [id number] - [place] - [address]  [address]  [place] [address]  Phone: [phone] Fax: [phone]    Patient prefers a 90 days supply  Review of patient's allergies indicates:   Allergen Reactions    No known drug allergies      Current Outpatient Prescriptions   Medication Sig    aspirin 81 MG Chew Take 1 tablet (81 mg total) by mouth once daily.     No current facility-administered medications for this visit.        Reviewed non-pharmacologic therapies and impact on BP:    1. Low-sodium diet- 11 mmHg reduction 2-4 weeks. I have reviewed [facility].A[facility]S.[facility] diet sodium restrictions (<2000mg/day). Patient does not add salt to her foods. She does cook with sea salt. She does not count her sodium intake and does not choose low sodium options.  2. Exercise- 7 mmHg reduction 4-12 weeks. I have recommended patient engage in exercise as tolerated at least 30 minutes 5x per week to improve cardiovascular health.  Patient does not exercise at this time. She has a gym membership and wants to get back into exercising. She ordered a treadmill to increase her exercise.  3. Alcohol intake- 3 mmHg reduction 4-12 weeks. I have discussed with patient the maximum recommended number of 2 drink(s) per day for men/women. Patient does not consume alcohol.    Explained that we expect patient to obtain several blood pressures per week at random times of day.   Our goal is to get  BP to consistently below 130/80mmHg and make the process convenient so patient can avoid extra trips to the office. Getting your blood pressure below 130/80mmHg (definition of control) will reduce your risk for heart attack, kidney failure, stroke and death (as well as kidney failure, eye disease, & dementia).     Patient is not meeting the goal already.   When  asked what the patient thinks is causing BP to be elevated, she states: stress for the holidays, kids and Islam by volunteering.    Instructed patient not to allow anyone else to use phone and BP cuff.   I'm not available for emergencies. Patient will call [facility] on-call ([phone] or [phone]) or [phone] if needed.     Discussed appropriate BP measuring technique:  Before taking your blood pressure  Find a quiet place. You will need to listen for your heartbeat.  Roll up the sleeve on your left arm or remove any tight-sleeved clothing, if needed. (It's best to take your blood pressure from your left arm if you are right-handed.You can use the other arm if you have been told by your health care provider to do so.)  Rest in a chair next to a table for 5 to 10 minutes. (Your left arm should rest comfortably at heart level.)  Sit up straight with your back against the chair, legs uncrossed and on the ground.  Rest your forearm on the table with the palm of your hand facing up.  You should not talk, read the newspaper, or watch television during this process.      Patient and I agreed that she will continue to monitor blood pressure and sodium intake, and continue to remain adherent to medications.     I will plan to follow-up with the patient in 2 weeks.   Emailed patient link to [facility]'s [facility] webpage and my contact information in case she has any questions.     Last 5 Patient Entered Readings Current 30 Day Average: 149/112       Units 12/13/2017    Time - 12:36 PM    Systolic Blood Pressure - 149    Diastolic Blood Pressure - 112    Pulse bpm 94        Patient will test daily and we will follow-up in two weeks to obtain more readings. Patient is not taking BP medication at this time. Her BP reading yesterday at the [facility] was with her standing up and not rested for 5-10 minutes. She was in a rush.        -Patient found to have 4.2 cm right upper pole renal mass with central necrosis, multiple pulmonary nodules seen in the lung bases.  Patient had IR guided biopsy of pulmonary nodule to rule out lung mets 11/19-follow up biopsy results.  Also found to have extensive tumor thrombus extending into the left renal vein, IVC and bilateral iliac veins.  Patient transferred to urology yesterday, patient possibly for OR Friday for nephrectomy, thrombectomy and IVC reconstruction with urology and vascular services.  For PET scan and MR venogram today, follow up results.  Heparin drip for extensive thrombus.

## 2019-11-21 NOTE — DIETITIAN INITIAL EVALUATION ADULT. - PROBLEM SELECTOR PLAN 7
Pt with known history of diabetes, takes Januvia 100mg and Metformin 500mg BID at home. A1c 7.0   - hold oral antidiabetic agents   - Moderate ISS

## 2019-11-21 NOTE — DIETITIAN INITIAL EVALUATION ADULT. - ENERGY NEEDS
Height: 63" Weight: 133.3lbs, IBW 115lbs+/-10%, %.9%, BMI 23.6 kg/m2  ABW used for calculations as pt between % of IBW. Needs adjusted for advanced age and CA guidelines.

## 2019-11-21 NOTE — PROGRESS NOTE ADULT - ASSESSMENT
69F, Australian speaking with multiple comorbidities, presenting with 4 days of LLQ abd pain,, found to have a new right upper pole renal mass with tumor thrombus extending in to bilateral renal veins and IVC.    #R Renal Mass   #Extensive Tumor thrombus   #Multiple pleural based lesions     -Upper pole R Kidney mass highly suspicious for malignancy, w/ extensive tumor thrombus extending to L renal vein, IVC and bilateral iliac veins (currently, clinically appears to be at least stage 3)   -s/p MRV prelim report available, per surgical service, tentatively planned for OR Friday for possible combination nephrectomy, IVC thrombectomy +reconstruction  -patient c/w Hep gtt (likely Lovenox for extended duration upon d/c)   -left lower lobe pleural based nodule core biopsy, negative for carcinoma, c/w hyalinized +fibrotic tissue, chronic inflammation  -collateral CT scan from [facility] for radiology received   -eval of osseous structures remains unremarkable on imaging   -urology/vascular recs   -dvt ppx on ac      d/w Dr. Jasmine  arrange outpt f/u with Dr. Jasmine when planned for dc

## 2019-11-21 NOTE — PRE-OP CHECKLIST - INTERNAL PROSTHESES
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

## 2019-11-21 NOTE — PROGRESS NOTE ADULT - PROBLEM SELECTOR PLAN 2
-Patient found to have numerous subpleural and  parenchymal lung nodules in bilateral lung lobes, largest on right 11mm and left 9 mm.  However patient has known history of pulmonary nodules, gets yearly CT scans for monitoring.  Has a pulmonologist at [facility] Dr. Castaneda.  Prior old CT chest and PET scan uploaded to [facility] system today.  S/p IR-guided biopsy 11/19-follow up biopsy results.

## 2019-11-21 NOTE — DIETITIAN INITIAL EVALUATION ADULT. - OTHER INFO
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 Pain has been progressively worsening x1 week and now extending to RLQ worsening with movement. Pt denies wt loss or change in appetite PTA. CT revealing right upper pole renal mass with tumor thrombolus, multiple pulmonary nodes at lung base- pending IR guided bx of lung nodule results. Pt has been out of room all morning for PET exam (11/21) with plan for radical right nephrectomy, removal of IVC tumor, IVC reconstruction, possible median sternotomy with CPB 11/22. Unable to assess patient at this time- RD to follow up per protocol and reassess. Hx provided by EMR. Pt was on CSTCHO diet per hx of DM and tolerating well per discussion with RN, now NPO for procedure and planned OR tomorrow. NKFA. DM well controlled per A1C of 7.0% (11/15). NKFA. GI: WDL with abd discomfort per flowsheet. Skin: WDL, bx puncture wound, jimmie score 20. Pain: Denies pain/ discomfort per flowsheet. Please see nutrition recommendations below. RD to follow up per protocol.

## 2019-11-21 NOTE — PROGRESS NOTE ADULT - ASSESSMENT
69y Female, Syriac speaking,  PMH HTN, DM, HLD, hypothyroid, diverticulosis, COPD/emphysema, former smoker (1ppd x 45 years, quit 1 year ago), with right renal mass with IVC thrombus.

## 2019-11-21 NOTE — PROGRESS NOTE ADULT - SUBJECTIVE AND OBJECTIVE BOX
Heme/Onc Progress Note (Dr. Jasmine)      Interval History: Pt seen and examined. No acute events overnight. No bleeding or bruising noted. no fevers. Pt s/p MRV .     Allergies    No Known Allergies    Intolerances        Medications:  MEDICATIONS  (STANDING):  amLODIPine   Tablet 5 milliGRAM(s) Oral daily  atorvastatin 20 milliGRAM(s) Oral at bedtime  heparin  Infusion.  Unit(s)/Hr (11 mL/Hr) IV Continuous <Continuous>  levothyroxine 75 MICROGram(s) Oral daily  sodium chloride 0.9%. 1000 milliLiter(s) (75 mL/Hr) IV Continuous <Continuous>  sodium phosphate IVPB 15 milliMole(s) IV Intermittent once    MEDICATIONS  (PRN):  acetaminophen   Tablet .. 650 milliGRAM(s) Oral every 6 hours PRN Mild Pain (1 - 3)  methocarbamol 1000 milliGRAM(s) Oral every 8 hours PRN neck pain    heparin  Infusion.  Unit(s)/Hr IV Continuous <Continuous>      PHYSICAL EXAM:    T(F): 97.3 (11-21-19 @ 09:10), Max: 98.9 (11-20-19 @ 15:32)  HR: 75 (11-21-19 @ 09:10) (61 - 86)  BP: 103/68 (11-21-19 @ 09:10) (103/68 - 156/79)  RR: 17 (11-21-19 @ 09:10) (17 - 18)  SpO2: 96% (11-21-19 @ 09:10) (95% - 97%)  Wt(kg): --    Daily     Daily     GEN: NAD, resting  HEENT: AT/NC, EOMI  NECK: Supple  CVS:+S1S2   LUNG: CTA B/L s/p bx site clean  ABD: +BS, NT, ND  EXT: no c/c/e  NEURO: aaox3        Labs:                          11.1   7.10  )-----------( 306      ( 21 Nov 2019 06:28 )             37.1     CBC Full  -  ( 21 Nov 2019 06:28 )  WBC Count : 7.10 K/uL  RBC Count : 4.26 M/uL  Hemoglobin : 11.1 g/dL  Hematocrit : 37.1 %  Platelet Count - Automated : 306 K/uL  Mean Cell Volume : 87.1 fl  Mean Cell Hemoglobin : 26.1 pg  Mean Cell Hemoglobin Concentration : 29.9 gm/dL  Auto Neutrophil # : x  Auto Lymphocyte # : x  Auto Monocyte # : x  Auto Eosinophil # : x  Auto Basophil # : x  Auto Neutrophil % : x  Auto Lymphocyte % : x  Auto Monocyte % : x  Auto Eosinophil % : x  Auto Basophil % : x    PT/INR - ( 21 Nov 2019 06:28 )   PT: 12.5 sec;   INR: 1.10          PTT - ( 21 Nov 2019 06:28 )  PTT:81.5 sec    11-21    140  |  106  |  9   ----------------------------<  165<H>  4.3   |  26  |  0.74    Ca    8.8      21 Nov 2019 06:28  Phos  2.4     11-21  Mg     2.1     11-21            11/15  CT CAP  Evaluation of the lower chest demonstrates normal heart size. There is no   pleural or pericardial effusion. There is coronary arterial   calcification. Innumerable bilateral pulmonary nodules along the pleural   surfaces, consistent with pleural-based metastasis measuring up to 15 mm.   Evaluation of the abdomen demonstrates that the liver is not enlarged.   The spleen, pancreas, gallbladder, bilateral adrenal glands and left   kidney are unremarkable aside from a cyst in the posterior interpolar   region of the left kidney measuring 4.5 cm. Evaluation of the right   kidney demonstrates an avidly enhancing solid and cystic mass within the   upper pole measuring 4.4 x 4.2 cm. There is enhancing tumor thrombus   extending into the right renal vein and superiorly into the infrahepatic   IVC; there is thrombus extending into the infracaval IVC and bilateral   common iliac veins with venous distention and surrounding edematous   infiltration. Tumor thrombosis also extends partially into the right   renal vein. Evaluation of the gastrointestinal tract demonstrates   diverticulosis. There is no bowel thickening or bowel obstruction. There   is appearance of the appendix. Evaluation of the pelvis demonstrates   hysterectomy. Bladder is unremarkable. Small left fat-containing inguinal   hernia. Edematous infiltration of the presacral region and   retroperitoneum. No adenopathy. Moderate aortic and arterial vascular   calcification. Opacified aspects of the portal, hepatic splenic superior   mesenteric veins demonstrates no sydney intraluminal thrombus. Evaluation   of the osseous structures are unremarkable    IMPRESSION:    Carcinoma of the upper pole of the right kidney with extensive tumor   thrombus extending into the left renal vein, IVC and bilateral iliac   veins.    Diffuse pleural-based pulmonary metastasis.    CT Chest w/ IVC 11/16#    Evaluation of the chest demonstrates that the visualized thyroid gland is   normal in appearance. There is normal heart size. There is moderate   coronary arterial calcification. Negative for thoracic inlet, axillary,   mediastinal or hilar lymphadenopathy. There is no sydney central or   segmental pulmonary embolus. Negative for intraluminal thrombus within   the left atrial appendage. Negative for mitral annular or aortic valvular   calcification. Evaluation of the lung parenchyma demonstrates mild   paraseptal and centrilobular edematous change. There are multiple   pleural-based pulmonary nodules which do not demonstrate avid   enhancement, predominantly along the bilateral lower lobes, largest which   measures 15 mm along the right lower lobe; there is a calcified   subpleural nodule along the right middle lobe measuring 6 mm. Some   pleural-based nodules demonstrates an oblong configuration and the   overall lack of avid enhancement suggests the possibility of multiple   nodular pleural plaques. There is no endobronchial lesion. Evaluation the   upper abdomen redemonstrates carcinoma within the upper pole the right   kidney measuring 4.3 cm with tumor thrombus extending into the IVC and   right renal vein. Evaluation of the osseous structures demonstrates no   destructive lesion.    IMPRESSION:    Pleural-based metastasis versus nodular pleural plaques; PET-CT   correlation is recommended.    Pathology 11/19    Final Diagnosis    Lung, left lower lobe, pleural based; core biopsy:  - Hyalinized and fibrotic tissue with adjacent chronic  inflammation, consistent with pleural plaque.  - Negative for carcinoma.

## 2019-11-21 NOTE — DIETITIAN INITIAL EVALUATION ADULT. - ADD RECOMMEND
1. Recommend advance to CST CHO diet with evening snack 2. If appetite is poor on diet advancement, recommend add Glucerna Shakes BID (440 kcal, 20g protein, 400mL H2O)

## 2019-11-21 NOTE — DIETITIAN INITIAL EVALUATION ADULT. - PROBLEM SELECTOR PLAN 1
Pt with 4.2 cm right upper pole renal mass with central  necrosis, multiple pulmonary nodules seen in the lung bases, concerning for metastatic lesions. No lymphadenopathy, no lytic lesions. Patient will need biopsy of either renal mass or pulmonary nodule for diagnosis/staging.  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

## 2019-11-21 NOTE — DIETITIAN INITIAL EVALUATION ADULT. - PROBLEM SELECTOR PLAN 3
CT Abd/Pelv with tumor thrombus extending in to bilateral renal veins and IVC, additional non-tumor thrombus seen extending inferiorly to level of bilateral common iliacs. s/p 4500 heparin IV bolus and started on heparin gtt 11/hr  - continue heparin gtt, f/u PTT at 10:00AM  - f/u US dopplers LE  - Vascular following, f/u recs

## 2019-11-21 NOTE — PROGRESS NOTE ADULT - SUBJECTIVE AND OBJECTIVE BOX
INTERVAL HPI/OVERNIGHT EVENTS:  No acute events overnight.    VITALS:    T(F): 98.2 (11-20-19 @ 21:58), Max: 98.9 (11-20-19 @ 15:32)  HR: 80 (11-20-19 @ 21:58) (79 - 86)  BP: 150/66 (11-20-19 @ 21:58) (118/83 - 156/79)  RR: 17 (11-20-19 @ 21:58) (16 - 18)  SpO2: 96% (11-20-19 @ 21:58) (95% - 97%)  Wt(kg): --    I&O's Detail    19 Nov 2019 07:01  -  20 Nov 2019 07:00  --------------------------------------------------------  IN:    heparin  Infusion.: 121 mL  Total IN: 121 mL    OUT:    Voided: 1 mL  Total OUT: 1 mL    Total NET: 120 mL      20 Nov 2019 07:01  -  21 Nov 2019 05:20  --------------------------------------------------------  IN:  Total IN: 0 mL    OUT:    Voided: 1000 mL  Total OUT: 1000 mL    Total NET: -1000 mL        PAIN CONTROL:  acetaminophen   Tablet .. 650 milliGRAM(s) Oral every 6 hours PRN  methocarbamol 1000 milliGRAM(s) Oral every 8 hours PRN       MEDS:      HEME/ONC  heparin  Infusion.  Unit(s)/Hr IV Continuous <Continuous>        PHYSICAL EXAM:  General: No acute distress.  Alert and Oriented  Abdominal Exam: soft, ntnd   Exam: WNL, voiding clear      LABS:                        11.4   7.34  )-----------( 319      ( 20 Nov 2019 07:42 )             37.7     11-20    139  |  105  |  8   ----------------------------<  161<H>  4.0   |  23  |  0.78    Ca    9.0      20 Nov 2019 07:42  Phos  2.4     11-20  Mg     2.0     11-20      PTT - ( 20 Nov 2019 07:42 )  PTT:83.1 sec      RADIOLOGY & ADDITIONAL TESTS:      ******PRELIMINARY REPORT******    ******PRELIMINARY REPORT******            EXAM:  MR VENOGRAM PELVIS IC                          EXAM:  MR VENOGRAM ABD IC                          PROCEDURE DATE:  11/20/2019    ******PRELIMINARY REPORT******    ******PRELIMINARY REPORT******              INTERPRETATION:  Brief resident preliminary report. Full dictation to be   issued in 24 hours or sooner upon request.  4.2 x 3.7 cm heterogeneous mass upper pole right kidney. Tumor thrombus   involves the inferior vena cava and extends from just above the   infrahepatic IVC to the level of the lowermost portion of the right renal   vein. The infrarenal IVC and iliac veins appear patent on this MRV study.            Thank you for the opportunity to participate in the care of this patient.  ******PRELIMINARY REPORT******    ******PRELIMINARY REPORT******

## 2019-11-21 NOTE — PROGRESS NOTE ADULT - SUBJECTIVE AND OBJECTIVE BOX
Planned Date of Surgery: 11/22/19                                                                                                                 Surgeon: Dr. Feliciano, Dr. Duncan, Dr. Garcia, Dr. 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 Castaneda at [facility] for known pulmonary nodules and has serial CT Chest for monitoring. Per pt's daughter likely related to asbestosis, but this was never biopsied. CT chest/abd/pelvis significant for carcinoma of upper pole of right kidney measuring 4.3cm and tumor thrombus extending from renal vein, infrarenal IVC, b/l common iliac veins. Vascular and Urology follow, CT surgery consulted for combination surgical intervention, nephrectomy and IVC reconstruction    PAST MEDICAL & SURGICAL HISTORY:  Diverticulosis  Hypothyroid  High cholesterol  HTN (hypertension)  Diabetes    No Known Allergies    Physical Exam  T(C): 36.3 (11-21-19 @ 09:10), Max: 37.2 (11-20-19 @ 15:32)  HR: 75 (11-21-19 @ 09:10) (61 - 86)  BP: 103/68 (11-21-19 @ 09:10) (103/68 - 156/79)  RR: 17 (11-21-19 @ 09:10) (17 - 18)  SpO2: 96% (11-21-19 @ 09:10) (95% - 97%)  Constitutional: NAD  Neuro: A&Ox3  CV: S1S2 RRR  Pulmonary: CTA b/l no W/R/R  GI: soft NT/ND +BS  Extremeties: b/l LE edema    MEDICATIONS  (STANDING):  amLODIPine   Tablet 5 milliGRAM(s) Oral daily  atorvastatin 20 milliGRAM(s) Oral at bedtime  chlorhexidine 4% Liquid 1 Application(s) Topical once  chlorhexidine 4% Liquid 1 Application(s) Topical once  chlorhexidine 4% Liquid 1 Application(s) Topical once  heparin  Infusion.  Unit(s)/Hr (11 mL/Hr) IV Continuous <Continuous>  levothyroxine 75 MICROGram(s) Oral daily  sodium chloride 0.9%. 1000 milliLiter(s) (75 mL/Hr) IV Continuous <Continuous>    MEDICATIONS  (PRN):  acetaminophen   Tablet .. 650 milliGRAM(s) Oral every 6 hours PRN Mild Pain (1 - 3)  methocarbamol 1000 milliGRAM(s) Oral every 8 hours PRN neck pain      On Beta Blocker?  NO  Reason: no CAD, not on beta blocker pre-op    Labs:                        11.1   7.10  )-----------( 306      ( 21 Nov 2019 06:28 )             37.1     11-21    140  |  106  |  9   ----------------------------<  165<H>  4.3   |  26  |  0.74    Ca    8.8      21 Nov 2019 06:28  Phos  2.4     11-21  Mg     2.1     11-21      PT/INR - ( 21 Nov 2019 06:28 )   PT: 12.5 sec;   INR: 1.10          PTT - ( 21 Nov 2019 12:21 )  PTT:80.5 sec    ABO Interpretation: O (11-21-19 @ 05:30)    Hgb A1C: 7    CXR: no effusions/infiltrates/PTX    CT Scans:  carcinoma within the upper pole the right   kidney measuring 4.3 cm with tumor thrombus extending into the IVC and   right renal vein.     Echo: no valvular disease, EF 56%    Consult in Chart?  YES  Consent in Chart? YES  Pre-op Orders Placed? YES  Blood Prodeucts Ordered? YES  NPO ordered? YES

## 2019-11-22 ENCOUNTER — RESULT REVIEW (OUTPATIENT)
Age: 69
End: 2019-11-22

## 2019-11-22 ENCOUNTER — APPOINTMENT (OUTPATIENT)
Dept: CARDIOTHORACIC SURGERY | Facility: HOSPITAL | Age: 69
End: 2019-11-22

## 2019-11-22 LAB
ALBUMIN SERPL ELPH-MCNC: 2.6 G/DL — LOW (ref 3.3–5)
ALBUMIN SERPL ELPH-MCNC: 2.6 G/DL — LOW (ref 3.3–5)
ALP SERPL-CCNC: 51 U/L — SIGNIFICANT CHANGE UP (ref 40–120)
ALT FLD-CCNC: 109 U/L — HIGH (ref 10–45)
ANION GAP SERPL CALC-SCNC: 11 MMOL/L — SIGNIFICANT CHANGE UP (ref 5–17)
ANION GAP SERPL CALC-SCNC: 12 MMOL/L — SIGNIFICANT CHANGE UP (ref 5–17)
ANION GAP SERPL CALC-SCNC: 13 MMOL/L — SIGNIFICANT CHANGE UP (ref 5–17)
APTT BLD: 31.6 SEC — SIGNIFICANT CHANGE UP (ref 27.5–36.3)
APTT BLD: 35.1 SEC — SIGNIFICANT CHANGE UP (ref 27.5–36.3)
AST SERPL-CCNC: 118 U/L — HIGH (ref 10–40)
BASOPHILS # BLD AUTO: 0.05 K/UL — SIGNIFICANT CHANGE UP (ref 0–0.2)
BASOPHILS NFR BLD AUTO: 0.3 % — SIGNIFICANT CHANGE UP (ref 0–2)
BILIRUB SERPL-MCNC: 0.3 MG/DL — SIGNIFICANT CHANGE UP (ref 0.2–1.2)
BUN SERPL-MCNC: 10 MG/DL — SIGNIFICANT CHANGE UP (ref 7–23)
BUN SERPL-MCNC: 8 MG/DL — SIGNIFICANT CHANGE UP (ref 7–23)
BUN SERPL-MCNC: 8 MG/DL — SIGNIFICANT CHANGE UP (ref 7–23)
CALCIUM SERPL-MCNC: 6.3 MG/DL — CRITICAL LOW (ref 8.4–10.5)
CALCIUM SERPL-MCNC: 8.4 MG/DL — SIGNIFICANT CHANGE UP (ref 8.4–10.5)
CALCIUM SERPL-MCNC: 8.7 MG/DL — SIGNIFICANT CHANGE UP (ref 8.4–10.5)
CHLORIDE SERPL-SCNC: 109 MMOL/L — HIGH (ref 96–108)
CHLORIDE SERPL-SCNC: 110 MMOL/L — HIGH (ref 96–108)
CHLORIDE SERPL-SCNC: 111 MMOL/L — HIGH (ref 96–108)
CO2 SERPL-SCNC: 16 MMOL/L — LOW (ref 22–31)
CO2 SERPL-SCNC: 18 MMOL/L — LOW (ref 22–31)
CO2 SERPL-SCNC: 22 MMOL/L — SIGNIFICANT CHANGE UP (ref 22–31)
CREAT SERPL-MCNC: 0.62 MG/DL — SIGNIFICANT CHANGE UP (ref 0.5–1.3)
CREAT SERPL-MCNC: 0.63 MG/DL — SIGNIFICANT CHANGE UP (ref 0.5–1.3)
CREAT SERPL-MCNC: 0.66 MG/DL — SIGNIFICANT CHANGE UP (ref 0.5–1.3)
EOSINOPHIL # BLD AUTO: 0.03 K/UL — SIGNIFICANT CHANGE UP (ref 0–0.5)
EOSINOPHIL NFR BLD AUTO: 0.2 % — SIGNIFICANT CHANGE UP (ref 0–6)
GAS PNL BLDA: SIGNIFICANT CHANGE UP
GLUCOSE BLDC GLUCOMTR-MCNC: 169 MG/DL — HIGH (ref 70–99)
GLUCOSE BLDC GLUCOMTR-MCNC: 169 MG/DL — HIGH (ref 70–99)
GLUCOSE BLDC GLUCOMTR-MCNC: 229 MG/DL — HIGH (ref 70–99)
GLUCOSE SERPL-MCNC: 133 MG/DL — HIGH (ref 70–99)
GLUCOSE SERPL-MCNC: 145 MG/DL — HIGH (ref 70–99)
GLUCOSE SERPL-MCNC: 194 MG/DL — HIGH (ref 70–99)
HCT VFR BLD CALC: 33.9 % — LOW (ref 34.5–45)
HCT VFR BLD CALC: 42 % — SIGNIFICANT CHANGE UP (ref 34.5–45)
HGB BLD-MCNC: 10.4 G/DL — LOW (ref 11.5–15.5)
HGB BLD-MCNC: 12.6 G/DL — SIGNIFICANT CHANGE UP (ref 11.5–15.5)
IMM GRANULOCYTES NFR BLD AUTO: 0.8 % — SIGNIFICANT CHANGE UP (ref 0–1.5)
INR BLD: 1.05 — SIGNIFICANT CHANGE UP (ref 0.88–1.16)
INR BLD: 1.15 — SIGNIFICANT CHANGE UP (ref 0.88–1.16)
LACTATE SERPL-SCNC: 1.2 MMOL/L — SIGNIFICANT CHANGE UP (ref 0.5–2)
LYMPHOCYTES # BLD AUTO: 12.6 % — LOW (ref 13–44)
LYMPHOCYTES # BLD AUTO: 2 K/UL — SIGNIFICANT CHANGE UP (ref 1–3.3)
MAGNESIUM SERPL-MCNC: 2.1 MG/DL — SIGNIFICANT CHANGE UP (ref 1.6–2.6)
MAGNESIUM SERPL-MCNC: 2.4 MG/DL — SIGNIFICANT CHANGE UP (ref 1.6–2.6)
MCHC RBC-ENTMCNC: 26.4 PG — LOW (ref 27–34)
MCHC RBC-ENTMCNC: 26.9 PG — LOW (ref 27–34)
MCHC RBC-ENTMCNC: 30 GM/DL — LOW (ref 32–36)
MCHC RBC-ENTMCNC: 30.7 GM/DL — LOW (ref 32–36)
MCV RBC AUTO: 87.6 FL — SIGNIFICANT CHANGE UP (ref 80–100)
MCV RBC AUTO: 87.9 FL — SIGNIFICANT CHANGE UP (ref 80–100)
MONOCYTES # BLD AUTO: 0.82 K/UL — SIGNIFICANT CHANGE UP (ref 0–0.9)
MONOCYTES NFR BLD AUTO: 5.2 % — SIGNIFICANT CHANGE UP (ref 2–14)
NEUTROPHILS # BLD AUTO: 12.86 K/UL — HIGH (ref 1.8–7.4)
NEUTROPHILS NFR BLD AUTO: 80.9 % — HIGH (ref 43–77)
NRBC # BLD: 0 /100 WBCS — SIGNIFICANT CHANGE UP (ref 0–0)
NRBC # BLD: 0 /100 WBCS — SIGNIFICANT CHANGE UP (ref 0–0)
PHOSPHATE SERPL-MCNC: 2.3 MG/DL — LOW (ref 2.5–4.5)
PHOSPHATE SERPL-MCNC: 3.1 MG/DL — SIGNIFICANT CHANGE UP (ref 2.5–4.5)
PLATELET # BLD AUTO: 268 K/UL — SIGNIFICANT CHANGE UP (ref 150–400)
PLATELET # BLD AUTO: 305 K/UL — SIGNIFICANT CHANGE UP (ref 150–400)
POTASSIUM SERPL-MCNC: 3.6 MMOL/L — SIGNIFICANT CHANGE UP (ref 3.5–5.3)
POTASSIUM SERPL-MCNC: 3.8 MMOL/L — SIGNIFICANT CHANGE UP (ref 3.5–5.3)
POTASSIUM SERPL-MCNC: SIGNIFICANT CHANGE UP MMOL/L (ref 3.5–5.3)
POTASSIUM SERPL-SCNC: 3.6 MMOL/L — SIGNIFICANT CHANGE UP (ref 3.5–5.3)
POTASSIUM SERPL-SCNC: 3.8 MMOL/L — SIGNIFICANT CHANGE UP (ref 3.5–5.3)
POTASSIUM SERPL-SCNC: SIGNIFICANT CHANGE UP MMOL/L (ref 3.5–5.3)
PROT SERPL-MCNC: 4.7 G/DL — LOW (ref 6–8.3)
PROTHROM AB SERPL-ACNC: 11.9 SEC — SIGNIFICANT CHANGE UP (ref 10–12.9)
PROTHROM AB SERPL-ACNC: 13.1 SEC — HIGH (ref 10–12.9)
RBC # BLD: 3.87 M/UL — SIGNIFICANT CHANGE UP (ref 3.8–5.2)
RBC # BLD: 4.78 M/UL — SIGNIFICANT CHANGE UP (ref 3.8–5.2)
RBC # FLD: 14 % — SIGNIFICANT CHANGE UP (ref 10.3–14.5)
RBC # FLD: 14.7 % — HIGH (ref 10.3–14.5)
SODIUM SERPL-SCNC: 138 MMOL/L — SIGNIFICANT CHANGE UP (ref 135–145)
SODIUM SERPL-SCNC: 140 MMOL/L — SIGNIFICANT CHANGE UP (ref 135–145)
SODIUM SERPL-SCNC: 144 MMOL/L — SIGNIFICANT CHANGE UP (ref 135–145)
WBC # BLD: 15.88 K/UL — HIGH (ref 3.8–10.5)
WBC # BLD: 8.39 K/UL — SIGNIFICANT CHANGE UP (ref 3.8–10.5)
WBC # FLD AUTO: 15.88 K/UL — HIGH (ref 3.8–10.5)
WBC # FLD AUTO: 8.39 K/UL — SIGNIFICANT CHANGE UP (ref 3.8–10.5)

## 2019-11-22 PROCEDURE — 99233 SBSQ HOSP IP/OBS HIGH 50: CPT | Mod: GC

## 2019-11-22 PROCEDURE — 34401 REMOVAL OF VEIN CLOT: CPT | Mod: GC

## 2019-11-22 PROCEDURE — 50230 REMOVAL KIDNEY OPEN RADICAL: CPT | Mod: 62,RT

## 2019-11-22 PROCEDURE — 71045 X-RAY EXAM CHEST 1 VIEW: CPT | Mod: 26

## 2019-11-22 PROCEDURE — 34502 RECONSTRUCT VENA CAVA: CPT | Mod: GC

## 2019-11-22 RX ORDER — DEXTROSE 50 % IN WATER 50 %
25 SYRINGE (ML) INTRAVENOUS
Refills: 0 | Status: DISCONTINUED | OUTPATIENT
Start: 2019-11-22 | End: 2019-11-27

## 2019-11-22 RX ORDER — HEPARIN SODIUM 5000 [USP'U]/ML
5000 INJECTION INTRAVENOUS; SUBCUTANEOUS EVERY 8 HOURS
Refills: 0 | Status: DISCONTINUED | OUTPATIENT
Start: 2019-11-22 | End: 2019-11-23

## 2019-11-22 RX ORDER — SODIUM CHLORIDE 9 MG/ML
1000 INJECTION, SOLUTION INTRAVENOUS
Refills: 0 | Status: DISCONTINUED | OUTPATIENT
Start: 2019-11-22 | End: 2019-11-27

## 2019-11-22 RX ORDER — CEFAZOLIN SODIUM 1 G
2000 VIAL (EA) INJECTION EVERY 8 HOURS
Refills: 0 | Status: DISCONTINUED | OUTPATIENT
Start: 2019-11-22 | End: 2019-11-22

## 2019-11-22 RX ORDER — INSULIN LISPRO 100/ML
VIAL (ML) SUBCUTANEOUS
Refills: 0 | Status: DISCONTINUED | OUTPATIENT
Start: 2019-11-22 | End: 2019-11-27

## 2019-11-22 RX ORDER — DEXTROSE 50 % IN WATER 50 %
50 SYRINGE (ML) INTRAVENOUS
Refills: 0 | Status: DISCONTINUED | OUTPATIENT
Start: 2019-11-22 | End: 2019-11-27

## 2019-11-22 RX ORDER — IPRATROPIUM/ALBUTEROL SULFATE 18-103MCG
3 AEROSOL WITH ADAPTER (GRAM) INHALATION EVERY 6 HOURS
Refills: 0 | Status: DISCONTINUED | OUTPATIENT
Start: 2019-11-22 | End: 2019-11-27

## 2019-11-22 RX ORDER — INSULIN HUMAN 100 [IU]/ML
1 INJECTION, SOLUTION SUBCUTANEOUS
Qty: 50 | Refills: 0 | Status: DISCONTINUED | OUTPATIENT
Start: 2019-11-22 | End: 2019-11-22

## 2019-11-22 RX ORDER — CEFAZOLIN SODIUM 1 G
2000 VIAL (EA) INJECTION EVERY 8 HOURS
Refills: 0 | Status: COMPLETED | OUTPATIENT
Start: 2019-11-22 | End: 2019-11-24

## 2019-11-22 RX ORDER — DEXTROSE 50 % IN WATER 50 %
25 SYRINGE (ML) INTRAVENOUS ONCE
Refills: 0 | Status: DISCONTINUED | OUTPATIENT
Start: 2019-11-22 | End: 2019-11-27

## 2019-11-22 RX ORDER — DEXTROSE 50 % IN WATER 50 %
15 SYRINGE (ML) INTRAVENOUS ONCE
Refills: 0 | Status: DISCONTINUED | OUTPATIENT
Start: 2019-11-22 | End: 2019-11-27

## 2019-11-22 RX ORDER — DEXTROSE 50 % IN WATER 50 %
25 SYRINGE (ML) INTRAVENOUS ONCE
Refills: 0 | Status: DISCONTINUED | OUTPATIENT
Start: 2019-11-22 | End: 2019-11-22

## 2019-11-22 RX ORDER — BUPIVACAINE 13.3 MG/ML
20 INJECTION, SUSPENSION, LIPOSOMAL INFILTRATION ONCE
Refills: 0 | Status: DISCONTINUED | OUTPATIENT
Start: 2019-11-22 | End: 2019-11-22

## 2019-11-22 RX ORDER — SODIUM CHLORIDE 9 MG/ML
1000 INJECTION INTRAMUSCULAR; INTRAVENOUS; SUBCUTANEOUS
Refills: 0 | Status: DISCONTINUED | OUTPATIENT
Start: 2019-11-22 | End: 2019-11-22

## 2019-11-22 RX ORDER — GLUCAGON INJECTION, SOLUTION 0.5 MG/.1ML
1 INJECTION, SOLUTION SUBCUTANEOUS ONCE
Refills: 0 | Status: DISCONTINUED | OUTPATIENT
Start: 2019-11-22 | End: 2019-11-27

## 2019-11-22 RX ORDER — CALCIUM GLUCONATE 100 MG/ML
1 VIAL (ML) INTRAVENOUS ONCE
Refills: 0 | Status: COMPLETED | OUTPATIENT
Start: 2019-11-22 | End: 2019-11-22

## 2019-11-22 RX ORDER — HYDROMORPHONE HYDROCHLORIDE 2 MG/ML
1 INJECTION INTRAMUSCULAR; INTRAVENOUS; SUBCUTANEOUS EVERY 6 HOURS
Refills: 0 | Status: DISCONTINUED | OUTPATIENT
Start: 2019-11-22 | End: 2019-11-25

## 2019-11-22 RX ORDER — SODIUM CHLORIDE 9 MG/ML
1000 INJECTION, SOLUTION INTRAVENOUS
Refills: 0 | Status: DISCONTINUED | OUTPATIENT
Start: 2019-11-22 | End: 2019-11-23

## 2019-11-22 RX ORDER — LEVOTHYROXINE SODIUM 125 MCG
60 TABLET ORAL
Refills: 0 | Status: DISCONTINUED | OUTPATIENT
Start: 2019-11-22 | End: 2019-11-23

## 2019-11-22 RX ORDER — PANTOPRAZOLE SODIUM 20 MG/1
40 TABLET, DELAYED RELEASE ORAL DAILY
Refills: 0 | Status: DISCONTINUED | OUTPATIENT
Start: 2019-11-22 | End: 2019-11-23

## 2019-11-22 RX ORDER — CHLORHEXIDINE GLUCONATE 213 G/1000ML
5 SOLUTION TOPICAL EVERY 4 HOURS
Refills: 0 | Status: DISCONTINUED | OUTPATIENT
Start: 2019-11-22 | End: 2019-11-22

## 2019-11-22 RX ORDER — POTASSIUM CHLORIDE 20 MEQ
20 PACKET (EA) ORAL
Refills: 0 | Status: COMPLETED | OUTPATIENT
Start: 2019-11-22 | End: 2019-11-22

## 2019-11-22 RX ORDER — MAGNESIUM SULFATE 500 MG/ML
2 VIAL (ML) INJECTION ONCE
Refills: 0 | Status: DISCONTINUED | OUTPATIENT
Start: 2019-11-22 | End: 2019-11-22

## 2019-11-22 RX ORDER — HYDROMORPHONE HYDROCHLORIDE 2 MG/ML
0.5 INJECTION INTRAMUSCULAR; INTRAVENOUS; SUBCUTANEOUS EVERY 6 HOURS
Refills: 0 | Status: DISCONTINUED | OUTPATIENT
Start: 2019-11-22 | End: 2019-11-25

## 2019-11-22 RX ORDER — DEXMEDETOMIDINE HYDROCHLORIDE IN 0.9% SODIUM CHLORIDE 4 UG/ML
0.04 INJECTION INTRAVENOUS
Qty: 200 | Refills: 0 | Status: DISCONTINUED | OUTPATIENT
Start: 2019-11-22 | End: 2019-11-22

## 2019-11-22 RX ADMIN — Medication 50 MILLIEQUIVALENT(S): at 17:55

## 2019-11-22 RX ADMIN — HYDROMORPHONE HYDROCHLORIDE 1 MILLIGRAM(S): 2 INJECTION INTRAMUSCULAR; INTRAVENOUS; SUBCUTANEOUS at 17:04

## 2019-11-22 RX ADMIN — HYDROMORPHONE HYDROCHLORIDE 1 MILLIGRAM(S): 2 INJECTION INTRAMUSCULAR; INTRAVENOUS; SUBCUTANEOUS at 17:51

## 2019-11-22 RX ADMIN — CHLORHEXIDINE GLUCONATE 1 APPLICATION(S): 213 SOLUTION TOPICAL at 05:29

## 2019-11-22 RX ADMIN — Medication 3 MILLILITER(S): at 23:10

## 2019-11-22 RX ADMIN — HEPARIN SODIUM 5000 UNIT(S): 5000 INJECTION INTRAVENOUS; SUBCUTANEOUS at 22:50

## 2019-11-22 RX ADMIN — AMLODIPINE BESYLATE 5 MILLIGRAM(S): 2.5 TABLET ORAL at 05:29

## 2019-11-22 RX ADMIN — Medication 2000 MILLIGRAM(S): at 18:00

## 2019-11-22 RX ADMIN — Medication 200 GRAM(S): at 18:53

## 2019-11-22 RX ADMIN — Medication 75 MICROGRAM(S): at 05:29

## 2019-11-22 RX ADMIN — Medication 2: at 21:38

## 2019-11-22 RX ADMIN — Medication 2: at 17:53

## 2019-11-22 RX ADMIN — PANTOPRAZOLE SODIUM 40 MILLIGRAM(S): 20 TABLET, DELAYED RELEASE ORAL at 21:28

## 2019-11-22 RX ADMIN — Medication 50 MILLIEQUIVALENT(S): at 19:06

## 2019-11-22 NOTE — PROGRESS NOTE ADULT - SUBJECTIVE AND OBJECTIVE BOX
Vascular Surgery Post-Op Note, PCN:     Pre-Op Dx: RENAL MASS  Renal cell carcinoma  Renal mass    Procedure: Nephrectomy  Vascular surgery procedure, removal of thrombus from IVC and reconstruction    Surgeon: Rachel    Subjective:  Patient sleepy but can be awoken, was in pain but controlled with meds. Denies CP/SOB, N/V.     Vital Signs Last 24 Hrs  T(C): 34.8 (22 Nov 2019 17:51), Max: 36.8 (21 Nov 2019 20:57)  T(F): 94.7 (22 Nov 2019 17:51), Max: 98.2 (21 Nov 2019 20:57)  HR: 64 (22 Nov 2019 17:30) (52 - 83)  BP: 108/55 (22 Nov 2019 17:00) (91/48 - 153/77)  BP(mean): 66 (22 Nov 2019 17:00) (56 - 76)  RR: 10 (22 Nov 2019 17:30) (10 - 20)  SpO2: 96% (22 Nov 2019 17:30) (95% - 99%)    Physical Exam:  General: NAD, resting comfortably in bed  Pulmonary: Nonlabored breathing, no respiratory distress  Cardiovascular: NSR  Abdominal: soft, appropriately tender, non-distended; chevron incision dressing intact, with some spotting of dried blood  Extremities: WWP; 2+ DP pulses b/l; no LE edema  Neuro: A/O x 3    LABS:                   10.4   15.88 )-----------( 268      ( 22 Nov 2019 15:59 )             33.9     11-22  138  |  111<H>  |  10  ----------------------------<  194<H>  3.6   |  16<L>  |  0.63    Ca    6.3<LL>      22 Nov 2019 15:59  Phos  3.1     11-22  Mg     2.1     11-22    TPro  4.7<L>  /  Alb  2.6<L>  /  TBili  0.3  /  DBili  x   /  AST  118<H>  /  ALT  109<H>  /  AlkPhos  51  11-22    PT/INR - ( 22 Nov 2019 15:59 )   PT: 13.1 sec;   INR: 1.15     PTT - ( 22 Nov 2019 15:59 )  PTT:35.1 sec    CAPILLARY BLOOD GLUCOSE  POCT Blood Glucose.: 169 mg/dL (22 Nov 2019 17:14)  POCT Blood Glucose.: 229 mg/dL (22 Nov 2019 17:12)    LIVER FUNCTIONS - ( 22 Nov 2019 15:59 )  Alb: 2.6 g/dL / Pro: 4.7 g/dL / ALK PHOS: 51 U/L / ALT: 109 U/L / AST: 118 U/L / GGT: x           Assessment:69y Female s/p above procedure    Plan:  Pain/nausea control PRN  Continue DVT PPX  Care per SICU/Primary team

## 2019-11-22 NOTE — PROGRESS NOTE ADULT - PROBLEM SELECTOR PLAN 1
-stable  -NPO since midnight  -OR today  -Heparin stopped at midnight  -post op recovery + labs  -OOB, IS  -DVT prophylaxis

## 2019-11-22 NOTE — PROGRESS NOTE ADULT - ASSESSMENT
69y Female, Setswana speaking,  PMH HTN, DM, HLD, hypothyroid, diverticulosis, COPD/emphysema, former smoker (1ppd x 45 years, quit 1 year ago), with right renal mass with IVC thrombus.

## 2019-11-22 NOTE — BRIEF OPERATIVE NOTE - NSICDXBRIEFPROCEDURE_GEN_ALL_CORE_FT
PROCEDURES:  Nephrectomy 22-Nov-2019 13:41:07 right Bret Rivera  Vascular surgery procedure 22-Nov-2019 13:40:39 IVC tumor thrombectomy and patch repair Bret Rivera

## 2019-11-22 NOTE — PROGRESS NOTE ADULT - SUBJECTIVE AND OBJECTIVE BOX
Patient currently in the OR for nephrectomy, IVC tumor thrombus removal and reconstruction. Multidisciplenary approach with Urology (Noy), Vascular (Rachel), Hepatobiliary (Jordan), and CT Surgery (Jodie). Will evaluate patient in recovery after surgery.

## 2019-11-22 NOTE — PROGRESS NOTE ADULT - SUBJECTIVE AND OBJECTIVE BOX
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       PAST MEDICAL & SURGICAL HISTORY:  Diverticulosis  Hypothyroid  High cholesterol  HTN (hypertension)  Diabetes          Allergies    No Known Allergies    Intolerances        FAMILY HISTORY:      ROS: otherwise negative.    Vital Signs Last 24 Hrs  T(C): 36.8 (22 Nov 2019 04:29), Max: 36.8 (21 Nov 2019 20:57)  T(F): 98.2 (22 Nov 2019 04:29), Max: 98.2 (21 Nov 2019 20:57)  HR: 83 (22 Nov 2019 04:29) (65 - 83)  BP: 129/67 (22 Nov 2019 04:29) (123/64 - 153/77)  BP(mean): --  RR: 18 (22 Nov 2019 04:29) (18 - 18)  SpO2: 97% (22 Nov 2019 04:29) (95% - 98%)    I&O's Detail    21 Nov 2019 07:01  -  22 Nov 2019 07:00  --------------------------------------------------------  IN:    heparin  Infusion.: 154 mL    Oral Fluid: 300 mL    sodium chloride 0.9%: 1650 mL  Total IN: 2104 mL    OUT:    Voided: 1450 mL  Total OUT: 1450 mL    Total NET: 654 mL              Physical Exam:  General: NAD, resting comfortably in bed  HEENT: MMM  Pulmonary: nonlabored breathing, normal resp effort  Cardiovascular: RRR  Abdominal: soft, nontender, nondistended  Extremities: WWP, no edema, no calf tenderness  Neuro: no focal deficits  Psych: pleasant    LABS:                        12.6   8.39  )-----------( 305      ( 22 Nov 2019 06:21 )             42.0     11-22    144  |  110<H>  |  8   ----------------------------<  133<H>  3.8   |  22  |  0.66    Ca    8.4      22 Nov 2019 08:17  Phos  2.3     11-22  Mg     2.4     11-22      PT/INR - ( 22 Nov 2019 07:47 )   PT: 11.9 sec;   INR: 1.05          PTT - ( 22 Nov 2019 07:47 )  PTT:31.6 sec    CAPILLARY BLOOD GLUCOSE      POCT Blood Glucose.: 117 mg/dL (21 Nov 2019 16:38)        Cultures:      RADIOLOGY & ADDITIONAL STUDIES: 69F, Slovak speaking, with PMH of HTN, DM, HLD, Hypothyroid, diverticulosis, 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 , IVF 3L, 1PRBC. U/O260      PAST MEDICAL & SURGICAL HISTORY:  Diverticulosis  Hypothyroid  High cholesterol  HTN (hypertension)  Diabetes          Allergies    No Known Allergies    Intolerances        FAMILY HISTORY:      ROS: otherwise negative.    Vital Signs Last 24 Hrs  T(C): 36.8 (22 Nov 2019 04:29), Max: 36.8 (21 Nov 2019 20:57)  T(F): 98.2 (22 Nov 2019 04:29), Max: 98.2 (21 Nov 2019 20:57)  HR: 83 (22 Nov 2019 04:29) (65 - 83)  BP: 129/67 (22 Nov 2019 04:29) (123/64 - 153/77)  BP(mean): --  RR: 18 (22 Nov 2019 04:29) (18 - 18)  SpO2: 97% (22 Nov 2019 04:29) (95% - 98%)    I&O's Detail    21 Nov 2019 07:01  -  22 Nov 2019 07:00  --------------------------------------------------------  IN:    heparin  Infusion.: 154 mL    Oral Fluid: 300 mL    sodium chloride 0.9%: 1650 mL  Total IN: 2104 mL    OUT:    Voided: 1450 mL  Total OUT: 1450 mL    Total NET: 654 mL              Physical Exam:  General: NAD, resting comfortably in bed  HEENT: MMM  Pulmonary: nonlabored breathing, normal resp effort  Cardiovascular: RRR  Abdominal: soft, nontender, nondistended, Chevron dressing c/d/i  Extremities: WWP, no edema, no calf tenderness  Neuro: no focal deficits      LABS:                        12.6   8.39  )-----------( 305      ( 22 Nov 2019 06:21 )             42.0     11-22    144  |  110<H>  |  8   ----------------------------<  133<H>  3.8   |  22  |  0.66    Ca    8.4      22 Nov 2019 08:17  Phos  2.3     11-22  Mg     2.4     11-22      PT/INR - ( 22 Nov 2019 07:47 )   PT: 11.9 sec;   INR: 1.05          PTT - ( 22 Nov 2019 07:47 )  PTT:31.6 sec    CAPILLARY BLOOD GLUCOSE      POCT Blood Glucose.: 117 mg/dL (21 Nov 2019 16:38)        Cultures:      RADIOLOGY & ADDITIONAL STUDIES:

## 2019-11-22 NOTE — PROGRESS NOTE ADULT - ASSESSMENT
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     Plan  Neuro: dilaudid prn   CV: HD stable   Pulm: Satting well on NC Hx of COPD: cont Duonebs  GI: NPO, IVF   : Mccall s/p Rt Nephrectomy   ID: Ancef X3( 11/22-)   Endo: ISS Synthroid   PPx: SQH SCD  Lines: Jackson( 11/22-) TLC( 11/22-) Left femoral sheath (11/22-)  Wounds: Cheveron   PT/OT: OOB to chair PT to be ordered on 11/23

## 2019-11-22 NOTE — PROGRESS NOTE ADULT - SUBJECTIVE AND OBJECTIVE BOX
INTERVAL HPI/OVERNIGHT EVENTS:  No acute events overnight.    VITALS:    T(F): 98.2 (11-22-19 @ 04:29), Max: 98.2 (11-21-19 @ 20:57)  HR: 83 (11-22-19 @ 04:29) (61 - 83)  BP: 129/67 (11-22-19 @ 04:29) (103/68 - 153/77)  RR: 18 (11-22-19 @ 04:29) (17 - 18)  SpO2: 97% (11-22-19 @ 04:29) (95% - 98%)  Wt(kg): --    I&O's Detail    20 Nov 2019 07:01  -  21 Nov 2019 07:00  --------------------------------------------------------  IN:  Total IN: 0 mL    OUT:    Voided: 1000 mL  Total OUT: 1000 mL    Total NET: -1000 mL      21 Nov 2019 07:01  -  22 Nov 2019 05:12  --------------------------------------------------------  IN:    heparin  Infusion.: 154 mL    Oral Fluid: 300 mL    sodium chloride 0.9%.: 1575 mL  Total IN: 2029 mL    OUT:    Voided: 1450 mL  Total OUT: 1450 mL    Total NET: 579 mL      PAIN CONTROL:  acetaminophen   Tablet .. 650 milliGRAM(s) Oral every 6 hours PRN  methocarbamol 1000 milliGRAM(s) Oral every 8 hours PRN      Physical Exam  General: No acute distress.  Alert and Oriented  Abdominal Exam: soft, ntnd   Exam: WNL, voiding clear      LABS:                        11.1   7.10  )-----------( 306      ( 21 Nov 2019 06:28 )             37.1     11-21    140  |  106  |  9   ----------------------------<  165<H>  4.3   |  26  |  0.74    Ca    8.8      21 Nov 2019 06:28  Phos  2.4     11-21  Mg     2.1     11-21      PT/INR - ( 21 Nov 2019 06:28 )   PT: 12.5 sec;   INR: 1.10          PTT - ( 21 Nov 2019 18:15 )  PTT:80.9 sec    IMAGING:    EXAM:  PETCT SKUL-THI ONC FDG INIT                          PROCEDURE DATE:  11/21/2019          INTERPRETATION:  PET-CT Scan    History: Right renal mass with extension to right renal vein and the IVC.   Baseline study to help determine initial treatment strategy.     Procedure: Following at least 4 hour fasting, the patient's blood glucose   was 150 mg/dl.  The patient was injected with 9 mCi of F-18-FDG.     After resting in a quiet room for about one hour, the patient was   positioned on the scanning table and images were obtained using standard   PET/CT technique from the mid thigh to the skull base.    Simultaneous low-dose CT scanning is used for attenuation correction and   localization.    PET images were reconstructed in axial, sagittal and coronal planes using   CT based attenuation correction.  Images were displayed as PET, CT and   fused data sets as well as maximum intensity pixel projections.    The standard uptake values (SUV) reported below are maximum values within   the region of interest, expressed in gm/mL.    Comparison: CT abdomen pelvis 11/15/2019.    Findings:     Lower head and neck: Normal.    Lungs and large airways: Normal.     Pleura:  There are multiple bilateral pleural-based nodules which do not   demonstrate significant FDG activity. There is mild FDG uptake within a   left posterior pleural-based nodule along the posterior left ninth rib   (series 4 image 54) which likely represents the of biopsy.    Mediastinum and hilar regions: No thoracic lymphadenopathy.    Heart and pericardium:  Heart size is normal. No pericardial effusion.    Vessels:  There is FDG uptake within the superior right renal vein with   soft tissue expansion of IVC adjacent to the upper pole of the right   kidney, compatible with tumor thrombus (SUV 6.2). There is an additional   focus of increased FDG uptake within the IVC just inferior at the level   of the right lower pole (SUV 4.2). No abnormal uptake inferior to this   region, left renal, or iliac veins.    Liver:  Normal.    Gallbladder: No radiopaque stones gallbladder.    Spleen:  Normal.    Pancreas:  Normal.    Adrenal glands:  Normal.    Kidneys: 4.8 x 4.5 cm FDG avid soft tissue mass upper pole right kidney   with central necrosis (max SUV 5.2).     Abdominal and pelvic adenopathy:  No lymphadenopathy in abdomen or pelvis.    Ascites: None.    Gastrointestinal tract: Colonic diverticulosis. No abnormal FDG uptake.    Pelvic organs: No abnormal FDG uptake.    Soft tissues: Normal.    Bones: No abnormal FDG uptake.      Impression:    FDG avid right renal mass extending into the right renal vein and   adjacent IVC, as above.    No additional area of abnormal FDG uptake to suggest bilaterally tumor   activity.

## 2019-11-22 NOTE — BRIEF OPERATIVE NOTE - OPERATION/FINDINGS
Right nephrectomy, IVC tumor thrombectomy and bovine pericardial patch venoplasty. Entire large tumor removed from IVC grossly, did not extend supra-hepatically. Right nephrectomy, IVC tumor thrombectomy and bovine pericardial patch venoplasty. Large tumor grossly removed from IVC in its entirety, did not extend supra-hepatically. Right renal artery and vein suture ligated.

## 2019-11-23 LAB
ALBUMIN SERPL ELPH-MCNC: 3.1 G/DL — LOW (ref 3.3–5)
ALP SERPL-CCNC: 52 U/L — SIGNIFICANT CHANGE UP (ref 40–120)
ALT FLD-CCNC: 120 U/L — HIGH (ref 10–45)
ANION GAP SERPL CALC-SCNC: 11 MMOL/L — SIGNIFICANT CHANGE UP (ref 5–17)
APTT BLD: 26.7 SEC — LOW (ref 27.5–36.3)
AST SERPL-CCNC: 137 U/L — HIGH (ref 10–40)
BILIRUB SERPL-MCNC: 0.2 MG/DL — SIGNIFICANT CHANGE UP (ref 0.2–1.2)
BUN SERPL-MCNC: 11 MG/DL — SIGNIFICANT CHANGE UP (ref 7–23)
CALCIUM SERPL-MCNC: 7.2 MG/DL — LOW (ref 8.4–10.5)
CHLORIDE SERPL-SCNC: 109 MMOL/L — HIGH (ref 96–108)
CO2 SERPL-SCNC: 18 MMOL/L — LOW (ref 22–31)
CREAT SERPL-MCNC: 0.96 MG/DL — SIGNIFICANT CHANGE UP (ref 0.5–1.3)
GLUCOSE BLDC GLUCOMTR-MCNC: 123 MG/DL — HIGH (ref 70–99)
GLUCOSE BLDC GLUCOMTR-MCNC: 158 MG/DL — HIGH (ref 70–99)
GLUCOSE BLDC GLUCOMTR-MCNC: 162 MG/DL — HIGH (ref 70–99)
GLUCOSE BLDC GLUCOMTR-MCNC: 190 MG/DL — HIGH (ref 70–99)
GLUCOSE SERPL-MCNC: 138 MG/DL — HIGH (ref 70–99)
HCT VFR BLD CALC: 30.6 % — LOW (ref 34.5–45)
HGB BLD-MCNC: 9.6 G/DL — LOW (ref 11.5–15.5)
INR BLD: 1.1 — SIGNIFICANT CHANGE UP (ref 0.88–1.16)
MAGNESIUM SERPL-MCNC: 2.1 MG/DL — SIGNIFICANT CHANGE UP (ref 1.6–2.6)
MCHC RBC-ENTMCNC: 27.2 PG — SIGNIFICANT CHANGE UP (ref 27–34)
MCHC RBC-ENTMCNC: 31.4 GM/DL — LOW (ref 32–36)
MCV RBC AUTO: 86.7 FL — SIGNIFICANT CHANGE UP (ref 80–100)
NRBC # BLD: 0 /100 WBCS — SIGNIFICANT CHANGE UP (ref 0–0)
PHOSPHATE SERPL-MCNC: 3.5 MG/DL — SIGNIFICANT CHANGE UP (ref 2.5–4.5)
PLATELET # BLD AUTO: 256 K/UL — SIGNIFICANT CHANGE UP (ref 150–400)
POTASSIUM SERPL-MCNC: 4.8 MMOL/L — SIGNIFICANT CHANGE UP (ref 3.5–5.3)
POTASSIUM SERPL-SCNC: 4.8 MMOL/L — SIGNIFICANT CHANGE UP (ref 3.5–5.3)
PROT SERPL-MCNC: 5.6 G/DL — LOW (ref 6–8.3)
PROTHROM AB SERPL-ACNC: 12.5 SEC — SIGNIFICANT CHANGE UP (ref 10–12.9)
RBC # BLD: 3.53 M/UL — LOW (ref 3.8–5.2)
RBC # FLD: 14.9 % — HIGH (ref 10.3–14.5)
SODIUM SERPL-SCNC: 138 MMOL/L — SIGNIFICANT CHANGE UP (ref 135–145)
WBC # BLD: 13.63 K/UL — HIGH (ref 3.8–10.5)
WBC # FLD AUTO: 13.63 K/UL — HIGH (ref 3.8–10.5)

## 2019-11-23 PROCEDURE — 99233 SBSQ HOSP IP/OBS HIGH 50: CPT | Mod: GC

## 2019-11-23 RX ORDER — PANTOPRAZOLE SODIUM 20 MG/1
40 TABLET, DELAYED RELEASE ORAL
Refills: 0 | Status: DISCONTINUED | OUTPATIENT
Start: 2019-11-23 | End: 2019-11-27

## 2019-11-23 RX ORDER — LEVOTHYROXINE SODIUM 125 MCG
75 TABLET ORAL DAILY
Refills: 0 | Status: DISCONTINUED | OUTPATIENT
Start: 2019-11-23 | End: 2019-11-27

## 2019-11-23 RX ORDER — ENOXAPARIN SODIUM 100 MG/ML
40 INJECTION SUBCUTANEOUS DAILY
Refills: 0 | Status: DISCONTINUED | OUTPATIENT
Start: 2019-11-23 | End: 2019-11-27

## 2019-11-23 RX ADMIN — HYDROMORPHONE HYDROCHLORIDE 1 MILLIGRAM(S): 2 INJECTION INTRAMUSCULAR; INTRAVENOUS; SUBCUTANEOUS at 10:20

## 2019-11-23 RX ADMIN — Medication 2: at 11:46

## 2019-11-23 RX ADMIN — HYDROMORPHONE HYDROCHLORIDE 1 MILLIGRAM(S): 2 INJECTION INTRAMUSCULAR; INTRAVENOUS; SUBCUTANEOUS at 00:24

## 2019-11-23 RX ADMIN — Medication 2: at 22:00

## 2019-11-23 RX ADMIN — HYDROMORPHONE HYDROCHLORIDE 0.5 MILLIGRAM(S): 2 INJECTION INTRAMUSCULAR; INTRAVENOUS; SUBCUTANEOUS at 22:55

## 2019-11-23 RX ADMIN — HYDROMORPHONE HYDROCHLORIDE 1 MILLIGRAM(S): 2 INJECTION INTRAMUSCULAR; INTRAVENOUS; SUBCUTANEOUS at 07:56

## 2019-11-23 RX ADMIN — HYDROMORPHONE HYDROCHLORIDE 0.5 MILLIGRAM(S): 2 INJECTION INTRAMUSCULAR; INTRAVENOUS; SUBCUTANEOUS at 15:51

## 2019-11-23 RX ADMIN — Medication 60 MICROGRAM(S): at 06:37

## 2019-11-23 RX ADMIN — Medication 3 MILLILITER(S): at 09:53

## 2019-11-23 RX ADMIN — Medication 3 MILLILITER(S): at 06:48

## 2019-11-23 RX ADMIN — HYDROMORPHONE HYDROCHLORIDE 0.5 MILLIGRAM(S): 2 INJECTION INTRAMUSCULAR; INTRAVENOUS; SUBCUTANEOUS at 15:42

## 2019-11-23 RX ADMIN — HEPARIN SODIUM 5000 UNIT(S): 5000 INJECTION INTRAVENOUS; SUBCUTANEOUS at 07:55

## 2019-11-23 RX ADMIN — HYDROMORPHONE HYDROCHLORIDE 1 MILLIGRAM(S): 2 INJECTION INTRAMUSCULAR; INTRAVENOUS; SUBCUTANEOUS at 10:08

## 2019-11-23 RX ADMIN — Medication 3 MILLILITER(S): at 19:00

## 2019-11-23 RX ADMIN — Medication 2000 MILLIGRAM(S): at 10:18

## 2019-11-23 RX ADMIN — PANTOPRAZOLE SODIUM 40 MILLIGRAM(S): 20 TABLET, DELAYED RELEASE ORAL at 11:38

## 2019-11-23 RX ADMIN — HYDROMORPHONE HYDROCHLORIDE 0.5 MILLIGRAM(S): 2 INJECTION INTRAMUSCULAR; INTRAVENOUS; SUBCUTANEOUS at 21:55

## 2019-11-23 RX ADMIN — Medication 2000 MILLIGRAM(S): at 19:00

## 2019-11-23 RX ADMIN — ENOXAPARIN SODIUM 40 MILLIGRAM(S): 100 INJECTION SUBCUTANEOUS at 11:39

## 2019-11-23 RX ADMIN — Medication 2000 MILLIGRAM(S): at 01:35

## 2019-11-23 NOTE — PHYSICAL THERAPY INITIAL EVALUATION ADULT - GAIT DEVIATIONS NOTED, PT EVAL
decreased step length/decreased stride length/decreased BLE strength, endurance, and dynamic standing balance

## 2019-11-23 NOTE — PHYSICAL THERAPY INITIAL EVALUATION ADULT - PERTINENT HX OF CURRENT PROBLEM, REHAB EVAL
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

## 2019-11-23 NOTE — PROGRESS NOTE ADULT - SUBJECTIVE AND OBJECTIVE BOX
Patient was seen and examined at bedside. No acute event overnight. C/o of pain on R lateral abdomen. Steeped down to 8la    Vital Signs Last 24 Hrs  T(C): 37.2 (23 Nov 2019 18:13), Max: 37.4 (23 Nov 2019 14:09)  T(F): 99 (23 Nov 2019 18:13), Max: 99.3 (23 Nov 2019 14:09)  HR: 112 (23 Nov 2019 20:01) (80 - 124)  BP: 135/63 (23 Nov 2019 20:01) (108/65 - 143/61)  BP(mean): 90 (23 Nov 2019 20:01) (73 - 105)  RR: 18 (23 Nov 2019 20:01) (9 - 25)  SpO2: 99% (23 Nov 2019 20:01) (92% - 100%)    Physical Exam:  General: NAD  Pulmonary: Nonlabored breathing, no respiratory distress  Cardiovascular: NSR  Abdominal: soft, appropriately tender to palpation especially in the right side. Left side mildly tender. No distension. Incision dressing CDI. Drain in place - serous ouput.   Extremities: WWP, normal strength, no clubbing/cyanosis/edema, no signs of DVT  Neuro: A/O x3, no focal deficits, normal sensation  Pulses: palpable distal pulses    Lines/drains/tubes:    I&O's Summary    22 Nov 2019 07:01  -  23 Nov 2019 07:00  --------------------------------------------------------  IN: 1275 mL / OUT: 1210 mL / NET: 65 mL    23 Nov 2019 07:01  -  23 Nov 2019 21:10  --------------------------------------------------------  IN: 150 mL / OUT: 760 mL / NET: -610 mL        LABS:                        9.6    13.63 )-----------( 256      ( 23 Nov 2019 04:38 )             30.6     11-23    138  |  109<H>  |  11  ----------------------------<  138<H>  4.8   |  18<L>  |  0.96    Ca    7.2<L>      23 Nov 2019 04:38  Phos  3.5     11-23  Mg     2.1     11-23    TPro  5.6<L>  /  Alb  3.1<L>  /  TBili  0.2  /  DBili  x   /  AST  137<H>  /  ALT  120<H>  /  AlkPhos  52  11-23    PT/INR - ( 23 Nov 2019 04:38 )   PT: 12.5 sec;   INR: 1.10          PTT - ( 23 Nov 2019 04:38 )  PTT:26.7 sec    CAPILLARY BLOOD GLUCOSE      POCT Blood Glucose.: 158 mg/dL (23 Nov 2019 16:33)  POCT Blood Glucose.: 162 mg/dL (23 Nov 2019 11:40)  POCT Blood Glucose.: 123 mg/dL (23 Nov 2019 06:56)  POCT Blood Glucose.: 169 mg/dL (22 Nov 2019 21:31)    LIVER FUNCTIONS - ( 23 Nov 2019 04:38 )  Alb: 3.1 g/dL / Pro: 5.6 g/dL / ALK PHOS: 52 U/L / ALT: 120 U/L / AST: 137 U/L / GGT: x             RADIOLOGY & ADDITIONAL STUDIES: Patient was seen and examined at bedside. No acute event overnight. C/o of pain on R lateral abdomen. Steeped down to 8la    Vital Signs Last 24 Hrs  T(C): 37.2 (23 Nov 2019 18:13), Max: 37.4 (23 Nov 2019 14:09)  T(F): 99 (23 Nov 2019 18:13), Max: 99.3 (23 Nov 2019 14:09)  HR: 112 (23 Nov 2019 20:01) (80 - 124)  BP: 135/63 (23 Nov 2019 20:01) (108/65 - 143/61)  BP(mean): 90 (23 Nov 2019 20:01) (73 - 105)  RR: 18 (23 Nov 2019 20:01) (9 - 25)  SpO2: 99% (23 Nov 2019 20:01) (92% - 100%)    Physical Exam:  General: NAD  Pulmonary: Nonlabored breathing, no respiratory distress  Cardiovascular: NSR  Abdominal: soft, appropriately tender to palpation especially in the right side. Left side mildly tender. No distension. Incision dressing CDI.   Extremities: WWP, normal strength, no clubbing/cyanosis/edema, no signs of DVT  Neuro: A/O x3, no focal deficits, normal sensation  Pulses: palpable distal pulses    Lines/drains/tubes:    I&O's Summary    22 Nov 2019 07:01  -  23 Nov 2019 07:00  --------------------------------------------------------  IN: 1275 mL / OUT: 1210 mL / NET: 65 mL    23 Nov 2019 07:01  -  23 Nov 2019 21:10  --------------------------------------------------------  IN: 150 mL / OUT: 760 mL / NET: -610 mL        LABS:                        9.6    13.63 )-----------( 256      ( 23 Nov 2019 04:38 )             30.6     11-23    138  |  109<H>  |  11  ----------------------------<  138<H>  4.8   |  18<L>  |  0.96    Ca    7.2<L>      23 Nov 2019 04:38  Phos  3.5     11-23  Mg     2.1     11-23    TPro  5.6<L>  /  Alb  3.1<L>  /  TBili  0.2  /  DBili  x   /  AST  137<H>  /  ALT  120<H>  /  AlkPhos  52  11-23    PT/INR - ( 23 Nov 2019 04:38 )   PT: 12.5 sec;   INR: 1.10          PTT - ( 23 Nov 2019 04:38 )  PTT:26.7 sec    CAPILLARY BLOOD GLUCOSE      POCT Blood Glucose.: 158 mg/dL (23 Nov 2019 16:33)  POCT Blood Glucose.: 162 mg/dL (23 Nov 2019 11:40)  POCT Blood Glucose.: 123 mg/dL (23 Nov 2019 06:56)  POCT Blood Glucose.: 169 mg/dL (22 Nov 2019 21:31)    LIVER FUNCTIONS - ( 23 Nov 2019 04:38 )  Alb: 3.1 g/dL / Pro: 5.6 g/dL / ALK PHOS: 52 U/L / ALT: 120 U/L / AST: 137 U/L / GGT: x             RADIOLOGY & ADDITIONAL STUDIES:

## 2019-11-23 NOTE — PHYSICAL THERAPY INITIAL EVALUATION ADULT - ADDITIONAL COMMENTS
Patient lives in first-floor walk-up apartment. Patient denies home health assistance, DME, or history of falls.

## 2019-11-23 NOTE — PROGRESS NOTE ADULT - ASSESSMENT
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     Plan  Neuro: dilaudid prn   CV: HD stable   Pulm: Satting well on NC Hx of COPD: cont Duonebs  GI: Start CLD ; Pantoprazole   : s/p Rt Nephrectomy ; D/C brunner   ID: Ancef X3( 11/22-24)   Endo: ISS, start Lantus if continued insulin need ; Synthroid   PPx: Lovenox ; SCD  Lines: Steph( 11/22-) TLC( 11/22-) Left femoral sheath (11/22-23)  Wounds: Cheveron, clean  PT/OT: OOB to chair PT to be ordered on 11/23  Dispo: stepdown today

## 2019-11-23 NOTE — PHYSICAL THERAPY INITIAL EVALUATION ADULT - GENERAL OBSERVATIONS, REHAB EVAL
Patient received semi-supine, No apparent distress, +tele, +HEP lock, +2L of O2 ( nasal cannula). Patient's daughter translating secondary to declining language line.

## 2019-11-23 NOTE — PROGRESS NOTE ADULT - SUBJECTIVE AND OBJECTIVE BOX
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 , IVF 3L, 1PRBC. U/O260    Patient feeling well this morning.   Pain at the incision site with deep breathing   Wants to eat   No acute events per RN      Vital Signs Last 24 Hrs  T(C): 36.8 (22 Nov 2019 04:29), Max: 36.8 (21 Nov 2019 20:57)  T(F): 98.2 (22 Nov 2019 04:29), Max: 98.2 (21 Nov 2019 20:57)  HR: 83 (22 Nov 2019 04:29) (65 - 83)  BP: 129/67 (22 Nov 2019 04:29) (123/64 - 153/77)  BP(mean): --  RR: 18 (22 Nov 2019 04:29) (18 - 18)  SpO2: 97% (22 Nov 2019 04:29) (95% - 98%)    I&O's Detail    21 Nov 2019 07:01  -  22 Nov 2019 07:00  --------------------------------------------------------  IN:    heparin  Infusion.: 154 mL    Oral Fluid: 300 mL    sodium chloride 0.9%: 1650 mL  Total IN: 2104 mL    OUT:    Voided: 1450 mL  Total OUT: 1450 mL    Total NET: 654 mL      Physical Exam:  General: NAD, resting comfortably in bed  HEENT: normocephalic, no scleral ictera   Pulmonary: nonlabored breathing, lungs are clear to auscultation   Cardiovascular: regular rate and rhythm, no murmur to auscultation, no carotid bruit   Abdominal: soft, nontender, nondistended, Chevron dressing c/d/i  Extremities: WWP, no edema, no calf tenderness  Neuro: no focal deficits      LABS:                        12.6   8.39  )-----------( 305      ( 22 Nov 2019 06:21 )             42.0     11-22    144  |  110<H>  |  8   ----------------------------<  133<H>  3.8   |  22  |  0.66    Ca    8.4      22 Nov 2019 08:17  Phos  2.3     11-22  Mg     2.4     11-22      PT/INR - ( 22 Nov 2019 07:47 )   PT: 11.9 sec;   INR: 1.05          PTT - ( 22 Nov 2019 07:47 )  PTT:31.6 sec    CAPILLARY BLOOD GLUCOSE      POCT Blood Glucose.: 117 mg/dL (21 Nov 2019 16:38)        Cultures:      RADIOLOGY & ADDITIONAL STUDIES:

## 2019-11-23 NOTE — PROGRESS NOTE ADULT - ASSESSMENT
Assessment:69y Female s/p above procedure    Plan:  Pain/nausea control PRN  Continue DVT PPX - SCDs  Care per primary team

## 2019-11-23 NOTE — PROGRESS NOTE ADULT - SUBJECTIVE AND OBJECTIVE BOX
Heme/Onc Progress Note (Dr. Jasmine)    Interval History: Pt seen and examined. S/p right nephrectomy, IVC tumour thrombectomy and bovine pericardial patch venoplasty.    Pain at incision site but doing fairly well. Sitting up in chair. Breathing comfortably. Daughter by bedside.     Allergies:  No Known Allergies    Medications:  MEDICATIONS  (STANDING):  amLODIPine   Tablet 5 milliGRAM(s) Oral daily  atorvastatin 20 milliGRAM(s) Oral at bedtime  heparin  Infusion.  Unit(s)/Hr (11 mL/Hr) IV Continuous <Continuous>  levothyroxine 75 MICROGram(s) Oral daily  sodium chloride 0.9%. 1000 milliLiter(s) (75 mL/Hr) IV Continuous <Continuous>  sodium phosphate IVPB 15 milliMole(s) IV Intermittent once    MEDICATIONS  (PRN):  acetaminophen   Tablet .. 650 milliGRAM(s) Oral every 6 hours PRN Mild Pain (1 - 3)  methocarbamol 1000 milliGRAM(s) Oral every 8 hours PRN neck pain    heparin  Infusion.  Unit(s)/Hr IV Continuous <Continuous>      PHYSICAL EXAM:    Vital Signs Last 24 Hrs  T(C): 37.4 (23 Nov 2019 14:09), Max: 37.4 (23 Nov 2019 14:09)  T(F): 99.3 (23 Nov 2019 14:09), Max: 99.3 (23 Nov 2019 14:09)  HR: 111 (23 Nov 2019 16:04) (58 - 124)  BP: 135/70 (23 Nov 2019 16:04) (93/50 - 143/61)  BP(mean): 93 (23 Nov 2019 16:04) (62 - 105)  RR: 20 (23 Nov 2019 16:04) (9 - 25)  SpO2: 97% (23 Nov 2019 16:04) (92% - 100%)    Daily     Daily     GEN: NAD, resting  HEENT: AT/NC, EOMI  NECK: Supple  CVS:+S1S2   LUNG: CTA B/L s/p bx site clean  ABD: +BS, NT, ND  EXT: no c/c/e  NEURO: aaox3        Labs:  13.6, 9.6, 256,   0.96        11/15  CT CAP  Evaluation of the lower chest demonstrates normal heart size. There is no   pleural or pericardial effusion. There is coronary arterial   calcification. Innumerable bilateral pulmonary nodules along the pleural   surfaces, consistent with pleural-based metastasis measuring up to 15 mm.   Evaluation of the abdomen demonstrates that the liver is not enlarged.   The spleen, pancreas, gallbladder, bilateral adrenal glands and left   kidney are unremarkable aside from a cyst in the posterior interpolar   region of the left kidney measuring 4.5 cm. Evaluation of the right   kidney demonstrates an avidly enhancing solid and cystic mass within the   upper pole measuring 4.4 x 4.2 cm. There is enhancing tumor thrombus   extending into the right renal vein and superiorly into the infrahepatic   IVC; there is thrombus extending into the infracaval IVC and bilateral   common iliac veins with venous distention and surrounding edematous   infiltration. Tumor thrombosis also extends partially into the right   renal vein. Evaluation of the gastrointestinal tract demonstrates   diverticulosis. There is no bowel thickening or bowel obstruction. There   is appearance of the appendix. Evaluation of the pelvis demonstrates   hysterectomy. Bladder is unremarkable. Small left fat-containing inguinal   hernia. Edematous infiltration of the presacral region and   retroperitoneum. No adenopathy. Moderate aortic and arterial vascular   calcification. Opacified aspects of the portal, hepatic splenic superior   mesenteric veins demonstrates no sydney intraluminal thrombus. Evaluation   of the osseous structures are unremarkable    IMPRESSION:    Carcinoma of the upper pole of the right kidney with extensive tumor   thrombus extending into the left renal vein, IVC and bilateral iliac   veins.    Diffuse pleural-based pulmonary metastasis.    CT Chest w/ IVC 11/16#    Evaluation of the chest demonstrates that the visualized thyroid gland is   normal in appearance. There is normal heart size. There is moderate   coronary arterial calcification. Negative for thoracic inlet, axillary,   mediastinal or hilar lymphadenopathy. There is no sydney central or   segmental pulmonary embolus. Negative for intraluminal thrombus within   the left atrial appendage. Negative for mitral annular or aortic valvular   calcification. Evaluation of the lung parenchyma demonstrates mild   paraseptal and centrilobular edematous change. There are multiple   pleural-based pulmonary nodules which do not demonstrate avid   enhancement, predominantly along the bilateral lower lobes, largest which   measures 15 mm along the right lower lobe; there is a calcified   subpleural nodule along the right middle lobe measuring 6 mm. Some   pleural-based nodules demonstrates an oblong configuration and the   overall lack of avid enhancement suggests the possibility of multiple   nodular pleural plaques. There is no endobronchial lesion. Evaluation the   upper abdomen redemonstrates carcinoma within the upper pole the right   kidney measuring 4.3 cm with tumor thrombus extending into the IVC and   right renal vein. Evaluation of the osseous structures demonstrates no   destructive lesion.    IMPRESSION:    Pleural-based metastasis versus nodular pleural plaques; PET-CT   correlation is recommended.    Pathology 11/19    Final Diagnosis    Lung, left lower lobe, pleural based; core biopsy:  - Hyalinized and fibrotic tissue with adjacent chronic  inflammation, consistent with pleural plaque.  - Negative for carcinoma.

## 2019-11-23 NOTE — PROGRESS NOTE ADULT - ASSESSMENT
69F, Irish speaking with multiple comorbidities, presenting with 4 days of LLQ abd pain,, found to have a new right upper pole renal mass with tumor thrombus extending in to bilateral renal veins and IVC.    #R Renal Mass   #Extensive Tumor thrombus   #Multiple pleural based lesions negative    -Upper pole R Kidney mass w/ extensive tumor thrombus extending to L renal vein, IVC and bilateral iliac veins (currently, clinically appears to be at least stage 3).  -S/p nephrectomy and thrombectomy on 11/22/19. Awaiting final path.    -C/w lovenox.  -left lower lobe pleural based nodule core biopsy, negative for carcinoma, c/w hyalinized +fibrotic tissue, chronic inflammation  -PET/CT without evidence of distant mets.  -Will follow with you.    Thank you  Kirk Jasmine MD.

## 2019-11-24 LAB
ANION GAP SERPL CALC-SCNC: 11 MMOL/L — SIGNIFICANT CHANGE UP (ref 5–17)
BUN SERPL-MCNC: 12 MG/DL — SIGNIFICANT CHANGE UP (ref 7–23)
CALCIUM SERPL-MCNC: 7.8 MG/DL — LOW (ref 8.4–10.5)
CHLORIDE SERPL-SCNC: 104 MMOL/L — SIGNIFICANT CHANGE UP (ref 96–108)
CO2 SERPL-SCNC: 23 MMOL/L — SIGNIFICANT CHANGE UP (ref 22–31)
CREAT SERPL-MCNC: 1.15 MG/DL — SIGNIFICANT CHANGE UP (ref 0.5–1.3)
CULTURE RESULTS: NO GROWTH — SIGNIFICANT CHANGE UP
GLUCOSE BLDC GLUCOMTR-MCNC: 178 MG/DL — HIGH (ref 70–99)
GLUCOSE BLDC GLUCOMTR-MCNC: 180 MG/DL — HIGH (ref 70–99)
GLUCOSE BLDC GLUCOMTR-MCNC: 186 MG/DL — HIGH (ref 70–99)
GLUCOSE BLDC GLUCOMTR-MCNC: 191 MG/DL — HIGH (ref 70–99)
GLUCOSE BLDC GLUCOMTR-MCNC: 207 MG/DL — HIGH (ref 70–99)
GLUCOSE SERPL-MCNC: 199 MG/DL — HIGH (ref 70–99)
HCT VFR BLD CALC: 27.8 % — LOW (ref 34.5–45)
HGB BLD-MCNC: 8.9 G/DL — LOW (ref 11.5–15.5)
MAGNESIUM SERPL-MCNC: 2.2 MG/DL — SIGNIFICANT CHANGE UP (ref 1.6–2.6)
MCHC RBC-ENTMCNC: 27.6 PG — SIGNIFICANT CHANGE UP (ref 27–34)
MCHC RBC-ENTMCNC: 32 GM/DL — SIGNIFICANT CHANGE UP (ref 32–36)
MCV RBC AUTO: 86.3 FL — SIGNIFICANT CHANGE UP (ref 80–100)
NRBC # BLD: 0 /100 WBCS — SIGNIFICANT CHANGE UP (ref 0–0)
PHOSPHATE SERPL-MCNC: 1.7 MG/DL — LOW (ref 2.5–4.5)
PLATELET # BLD AUTO: 254 K/UL — SIGNIFICANT CHANGE UP (ref 150–400)
POTASSIUM SERPL-MCNC: 4.9 MMOL/L — SIGNIFICANT CHANGE UP (ref 3.5–5.3)
POTASSIUM SERPL-SCNC: 4.9 MMOL/L — SIGNIFICANT CHANGE UP (ref 3.5–5.3)
RBC # BLD: 3.22 M/UL — LOW (ref 3.8–5.2)
RBC # FLD: 15.7 % — HIGH (ref 10.3–14.5)
SODIUM SERPL-SCNC: 138 MMOL/L — SIGNIFICANT CHANGE UP (ref 135–145)
SPECIMEN SOURCE: SIGNIFICANT CHANGE UP
WBC # BLD: 12.94 K/UL — HIGH (ref 3.8–10.5)
WBC # FLD AUTO: 12.94 K/UL — HIGH (ref 3.8–10.5)

## 2019-11-24 PROCEDURE — 93010 ELECTROCARDIOGRAM REPORT: CPT

## 2019-11-24 PROCEDURE — 71275 CT ANGIOGRAPHY CHEST: CPT | Mod: 26

## 2019-11-24 RX ORDER — SODIUM CHLORIDE 9 MG/ML
500 INJECTION INTRAMUSCULAR; INTRAVENOUS; SUBCUTANEOUS ONCE
Refills: 0 | Status: COMPLETED | OUTPATIENT
Start: 2019-11-24 | End: 2019-11-24

## 2019-11-24 RX ADMIN — Medication 3 MILLILITER(S): at 00:28

## 2019-11-24 RX ADMIN — HYDROMORPHONE HYDROCHLORIDE 0.5 MILLIGRAM(S): 2 INJECTION INTRAMUSCULAR; INTRAVENOUS; SUBCUTANEOUS at 06:23

## 2019-11-24 RX ADMIN — Medication 4: at 11:53

## 2019-11-24 RX ADMIN — HYDROMORPHONE HYDROCHLORIDE 1 MILLIGRAM(S): 2 INJECTION INTRAMUSCULAR; INTRAVENOUS; SUBCUTANEOUS at 01:15

## 2019-11-24 RX ADMIN — SODIUM CHLORIDE 500 MILLILITER(S): 9 INJECTION INTRAMUSCULAR; INTRAVENOUS; SUBCUTANEOUS at 22:02

## 2019-11-24 RX ADMIN — Medication 75 MICROGRAM(S): at 06:24

## 2019-11-24 RX ADMIN — ENOXAPARIN SODIUM 40 MILLIGRAM(S): 100 INJECTION SUBCUTANEOUS at 11:54

## 2019-11-24 RX ADMIN — HYDROMORPHONE HYDROCHLORIDE 1 MILLIGRAM(S): 2 INJECTION INTRAMUSCULAR; INTRAVENOUS; SUBCUTANEOUS at 02:15

## 2019-11-24 RX ADMIN — Medication 2: at 19:29

## 2019-11-24 RX ADMIN — Medication 3 MILLILITER(S): at 19:31

## 2019-11-24 RX ADMIN — HYDROMORPHONE HYDROCHLORIDE 1 MILLIGRAM(S): 2 INJECTION INTRAMUSCULAR; INTRAVENOUS; SUBCUTANEOUS at 13:02

## 2019-11-24 RX ADMIN — HYDROMORPHONE HYDROCHLORIDE 1 MILLIGRAM(S): 2 INJECTION INTRAMUSCULAR; INTRAVENOUS; SUBCUTANEOUS at 22:02

## 2019-11-24 RX ADMIN — HYDROMORPHONE HYDROCHLORIDE 1 MILLIGRAM(S): 2 INJECTION INTRAMUSCULAR; INTRAVENOUS; SUBCUTANEOUS at 13:56

## 2019-11-24 RX ADMIN — HYDROMORPHONE HYDROCHLORIDE 1 MILLIGRAM(S): 2 INJECTION INTRAMUSCULAR; INTRAVENOUS; SUBCUTANEOUS at 22:15

## 2019-11-24 RX ADMIN — Medication 2: at 22:02

## 2019-11-24 RX ADMIN — Medication 3 MILLILITER(S): at 06:23

## 2019-11-24 RX ADMIN — HYDROMORPHONE HYDROCHLORIDE 0.5 MILLIGRAM(S): 2 INJECTION INTRAMUSCULAR; INTRAVENOUS; SUBCUTANEOUS at 07:16

## 2019-11-24 RX ADMIN — PANTOPRAZOLE SODIUM 40 MILLIGRAM(S): 20 TABLET, DELAYED RELEASE ORAL at 06:24

## 2019-11-24 RX ADMIN — Medication 2000 MILLIGRAM(S): at 01:00

## 2019-11-24 NOTE — PROGRESS NOTE ADULT - ASSESSMENT
69y Female, Frisian speaking,  PMH HTN, DM, HLD, hypothyroid, diverticulosis, COPD/emphysema, former smoker (1ppd x 45 years, quit 1 year ago), with right renal mass with IVC thrombus.  S/P radical nx, IVC thrombectomy, venoplasty with patch 11/22

## 2019-11-24 NOTE — PROGRESS NOTE ADULT - SUBJECTIVE AND OBJECTIVE BOX
Patient examined bedside. She reports that she is having minimal LLQ pain. Patient reports she is not passing flatus or having bowel movement. Patient reports she is tolerating diet. Patient denies  SOB, chest pain, nausea and emesis.         PAST MEDICAL & SURGICAL HISTORY:  Diverticulosis  Hypothyroid  High cholesterol  HTN (hypertension)  Diabetes          SOCIAL HISTORY:  Smoke: Never Smoker  EtOH: occasional    Vital Signs Last 24 Hrs  T(C): 37.1 (24 Nov 2019 13:27), Max: 38.1 (23 Nov 2019 21:45)  T(F): 98.7 (24 Nov 2019 13:27), Max: 100.5 (23 Nov 2019 21:45)  HR: 94 (24 Nov 2019 16:19) (94 - 126)  BP: 138/63 (24 Nov 2019 16:19) (119/57 - 138/63)  BP(mean): 90 (24 Nov 2019 16:19) (80 - 91)  RR: 18 (24 Nov 2019 16:19) (18 - 19)  SpO2: 100% (24 Nov 2019 16:19) (96% - 100%)    PHYSICAL EXAM    Physical Exam:  General: NAD, resting comfortably in bed  Abdominal: soft, nontender, nondistended, Chevron dressing c/d/i  Extremities: WWP, no edema, no calf tenderness        LABS:                        8.9    12.94 )-----------( 254      ( 24 Nov 2019 06:19 )             27.8     11-24    138  |  104  |  12  ----------------------------<  199<H>  4.9   |  23  |  1.15    Ca    7.8<L>      24 Nov 2019 06:19  Phos  1.7     11-24  Mg     2.2     11-24    TPro  5.6<L>  /  Alb  3.1<L>  /  TBili  0.2  /  DBili  x   /  AST  137<H>  /  ALT  120<H>  /  AlkPhos  52  11-23    PT/INR - ( 23 Nov 2019 04:38 )   PT: 12.5 sec;   INR: 1.10          PTT - ( 23 Nov 2019 04:38 )  PTT:26.7 sec      RADIOLOGY & ADDITIONAL STUDIES:

## 2019-11-24 NOTE — CHART NOTE - NSCHARTNOTEFT_GEN_A_CORE
Patient is s/p S/P radical nx, IVC thrombectomy, venoplasty with patch 11/22.  CTS on standby during surgery in case need for bypass arose.  Patient never placed on bypass and CTS never involved intraop.  Discussed with Dr. Feliciano, will sign off at this time.  Please feel free to re-consult if needed.

## 2019-11-24 NOTE — PROVIDER CONTACT NOTE (CHANGE IN STATUS NOTIFICATION) - ACTION/TREATMENT ORDERED:
CT PE protocol ordered  Pain medications given.  Will continue to monitor.
Continue to monitor. Provider stated they would come assess pt at bedside.

## 2019-11-24 NOTE — PROGRESS NOTE ADULT - SUBJECTIVE AND OBJECTIVE BOX
AM Note    No acute events overnight.      Vital Signs Last 24 Hrs  T(C): 37.6 (11-24-19 @ 00:16), Max: 38.1 (11-23-19 @ 21:45)  T(F): 99.7 (11-24-19 @ 00:16), Max: 100.5 (11-23-19 @ 21:45)  HR: 106 (11-24-19 @ 04:05) (82 - 124)  BP: 125/58 (11-24-19 @ 04:05) (108/65 - 143/61)  BP(mean): 83 (11-24-19 @ 04:05) (73 - 105)  RR: 18 (11-24-19 @ 04:05) (9 - 25)  SpO2: 96% (11-24-19 @ 04:05) (92% - 100%)     23 Nov 2019 04:38    138    |  109    |  11     ----------------------------<  138    4.8     |  18     |  0.96     Ca    7.2        23 Nov 2019 04:38  Phos  3.5       23 Nov 2019 04:38  Mg     2.1       23 Nov 2019 04:38    TPro  5.6    /  Alb  3.1    /  TBili  0.2    /  DBili  x      /  AST  137    /  ALT  120    /  AlkPhos  52     23 Nov 2019 04:38                          9.6    13.63 )-----------( 256      ( 23 Nov 2019 04:38 )             30.6         I&O's Summary    22 Nov 2019 07:01  -  23 Nov 2019 07:00  --------------------------------------------------------  IN: 1275 mL / OUT: 1210 mL / NET: 65 mL    23 Nov 2019 07:01  -  24 Nov 2019 04:47  --------------------------------------------------------  IN: 150 mL / OUT: 760 mL / NET: -610 mL          PHYSICAL EXAM:    GEN: NAD  ABD: incision C/D/I  : voiding

## 2019-11-24 NOTE — PROGRESS NOTE ADULT - ASSESSMENT
69F, Persian speaking, with PMH of HTN, DM, HLD, Hypothyroid, diverticulosis, 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 presents with 4 days history of LLQ abd pain,  Continue CLD  F/U bowel function  Surgery will continue to follow

## 2019-11-24 NOTE — PROGRESS NOTE ADULT - SUBJECTIVE AND OBJECTIVE BOX
s: admits to incisional pain in abd, denies CP, SOB, denies bilateral foot edema    enoxaparin Injectable 40      Allergies    No Known Allergies    Intolerances        Vital Signs Last 24 Hrs  T(C): 36.6 (24 Nov 2019 17:52), Max: 38.1 (23 Nov 2019 21:45)  T(F): 97.9 (24 Nov 2019 17:52), Max: 100.5 (23 Nov 2019 21:45)  HR: 94 (24 Nov 2019 16:19) (94 - 126)  BP: 138/63 (24 Nov 2019 16:19) (119/57 - 138/63)  BP(mean): 90 (24 Nov 2019 16:19) (81 - 91)  RR: 18 (24 Nov 2019 16:19) (18 - 18)  SpO2: 100% (24 Nov 2019 16:19) (96% - 100%)  I&O's Summary    23 Nov 2019 07:01  -  24 Nov 2019 07:00  --------------------------------------------------------  IN: 150 mL / OUT: 760 mL / NET: -610 mL    24 Nov 2019 07:01  -  24 Nov 2019 19:12  --------------------------------------------------------  IN: 0 mL / OUT: 700 mL / NET: -700 mL        Physical Exam:  General: nad  Pulmonary: d/c breath sounds noted   Cardiovascular:s1s2 tachycardic   Abdominal: incision clean and dry   Extremities: bilateral feet without edema   palpable DP pulses bilaterally       LABS:                        8.9    12.94 )-----------( 254      ( 24 Nov 2019 06:19 )             27.8     11-24    138  |  104  |  12  ----------------------------<  199<H>  4.9   |  23  |  1.15    Ca    7.8<L>      24 Nov 2019 06:19  Phos  1.7     11-24  Mg     2.2     11-24    TPro  5.6<L>  /  Alb  3.1<L>  /  TBili  0.2  /  DBili  x   /  AST  137<H>  /  ALT  120<H>  /  AlkPhos  52  11-23    PT/INR - ( 23 Nov 2019 04:38 )   PT: 12.5 sec;   INR: 1.10          PTT - ( 23 Nov 2019 04:38 )  PTT:26.7 sec    Radiology and Additional Studies:    69y Female, Amharic speaking,  PMH HTN, DM, HLD, hypothyroid, diverticulosis, COPD/emphysema, former smoker (1ppd x 45 years, quit 1 year ago), with right renal mass with IVC thrombus.  S/P radical nx, IVC thrombectomy, venoplasty with patch 11/22    -pain control  -tachycardic today   -CT PE protocol pending-will follow results

## 2019-11-24 NOTE — PROVIDER CONTACT NOTE (CHANGE IN STATUS NOTIFICATION) - BACKGROUND
69F, Divehi speaking, with PMH DM, HTN, HLD,  hypothyroid, diverticulosis, presents with 4 days of LLQ abd pain without associated signs of symptoms  admitted to [facility], found to have a new right upper pole renal mass with tumor thrombus extending in to bilateral renal veins and IVC.

## 2019-11-24 NOTE — PROVIDER CONTACT NOTE (CHANGE IN STATUS NOTIFICATION) - SITUATION
Pt's HR went tachycardic to 145 while pt sleeping in bed. Pt asymptomatic. Urology made aware. EKG performed. Pt HR staying high at in 120s. Pt asymptomatic.

## 2019-11-25 DIAGNOSIS — R00.0 TACHYCARDIA, UNSPECIFIED: ICD-10-CM

## 2019-11-25 LAB
ANION GAP SERPL CALC-SCNC: 11 MMOL/L — SIGNIFICANT CHANGE UP (ref 5–17)
BLD GP AB SCN SERPL QL: NEGATIVE — SIGNIFICANT CHANGE UP
BUN SERPL-MCNC: 7 MG/DL — SIGNIFICANT CHANGE UP (ref 7–23)
CALCIUM SERPL-MCNC: 8.1 MG/DL — LOW (ref 8.4–10.5)
CHLORIDE SERPL-SCNC: 105 MMOL/L — SIGNIFICANT CHANGE UP (ref 96–108)
CO2 SERPL-SCNC: 23 MMOL/L — SIGNIFICANT CHANGE UP (ref 22–31)
CREAT SERPL-MCNC: 0.92 MG/DL — SIGNIFICANT CHANGE UP (ref 0.5–1.3)
GLUCOSE BLDC GLUCOMTR-MCNC: 144 MG/DL — HIGH (ref 70–99)
GLUCOSE BLDC GLUCOMTR-MCNC: 166 MG/DL — HIGH (ref 70–99)
GLUCOSE BLDC GLUCOMTR-MCNC: 200 MG/DL — HIGH (ref 70–99)
GLUCOSE BLDC GLUCOMTR-MCNC: 227 MG/DL — HIGH (ref 70–99)
GLUCOSE SERPL-MCNC: 150 MG/DL — HIGH (ref 70–99)
HCT VFR BLD CALC: 27 % — LOW (ref 34.5–45)
HGB BLD-MCNC: 8.5 G/DL — LOW (ref 11.5–15.5)
MAGNESIUM SERPL-MCNC: 2.1 MG/DL — SIGNIFICANT CHANGE UP (ref 1.6–2.6)
MCHC RBC-ENTMCNC: 27.4 PG — SIGNIFICANT CHANGE UP (ref 27–34)
MCHC RBC-ENTMCNC: 31.5 GM/DL — LOW (ref 32–36)
MCV RBC AUTO: 87.1 FL — SIGNIFICANT CHANGE UP (ref 80–100)
NRBC # BLD: 0 /100 WBCS — SIGNIFICANT CHANGE UP (ref 0–0)
PHOSPHATE SERPL-MCNC: 1.3 MG/DL — LOW (ref 2.5–4.5)
PLATELET # BLD AUTO: 245 K/UL — SIGNIFICANT CHANGE UP (ref 150–400)
POTASSIUM SERPL-MCNC: 4.2 MMOL/L — SIGNIFICANT CHANGE UP (ref 3.5–5.3)
POTASSIUM SERPL-SCNC: 4.2 MMOL/L — SIGNIFICANT CHANGE UP (ref 3.5–5.3)
RBC # BLD: 3.1 M/UL — LOW (ref 3.8–5.2)
RBC # FLD: 15.3 % — HIGH (ref 10.3–14.5)
RH IG SCN BLD-IMP: POSITIVE — SIGNIFICANT CHANGE UP
SODIUM SERPL-SCNC: 139 MMOL/L — SIGNIFICANT CHANGE UP (ref 135–145)
WBC # BLD: 10.38 K/UL — SIGNIFICANT CHANGE UP (ref 3.8–10.5)
WBC # FLD AUTO: 10.38 K/UL — SIGNIFICANT CHANGE UP (ref 3.8–10.5)

## 2019-11-25 PROCEDURE — 99233 SBSQ HOSP IP/OBS HIGH 50: CPT

## 2019-11-25 RX ORDER — HYDROMORPHONE HYDROCHLORIDE 2 MG/ML
2 INJECTION INTRAMUSCULAR; INTRAVENOUS; SUBCUTANEOUS EVERY 4 HOURS
Refills: 0 | Status: DISCONTINUED | OUTPATIENT
Start: 2019-11-25 | End: 2019-11-27

## 2019-11-25 RX ORDER — ACETAMINOPHEN 500 MG
1000 TABLET ORAL EVERY 6 HOURS
Refills: 0 | Status: DISCONTINUED | OUTPATIENT
Start: 2019-11-25 | End: 2019-11-27

## 2019-11-25 RX ORDER — HYDROMORPHONE HYDROCHLORIDE 2 MG/ML
4 INJECTION INTRAMUSCULAR; INTRAVENOUS; SUBCUTANEOUS EVERY 4 HOURS
Refills: 0 | Status: DISCONTINUED | OUTPATIENT
Start: 2019-11-25 | End: 2019-11-27

## 2019-11-25 RX ORDER — KETOROLAC TROMETHAMINE 30 MG/ML
30 SYRINGE (ML) INJECTION EVERY 6 HOURS
Refills: 0 | Status: DISCONTINUED | OUTPATIENT
Start: 2019-11-25 | End: 2019-11-27

## 2019-11-25 RX ADMIN — HYDROMORPHONE HYDROCHLORIDE 4 MILLIGRAM(S): 2 INJECTION INTRAMUSCULAR; INTRAVENOUS; SUBCUTANEOUS at 22:40

## 2019-11-25 RX ADMIN — ENOXAPARIN SODIUM 40 MILLIGRAM(S): 100 INJECTION SUBCUTANEOUS at 11:40

## 2019-11-25 RX ADMIN — Medication 3 MILLILITER(S): at 13:17

## 2019-11-25 RX ADMIN — Medication 1000 MILLIGRAM(S): at 17:47

## 2019-11-25 RX ADMIN — Medication 3 MILLILITER(S): at 00:56

## 2019-11-25 RX ADMIN — Medication 2: at 17:48

## 2019-11-25 RX ADMIN — Medication 4: at 12:32

## 2019-11-25 RX ADMIN — HYDROMORPHONE HYDROCHLORIDE 4 MILLIGRAM(S): 2 INJECTION INTRAMUSCULAR; INTRAVENOUS; SUBCUTANEOUS at 07:03

## 2019-11-25 RX ADMIN — Medication 1000 MILLIGRAM(S): at 20:13

## 2019-11-25 RX ADMIN — Medication 3 MILLILITER(S): at 20:24

## 2019-11-25 RX ADMIN — Medication 75 MICROGRAM(S): at 06:29

## 2019-11-25 RX ADMIN — Medication 2: at 23:09

## 2019-11-25 RX ADMIN — Medication 64.25 MILLIMOLE(S): at 09:03

## 2019-11-25 RX ADMIN — HYDROMORPHONE HYDROCHLORIDE 4 MILLIGRAM(S): 2 INJECTION INTRAMUSCULAR; INTRAVENOUS; SUBCUTANEOUS at 15:22

## 2019-11-25 RX ADMIN — HYDROMORPHONE HYDROCHLORIDE 4 MILLIGRAM(S): 2 INJECTION INTRAMUSCULAR; INTRAVENOUS; SUBCUTANEOUS at 16:30

## 2019-11-25 RX ADMIN — PANTOPRAZOLE SODIUM 40 MILLIGRAM(S): 20 TABLET, DELAYED RELEASE ORAL at 06:29

## 2019-11-25 RX ADMIN — HYDROMORPHONE HYDROCHLORIDE 4 MILLIGRAM(S): 2 INJECTION INTRAMUSCULAR; INTRAVENOUS; SUBCUTANEOUS at 22:29

## 2019-11-25 RX ADMIN — Medication 3 MILLILITER(S): at 06:29

## 2019-11-25 RX ADMIN — HYDROMORPHONE HYDROCHLORIDE 4 MILLIGRAM(S): 2 INJECTION INTRAMUSCULAR; INTRAVENOUS; SUBCUTANEOUS at 07:15

## 2019-11-25 NOTE — PROGRESS NOTE ADULT - PROBLEM SELECTOR PLAN 4
-Patient found to have extensive tumor thrombus extending into the left renal vein, IVC and bilateral iliac veins. Now s/p thrombectomy  - currently on lovenox for DVT ppx, 40 mg subq daily, f/u vascular/heme recs for further AC recs
-Patient with history of HTN on HCTZ and Norvasc at home, currently only on amlodipine 5mg and BP within goal, hold HCTZ as patient planned for possible nephrectomy.
-Patient with history of HTN on HCTZ and Norvasc at home, currently only on amlodipine 5mg and BP within goal, hold HCTZ as patient planned for possible nephrectomy.
Numerous subpleural and  parenchymal lung nodules in bilateral lung lobes, largest on right 11mm and left 9 mm. However patient has known history of pulmonary nodules, gets yearly CT scans for monitoring. Has a pulmonologist at [facility] Dr. Castaneda. Daughter to bring in report/CDs of prior scans for comparison  - obtain collateral from patient's pulmonologist  - Plan for IR guided pulm nodule biopsy on Mon 11/18
Pt with h/o hypothyroid.  - TSH wnl  - c/w Levothyroxine 75mcg.
Pt with h/o hypothyroid.  - TSH wnl  - c/w Levothyroxine 75mcg.

## 2019-11-25 NOTE — PROGRESS NOTE ADULT - SUBJECTIVE AND OBJECTIVE BOX
Heme/Onc Progress Note (Dr. Jasmine)    Interval History: Pt seen and examined. Resting comfortably. Without fevers. No gross bleeding/bruising noted.         Allergies    No Known Allergies    Intolerances        Medications:  MEDICATIONS  (STANDING):  albuterol/ipratropium for Nebulization 3 milliLiter(s) Nebulizer every 6 hours  dextrose 5%. 1000 milliLiter(s) (50 mL/Hr) IV Continuous <Continuous>  dextrose 50% Injectable 25 Gram(s) IV Push once  dextrose 50% Injectable 50 milliLiter(s) IV Push every 15 minutes  dextrose 50% Injectable 25 milliLiter(s) IV Push every 15 minutes  enoxaparin Injectable 40 milliGRAM(s) SubCutaneous daily  insulin lispro (HumaLOG) corrective regimen sliding scale   SubCutaneous Before meals and at bedtime  levothyroxine 75 MICROGram(s) Oral daily  pantoprazole    Tablet 40 milliGRAM(s) Oral before breakfast    MEDICATIONS  (PRN):  dextrose 40% Gel 15 Gram(s) Oral once PRN Blood Glucose LESS THAN 70 milliGRAM(s)/deciliter  glucagon  Injectable 1 milliGRAM(s) IntraMuscular once PRN Glucose LESS THAN 70 milligrams/deciliter  HYDROmorphone   Tablet 2 milliGRAM(s) Oral every 4 hours PRN Moderate Pain (4 - 6)  HYDROmorphone   Tablet 4 milliGRAM(s) Oral every 4 hours PRN Severe Pain (7 - 10)    enoxaparin Injectable 40 milliGRAM(s) SubCutaneous daily          PHYSICAL EXAM:    T(F): 98.9 (11-25-19 @ 13:45), Max: 98.9 (11-25-19 @ 13:45)  HR: 94 (11-25-19 @ 12:41) (82 - 120)  BP: 128/60 (11-25-19 @ 12:41) (117/57 - 144/68)  RR: 18 (11-25-19 @ 12:41) (16 - 18)  SpO2: 96% (11-25-19 @ 12:41) (95% - 100%)  Wt(kg): --    Daily     Daily     GEN: NAD, resting  HEENT: AT/NC, EOMI  NECK: Supple  CVS:+S1S2   LUNG: CTA B/L  ABD: +BS, incision site clean   EXT: no c/c/e  NEURO: aaox3      Labs:                          8.5    10.38 )-----------( 245      ( 25 Nov 2019 06:06 )             27.0     CBC Full  -  ( 25 Nov 2019 06:06 )  WBC Count : 10.38 K/uL  RBC Count : 3.10 M/uL  Hemoglobin : 8.5 g/dL  Hematocrit : 27.0 %  Platelet Count - Automated : 245 K/uL  Mean Cell Volume : 87.1 fl  Mean Cell Hemoglobin : 27.4 pg  Mean Cell Hemoglobin Concentration : 31.5 gm/dL  Auto Neutrophil # : x  Auto Lymphocyte # : x  Auto Monocyte # : x  Auto Eosinophil # : x  Auto Basophil # : x  Auto Neutrophil % : x  Auto Lymphocyte % : x  Auto Monocyte % : x  Auto Eosinophil % : x  Auto Basophil % : x        11-25    139  |  105  |  7   ----------------------------<  150<H>  4.2   |  23  |  0.92    Ca    8.1<L>      25 Nov 2019 06:06  Phos  1.3     11-25  Mg     2.1     11-25      11/15  CT CAP  Evaluation of the lower chest demonstrates normal heart size. There is no   pleural or pericardial effusion. There is coronary arterial   calcification. Innumerable bilateral pulmonary nodules along the pleural   surfaces, consistent with pleural-based metastasis measuring up to 15 mm.   Evaluation of the abdomen demonstrates that the liver is not enlarged.   The spleen, pancreas, gallbladder, bilateral adrenal glands and left   kidney are unremarkable aside from a cyst in the posterior interpolar   region of the left kidney measuring 4.5 cm. Evaluation of the right   kidney demonstrates an avidly enhancing solid and cystic mass within the   upper pole measuring 4.4 x 4.2 cm. There is enhancing tumor thrombus   extending into the right renal vein and superiorly into the infrahepatic   IVC; there is thrombus extending into the infracaval IVC and bilateral   common iliac veins with venous distention and surrounding edematous   infiltration. Tumor thrombosis also extends partially into the right   renal vein. Evaluation of the gastrointestinal tract demonstrates   diverticulosis. There is no bowel thickening or bowel obstruction. There   is appearance of the appendix. Evaluation of the pelvis demonstrates   hysterectomy. Bladder is unremarkable. Small left fat-containing inguinal   hernia. Edematous infiltration of the presacral region and   retroperitoneum. No adenopathy. Moderate aortic and arterial vascular   calcification. Opacified aspects of the portal, hepatic splenic superior   mesenteric veins demonstrates no sydney intraluminal thrombus. Evaluation   of the osseous structures are unremarkable    IMPRESSION:    Carcinoma of the upper pole of the right kidney with extensive tumor   thrombus extending into the left renal vein, IVC and bilateral iliac   veins.    Diffuse pleural-based pulmonary metastasis.    CT Chest w/ IVC 11/16#    Evaluation of the chest demonstrates that the visualized thyroid gland is   normal in appearance. There is normal heart size. There is moderate   coronary arterial calcification. Negative for thoracic inlet, axillary,   mediastinal or hilar lymphadenopathy. There is no sydney central or   segmental pulmonary embolus. Negative for intraluminal thrombus within   the left atrial appendage. Negative for mitral annular or aortic valvular   calcification. Evaluation of the lung parenchyma demonstrates mild   paraseptal and centrilobular edematous change. There are multiple   pleural-based pulmonary nodules which do not demonstrate avid   enhancement, predominantly along the bilateral lower lobes, largest which   measures 15 mm along the right lower lobe; there is a calcified   subpleural nodule along the right middle lobe measuring 6 mm. Some   pleural-based nodules demonstrates an oblong configuration and the   overall lack of avid enhancement suggests the possibility of multiple   nodular pleural plaques. There is no endobronchial lesion. Evaluation the   upper abdomen redemonstrates carcinoma within the upper pole the right   kidney measuring 4.3 cm with tumor thrombus extending into the IVC and   right renal vein. Evaluation of the osseous structures demonstrates no   destructive lesion.    IMPRESSION:    Pleural-based metastasis versus nodular pleural plaques; PET-CT   correlation is recommended.    Pathology 11/19    Final Diagnosis    Lung, left lower lobe, pleural based; core biopsy:  - Hyalinized and fibrotic tissue with adjacent chronic  inflammation, consistent with pleural plaque.  - Negative for carcinoma.      11/24 CT Angio  IMPRESSION:   1. No evidence of pulmonary embolism.    2. Status post right nephrectomy, IVC thrombectomy, and venoplasty with   patch, with postoperative changes.    3. Small bilateral pleural effusions and bibasilar atelectasis.    4. Large and small airways disease.    5. Bilateral pleural plaques, consistent with asbestos related pleural   disease.

## 2019-11-25 NOTE — PROGRESS NOTE ADULT - ATTENDING COMMENTS
70 y/o female with    1- Renal mass/extension to IVC- thrombus  plan for IR guided biopsy tomorrow of pulm nodule  Urology following  AC for thrombus  vascular following  Heme-onc consulted  2- HTN- home regimen  3- thrombus b/l iliac- on AC
Met with patient and family this morning to discuss imaging findings thus far. Right renal mass with IVC thrombus extending to the liver. Pleural nodules which are known to be chronic and have been followed for many years, suspect that they are benign but biopsy indicated to rule out metastases. If benign and no other sites of metastases, surgery would be recommended. Reviewed radical nephrectomy and IVC thrombectomy including risks of complications. Will need to coordinate with multiple surgical services, possible OR this Friday morning if possible. Check MR venogram for further characterization of thrombus.
Patient was seen and examined with the resident team today.  I agree with Dr. Ng's assessment and plan with the following exceptions/additions:     Briefly, this is a 68yo woman, Kinyarwanda-speaking, with PMH of NIDDM2, HTN, HLD, hypothyroid, diverticulosis and pulmonary nodules (prior work-up at [facility], non-malignant) who p/w LLQ abdominal pain x several days and found to have a R-renal mass with extensive thrombus (iliacs, renal vein, IVC) and pulmonary nodules (unclear if new or chronic), now awaiting a biopsy of her lung nodule w/potential subsequent nephrectomy, thrombectomy and IVC reconstruction.  OR is pending rule-out of pulmonary mets from renal mass.     -- IR/CT-guided lung biopsy this AM  -- needs MRV for mapping of thrombus  -- Onc requesting PET CT  -- c/w Heparin gtt pending procedures   -- will upload CD's from [facility] today to compare pulm nodules w/our most recent chest CT  -- Urology, CT Surgery, Gen Surg and Onc following, appreciate assistance   -- DVT PPx - holding heparin gtt for biopsy  -- Dispo - TBD    Kena Lutz  [phone]
Continue pulm toilet and OOB. Pain control.
s/p pleural nodule biopsy. Awaiting MR venogram. If biopsy negative, would recommend proceeding with radical nephrectomy and IVC thrombectomy. Needs medical and cardiac clearance. TTE. Possible combined surgery 11/22 with CTS, Hepatobiliary surgery and Vascular surgery
Patient seen and examined at bedside. Agree with exam, a/p as above with the following addendum/edits:     Tachycardia likely combination of ELICEO (now resolving), anemia (s/p pRBC) and pain. HR improving. Otherwise she is stable. Please reconsult with questions. Can restart home statin and hold BP meds. No wheezing on exam, can change duonebs to prn.

## 2019-11-25 NOTE — PROGRESS NOTE ADULT - SUBJECTIVE AND OBJECTIVE BOX
Patient seen and examined at bedside - currently sitting in a chair comfortably. No significant pain or other complaints.     Afebrile overnight, no acute events.     Overnight Events:     Vital Signs Last 24 Hrs  T(C): 37 (25 Nov 2019 09:56), Max: 37.1 (24 Nov 2019 13:27)  T(F): 98.6 (25 Nov 2019 09:56), Max: 98.7 (24 Nov 2019 13:27)  HR: 104 (25 Nov 2019 09:00) (82 - 120)  BP: 123/60 (25 Nov 2019 09:00) (117/57 - 144/68)  BP(mean): 86 (25 Nov 2019 09:00) (81 - 98)  RR: 18 (25 Nov 2019 09:00) (16 - 18)  SpO2: 95% (25 Nov 2019 09:00) (95% - 100%)    24 Nov 2019 07:01  -  25 Nov 2019 07:00  --------------------------------------------------------  IN:    IV PiggyBack: 1000 mL  Total IN: 1000 mL    OUT:    Voided: 2650 mL  Total OUT: 2650 mL    Total NET: -1650 mL        Physical Exam:  General: NAD, resting comfortably in bed  Abdominal: soft, nontender, nondistended, Chevron dressing c/d/i  Extremities: WWP, no edema, no calf tenderness                              8.5    10.38 )-----------( 245      ( 25 Nov 2019 06:06 )             27.0   25 Nov 2019 06:06    139    |  105    |  7      ----------------------------<  150    4.2     |  23     |  0.92     Ca    8.1        25 Nov 2019 06:06  Phos  1.3       25 Nov 2019 06:06  Mg     2.1       25 Nov 2019 06:06    · Assessment		  69F, [place]orese speaking, with PMH of HTN, DM, HLD, Hypothyroid, diverticulosis, 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 presents with 4 days history of LLQ abd pain,s/p R nephrectomy, IVC tumor thrombectomy with patch venoplasty.   - patient continues to progress well post-operatively, remains afebrile, VSS, with downtrending leukocytosis.   - still awaiting bowel function in order to advance diet.   - recommend a 12 leak EKG to evaluate tachycardia and rule out postop afib.   - continue team as per primary, no new surgical recs.   - discussed with Jordan.   Continue CLD  F/U bowel function  Surgery will continue to follow

## 2019-11-25 NOTE — PROGRESS NOTE ADULT - ASSESSMENT
69 year old female with PMH DM, HTN, HLD, hypothyroid, diverticulosis, presents with 4 days of LLQ abd pain, found to have R renal mass with thrombus from b/l renal veins extending to IVC and multiple lung nodules on CT imaging s/p lung nodule biopsy transferred from medicine to urology now s/p radical nephrectomy , IVC thrombectomy and venoplasty done 11/22.

## 2019-11-25 NOTE — PROGRESS NOTE ADULT - PROBLEM SELECTOR PROBLEM 2
Pulmonary nodules/lesions, multiple
Pulmonary nodules/lesions, multiple
Renal mass
Abdominal pain
Pulmonary nodules/lesions, multiple
Pulmonary nodules/lesions, multiple

## 2019-11-25 NOTE — PROGRESS NOTE ADULT - ASSESSMENT
69y Female, Maori speaking,  PMH HTN, DM, HLD, hypothyroid, diverticulosis, COPD/emphysema, former smoker (1ppd x 45 years, quit 1 year ago), with right renal mass with IVC thrombus.  S/P radical nx, IVC thrombectomy, venoplasty with patch 11/22    -no PE per CT PE protocol  -pain control  -intermittently tachycardic today

## 2019-11-25 NOTE — PROGRESS NOTE ADULT - ASSESSMENT
69F, Lithuanian speaking with multiple comorbidities, presenting with 4 days of LLQ abd pain,, found to have a new right upper pole renal mass with tumor thrombus extending in to bilateral renal veins and IVC.    #R Renal Mass   #Extensive Tumor thrombus   #Multiple pleural based lesions (Negative path)  #Normocytic Anemia    -Upper pole R Kidney mass w/ extensive tumor thrombus extending to L renal vein, IVC and bilateral iliac veins (currently, clinically appears to be at least stage 3).  -S/p nephrectomy and thrombectomy on 11/22/19. Awaiting final path.    -C/w lovenox (will decide for treatment dose)  -left lower lobe pleural based nodule core biopsy, negative for carcinoma, c/w hyalinized +fibrotic tissue, chronic inflammation  -PET/CT without evidence of distant mets.  -anemia appears post op, toby neg, would obtain b12/folate, retic, bili   -Will follow with you.  -f/u Dr. Jasmine

## 2019-11-25 NOTE — PROGRESS NOTE ADULT - PROBLEM SELECTOR PLAN 9
-Patient with normocytic anemia with Hemoglobin 11.2 on arrival, unknown baseline. Patient hemodynamically stable, no signs/symptoms of active bleeding.  Iron studies within normal limits.  Continue to trend CBC, maintain active type and screen. -Patient with normocytic anemia with Hemoglobin 11.2 on arrival, unknown baseline. Patient hemodynamically stable, no signs/symptoms of active bleeding.  Iron studies within normal limits.  Continue to trend CBC, maintain active type and screen. 1 unit PRBC ordered post-op 11/25

## 2019-11-25 NOTE — PROGRESS NOTE ADULT - PROBLEM SELECTOR PLAN 3
-Patient found to have numerous subpleural and  parenchymal lung nodules in bilateral lung lobes, largest on right 11mm and left 9 mm.  However patient has known history of pulmonary nodules, gets yearly CT scans for monitoring.  Has a pulmonologist at [facility] Dr. Castaneda.  Prior old CT chest and PET scan uploaded to [facility] system today.  S/p IR-guided biopsy 11/19-follow up biopsy results.  - heme onc following, f/u recs

## 2019-11-25 NOTE — PROGRESS NOTE ADULT - PROBLEM SELECTOR PROBLEM 4
HTN (hypertension)
HTN (hypertension)
Thrombus
Pulmonary nodules/lesions, multiple
Hypothyroid
Hypothyroid

## 2019-11-25 NOTE — PROGRESS NOTE ADULT - PROBLEM SELECTOR PLAN 8
-Patient with history of hyperlipidemia, continue with home dose of simvastatin 20mg daily -Patient with history of hyperlipidemia  - Please order home dose of simvastatin 20mg daily

## 2019-11-25 NOTE — PROGRESS NOTE ADULT - SUBJECTIVE AND OBJECTIVE BOX
24 hr events    Subjective: Patient seen and examined bedside. Sitting comfortably in chair in NAD. Denies flatus, denies pain.       Vital Signs Last 24 Hrs  T(C): 37.2 (25 Nov 2019 13:45), Max: 37.2 (25 Nov 2019 13:45)  T(F): 98.9 (25 Nov 2019 13:45), Max: 98.9 (25 Nov 2019 13:45)  HR: 94 (25 Nov 2019 12:41) (82 - 120)  BP: 128/60 (25 Nov 2019 12:41) (117/57 - 144/68)  BP(mean): 86 (25 Nov 2019 12:41) (81 - 98)  RR: 18 (25 Nov 2019 12:41) (16 - 18)  SpO2: 96% (25 Nov 2019 12:41) (95% - 100%)    I&O's Summary    24 Nov 2019 07:01  -  25 Nov 2019 07:00  --------------------------------------------------------  IN: 1000 mL / OUT: 2650 mL / NET: -1650 mL    25 Nov 2019 07:01  -  25 Nov 2019 15:29  --------------------------------------------------------  IN: 0 mL / OUT: 800 mL / NET: -800 mL        Physical Exam:  General: nad  Pulmonary: d/c breath sounds noted   Cardiovascular:s1s2 tachycardic   Abdominal: incision clean and dry   Extremities: bilateral feet without edema   palpable DP pulses bilaterally       Lines/drains/tubes:    LABS:                        8.5    10.38 )-----------( 245      ( 25 Nov 2019 06:06 )             27.0     11-25    139  |  105  |  7   ----------------------------<  150<H>  4.2   |  23  |  0.92    Ca    8.1<L>      25 Nov 2019 06:06  Phos  1.3     11-25  Mg     2.1     11-25            CAPILLARY BLOOD GLUCOSE      POCT Blood Glucose.: 227 mg/dL (25 Nov 2019 11:51)  POCT Blood Glucose.: 144 mg/dL (25 Nov 2019 06:46)  POCT Blood Glucose.: 186 mg/dL (24 Nov 2019 21:23)  POCT Blood Glucose.: 191 mg/dL (24 Nov 2019 19:25)  POCT Blood Glucose.: 178 mg/dL (24 Nov 2019 16:45)      RADIOLOGY & ADDITIONAL TESTS:  EXAM:  CT ANGIO CHEST PE PROTOCOL IC                          PROCEDURE DATE:  11/24/2019          INTERPRETATION:  CTA (CT angiography) of the CHEST     INDICATION: Renal mass. IVC thrombus. Tachycardic. Assess for pulmonary   embolism.    TECHNIQUE: CT angiography of the chest was performed during bolus   injection of intravenous contrast.  Post-processing including the   production of axial, coronal and sagittal multiplanar reformatted images   and axial and coronal maximum intensity projections (MIPs) was performed.    PRIOR STUDY: Comparison made with most recent PET/CT from 11/21/2019 and   with multiple additional prior imaging studies dating back to 4/12/2012.    FINDINGS: No pulmonary embolism is seen.     Small calcified plaque aorta. Moderate coronary artery calcification. The   heart is normal in size. No pericardial effusion is seen.     No mediastinal, hilar or axillary lymphadenopathy is seen.    There are new small pleural effusions bilaterally, with associated   compressive atelectasis of small portions of each lower lobe. Multiple   calcified and noncalcified pleural plaques are again present bilaterally,   including along the hemidiaphragms bilaterally, consistent with asbestos   related pleural disease. There is large and small airways disease, with   cylindrical bronchiectasis bilaterally and a cluster of tree-in-bud   nodules in the right upper lobe, the largest nodule measuring 4 mm. There   are also one or two micronodules bilaterally.    Interval right nephrectomy, IVC thrombectomy, and venoplasty with patch.   Fluid and small amount of extraluminal gas at operative site right   nephrectomy bed. Multiple skin staples overlie the anterior abdominal   wall. Mild infiltration of subcutaneous fat and small subcutaneous gas   anterior abdominal wall consistent with postoperative change. 4.3 cm left   renal cyst again noted.    Right-sided central line has distal portion superior vena cava.      Evaluation of the osseous structures demonstrates mild degenerative   changes of the right shoulder.      IMPRESSION:   1. No evidence of pulmonary embolism.    2. Status post right nephrectomy, IVC thrombectomy, and venoplasty with   patch, with postoperative changes.    3. Small bilateral pleural effusions and bibasilar atelectasis.    4. Large and small airways disease.    5. Bilateral pleural plaques, consistent with asbestos related pleural   disease.

## 2019-11-25 NOTE — PROGRESS NOTE ADULT - PROBLEM SELECTOR PLAN 7
-Patient with history of DM, takes Januvia 100mg and Metformin 500mg BID at home.  Continue with moderate sliding scale.
-Patient with history of hyperlipidemia, continue with home dose of simvastatin 20mg daily
-Patient with history of hyperlipidemia, continue with home dose of simvastatin 20mg daily
Pt with known history of diabetes, takes Januvia 100mg and Metformin 500mg BID at home. A1c 7.0   - hold oral antidiabetic agents   - Moderate ISS

## 2019-11-25 NOTE — PROGRESS NOTE ADULT - PROBLEM SELECTOR PLAN 5
-Patient with history of HTN on HCTZ and Norvasc at home, currently normotensive and home amlodipine 5mg + HCTZ being held, normotensive.   - continue to hold anti-HTN meds for now in post-op setting until tachycardia resolves

## 2019-11-25 NOTE — PROGRESS NOTE ADULT - PROBLEM SELECTOR PROBLEM 3
Pulmonary nodules/lesions, multiple
Thrombus
Thrombus
DVT (deep venous thrombosis)
HTN (hypertension)
HTN (hypertension)

## 2019-11-25 NOTE — PROGRESS NOTE ADULT - SUBJECTIVE AND OBJECTIVE BOX
THIS NOTE IS IN PROGRESS  INTERNAL MEDICINE CONSULT NOTE     O/N Events:  Subjective:    VITALS  Vital Signs Last 24 Hrs  T(C): 37 (25 Nov 2019 09:56), Max: 37.1 (24 Nov 2019 13:27)  T(F): 98.6 (25 Nov 2019 09:56), Max: 98.7 (24 Nov 2019 13:27)  HR: 104 (25 Nov 2019 09:00) (82 - 120)  BP: 123/60 (25 Nov 2019 09:00) (117/57 - 144/68)  BP(mean): 86 (25 Nov 2019 09:00) (81 - 98)  RR: 18 (25 Nov 2019 09:00) (16 - 18)  SpO2: 95% (25 Nov 2019 09:00) (95% - 100%)    I&O's Summary    24 Nov 2019 07:01  -  25 Nov 2019 07:00  --------------------------------------------------------  IN: 1000 mL / OUT: 2650 mL / NET: -1650 mL        CAPILLARY BLOOD GLUCOSE      POCT Blood Glucose.: 227 mg/dL (25 Nov 2019 11:51)  POCT Blood Glucose.: 144 mg/dL (25 Nov 2019 06:46)  POCT Blood Glucose.: 186 mg/dL (24 Nov 2019 21:23)  POCT Blood Glucose.: 191 mg/dL (24 Nov 2019 19:25)  POCT Blood Glucose.: 178 mg/dL (24 Nov 2019 16:45)      PHYSICAL EXAM  General: A&Ox 3; NAD  Head: NC/AT;   Eyes: PERRL; EOMI; anicteric sclera  Neck: Supple; no JVD  Respiratory: CTA B/L; no wheezes/crackles/rales auscultated w/ good air movement  Cardiovascular: Regular rhythm/rate; S1/S2; no gallops or murmurs auscultated  Gastrointestinal: Soft; NTND w/out rebound tenderness or guarding; bowel sounds normal  Extremities: WWP; no edema or cyanosis; radial/pedal pulses palpable  Neurological:  CNII-XII grossly intact; no obvious focal deficits    MEDICATIONS  (STANDING):  albuterol/ipratropium for Nebulization 3 milliLiter(s) Nebulizer every 6 hours  dextrose 5%. 1000 milliLiter(s) (50 mL/Hr) IV Continuous <Continuous>  dextrose 50% Injectable 25 Gram(s) IV Push once  dextrose 50% Injectable 50 milliLiter(s) IV Push every 15 minutes  dextrose 50% Injectable 25 milliLiter(s) IV Push every 15 minutes  enoxaparin Injectable 40 milliGRAM(s) SubCutaneous daily  insulin lispro (HumaLOG) corrective regimen sliding scale   SubCutaneous Before meals and at bedtime  levothyroxine 75 MICROGram(s) Oral daily  pantoprazole    Tablet 40 milliGRAM(s) Oral before breakfast    MEDICATIONS  (PRN):  dextrose 40% Gel 15 Gram(s) Oral once PRN Blood Glucose LESS THAN 70 milliGRAM(s)/deciliter  glucagon  Injectable 1 milliGRAM(s) IntraMuscular once PRN Glucose LESS THAN 70 milligrams/deciliter  HYDROmorphone   Tablet 2 milliGRAM(s) Oral every 4 hours PRN Moderate Pain (4 - 6)  HYDROmorphone   Tablet 4 milliGRAM(s) Oral every 4 hours PRN Severe Pain (7 - 10)      LABS                        8.5    10.38 )-----------( 245      ( 25 Nov 2019 06:06 )             27.0     11-25    139  |  105  |  7   ----------------------------<  150<H>  4.2   |  23  |  0.92    Ca    8.1<L>      25 Nov 2019 06:06  Phos  1.3     11-25  Mg     2.1     11-25                  IMAGING/EKG/ETC  EKG:   Xray:  CT: INTERNAL MEDICINE CONSULT NOTE     O/N Events: Patient tachycardic to 120's, EKG showed NSR, CT PE negative for PE.   Subjective: Patient seen and examined at bedside. Complains of significant pain at post-op site. No BM yet. Walked this AM, tolerated well without CP or SOB. Patient's daughter at bedside assisted with history taking, declined Palestinian .     REVIEW OF SYSTEMS:  CONSTITUTIONAL: No fevers or chills  EYES/ENT: No visual changes;  No vertigo or throat pain   NECK: No pain or stiffness  RESPIRATORY: No cough, wheezing, hemoptysis; No shortness of breath  CARDIOVASCULAR: No chest pain or palpitations  GASTROINTESTINAL: As in HPI  GENITOURINARY: No dysuria, frequency or hematuria  NEUROLOGICAL: No numbness or weakness  SKIN: No itching, rashes      VITALS  Vital Signs Last 24 Hrs  T(C): 37 (25 Nov 2019 09:56), Max: 37.1 (24 Nov 2019 13:27)  T(F): 98.6 (25 Nov 2019 09:56), Max: 98.7 (24 Nov 2019 13:27)  HR: 104 (25 Nov 2019 09:00) (82 - 120)  BP: 123/60 (25 Nov 2019 09:00) (117/57 - 144/68)  BP(mean): 86 (25 Nov 2019 09:00) (81 - 98)  RR: 18 (25 Nov 2019 09:00) (16 - 18)  SpO2: 95% (25 Nov 2019 09:00) (95% - 100%)    I&O's Summary    24 Nov 2019 07:01  -  25 Nov 2019 07:00  --------------------------------------------------------  IN: 1000 mL / OUT: 2650 mL / NET: -1650 mL        CAPILLARY BLOOD GLUCOSE      POCT Blood Glucose.: 227 mg/dL (25 Nov 2019 11:51)  POCT Blood Glucose.: 144 mg/dL (25 Nov 2019 06:46)  POCT Blood Glucose.: 186 mg/dL (24 Nov 2019 21:23)  POCT Blood Glucose.: 191 mg/dL (24 Nov 2019 19:25)  POCT Blood Glucose.: 178 mg/dL (24 Nov 2019 16:45)      PHYSICAL EXAM  General: Awake and alert  NAD  Head: NC/AT;   Eyes: PERRL; EOMI; anicteric sclera  Neck: Supple; no JVD  Respiratory: CTA B/L; no wheezes/crackles/rales auscultated w/ good air movement  Cardiovascular: Regular rhythm/rate; S1/S2; no gallops or murmurs auscultated  Gastrointestinal: Soft; NTND w/out rebound tenderness or guarding; bowel sounds normal. Surgical site c/d/i with staples in place.   Extremities: WWP; no edema or cyanosis; radial/pedal pulses palpable      MEDICATIONS  (STANDING):  albuterol/ipratropium for Nebulization 3 milliLiter(s) Nebulizer every 6 hours  dextrose 5%. 1000 milliLiter(s) (50 mL/Hr) IV Continuous <Continuous>  dextrose 50% Injectable 25 Gram(s) IV Push once  dextrose 50% Injectable 50 milliLiter(s) IV Push every 15 minutes  dextrose 50% Injectable 25 milliLiter(s) IV Push every 15 minutes  enoxaparin Injectable 40 milliGRAM(s) SubCutaneous daily  insulin lispro (HumaLOG) corrective regimen sliding scale   SubCutaneous Before meals and at bedtime  levothyroxine 75 MICROGram(s) Oral daily  pantoprazole    Tablet 40 milliGRAM(s) Oral before breakfast    MEDICATIONS  (PRN):  dextrose 40% Gel 15 Gram(s) Oral once PRN Blood Glucose LESS THAN 70 milliGRAM(s)/deciliter  glucagon  Injectable 1 milliGRAM(s) IntraMuscular once PRN Glucose LESS THAN 70 milligrams/deciliter  HYDROmorphone   Tablet 2 milliGRAM(s) Oral every 4 hours PRN Moderate Pain (4 - 6)  HYDROmorphone   Tablet 4 milliGRAM(s) Oral every 4 hours PRN Severe Pain (7 - 10)      LABS                        8.5    10.38 )-----------( 245      ( 25 Nov 2019 06:06 )             27.0     11-25    139  |  105  |  7   ----------------------------<  150<H>  4.2   |  23  |  0.92    Ca    8.1<L>      25 Nov 2019 06:06  Phos  1.3     11-25  Mg     2.1     11-25                  IMAGING/EKG/ETC: reviewed    < from: CT Angio Chest PE Protocol w/ IV Cont (11.24.19 @ 18:57) >    IMPRESSION:   1. No evidence of pulmonary embolism.    2. Status post right nephrectomy, IVC thrombectomy, and venoplasty with   patch, with postoperative changes.    3. Small bilateral pleural effusions and bibasilar atelectasis.    4. Large and small airways disease.    5. Bilateral pleural plaques, consistent with asbestos related pleural   disease.    < end of copied text >

## 2019-11-25 NOTE — PROGRESS NOTE ADULT - SUBJECTIVE AND OBJECTIVE BOX
INTERVAL HPI/OVERNIGHT EVENTS:  No acute events overnight.    VITALS:    T(F): 98.6 (11-25-19 @ 04:39), Max: 99.6 (11-24-19 @ 09:06)  HR: 88 (11-25-19 @ 04:30) (82 - 126)  BP: 135/64 (11-25-19 @ 04:30) (117/57 - 144/68)  RR: 17 (11-25-19 @ 04:30) (16 - 18)  SpO2: 100% (11-25-19 @ 04:30) (96% - 100%)  Wt(kg): --    I&O's Detail    23 Nov 2019 07:01  -  24 Nov 2019 07:00  --------------------------------------------------------  IN:    lactated ringers.: 150 mL  Total IN: 150 mL    OUT:    Indwelling Catheter - Urethral: 300 mL    Voided: 460 mL  Total OUT: 760 mL    Total NET: -610 mL      24 Nov 2019 07:01  -  25 Nov 2019 06:11  --------------------------------------------------------  IN:    IV PiggyBack: 1000 mL  Total IN: 1000 mL    OUT:    Voided: 1750 mL  Total OUT: 1750 mL    Total NET: -750 mL          MEDICATIONS:    ANTIBIOTICS:      PAIN CONTROL:  HYDROmorphone  Injectable 0.5 milliGRAM(s) IV Push every 6 hours PRN  HYDROmorphone  Injectable 1 milliGRAM(s) IV Push every 6 hours PRN       MEDS:      HEME/ONC  enoxaparin Injectable 40 milliGRAM(s) SubCutaneous daily        PHYSICAL EXAM:  General: No acute distress.  Alert and Oriented  Abdominal Exam: soft, incision site clean and dry, clips intact, sl TTP.   Exam: no sp discomfort      LABS:                        8.9    12.94 )-----------( 254      ( 24 Nov 2019 06:19 )             27.8     11-24    138  |  104  |  12  ----------------------------<  199<H>  4.9   |  23  |  1.15    Ca    7.8<L>      24 Nov 2019 06:19  Phos  1.7     11-24  Mg     2.2     11-24            RADIOLOGY & ADDITIONAL TESTS:

## 2019-11-25 NOTE — PROGRESS NOTE ADULT - PROBLEM SELECTOR PLAN 1
Post-operatively HR has been elevated to 140's, most recently 120's overnight. EKG from 11/24 w/sinus tachycardia, RAD. CT PE study done 11/24 negative for PE. Patient mildly dry on exam, diet has recently been advanced. Anemic with Hb from 12.6 pre-procedure to 8.5 now with no signs of active bleeding. Patient complains of significant pain at post-operative site.   - management of postoperative pain per primary team, suspect this is significant contributor  - HR improving, when at bedside patient's HR 90.   - CT PE negative, PE unlikely to be etiology  - would repeat EKG to confirm still NSR  - may decrease frequency of duoneb, no wheezing on exam, encourage incentive spirometer Post-operatively HR has been elevated to 140's, most recently 120's overnight. EKG from 11/24 w/sinus tachycardia, RAD. CT PE study done 11/24 negative for PE. Patient mildly dry on exam, diet has recently been advanced. Anemic with Hb from 12.6 pre-procedure to 8.5 now with no signs of active bleeding. Patient complains of significant pain at post-operative site.   - management of postoperative pain per primary team, suspect this is significant contributor  - HR improving, when at bedside patient's HR 90.   - agree with transfusion given significant post-op Hb drop.  - encourage PO intake when can tolerate.   - CT PE negative, PE unlikely to be etiology  - would repeat EKG to confirm still NSR  - may decrease frequency of duoneb to PRN, no wheezing on exam, encourage incentive spirometer

## 2019-11-25 NOTE — PROGRESS NOTE ADULT - PROBLEM SELECTOR PLAN 2
-Patient found to have 4.2 cm right upper pole renal mass with central necrosis, multiple pulmonary nodules seen in the lung bases.  Patient had IR guided biopsy of pulmonary nodule to rule out lung mets 11/19-follow up biopsy results.  Also found to have extensive tumor thrombus extending into the left renal vein, IVC and bilateral iliac veins.  Patient transferred to urology now s/p radical nephrectomy , IVC thrombectomy and venoplasty done 11/22.

## 2019-11-26 LAB
ANION GAP SERPL CALC-SCNC: 10 MMOL/L — SIGNIFICANT CHANGE UP (ref 5–17)
BUN SERPL-MCNC: 5 MG/DL — LOW (ref 7–23)
CALCIUM SERPL-MCNC: 8.4 MG/DL — SIGNIFICANT CHANGE UP (ref 8.4–10.5)
CHLORIDE SERPL-SCNC: 105 MMOL/L — SIGNIFICANT CHANGE UP (ref 96–108)
CO2 SERPL-SCNC: 27 MMOL/L — SIGNIFICANT CHANGE UP (ref 22–31)
CREAT SERPL-MCNC: 0.89 MG/DL — SIGNIFICANT CHANGE UP (ref 0.5–1.3)
GLUCOSE BLDC GLUCOMTR-MCNC: 131 MG/DL — HIGH (ref 70–99)
GLUCOSE BLDC GLUCOMTR-MCNC: 159 MG/DL — HIGH (ref 70–99)
GLUCOSE BLDC GLUCOMTR-MCNC: 164 MG/DL — HIGH (ref 70–99)
GLUCOSE BLDC GLUCOMTR-MCNC: 203 MG/DL — HIGH (ref 70–99)
GLUCOSE SERPL-MCNC: 139 MG/DL — HIGH (ref 70–99)
HCT VFR BLD CALC: 34.5 % — SIGNIFICANT CHANGE UP (ref 34.5–45)
HGB BLD-MCNC: 10.6 G/DL — LOW (ref 11.5–15.5)
MAGNESIUM SERPL-MCNC: 2.2 MG/DL — SIGNIFICANT CHANGE UP (ref 1.6–2.6)
MCHC RBC-ENTMCNC: 27.4 PG — SIGNIFICANT CHANGE UP (ref 27–34)
MCHC RBC-ENTMCNC: 30.7 GM/DL — LOW (ref 32–36)
MCV RBC AUTO: 89.1 FL — SIGNIFICANT CHANGE UP (ref 80–100)
NRBC # BLD: 0 /100 WBCS — SIGNIFICANT CHANGE UP (ref 0–0)
PHOSPHATE SERPL-MCNC: 2.3 MG/DL — LOW (ref 2.5–4.5)
PLATELET # BLD AUTO: 311 K/UL — SIGNIFICANT CHANGE UP (ref 150–400)
POTASSIUM SERPL-MCNC: 4.2 MMOL/L — SIGNIFICANT CHANGE UP (ref 3.5–5.3)
POTASSIUM SERPL-SCNC: 4.2 MMOL/L — SIGNIFICANT CHANGE UP (ref 3.5–5.3)
RBC # BLD: 3.87 M/UL — SIGNIFICANT CHANGE UP (ref 3.8–5.2)
RBC # FLD: 15.3 % — HIGH (ref 10.3–14.5)
SODIUM SERPL-SCNC: 142 MMOL/L — SIGNIFICANT CHANGE UP (ref 135–145)
SURGICAL PATHOLOGY STUDY: SIGNIFICANT CHANGE UP
WBC # BLD: 7.47 K/UL — SIGNIFICANT CHANGE UP (ref 3.8–10.5)
WBC # FLD AUTO: 7.47 K/UL — SIGNIFICANT CHANGE UP (ref 3.8–10.5)

## 2019-11-26 RX ORDER — POTASSIUM PHOSPHATE, MONOBASIC POTASSIUM PHOSPHATE, DIBASIC 236; 224 MG/ML; MG/ML
15 INJECTION, SOLUTION INTRAVENOUS ONCE
Refills: 0 | Status: COMPLETED | OUTPATIENT
Start: 2019-11-26 | End: 2019-11-26

## 2019-11-26 RX ADMIN — Medication 1000 MILLIGRAM(S): at 18:00

## 2019-11-26 RX ADMIN — Medication 1000 MILLIGRAM(S): at 00:42

## 2019-11-26 RX ADMIN — Medication 3 MILLILITER(S): at 12:02

## 2019-11-26 RX ADMIN — Medication 1000 MILLIGRAM(S): at 12:00

## 2019-11-26 RX ADMIN — Medication 75 MICROGRAM(S): at 05:55

## 2019-11-26 RX ADMIN — Medication 1000 MILLIGRAM(S): at 00:55

## 2019-11-26 RX ADMIN — POTASSIUM PHOSPHATE, MONOBASIC POTASSIUM PHOSPHATE, DIBASIC 63.75 MILLIMOLE(S): 236; 224 INJECTION, SOLUTION INTRAVENOUS at 12:02

## 2019-11-26 RX ADMIN — Medication 2: at 17:32

## 2019-11-26 RX ADMIN — ENOXAPARIN SODIUM 40 MILLIGRAM(S): 100 INJECTION SUBCUTANEOUS at 12:00

## 2019-11-26 RX ADMIN — Medication 3 MILLILITER(S): at 06:15

## 2019-11-26 RX ADMIN — Medication 3 MILLILITER(S): at 20:59

## 2019-11-26 RX ADMIN — PANTOPRAZOLE SODIUM 40 MILLIGRAM(S): 20 TABLET, DELAYED RELEASE ORAL at 06:15

## 2019-11-26 RX ADMIN — Medication 1000 MILLIGRAM(S): at 06:17

## 2019-11-26 RX ADMIN — Medication 3 MILLILITER(S): at 00:42

## 2019-11-26 RX ADMIN — Medication 1000 MILLIGRAM(S): at 17:31

## 2019-11-26 RX ADMIN — Medication 2: at 21:59

## 2019-11-26 RX ADMIN — HYDROMORPHONE HYDROCHLORIDE 4 MILLIGRAM(S): 2 INJECTION INTRAMUSCULAR; INTRAVENOUS; SUBCUTANEOUS at 23:03

## 2019-11-26 RX ADMIN — HYDROMORPHONE HYDROCHLORIDE 4 MILLIGRAM(S): 2 INJECTION INTRAMUSCULAR; INTRAVENOUS; SUBCUTANEOUS at 21:52

## 2019-11-26 RX ADMIN — Medication 1000 MILLIGRAM(S): at 12:30

## 2019-11-26 RX ADMIN — Medication 1000 MILLIGRAM(S): at 06:25

## 2019-11-26 RX ADMIN — Medication 4: at 12:03

## 2019-11-26 RX ADMIN — Medication 10 MILLIGRAM(S): at 17:31

## 2019-11-26 NOTE — PROGRESS NOTE ADULT - ASSESSMENT
69F, Liberian speaking with multiple comorbidities, presenting with 4 days of LLQ abd pain,, found to have a new right upper pole renal mass with tumor thrombus extending in to bilateral renal veins and IVC.    #R Renal Mass   #Extensive Tumor thrombus   #Multiple pleural based lesions negative    -Upper pole R Kidney mass w/ extensive tumor thrombus extending to L renal vein, IVC and bilateral iliac veins (currently, clinically appears to be at least stage 3).  -S/p nephrectomy and thrombectomy on 11/22/19. Awaiting final path.    -C/w lovenox.  -left lower lobe pleural based nodule core biopsy, negative for carcinoma, c/w hyalinized +fibrotic tissue, chronic inflammation  -PET/CT without evidence of distant mets.    Thank you  Chelsie Salcedo MD for   Kirk Jasmine MD.

## 2019-11-26 NOTE — CHART NOTE - NSCHARTNOTEFT_GEN_A_CORE
Admitting Diagnosis:   Patient is a 69y old  Female who presents with a chief complaint of LLQ abd pain (26 Nov 2019 10:32)      PAST MEDICAL & SURGICAL HISTORY:  Diverticulosis  Hypothyroid  High cholesterol  HTN (hypertension)  Diabetes      Current Nutrition Order:   Full Liquids, CSTCHO    PO Intake: Good (%) [   ]  Fair (50-75%) [ x  ] Poor (<25%) [   ]  Had cream of wheat and pudding for breakfast    GI Issues:   Denies N/V  +F/-BM  Last BM was 11/22 per flowsheet    Pain:  No pain reported. Being medically managed.     Skin Integrity:  Surgical incision    Labs:   11-26    142  |  105  |  5<L>  ----------------------------<  139<H>  4.2   |  27  |  0.89    Ca    8.4      26 Nov 2019 07:13  Phos  2.3     11-26  Mg     2.2     11-26      CAPILLARY BLOOD GLUCOSE      POCT Blood Glucose.: 203 mg/dL (26 Nov 2019 11:30)  POCT Blood Glucose.: 131 mg/dL (26 Nov 2019 06:58)  POCT Blood Glucose.: 200 mg/dL (25 Nov 2019 22:43)  POCT Blood Glucose.: 166 mg/dL (25 Nov 2019 17:20)      Medications:  MEDICATIONS  (STANDING):  acetaminophen   Tablet .. 1000 milliGRAM(s) Oral every 6 hours  albuterol/ipratropium for Nebulization 3 milliLiter(s) Nebulizer every 6 hours  bisacodyl Suppository 10 milliGRAM(s) Rectal once  dextrose 5%. 1000 milliLiter(s) (50 mL/Hr) IV Continuous <Continuous>  dextrose 50% Injectable 25 Gram(s) IV Push once  dextrose 50% Injectable 50 milliLiter(s) IV Push every 15 minutes  dextrose 50% Injectable 25 milliLiter(s) IV Push every 15 minutes  enoxaparin Injectable 40 milliGRAM(s) SubCutaneous daily  insulin lispro (HumaLOG) corrective regimen sliding scale   SubCutaneous Before meals and at bedtime  ketorolac   Injectable 30 milliGRAM(s) IV Push every 6 hours  levothyroxine 75 MICROGram(s) Oral daily  pantoprazole    Tablet 40 milliGRAM(s) Oral before breakfast    MEDICATIONS  (PRN):  dextrose 40% Gel 15 Gram(s) Oral once PRN Blood Glucose LESS THAN 70 milliGRAM(s)/deciliter  glucagon  Injectable 1 milliGRAM(s) IntraMuscular once PRN Glucose LESS THAN 70 milligrams/deciliter  HYDROmorphone   Tablet 2 milliGRAM(s) Oral every 4 hours PRN Moderate Pain (4 - 6)  HYDROmorphone   Tablet 4 milliGRAM(s) Oral every 4 hours PRN Severe Pain (7 - 10)      Weight: 133lbs  Height: 63", IBW 115lbs+/-10%, %.9%, BMI 23.6 kg/m2  Daily     Weight Change:   Denies weight changes PTA. States UBW is 125lbs.     Estimated energy needs:   ABW used for calculations as pt between % of IBW.   Nutrient needs based on [facility] standards of care for maintenance in older adults.   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 P/w 4 days history of LLQ abd pain-. Found to have R renal mass w/ IVC thrombus. Now s/p R nephrectomy, IVC tumor thrombectomy with patch venoplasty POD4. Pt seen in room, resting in bed w/ daughter at bedside. Declined  services and deferred to daughter. Pt is currently on a CSTCHO, full liquid diet and tolerating PO. Had 50% cream of wheat and 100% of pudding. No apparent GI distress, no N/V, +F/-BM. Daughter reports pt prepares her own meals at home and adheres to a CSTCHO diet monitoring foods high in sugar/refined CHO. States UBW is 125lbs, admitted wt is 133lbs. Unaware of any weight changes PTA. Skin: surgical incision; GI WDL. last BM 11/22. Recommend advancing to CSTCHO, DASH, mechanical soft once medically feasible. RD to follow.     Previous Nutrition Diagnosis:  inadequate energy intake RT NPO status for testings/procedures AEB meeting <50% EER this admission  Active [ x  ]  Resolved [   ]    If resolved, new PES:     Goal:  Pt to consistently meet % of estimated needs PO     Recommendations:  1. Recommend advancing diet to CSTCHO, DASH, Mechanical soft  2. If to remain on a full liquid diet recommend adding on Glucerna TID (660kcal, 30g pro) and Ensure Pudding BID (310kcal, 8g pro).   3. Monitor for s/s intolerance as diet is advanced  4. Obtain updated weight.     Education:   Discussed current diet order. Likely advancement process.     Risk Level: High [   ] Moderate [ x  ] Low [   ] Admitting Diagnosis:   Patient is a 69y old  Female who presents with a chief complaint of LLQ abd pain (26 Nov 2019 10:32)      PAST MEDICAL & SURGICAL HISTORY:  Diverticulosis  Hypothyroid  High cholesterol  HTN (hypertension)  Diabetes      Current Nutrition Order:   Full Liquids, CSTCHO    PO Intake: Good (%) [   ]  Fair (50-75%) [ x  ] Poor (<25%) [   ]  Had cream of wheat and pudding for breakfast    GI Issues:   Denies N/V  +F/-BM  Last BM was 11/22 per flowsheet    Pain:  No pain reported. Being medically managed.     Skin Integrity:  Surgical incision    Labs:   11-26    142  |  105  |  5<L>  ----------------------------<  139<H>  4.2   |  27  |  0.89    Ca    8.4      26 Nov 2019 07:13  Phos  2.3     11-26  Mg     2.2     11-26      CAPILLARY BLOOD GLUCOSE      POCT Blood Glucose.: 203 mg/dL (26 Nov 2019 11:30)  POCT Blood Glucose.: 131 mg/dL (26 Nov 2019 06:58)  POCT Blood Glucose.: 200 mg/dL (25 Nov 2019 22:43)  POCT Blood Glucose.: 166 mg/dL (25 Nov 2019 17:20)      Medications:  MEDICATIONS  (STANDING):  acetaminophen   Tablet .. 1000 milliGRAM(s) Oral every 6 hours  albuterol/ipratropium for Nebulization 3 milliLiter(s) Nebulizer every 6 hours  bisacodyl Suppository 10 milliGRAM(s) Rectal once  dextrose 5%. 1000 milliLiter(s) (50 mL/Hr) IV Continuous <Continuous>  dextrose 50% Injectable 25 Gram(s) IV Push once  dextrose 50% Injectable 50 milliLiter(s) IV Push every 15 minutes  dextrose 50% Injectable 25 milliLiter(s) IV Push every 15 minutes  enoxaparin Injectable 40 milliGRAM(s) SubCutaneous daily  insulin lispro (HumaLOG) corrective regimen sliding scale   SubCutaneous Before meals and at bedtime  ketorolac   Injectable 30 milliGRAM(s) IV Push every 6 hours  levothyroxine 75 MICROGram(s) Oral daily  pantoprazole    Tablet 40 milliGRAM(s) Oral before breakfast    MEDICATIONS  (PRN):  dextrose 40% Gel 15 Gram(s) Oral once PRN Blood Glucose LESS THAN 70 milliGRAM(s)/deciliter  glucagon  Injectable 1 milliGRAM(s) IntraMuscular once PRN Glucose LESS THAN 70 milligrams/deciliter  HYDROmorphone   Tablet 2 milliGRAM(s) Oral every 4 hours PRN Moderate Pain (4 - 6)  HYDROmorphone   Tablet 4 milliGRAM(s) Oral every 4 hours PRN Severe Pain (7 - 10)      Weight: 133lbs  Height: 63", IBW 115lbs+/-10%, %.9%, BMI 23.6 kg/m2  Daily     Weight Change:   Denies weight changes PTA. States UBW is 125lbs.     Estimated energy needs:   ABW used for calculations as pt between % of IBW.   Nutrient needs based on [facility] standards of care for maintenance in older adults.   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 P/w 4 days history of LLQ abd pain-. Found to have R renal mass w/ IVC thrombus. Now s/p R nephrectomy, IVC tumor thrombectomy with patch venoplasty POD4. Pt seen in room, resting in bed w/ daughter at bedside. Declined  services and deferred to daughter. Pt is currently on a CSTCHO, full liquid diet and tolerating PO. Had 50% cream of wheat and 100% of pudding. No apparent GI distress, no N/V, +F/-BM. Daughter reports pt prepares her own meals at home and adheres to a CSTCHO diet monitoring foods high in sugar/refined CHO. States UBW is 125lbs, admitted wt is 133lbs. Unaware of any weight changes PTA. Skin: surgical incision; GI WDL. last BM 11/22. Recommend advancing to CSTCHO, DASH, mechanical soft once medically feasible. RD to follow.     Previous Nutrition Diagnosis:  inadequate energy intake RT NPO status for testings/procedures AEB meeting <50% EER this admission  Active [ x  ]  Resolved [   ]    If resolved, new PES:     Goal:  Pt to consistently meet % of estimated needs PO     Recommendations:  1. Recommend advancing diet to CSTCHO, DASH, Mechanical soft  2. If to remain on a full liquid diet recommend adding on Glucerna TID (660kcal, 30g pro) and Ensure Pudding BID (310kcal, 8g pro).   3. Monitor for s/s intolerance as diet is advanced  4. Obtain updated weight.   *paged team to discuss    Education:   Discussed current diet order. Likely advancement process.     Risk Level: High [   ] Moderate [ x  ] Low [   ]

## 2019-11-26 NOTE — PROGRESS NOTE ADULT - ASSESSMENT
68 yo female with R renal mass and IVC thrombus- s/p R radical nephrectomy, IVC thrombectomy, venoplasty with patch 11/22/19; anemia- s/p transfusion 1 unit PRBC's 11/25/19

## 2019-11-26 NOTE — PROGRESS NOTE ADULT - SUBJECTIVE AND OBJECTIVE BOX
INTERVAL HPI/OVERNIGHT EVENTS:  No acute events overnight.    VITALS:    T(F): 97.9 (11-26-19 @ 05:21), Max: 98.9 (11-25-19 @ 13:45)  HR: 74 (11-26-19 @ 03:50) (74 - 104)  BP: 115/63 (11-26-19 @ 03:50) (115/56 - 145/68)  RR: 18 (11-26-19 @ 03:50) (16 - 18)  SpO2: 100% (11-26-19 @ 03:50) (93% - 100%)  Wt(kg): --    I&O's Detail    24 Nov 2019 07:01  -  25 Nov 2019 07:00  --------------------------------------------------------  IN:    IV PiggyBack: 1000 mL  Total IN: 1000 mL    OUT:    Voided: 2650 mL  Total OUT: 2650 mL    Total NET: -1650 mL      25 Nov 2019 07:01  -  26 Nov 2019 06:02  --------------------------------------------------------  IN:    IV PiggyBack: 250 mL    Oral Fluid: 400 mL    Packed Red Blood Cells: 350 mL  Total IN: 1000 mL    OUT:    Voided: 2200 mL  Total OUT: 2200 mL    Total NET: -1200 mL          MEDICATIONS:    ANTIBIOTICS:      PAIN CONTROL:  acetaminophen   Tablet .. 1000 milliGRAM(s) Oral every 6 hours  HYDROmorphone   Tablet 2 milliGRAM(s) Oral every 4 hours PRN  HYDROmorphone   Tablet 4 milliGRAM(s) Oral every 4 hours PRN  ketorolac   Injectable 30 milliGRAM(s) IV Push every 6 hours       MEDS:      HEME/ONC  enoxaparin Injectable 40 milliGRAM(s) SubCutaneous daily        PHYSICAL EXAM:  General: No acute distress.  Alert and Oriented  Abdominal Exam: soft, incision site clean and dry, clips intact   Exam: no SP discomfort      LABS:                        8.5    10.38 )-----------( 245      ( 25 Nov 2019 06:06 )             27.0     11-25    139  |  105  |  7   ----------------------------<  150<H>  4.2   |  23  |  0.92    Ca    8.1<L>      25 Nov 2019 06:06  Phos  1.3     11-25  Mg     2.1     11-25            RADIOLOGY & ADDITIONAL TESTS:

## 2019-11-26 NOTE — PROGRESS NOTE ADULT - SUBJECTIVE AND OBJECTIVE BOX
**incomplete    Overnight patient afebrile, HR improved to 70-80s.     Overnight Events:     Vital Signs Last 24 Hrs  T(C): 36.6 (26 Nov 2019 05:21), Max: 37.2 (25 Nov 2019 13:45)  T(F): 97.9 (26 Nov 2019 05:21), Max: 98.9 (25 Nov 2019 13:45)  HR: 74 (26 Nov 2019 03:50) (74 - 104)  BP: 115/63 (26 Nov 2019 03:50) (115/56 - 145/68)  BP(mean): 83 (26 Nov 2019 03:50) (83 - 98)  RR: 18 (26 Nov 2019 03:50) (16 - 18)  SpO2: 100% (26 Nov 2019 03:50) (93% - 100%)    24 Nov 2019 07:01  -  25 Nov 2019 07:00  --------------------------------------------------------  IN:    IV PiggyBack: 1000 mL  Total IN: 1000 mL    OUT:    Voided: 2650 mL  Total OUT: 2650 mL    Total NET: -1650 mL      25 Nov 2019 07:01  -  26 Nov 2019 06:11  --------------------------------------------------------  IN:    IV PiggyBack: 250 mL    Oral Fluid: 400 mL    Packed Red Blood Cells: 350 mL  Total IN: 1000 mL    OUT:    Voided: 2200 mL  Total OUT: 2200 mL    Total NET: -1200 mL      Physical Exam:  General: nad  Pulmonary: normal breath sounds noted   Cardiovascular: tachycardic   Abdominal: chevron incision clean and dry, no erythema or drainage, staples in place   Extremities: bilateral feet without edema   palpable DP pulses bilaterally                             8.5    10.38 )-----------( 245      ( 25 Nov 2019 06:06 )             27.0   25 Nov 2019 06:06    139    |  105    |  7      ----------------------------<  150    4.2     |  23     |  0.92     Ca    8.1        25 Nov 2019 06:06  Phos  1.3       25 Nov 2019 06:06  Mg     2.1       25 Nov 2019 06:06    · Assessment		  69F, Dutch speaking, with PMH of HTN, DM, HLD, Hypothyroid, diverticulosis, 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 presents with 4 days history of LLQ abd pain,s/p R nephrectomy, IVC tumor thrombectomy with patch venoplasty.     - patient continues to progress well post-operatively, remain afebrile - leukcytosis   - 12 leak EKG showed no evidence of afib, just sinus tachycardia  - advance diet?  - continue plan as per primary team, no new surgical recs Patient seen and examined at bedside, patient resting comfortably in bed with her daughter bedside. Daughter states she is doing well overall, pain is controlled with medication, she is tolerating PO, walking and breathing well on room air. No nausea/vomiting, still awaiting bowel function.     Overnight patient afebrile, HR improved to 70-80s.     Overnight Events:     Vital Signs Last 24 Hrs  T(C): 36.6 (26 Nov 2019 05:21), Max: 37.2 (25 Nov 2019 13:45)  T(F): 97.9 (26 Nov 2019 05:21), Max: 98.9 (25 Nov 2019 13:45)  HR: 74 (26 Nov 2019 03:50) (74 - 104)  BP: 115/63 (26 Nov 2019 03:50) (115/56 - 145/68)  BP(mean): 83 (26 Nov 2019 03:50) (83 - 98)  RR: 18 (26 Nov 2019 03:50) (16 - 18)  SpO2: 100% (26 Nov 2019 03:50) (93% - 100%)    24 Nov 2019 07:01  -  25 Nov 2019 07:00  --------------------------------------------------------  IN:    IV PiggyBack: 1000 mL  Total IN: 1000 mL    OUT:    Voided: 2650 mL  Total OUT: 2650 mL    Total NET: -1650 mL      25 Nov 2019 07:01  -  26 Nov 2019 06:11  --------------------------------------------------------  IN:    IV PiggyBack: 250 mL    Oral Fluid: 400 mL    Packed Red Blood Cells: 350 mL  Total IN: 1000 mL    OUT:    Voided: 2200 mL  Total OUT: 2200 mL    Total NET: -1200 mL      Physical Exam:  General: nad  Pulmonary: normal breath sounds noted   Cardiovascular: tachycardic   Abdominal: chevron incision clean and dry, no erythema or drainage, staples in place   Extremities: bilateral feet without edema   palpable DP pulses bilaterally                             8.5    10.38 )-----------( 245      ( 25 Nov 2019 06:06 )             27.0   25 Nov 2019 06:06    139    |  105    |  7      ----------------------------<  150    4.2     |  23     |  0.92     Ca    8.1        25 Nov 2019 06:06  Phos  1.3       25 Nov 2019 06:06  Mg     2.1       25 Nov 2019 06:06    · Assessment		  69F, Armenian speaking, with PMH of HTN, DM, HLD, Hypothyroid, diverticulosis, 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 presents with 4 days history of LLQ abd pain,s/p R nephrectomy, IVC tumor thrombectomy with patch venoplasty.     - patient continues to progress well post-operatively, and remains afebrile - leukcytosis   - 12 leak EKG showed no evidence of afib, just sinus tachycardia  - patient still awaiting bowel function, continue to encourage ambulation   - continue plan as per primary team, no new surgical recs

## 2019-11-26 NOTE — PROGRESS NOTE ADULT - SUBJECTIVE AND OBJECTIVE BOX
24 hr events    Subjective: Patient seen and examined bedside. NAOE.      Vital Signs Last 24 Hrs  T(C): 36.4 (26 Nov 2019 09:26), Max: 37.2 (25 Nov 2019 13:45)  T(F): 97.5 (26 Nov 2019 09:26), Max: 98.9 (25 Nov 2019 13:45)  HR: 90 (26 Nov 2019 08:30) (74 - 104)  BP: 128/62 (26 Nov 2019 08:30) (115/56 - 145/68)  BP(mean): 88 (26 Nov 2019 08:30) (83 - 98)  RR: 16 (26 Nov 2019 08:30) (16 - 18)  SpO2: 95% (26 Nov 2019 08:30) (93% - 100%)    I&O's Summary    25 Nov 2019 07:01  -  26 Nov 2019 07:00  --------------------------------------------------------  IN: 1000 mL / OUT: 2200 mL / NET: -1200 mL        Physical Exam:  General: nad  Pulmonary: d/c breath sounds noted   Cardiovascular:s1s2 tachycardic   Abdominal: incision clean and dry   Extremities: bilateral feet without edema   palpable DP pulses bilaterally     Lines/drains/tubes:    LABS:                        10.6   7.47  )-----------( 311      ( 26 Nov 2019 07:13 )             34.5     11-26    142  |  105  |  5<L>  ----------------------------<  139<H>  4.2   |  27  |  0.89    Ca    8.4      26 Nov 2019 07:13  Phos  2.3     11-26  Mg     2.2     11-26            CAPILLARY BLOOD GLUCOSE      POCT Blood Glucose.: 131 mg/dL (26 Nov 2019 06:58)  POCT Blood Glucose.: 200 mg/dL (25 Nov 2019 22:43)  POCT Blood Glucose.: 166 mg/dL (25 Nov 2019 17:20)  POCT Blood Glucose.: 227 mg/dL (25 Nov 2019 11:51)      RADIOLOGY & ADDITIONAL TESTS:

## 2019-11-26 NOTE — PROGRESS NOTE ADULT - SUBJECTIVE AND OBJECTIVE BOX
Heme/Onc Progress Note    Interval History: Pt seen and examined. S/p right nephrectomy, IVC tumour thrombectomy and bovine pericardial patch venoplasty.    Pain persists at incision site but tolerable on meds. Sitting up in chair. Breathing comfortably. Daughter by bedside.   No new complaints.  Drinking liquids.    Allergies:  No Known Allergies    MEDICATIONS  (STANDING):  acetaminophen   Tablet .. 1000 milliGRAM(s) Oral every 6 hours  albuterol/ipratropium for Nebulization 3 milliLiter(s) Nebulizer every 6 hours  bisacodyl Suppository 10 milliGRAM(s) Rectal once  dextrose 5%. 1000 milliLiter(s) (50 mL/Hr) IV Continuous <Continuous>  dextrose 50% Injectable 25 Gram(s) IV Push once  dextrose 50% Injectable 50 milliLiter(s) IV Push every 15 minutes  dextrose 50% Injectable 25 milliLiter(s) IV Push every 15 minutes  enoxaparin Injectable 40 milliGRAM(s) SubCutaneous daily  insulin lispro (HumaLOG) corrective regimen sliding scale   SubCutaneous Before meals and at bedtime  ketorolac   Injectable 30 milliGRAM(s) IV Push every 6 hours  levothyroxine 75 MICROGram(s) Oral daily  pantoprazole    Tablet 40 milliGRAM(s) Oral before breakfast  potassium phosphate IVPB 15 milliMole(s) IV Intermittent once    MEDICATIONS  (PRN):  dextrose 40% Gel 15 Gram(s) Oral once PRN Blood Glucose LESS THAN 70 milliGRAM(s)/deciliter  glucagon  Injectable 1 milliGRAM(s) IntraMuscular once PRN Glucose LESS THAN 70 milligrams/deciliter  HYDROmorphone   Tablet 2 milliGRAM(s) Oral every 4 hours PRN Moderate Pain (4 - 6)  HYDROmorphone   Tablet 4 milliGRAM(s) Oral every 4 hours PRN Severe Pain (7 - 10)        PHYSICAL EXAM:  Vital Signs Last 24 Hrs  T(C): 36.4 (26 Nov 2019 09:26), Max: 37.2 (25 Nov 2019 13:45)  T(F): 97.5 (26 Nov 2019 09:26), Max: 98.9 (25 Nov 2019 13:45)  HR: 90 (26 Nov 2019 08:30) (74 - 104)  BP: 128/62 (26 Nov 2019 08:30) (115/56 - 145/68)  BP(mean): 88 (26 Nov 2019 08:30) (83 - 98)  RR: 16 (26 Nov 2019 08:30) (16 - 18)  SpO2: 95% (26 Nov 2019 08:30) (93% - 100%)    GEN: NAD, resting comfortably in chair  HEENT: AT/NC, EOMI  NECK: Supple  CVS:+S1S2, no mrg  LUNG: CTA B/L   ABD: +BS, soft, NT, ND  EXT: no c/c/e  NEURO: aaox3, moving all extremities                          10.6   7.47  )-----------( 311      ( 26 Nov 2019 07:13 )             34.5   11-26    142  |  105  |  5<L>  ----------------------------<  139<H>  4.2   |  27  |  0.89    Ca    8.4      26 Nov 2019 07:13  Phos  2.3     11-26  Mg     2.2     11-26        11/15  CT CAP  Evaluation of the lower chest demonstrates normal heart size. There is no   pleural or pericardial effusion. There is coronary arterial   calcification. Innumerable bilateral pulmonary nodules along the pleural   surfaces, consistent with pleural-based metastasis measuring up to 15 mm.   Evaluation of the abdomen demonstrates that the liver is not enlarged.   The spleen, pancreas, gallbladder, bilateral adrenal glands and left   kidney are unremarkable aside from a cyst in the posterior interpolar   region of the left kidney measuring 4.5 cm. Evaluation of the right   kidney demonstrates an avidly enhancing solid and cystic mass within the   upper pole measuring 4.4 x 4.2 cm. There is enhancing tumor thrombus   extending into the right renal vein and superiorly into the infrahepatic   IVC; there is thrombus extending into the infracaval IVC and bilateral   common iliac veins with venous distention and surrounding edematous   infiltration. Tumor thrombosis also extends partially into the right   renal vein. Evaluation of the gastrointestinal tract demonstrates   diverticulosis. There is no bowel thickening or bowel obstruction. There   is appearance of the appendix. Evaluation of the pelvis demonstrates   hysterectomy. Bladder is unremarkable. Small left fat-containing inguinal   hernia. Edematous infiltration of the presacral region and   retroperitoneum. No adenopathy. Moderate aortic and arterial vascular   calcification. Opacified aspects of the portal, hepatic splenic superior   mesenteric veins demonstrates no sydney intraluminal thrombus. Evaluation   of the osseous structures are unremarkable    IMPRESSION:    Carcinoma of the upper pole of the right kidney with extensive tumor   thrombus extending into the left renal vein, IVC and bilateral iliac   veins.    Diffuse pleural-based pulmonary metastasis.    CT Chest w/ IVC 11/16#    Evaluation of the chest demonstrates that the visualized thyroid gland is   normal in appearance. There is normal heart size. There is moderate   coronary arterial calcification. Negative for thoracic inlet, axillary,   mediastinal or hilar lymphadenopathy. There is no sydney central or   segmental pulmonary embolus. Negative for intraluminal thrombus within   the left atrial appendage. Negative for mitral annular or aortic valvular   calcification. Evaluation of the lung parenchyma demonstrates mild   paraseptal and centrilobular edematous change. There are multiple   pleural-based pulmonary nodules which do not demonstrate avid   enhancement, predominantly along the bilateral lower lobes, largest which   measures 15 mm along the right lower lobe; there is a calcified   subpleural nodule along the right middle lobe measuring 6 mm. Some   pleural-based nodules demonstrates an oblong configuration and the   overall lack of avid enhancement suggests the possibility of multiple   nodular pleural plaques. There is no endobronchial lesion. Evaluation the   upper abdomen redemonstrates carcinoma within the upper pole the right   kidney measuring 4.3 cm with tumor thrombus extending into the IVC and   right renal vein. Evaluation of the osseous structures demonstrates no   destructive lesion.    IMPRESSION:    Pleural-based metastasis versus nodular pleural plaques; PET-CT   correlation is recommended.    Pathology 11/19    Final Diagnosis    Lung, left lower lobe, pleural based; core biopsy:  - Hyalinized and fibrotic tissue with adjacent chronic  inflammation, consistent with pleural plaque.  - Negative for carcinoma.

## 2019-11-26 NOTE — PROGRESS NOTE ADULT - ASSESSMENT
69y Female, Frisian speaking,  PMH HTN, DM, HLD, hypothyroid, diverticulosis, COPD/emphysema, former smoker (1ppd x 45 years, quit 1 year ago), with right renal mass with IVC thrombus.  S/P radical nx, IVC thrombectomy, venoplasty with patch 11/22    -pain control per primary  -tachycardia resolved  -Vascular will follow

## 2019-11-27 ENCOUNTER — TRANSCRIPTION ENCOUNTER (OUTPATIENT)
Age: 69
End: 2019-11-27

## 2019-11-27 VITALS
OXYGEN SATURATION: 97 % | RESPIRATION RATE: 16 BRPM | DIASTOLIC BLOOD PRESSURE: 75 MMHG | SYSTOLIC BLOOD PRESSURE: 139 MMHG | TEMPERATURE: 98 F | HEART RATE: 81 BPM

## 2019-11-27 LAB
ANION GAP SERPL CALC-SCNC: 13 MMOL/L — SIGNIFICANT CHANGE UP (ref 5–17)
BUN SERPL-MCNC: 9 MG/DL — SIGNIFICANT CHANGE UP (ref 7–23)
CALCIUM SERPL-MCNC: 8.6 MG/DL — SIGNIFICANT CHANGE UP (ref 8.4–10.5)
CHLORIDE SERPL-SCNC: 104 MMOL/L — SIGNIFICANT CHANGE UP (ref 96–108)
CO2 SERPL-SCNC: 25 MMOL/L — SIGNIFICANT CHANGE UP (ref 22–31)
CREAT SERPL-MCNC: 1.01 MG/DL — SIGNIFICANT CHANGE UP (ref 0.5–1.3)
GLUCOSE BLDC GLUCOMTR-MCNC: 139 MG/DL — HIGH (ref 70–99)
GLUCOSE BLDC GLUCOMTR-MCNC: 145 MG/DL — HIGH (ref 70–99)
GLUCOSE SERPL-MCNC: 142 MG/DL — HIGH (ref 70–99)
HCT VFR BLD CALC: 33.7 % — LOW (ref 34.5–45)
HGB BLD-MCNC: 10.7 G/DL — LOW (ref 11.5–15.5)
MAGNESIUM SERPL-MCNC: 2.2 MG/DL — SIGNIFICANT CHANGE UP (ref 1.6–2.6)
MCHC RBC-ENTMCNC: 28.1 PG — SIGNIFICANT CHANGE UP (ref 27–34)
MCHC RBC-ENTMCNC: 31.8 GM/DL — LOW (ref 32–36)
MCV RBC AUTO: 88.5 FL — SIGNIFICANT CHANGE UP (ref 80–100)
NRBC # BLD: 0 /100 WBCS — SIGNIFICANT CHANGE UP (ref 0–0)
PHOSPHATE SERPL-MCNC: 2.5 MG/DL — SIGNIFICANT CHANGE UP (ref 2.5–4.5)
PLATELET # BLD AUTO: 340 K/UL — SIGNIFICANT CHANGE UP (ref 150–400)
POTASSIUM SERPL-MCNC: 4.4 MMOL/L — SIGNIFICANT CHANGE UP (ref 3.5–5.3)
POTASSIUM SERPL-SCNC: 4.4 MMOL/L — SIGNIFICANT CHANGE UP (ref 3.5–5.3)
RBC # BLD: 3.81 M/UL — SIGNIFICANT CHANGE UP (ref 3.8–5.2)
RBC # FLD: 15.8 % — HIGH (ref 10.3–14.5)
SODIUM SERPL-SCNC: 142 MMOL/L — SIGNIFICANT CHANGE UP (ref 135–145)
WBC # BLD: 7.32 K/UL — SIGNIFICANT CHANGE UP (ref 3.8–10.5)
WBC # FLD AUTO: 7.32 K/UL — SIGNIFICANT CHANGE UP (ref 3.8–10.5)

## 2019-11-27 RX ORDER — DOCUSATE SODIUM 100 MG
1 CAPSULE ORAL
Qty: 18 | Refills: 0
Start: 2019-11-27 | End: 2019-12-05

## 2019-11-27 RX ORDER — AMLODIPINE BESYLATE 2.5 MG/1
1 TABLET ORAL
Qty: 0 | Refills: 0 | DISCHARGE

## 2019-11-27 RX ORDER — POLYETHYLENE GLYCOL 3350 17 G/17G
17 POWDER, FOR SOLUTION ORAL DAILY
Refills: 0 | Status: DISCONTINUED | OUTPATIENT
Start: 2019-11-27 | End: 2019-11-27

## 2019-11-27 RX ADMIN — Medication 3 MILLILITER(S): at 09:35

## 2019-11-27 RX ADMIN — PANTOPRAZOLE SODIUM 40 MILLIGRAM(S): 20 TABLET, DELAYED RELEASE ORAL at 05:26

## 2019-11-27 RX ADMIN — Medication 3 MILLILITER(S): at 03:52

## 2019-11-27 RX ADMIN — Medication 30 MILLIGRAM(S): at 03:52

## 2019-11-27 RX ADMIN — POLYETHYLENE GLYCOL 3350 17 GRAM(S): 17 POWDER, FOR SOLUTION ORAL at 09:36

## 2019-11-27 RX ADMIN — ENOXAPARIN SODIUM 40 MILLIGRAM(S): 100 INJECTION SUBCUTANEOUS at 12:48

## 2019-11-27 RX ADMIN — Medication 75 MICROGRAM(S): at 05:26

## 2019-11-27 RX ADMIN — Medication 1000 MILLIGRAM(S): at 04:56

## 2019-11-27 RX ADMIN — Medication 1000 MILLIGRAM(S): at 03:52

## 2019-11-27 RX ADMIN — Medication 1000 MILLIGRAM(S): at 12:47

## 2019-11-27 RX ADMIN — Medication 30 MILLIGRAM(S): at 04:20

## 2019-11-27 NOTE — DISCHARGE NOTE PROVIDER - NSDCFUADDINST_GEN_ALL_CORE_FT
Please continue hydrochlorothiazide but discontinue norvasc until following up with primary care provider.     Pain control: take over the counter tylenol or percocet every six hours as needed. Please take either tylenol or percocet at scheduled 6 hour interval (but not both). Percocet contains tylenol so you do not want to exceed 4000 mg of tylenol per day.     General Discharge Instructions:  Please resume all regular home medications unless specifically advised not to take a particular medication. Also, please take any new medications as prescribed.  Please get plenty of rest, continue to ambulate several times per day, and drink adequate amounts of fluids. Avoid lifting weights greater than 5-10 lbs until you follow-up with your surgeon, who will instruct you further regarding activity restrictions.  Avoid driving or operating heavy machinery while taking pain medications.  Please follow-up with your surgeon and Primary Care Provider (PCP) as advised.  Incision Care:  *Please call your doctor or nurse practitioner if you have increased pain, swelling, redness, or drainage from the incision site.  *Avoid swimming and baths until your follow-up appointment.  *If you have staples, they will be removed at your follow-up appointment.    Warning Signs:  Please call your doctor or nurse practitioner if you experience the following:  *You experience new chest pain, pressure, squeezing or tightness.  *New or worsening cough, shortness of breath, or wheeze.  *If you are vomiting and cannot keep down fluids or your medications.  *You are getting dehydrated due to continued vomiting, diarrhea, or other reasons. Signs of dehydration include dry mouth, rapid heartbeat, or feeling dizzy or faint when standing.  *You see blood or dark/black material when you vomit or have a bowel movement.  *You experience burning when you urinate, have blood in your urine, or experience a discharge.  *Your pain is not improving within 8-12 hours or is not gone within 24 hours. Call or return immediately if your pain is getting worse, changes location, or moves to your chest or back.  *You have shaking chills, or fever greater than 101.5 degrees Fahrenheit or 38 degrees Celsius.  *Any change in your symptoms, or any new symptoms that concern you.

## 2019-11-27 NOTE — DISCHARGE NOTE NURSING/CASE MANAGEMENT/SOCIAL WORK - PATIENT PORTAL LINK FT
You can access the FollowMyHealth Patient Portal offered by [facility] by registering at the following website: [email or web address]. By joining [employer]’s FollowMyHealth portal, you will also be able to view your health information using other applications (apps) compatible with our system.

## 2019-11-27 NOTE — PROGRESS NOTE ADULT - SUBJECTIVE AND OBJECTIVE BOX
INTERVAL HPI/OVERNIGHT EVENTS:  No acute events overnight.    VITALS:    T(F): 98.1 (11-27-19 @ 04:24), Max: 98.6 (11-26-19 @ 13:52)  HR: 86 (11-27-19 @ 04:24) (70 - 116)  BP: 149/72 (11-27-19 @ 04:24) (123/69 - 160/70)  RR: 17 (11-27-19 @ 04:24) (16 - 18)  SpO2: 95% (11-27-19 @ 04:24) (95% - 98%)  Wt(kg): --    I&O's Detail    25 Nov 2019 07:01  -  26 Nov 2019 07:00  --------------------------------------------------------  IN:    IV PiggyBack: 250 mL    Oral Fluid: 400 mL    Packed Red Blood Cells: 350 mL  Total IN: 1000 mL    OUT:    Voided: 2200 mL  Total OUT: 2200 mL    Total NET: -1200 mL      26 Nov 2019 07:01  -  27 Nov 2019 06:21  --------------------------------------------------------  IN:  Total IN: 0 mL    OUT:    Voided: 1050 mL  Total OUT: 1050 mL    Total NET: -1050 mL          MEDICATIONS:    ANTIBIOTICS:      PAIN CONTROL:  acetaminophen   Tablet .. 1000 milliGRAM(s) Oral every 6 hours  HYDROmorphone   Tablet 2 milliGRAM(s) Oral every 4 hours PRN  HYDROmorphone   Tablet 4 milliGRAM(s) Oral every 4 hours PRN  ketorolac   Injectable 30 milliGRAM(s) IV Push every 6 hours       MEDS:      HEME/ONC  enoxaparin Injectable 40 milliGRAM(s) SubCutaneous daily        PHYSICAL EXAM:  General: No acute distress.  Alert and Oriented  Abdominal Exam: soft, incision site clean and dry, clips intact   Exam: no SP discomfort      LABS:                        10.6   7.47  )-----------( 311      ( 26 Nov 2019 07:13 )             34.5     11-26    142  |  105  |  5<L>  ----------------------------<  139<H>  4.2   |  27  |  0.89    Ca    8.4      26 Nov 2019 07:13  Phos  2.3     11-26  Mg     2.2     11-26            RADIOLOGY & ADDITIONAL TESTS:

## 2019-11-27 NOTE — DISCHARGE NOTE PROVIDER - NSDCCPTREATMENT_GEN_ALL_CORE_FT
PRINCIPAL PROCEDURE  Procedure: Nephrectomy  Findings and Treatment: right      SECONDARY PROCEDURE  Procedure: Thrombectomy, vena cava  Findings and Treatment:

## 2019-11-27 NOTE — DISCHARGE NOTE PROVIDER - NSDCCPCAREPLAN_GEN_ALL_CORE_FT
PRINCIPAL DISCHARGE DIAGNOSIS  Diagnosis: Renal mass  Assessment and Plan of Treatment:       SECONDARY DISCHARGE DIAGNOSES  Diagnosis: Pulmonary nodules/lesions, multiple  Assessment and Plan of Treatment: Pulmonary nodules/lesions, multiple

## 2019-11-27 NOTE — DISCHARGE NOTE PROVIDER - CARE PROVIDERS DIRECT ADDRESSES
[email or web address]

## 2019-11-27 NOTE — PROGRESS NOTE ADULT - REASON FOR ADMISSION
LLQ abd pain
R Renal mass with IVC thrombus
Renal Mass
Renal Mass
LLQ abd pain

## 2019-11-27 NOTE — PROGRESS NOTE ADULT - SUBJECTIVE AND OBJECTIVE BOX
**incomplete    Overnight patient remained afebrile and HD stable. No acute events.       Vital Signs Last 24 Hrs  T(C): 36.7 (27 Nov 2019 04:24), Max: 37 (26 Nov 2019 13:52)  T(F): 98.1 (27 Nov 2019 04:24), Max: 98.6 (26 Nov 2019 13:52)  HR: 86 (27 Nov 2019 04:24) (70 - 116)  BP: 149/72 (27 Nov 2019 04:24) (123/69 - 160/70)  BP(mean): 90 (26 Nov 2019 19:54) (88 - 101)  RR: 17 (27 Nov 2019 04:24) (16 - 18)  SpO2: 95% (27 Nov 2019 04:24) (95% - 98%)    25 Nov 2019 07:01  -  26 Nov 2019 07:00  --------------------------------------------------------  IN:    IV PiggyBack: 250 mL    Oral Fluid: 400 mL    Packed Red Blood Cells: 350 mL  Total IN: 1000 mL    OUT:    Voided: 2200 mL  Total OUT: 2200 mL    Total NET: -1200 mL      26 Nov 2019 07:01  -  27 Nov 2019 05:48  --------------------------------------------------------  IN:  Total IN: 0 mL    OUT:    Voided: 1050 mL  Total OUT: 1050 mL    Total NET: -1050 mL      Physical Exam  General: NAD  Pulmonary: normal breath sounds noted   Cardiovascular: tachycardic   Abdominal: chevron incision clean and dry, no erythema or drainage, staples in place   Extremities: bilateral feet without edema   palpable DP pulses bilaterally                              10.6   7.47  )-----------( 311      ( 26 Nov 2019 07:13 )             34.5   26 Nov 2019 07:13    142    |  105    |  5      ----------------------------<  139    4.2     |  27     |  0.89     Ca    8.4        26 Nov 2019 07:13  Phos  2.3       26 Nov 2019 07:13  Mg     2.2       26 Nov 2019 07:13    · Assessment		  69F, Setswana speaking, with PMH of HTN, DM, HLD, Hypothyroid, diverticulosis, 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 presents with 4 days history of LLQ abd pain,s/p R nephrectomy, IVC tumor thrombectomy with patch venoplasty.     - diet was advanced to CLD yesterday, tolerating and bowel function.   - patient continues to progress well post-operatively, and remains afebrile - No leukocytosis.   - 12 leak EKG showed no evidence of afib, just sinus tachycardia  - patient still awaiting bowel function, continue to encourage ambulation   - continue plan as per primary team, no new surgical recs Patient seen and examined at bedside - comfortably sleeping, spoke to daughter at bedside. No nausea/vomiting, no fever, patient is doing well and continues to ambulate. She also states she has been passing gas.     Overnight patient remained afebrile and HD stable. No acute events.       Vital Signs Last 24 Hrs  T(C): 36.7 (27 Nov 2019 04:24), Max: 37 (26 Nov 2019 13:52)  T(F): 98.1 (27 Nov 2019 04:24), Max: 98.6 (26 Nov 2019 13:52)  HR: 86 (27 Nov 2019 04:24) (70 - 116)  BP: 149/72 (27 Nov 2019 04:24) (123/69 - 160/70)  BP(mean): 90 (26 Nov 2019 19:54) (88 - 101)  RR: 17 (27 Nov 2019 04:24) (16 - 18)  SpO2: 95% (27 Nov 2019 04:24) (95% - 98%)    25 Nov 2019 07:01  -  26 Nov 2019 07:00  --------------------------------------------------------  IN:    IV PiggyBack: 250 mL    Oral Fluid: 400 mL    Packed Red Blood Cells: 350 mL  Total IN: 1000 mL    OUT:    Voided: 2200 mL  Total OUT: 2200 mL    Total NET: -1200 mL      26 Nov 2019 07:01  -  27 Nov 2019 05:48  --------------------------------------------------------  IN:  Total IN: 0 mL    OUT:    Voided: 1050 mL  Total OUT: 1050 mL    Total NET: -1050 mL      Physical Exam  General: NAD  Pulmonary: normal breath sounds noted   Exam deferred, sleeping comfortably and examined by Urology less than an hour ago.                            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 presents with 4 days history of LLQ abd pain,s/p R nephrectomy, IVC tumor thrombectomy with patch venoplasty.     - diet was advanced to CLD yesterday, tolerating and now having bowel function, OK to advance diet  - patient continues to progress well post-operatively, and remains afebrile - No leukocytosis.   -  continue to encourage ambulation   - continue plan as per primary team, no new surgical recs

## 2019-11-27 NOTE — PROGRESS NOTE ADULT - PROBLEM SELECTOR PROBLEM 1
Renal mass
Tachycardia
Renal mass

## 2019-11-27 NOTE — DISCHARGE NOTE PROVIDER - NSDCFUADDAPPT_GEN_ALL_CORE_FT
Please call Dr. Villafuerte's office this week to schedule a follow-up appointment for Monday 12/2/2019.    Please call your primary care provider to schedule a follow up appointment as well.

## 2019-11-27 NOTE — DISCHARGE NOTE PROVIDER - NSDCMRMEDTOKEN_GEN_ALL_CORE_FT
hydroCHLOROthiazide 12.5 mg oral capsule: 1 cap(s) orally once a day  Januvia 100 mg oral tablet: 1 tab(s) orally once a day  metFORMIN 500 mg oral tablet: 1 tab(s) orally 2 times a day  Norvasc 5 mg oral tablet: 1 tab(s) orally once a day  Prolia 60 mg/mL subcutaneous solution: subcutaneous every 6 months  simvastatin 20 mg oral tablet: 1 tab(s) orally once a day (at bedtime)  Synthroid 75 mcg (0.075 mg) oral tablet: 1 tab(s) orally once a day Colace 100 mg oral capsule: 1 cap(s) orally 2 times a day, As Needed -for constipation   hydroCHLOROthiazide 12.5 mg oral capsule: 1 cap(s) orally once a day  Januvia 100 mg oral tablet: 1 tab(s) orally once a day  metFORMIN 500 mg oral tablet: 1 tab(s) orally 2 times a day  Percocet 5/325 oral tablet: 1 tab(s) orally every 6 hours, As Needed -for severe pain MDD:4   Prolia 60 mg/mL subcutaneous solution: subcutaneous every 6 months  simvastatin 20 mg oral tablet: 1 tab(s) orally once a day (at bedtime)  Synthroid 75 mcg (0.075 mg) oral tablet: 1 tab(s) orally once a day

## 2019-11-27 NOTE — DISCHARGE NOTE PROVIDER - CARE PROVIDER_API CALL
Jose Villafuerte)  Urology  [address], [facility], [place] [address]  Phone: [phone]  Fax: [phone]  Follow Up Time:

## 2019-11-27 NOTE — DISCHARGE NOTE PROVIDER - HOSPITAL COURSE
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 States the pain has been intermittent, but has gotten progressively worse, and is now also in the RLQ. States the pain is worse with movement, radiates to the flanks bilaterally, but improved with laying down. She states the sometimes the pain also radiates down the lower extremities bilaterally. She also feel associated bloating and reduced frequency of bowel movements,  but denies any fevers, chills, weight loss, changes in appetite, dysuria, hematuria, urinary frequency, hematochezia, melena, n/v/d, constipation or any other complaints at this time. Of note, patient follows pulmonologist Dr. Castaneda at [facility], pt has known pulmonary nodules and has serial CT Chest for monitoring. Pt's daughter states it may be from asbestosis, but this was never biopsied. For the patient's age appropriate cancer screening, her colonoscopy was done 2 years ago, which revealed benign polyps, last Mammogram 3 months ago which was negative, last pap smear a few months ago, positive for HPV. Per the daughter, patient had similar symptoms 3-4 years ago, and at that time, her CT Abd /pelv revealed renal cysts. Had a PET/CT test ~6 years ago which was negative for any malignancy at that time.        In the ED, vitals were T: 98F, HR 91 /min, /79 mmHg, RR 19, O2 98% on room air. Labs were obtained, revealed Hb  11.2, unknown baseline. BMP, coags, unremarkable. CT Abd/pelv performed which revealed RUL renal mass with tumor thrombus into bilateral renal veins. Patient started on heparin gtt and was admitted for further evaluation.        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 States the pain has been intermittent, but has gotten progressively worse, and is now also in the RLQ. States the pain is worse with movement, radiates to the flanks bilaterally, but improved with laying down. She states the sometimes the pain also radiates down the lower extremities bilaterally. She also feel associated bloating and reduced frequency of bowel movements,  but denies any fevers, chills, weight loss, changes in appetite, dysuria, hematuria, urinary frequency, hematochezia, melena, n/v/d, constipation or any other complaints at this time. Of note, patient follows pulmonologist Dr. Castaneda at [facility], pt has known pulmonary nodules and has serial CT Chest for monitoring. Pt's daughter states it may be from asbestosis, but this was never biopsied. For the patient's age appropriate cancer screening, her colonoscopy was done 2 years ago, which revealed benign polyps, last Mammogram 3 months ago which was negative, last pap smear a few months ago, positive for HPV. Per the daughter, patient had similar symptoms 3-4 years ago, and at that time, her CT Abd /pelv revealed renal cysts. Had a PET/CT test ~6 years ago which was negative for any malignancy at that time.        In the ED, vitals were T: 98F, HR 91 /min, /79 mmHg, RR 19, O2 98% on room air. Labs were obtained, revealed Hb  11.2, unknown baseline. BMP, coags, unremarkable. CT Abd/pelv performed which revealed 4.2 cm RUL renal mass with tumor thrombus into bilateral renal veins. Patient started on heparin gtt and was admitted for further evaluation.        Patient was also noted to have numerous subpleural and parenchymal lung nodules in bilateral lung lobes, largest on right 11mm and left 9 mm. However patient has known history of pulmonary nodules, gets yearly CT scans for monitoring given asbestos exposure. Pt underwent IR-guided biopsy of lung nodule 11/19 which was negative for carcinoma, c/w hyalinized +fibrotic tissue, chronic inflammation. PET/CT without evidence of distant mets.        On 11/22, patient underwent right radical nephrectomy, IVC tumor thrombectomy and bovine pericardial patch venoplasty. Large tumor grossly removed from IVC in its entirety, did not extend supra-hepatically. Right renal artery and vein suture ligated. Postoperative, patient was placed in SICU. Mccall discontinued on POD1. She stepped down to telemetry on POD2 and was noted to have tachycardia, for which she was bolused with crystalloid and given 1 U PRBC. CT PE performed was negative. With further pain control tachycardia was resolved.         The remainder of the patient's post-operative course was uncomplicated. Diet was advanced as tolerated and pain was well controlled on medication. On day of discharge, pt deemed stable and ready to return home with plan to follow up as an outpatient.

## 2019-12-02 ENCOUNTER — APPOINTMENT (OUTPATIENT)
Dept: UROLOGY | Facility: CLINIC | Age: 69
End: 2019-12-02
Payer: MEDICARE

## 2019-12-02 VITALS — SYSTOLIC BLOOD PRESSURE: 126 MMHG | HEART RATE: 72 BPM | TEMPERATURE: 97.6 F | DIASTOLIC BLOOD PRESSURE: 75 MMHG

## 2019-12-02 PROCEDURE — 99024 POSTOP FOLLOW-UP VISIT: CPT

## 2019-12-02 NOTE — PHYSICAL EXAM
[General Appearance - Well Developed] : well developed [General Appearance - Well Nourished] : well nourished [Normal Appearance] : normal appearance [Well Groomed] : well groomed [General Appearance - In No Acute Distress] : no acute distress [Abdomen Soft] : soft [Abdomen Tenderness] : non-tender [Costovertebral Angle Tenderness] : no ~M costovertebral angle tenderness [Oriented To Time, Place, And Person] : oriented to person, place, and time [Affect] : the affect was normal [Mood] : the mood was normal [Not Anxious] : not anxious [Normal Station and Gait] : the gait and station were normal for the patient's age [No Focal Deficits] : no focal deficits [FreeTextEntry1] : incision c/d/i. Staples d/c'd

## 2019-12-02 NOTE — ASSESSMENT
[FreeTextEntry1] : 70yo female with [id number] clear cell carcinoma s/p nephrectomy, IVC thrombectomy 11/22/19\par Reviewed final pathology\par Doing well\par Check labs today\par No adjuvant therapy indicated\par F/u 3 months for re-imaging

## 2019-12-02 NOTE — LETTER BODY
[Courtesy Letter:] : I had the pleasure of seeing your patient, [unfilled], in my office today. [Dear  ___] : Dear  [unfilled], [Please see my note below.] : Please see my note below. [Consult Closing:] : Thank you very much for allowing me to participate in the care of this patient.  If you have any questions, please do not hesitate to contact me. [Sincerely,] : Sincerely, [FreeTextEntry2] : Delaney rEazo DO\par [address]\[place], [place], [address] [FreeTextEntry3] : Jose Villafuerte MD\par Urologic Oncology\par Department of Urology\par [facility]\par \par [employer] [facility] \par \par

## 2019-12-02 NOTE — HISTORY OF PRESENT ILLNESS
[None] : no symptoms [FreeTextEntry1] : 68yo female recently discharged from [facility]. 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 She underwent right radical nephrectomy with IVC thrombectomy 11/22. Did well postoperatively. Received 1 unit pRBC before discharge. Discharge hgb was 10.

## 2019-12-02 NOTE — REVIEW OF SYSTEMS
[Negative] : Gastrointestinal [Fever] : no fever [Chills] : no chills [Feeling Poorly] : not feeling poorly [Feeling Tired] : not feeling tired

## 2019-12-03 LAB
ALBUMIN SERPL ELPH-MCNC: 4 G/DL
ALP BLD-CCNC: 106 U/L
ALT SERPL-CCNC: 20 U/L
ANION GAP SERPL CALC-SCNC: 14 MMOL/L
AST SERPL-CCNC: 20 U/L
BASOPHILS # BLD AUTO: 0.08 K/UL
BASOPHILS NFR BLD AUTO: 0.9 %
BILIRUB SERPL-MCNC: 0.2 MG/DL
BUN SERPL-MCNC: 24 MG/DL
CALCIUM SERPL-MCNC: 11 MG/DL
CHLORIDE SERPL-SCNC: 97 MMOL/L
CO2 SERPL-SCNC: 26 MMOL/L
CREAT SERPL-MCNC: 1.08 MG/DL
EOSINOPHIL # BLD AUTO: 0.23 K/UL
EOSINOPHIL NFR BLD AUTO: 2.7 %
GLUCOSE SERPL-MCNC: 183 MG/DL
HCT VFR BLD CALC: 40.2 %
HGB BLD-MCNC: 12.5 G/DL
IMM GRANULOCYTES NFR BLD AUTO: 3.4 %
LYMPHOCYTES # BLD AUTO: 2.09 K/UL
LYMPHOCYTES NFR BLD AUTO: 24.4 %
MAN DIFF?: NORMAL
MCHC RBC-ENTMCNC: 27.7 PG
MCHC RBC-ENTMCNC: 31.1 GM/DL
MCV RBC AUTO: 89.1 FL
MONOCYTES # BLD AUTO: 0.86 K/UL
MONOCYTES NFR BLD AUTO: 10 %
NEUTROPHILS # BLD AUTO: 5.03 K/UL
NEUTROPHILS NFR BLD AUTO: 58.6 %
PLATELET # BLD AUTO: 552 K/UL
POTASSIUM SERPL-SCNC: 6 MMOL/L
PROT SERPL-MCNC: 7.4 G/DL
RBC # BLD: 4.51 M/UL
RBC # FLD: 15.8 %
SODIUM SERPL-SCNC: 137 MMOL/L
WBC # FLD AUTO: 8.58 K/UL

## 2019-12-05 DIAGNOSIS — J43.9 EMPHYSEMA, UNSPECIFIED: ICD-10-CM

## 2019-12-05 DIAGNOSIS — E89.2 POSTPROCEDURAL HYPOPARATHYROIDISM: ICD-10-CM

## 2019-12-05 DIAGNOSIS — Z79.84 LONG TERM (CURRENT) USE OF ORAL HYPOGLYCEMIC DRUGS: ICD-10-CM

## 2019-12-05 DIAGNOSIS — R00.0 TACHYCARDIA, UNSPECIFIED: ICD-10-CM

## 2019-12-05 DIAGNOSIS — R91.8 OTHER NONSPECIFIC ABNORMAL FINDING OF LUNG FIELD: ICD-10-CM

## 2019-12-05 DIAGNOSIS — E03.9 HYPOTHYROIDISM, UNSPECIFIED: ICD-10-CM

## 2019-12-05 DIAGNOSIS — I10 ESSENTIAL (PRIMARY) HYPERTENSION: ICD-10-CM

## 2019-12-05 DIAGNOSIS — D64.9 ANEMIA, UNSPECIFIED: ICD-10-CM

## 2019-12-05 DIAGNOSIS — Z87.891 PERSONAL HISTORY OF NICOTINE DEPENDENCE: ICD-10-CM

## 2019-12-05 DIAGNOSIS — C64.1 MALIGNANT NEOPLASM OF RIGHT KIDNEY, EXCEPT RENAL PELVIS: ICD-10-CM

## 2019-12-05 DIAGNOSIS — E78.00 PURE HYPERCHOLESTEROLEMIA, UNSPECIFIED: ICD-10-CM

## 2019-12-05 DIAGNOSIS — K57.90 DIVERTICULOSIS OF INTESTINE, PART UNSPECIFIED, WITHOUT PERFORATION OR ABSCESS WITHOUT BLEEDING: ICD-10-CM

## 2019-12-05 DIAGNOSIS — D63.0 ANEMIA IN NEOPLASTIC DISEASE: ICD-10-CM

## 2019-12-05 DIAGNOSIS — N28.89 OTHER SPECIFIED DISORDERS OF KIDNEY AND URETER: ICD-10-CM

## 2019-12-05 DIAGNOSIS — E78.5 HYPERLIPIDEMIA, UNSPECIFIED: ICD-10-CM

## 2019-12-05 DIAGNOSIS — C79.89 SECONDARY MALIGNANT NEOPLASM OF OTHER SPECIFIED SITES: ICD-10-CM

## 2019-12-05 DIAGNOSIS — E11.9 TYPE 2 DIABETES MELLITUS WITHOUT COMPLICATIONS: ICD-10-CM

## 2019-12-05 DIAGNOSIS — M81.0 AGE-RELATED OSTEOPOROSIS WITHOUT CURRENT PATHOLOGICAL FRACTURE: ICD-10-CM

## 2019-12-06 PROCEDURE — 84443 ASSAY THYROID STIM HORMONE: CPT

## 2019-12-06 PROCEDURE — 74185 MRA ABD W OR W/O CNTRST: CPT

## 2019-12-06 PROCEDURE — 85610 PROTHROMBIN TIME: CPT

## 2019-12-06 PROCEDURE — 32405: CPT

## 2019-12-06 PROCEDURE — 96375 TX/PRO/DX INJ NEW DRUG ADDON: CPT

## 2019-12-06 PROCEDURE — C9399: CPT

## 2019-12-06 PROCEDURE — 96374 THER/PROPH/DIAG INJ IV PUSH: CPT | Mod: XU

## 2019-12-06 PROCEDURE — 97116 GAIT TRAINING THERAPY: CPT

## 2019-12-06 PROCEDURE — 86901 BLOOD TYPING SEROLOGIC RH(D): CPT

## 2019-12-06 PROCEDURE — 82803 BLOOD GASES ANY COMBINATION: CPT

## 2019-12-06 PROCEDURE — C1781: CPT

## 2019-12-06 PROCEDURE — C1894: CPT

## 2019-12-06 PROCEDURE — 83550 IRON BINDING TEST: CPT

## 2019-12-06 PROCEDURE — C1769: CPT

## 2019-12-06 PROCEDURE — 78815 PET IMAGE W/CT SKULL-THIGH: CPT

## 2019-12-06 PROCEDURE — 96361 HYDRATE IV INFUSION ADD-ON: CPT

## 2019-12-06 PROCEDURE — 72198 MR ANGIO PELVIS W/O & W/DYE: CPT

## 2019-12-06 PROCEDURE — 85027 COMPLETE CBC AUTOMATED: CPT

## 2019-12-06 PROCEDURE — 86923 COMPATIBILITY TEST ELECTRIC: CPT

## 2019-12-06 PROCEDURE — 83036 HEMOGLOBIN GLYCOSYLATED A1C: CPT

## 2019-12-06 PROCEDURE — 83605 ASSAY OF LACTIC ACID: CPT

## 2019-12-06 PROCEDURE — A9585: CPT

## 2019-12-06 PROCEDURE — 87075 CULTR BACTERIA EXCEPT BLOOD: CPT

## 2019-12-06 PROCEDURE — 88173 CYTOPATH EVAL FNA REPORT: CPT

## 2019-12-06 PROCEDURE — P9016: CPT

## 2019-12-06 PROCEDURE — 86900 BLOOD TYPING SEROLOGIC ABO: CPT

## 2019-12-06 PROCEDURE — 93005 ELECTROCARDIOGRAM TRACING: CPT

## 2019-12-06 PROCEDURE — 80053 COMPREHEN METABOLIC PANEL: CPT

## 2019-12-06 PROCEDURE — 85730 THROMBOPLASTIN TIME PARTIAL: CPT

## 2019-12-06 PROCEDURE — 86803 HEPATITIS C AB TEST: CPT

## 2019-12-06 PROCEDURE — 88305 TISSUE EXAM BY PATHOLOGIST: CPT

## 2019-12-06 PROCEDURE — 84295 ASSAY OF SERUM SODIUM: CPT

## 2019-12-06 PROCEDURE — 93306 TTE W/DOPPLER COMPLETE: CPT

## 2019-12-06 PROCEDURE — C1889: CPT

## 2019-12-06 PROCEDURE — 97161 PT EVAL LOW COMPLEX 20 MIN: CPT

## 2019-12-06 PROCEDURE — 82668 ASSAY OF ERYTHROPOIETIN: CPT

## 2019-12-06 PROCEDURE — 82962 GLUCOSE BLOOD TEST: CPT

## 2019-12-06 PROCEDURE — 71045 X-RAY EXAM CHEST 1 VIEW: CPT

## 2019-12-06 PROCEDURE — 99285 EMERGENCY DEPT VISIT HI MDM: CPT | Mod: 25

## 2019-12-06 PROCEDURE — 85025 COMPLETE CBC W/AUTO DIFF WBC: CPT

## 2019-12-06 PROCEDURE — 87070 CULTURE OTHR SPECIMN AEROBIC: CPT

## 2019-12-06 PROCEDURE — A9552: CPT

## 2019-12-06 PROCEDURE — 82330 ASSAY OF CALCIUM: CPT

## 2019-12-06 PROCEDURE — 80048 BASIC METABOLIC PNL TOTAL CA: CPT

## 2019-12-06 PROCEDURE — 88304 TISSUE EXAM BY PATHOLOGIST: CPT

## 2019-12-06 PROCEDURE — 82728 ASSAY OF FERRITIN: CPT

## 2019-12-06 PROCEDURE — 82040 ASSAY OF SERUM ALBUMIN: CPT

## 2019-12-06 PROCEDURE — 88307 TISSUE EXAM BY PATHOLOGIST: CPT

## 2019-12-06 PROCEDURE — 87102 FUNGUS ISOLATION CULTURE: CPT

## 2019-12-06 PROCEDURE — 86850 RBC ANTIBODY SCREEN: CPT

## 2019-12-06 PROCEDURE — 83540 ASSAY OF IRON: CPT

## 2019-12-06 PROCEDURE — 97530 THERAPEUTIC ACTIVITIES: CPT

## 2019-12-06 PROCEDURE — 74177 CT ABD & PELVIS W/CONTRAST: CPT

## 2019-12-06 PROCEDURE — 94640 AIRWAY INHALATION TREATMENT: CPT

## 2019-12-06 PROCEDURE — 36415 COLL VENOUS BLD VENIPUNCTURE: CPT

## 2019-12-06 PROCEDURE — 87086 URINE CULTURE/COLONY COUNT: CPT

## 2019-12-06 PROCEDURE — 93970 EXTREMITY STUDY: CPT

## 2019-12-06 PROCEDURE — 83735 ASSAY OF MAGNESIUM: CPT

## 2019-12-06 PROCEDURE — 71275 CT ANGIOGRAPHY CHEST: CPT

## 2019-12-06 PROCEDURE — 77012 CT SCAN FOR NEEDLE BIOPSY: CPT

## 2019-12-06 PROCEDURE — 71260 CT THORAX DX C+: CPT

## 2019-12-06 PROCEDURE — 84132 ASSAY OF SERUM POTASSIUM: CPT

## 2019-12-06 PROCEDURE — 83690 ASSAY OF LIPASE: CPT

## 2019-12-06 PROCEDURE — 84466 ASSAY OF TRANSFERRIN: CPT

## 2019-12-06 PROCEDURE — 84100 ASSAY OF PHOSPHORUS: CPT

## 2019-12-06 PROCEDURE — 36430 TRANSFUSION BLD/BLD COMPNT: CPT

## 2019-12-06 PROCEDURE — 81001 URINALYSIS AUTO W/SCOPE: CPT

## 2019-12-09 ENCOUNTER — EMERGENCY (EMERGENCY)
Facility: HOSPITAL | Age: 69
LOS: 1 days | Discharge: HOME | End: 2019-12-09
Attending: EMERGENCY MEDICINE | Admitting: EMERGENCY MEDICINE
Payer: MEDICARE

## 2019-12-09 VITALS
DIASTOLIC BLOOD PRESSURE: 82 MMHG | SYSTOLIC BLOOD PRESSURE: 139 MMHG | WEIGHT: 116.62 LBS | RESPIRATION RATE: 16 BRPM | HEART RATE: 80 BPM | OXYGEN SATURATION: 98 % | HEIGHT: 62 IN | TEMPERATURE: 98 F

## 2019-12-09 VITALS
RESPIRATION RATE: 18 BRPM | DIASTOLIC BLOOD PRESSURE: 77 MMHG | TEMPERATURE: 98 F | HEART RATE: 68 BPM | OXYGEN SATURATION: 99 % | SYSTOLIC BLOOD PRESSURE: 135 MMHG

## 2019-12-09 DIAGNOSIS — R07.89 OTHER CHEST PAIN: ICD-10-CM

## 2019-12-09 LAB — TROPONIN T SERPL-MCNC: <0.01 NG/ML — SIGNIFICANT CHANGE UP (ref 0–0.01)

## 2019-12-09 PROCEDURE — 81003 URINALYSIS AUTO W/O SCOPE: CPT

## 2019-12-09 PROCEDURE — 99285 EMERGENCY DEPT VISIT HI MDM: CPT

## 2019-12-09 PROCEDURE — 74174 CTA ABD&PLVS W/CONTRAST: CPT

## 2019-12-09 PROCEDURE — 80053 COMPREHEN METABOLIC PANEL: CPT

## 2019-12-09 PROCEDURE — 87086 URINE CULTURE/COLONY COUNT: CPT

## 2019-12-09 PROCEDURE — 71046 X-RAY EXAM CHEST 2 VIEWS: CPT | Mod: 26

## 2019-12-09 PROCEDURE — 71275 CT ANGIOGRAPHY CHEST: CPT | Mod: 26

## 2019-12-09 PROCEDURE — 74174 CTA ABD&PLVS W/CONTRAST: CPT | Mod: 26

## 2019-12-09 PROCEDURE — 71046 X-RAY EXAM CHEST 2 VIEWS: CPT

## 2019-12-09 PROCEDURE — 93005 ELECTROCARDIOGRAM TRACING: CPT

## 2019-12-09 PROCEDURE — 85610 PROTHROMBIN TIME: CPT

## 2019-12-09 PROCEDURE — 36415 COLL VENOUS BLD VENIPUNCTURE: CPT

## 2019-12-09 PROCEDURE — 71275 CT ANGIOGRAPHY CHEST: CPT

## 2019-12-09 PROCEDURE — 93010 ELECTROCARDIOGRAM REPORT: CPT

## 2019-12-09 PROCEDURE — 99284 EMERGENCY DEPT VISIT MOD MDM: CPT | Mod: 25

## 2019-12-09 PROCEDURE — 84484 ASSAY OF TROPONIN QUANT: CPT

## 2019-12-09 PROCEDURE — 96360 HYDRATION IV INFUSION INIT: CPT

## 2019-12-09 PROCEDURE — 85025 COMPLETE CBC W/AUTO DIFF WBC: CPT

## 2019-12-09 RX ORDER — SODIUM CHLORIDE 9 MG/ML
1000 INJECTION INTRAMUSCULAR; INTRAVENOUS; SUBCUTANEOUS ONCE
Refills: 0 | Status: COMPLETED | OUTPATIENT
Start: 2019-12-09 | End: 2019-12-09

## 2019-12-09 RX ADMIN — SODIUM CHLORIDE 1000 MILLILITER(S): 9 INJECTION INTRAMUSCULAR; INTRAVENOUS; SUBCUTANEOUS at 14:22

## 2019-12-09 RX ADMIN — SODIUM CHLORIDE 1000 MILLILITER(S): 9 INJECTION INTRAMUSCULAR; INTRAVENOUS; SUBCUTANEOUS at 12:59

## 2019-12-09 NOTE — ED PROVIDER NOTE - PATIENT PORTAL LINK FT
You can access the FollowMyHealth Patient Portal offered by [facility] by registering at the following website: [email or web address]. By joining [employer]’s FollowMyHealth portal, you will also be able to view your health information using other applications (apps) compatible with our system.

## 2019-12-09 NOTE — ED ADULT NURSE NOTE - OBJECTIVE STATEMENT
pt c/o of chest pain around 0100am this morning. pt denies chest pain upon assessment at this time, just c/o of mild discomfort in her chest that radiate to the upper back. pt denies dizziness, sob or other symptoms. pt in NAD.

## 2019-12-09 NOTE — ED PROVIDER NOTE - NSFOLLOWUPINSTRUCTIONS_ED_ALL_ED_FT
Please see your primary care provider in 2-3 days.  Call for appointment.  If you have any problems with followup, please call the ED Referral Coordinator at [phone].  Return to the ER if symptoms worsen or other concerns.    Nonspecific Chest Pain  Chest pain can be caused by many different conditions. There is always a chance that your pain could be related to something serious, such as a heart attack or a blood clot in your lungs. Chest pain can also be caused by conditions that are not life-threatening. If you have chest pain, it is very important to follow up with your health care provider.    What are the causes?  Causes of this condition include:    Heartburn.  Pneumonia or bronchitis.  Anxiety or stress.  Inflammation around your heart (pericarditis) or lung (pleuritis or pleurisy).  A blood clot in your lung.  A collapsed lung (pneumothorax). This can develop suddenly on its own (spontaneous pneumothorax) or from trauma to the chest.  Shingles infection (varicella-zoster virus).  Heart attack.  Damage to the bones, muscles, and cartilage that make up your chest wall. This can include:    Bruised bones due to injury.  Strained muscles or cartilage due to frequent or repeated coughing or overwork.  Fracture to one or more ribs.  Sore cartilage due to inflammation (costochondritis).      What increases the risk?  Risk factors for this condition may include:    Activities that increase your risk for trauma or injury to your chest.  Respiratory infections or conditions that cause frequent coughing.  Medical conditions or overeating that can cause heartburn.  Heart disease or family history of heart disease.  Conditions or health behaviors that increase your risk of developing a blood clot.  Having had chicken pox (varicella zoster).    What are the signs or symptoms?  Chest pain can feel like:    Burning or tingling on the surface of your chest or deep in your chest.  Crushing, pressure, aching, or squeezing pain.  Dull or sharp pain that is worse when you move, cough, or take a deep breath.  Pain that is also felt in your back, neck, shoulder, or arm, or pain that spreads to any of these areas.    Your chest pain may come and go, or it may stay constant.    How is this diagnosed?  Lab tests or other studies may be needed to find the cause of your pain. Your health care provider may have you take a test called an ECG (electrocardiogram). An ECG records your heartbeat patterns at the time the test is performed. You may also have other tests, such as:    Transthoracic echocardiogram (TTE). In this test, sound waves are used to create a picture of the heart structures and to look at how blood flows through your heart.  Transesophageal echocardiogram (SHIRLEY). This is a more advanced imaging test that takes images from inside your body. It allows your health care provider to see your heart in finer detail.  Cardiac monitoring. This allows your health care provider to monitor your heart rate and rhythm in real time.  Holter monitor. This is a portable device that records your heartbeat and can help to diagnose abnormal heartbeats. It allows your health care provider to track your heart activity for several days, if needed.  Stress tests. These can be done through exercise or by taking medicine that makes your heart beat more quickly.  Blood tests.  Other imaging tests.    How is this treated?  Treatment depends on what is causing your chest pain. Treatment may include:    Medicines. These may include:    Acid blockers for heartburn.  Anti-inflammatory medicine.  Pain medicine for inflammatory conditions.  Antibiotic medicine, if an infection is present.  Medicines to dissolve blood clots.  Medicines to treat coronary artery disease (CAD).    Supportive care for conditions that do not require medicines. This may include:    Resting.  Applying heat or cold packs to injured areas.  Limiting activities until pain decreases.      Follow these instructions at home:  Medicines     If you were prescribed an antibiotic, take it as told by your health care provider. Do not stop taking the antibiotic even if you start to feel better.  Take over-the-counter and prescription medicines only as told by your health care provider.  Lifestyle     Do not use any products that contain nicotine or tobacco, such as cigarettes and e-cigarettes. If you need help quitting, ask your health care provider.  Do not drink alcohol.  ImageMake lifestyle changes as directed by your health care provider. These may include:    Getting regular exercise. Ask your health care provider to suggest some activities that are safe for you.  Eating a heart-healthy diet. A registered dietitian can help you to learn healthy eating options.  Maintaining a healthy weight.  Managing diabetes, if necessary.  Reducing stress, such as with yoga or relaxation techniques.    General instructions     Avoid any activities that bring on chest pain.  If heartburn is the cause for your chest pain, raise (elevate) the head of your bed about 6 inches (15 cm) by putting blocks under the legs. Sleeping with more pillows does not effectively relieve heartburn because it only changes the position of your head.  Keep all follow-up visits as told by your health care provider. This is important. This includes any further testing if your chest pain does not go away.  Contact a health care provider if:  Your chest pain does not go away.  You have a rash with blisters on your chest.  You have a fever.  You have chills.  Get help right away if:  Your chest pain is worse.  You have a cough that gets worse, or you cough up blood.  You have severe pain in your abdomen.  You have severe weakness.  You faint.  You have sudden, unexplained chest discomfort.  You have sudden, unexplained discomfort in your arms, back, neck, or jaw.  You have shortness of breath at any time.  You suddenly start to sweat, or your skin gets clammy.  You feel nauseous or you vomit.  You suddenly feel light-headed or dizzy.  Your heart begins to beat quickly, or it feels like it is skipping beats.  These symptoms may represent a serious problem that is an emergency. Do not wait to see if the symptoms will go away. Get medical help right away. Call your local emergency services (911 in the [place] Do not drive yourself to the hospital.     This information is not intended to replace advice given to you by your health care provider. Make sure you discuss any questions you have with your health care provider.

## 2019-12-09 NOTE — ED PROVIDER NOTE - CLINICAL SUMMARY MEDICAL DECISION MAKING FREE TEXT BOX
Currently awaiting CXR. Pt w nephrectomy and patching of IVC due to tumor invasion, presents 2 wks after surgery w new atypical CP, pt also w persistent but improving abd discomfort, discussed w both Urology team and vascular,   Plan to r/o PE w CT scan, also CT abd pelvis to r/o surgical complications, also w venous phase to eval graft,   dispo pending workup.

## 2019-12-09 NOTE — ED ADULT TRIAGE NOTE - CHIEF COMPLAINT QUOTE
pt c/o chest pain that started at 1am while sleeping, pain radiates to back. as per pt's family member, " Her pulse was 125. She took Metoprolol 25mg before coming." pt s/p kidney removal 1 week ago.

## 2019-12-09 NOTE — ED ADULT NURSE NOTE - PAIN: PRESENCE, MLM
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    Anesthesia Type:   General     Note:  Airway :ETT  Patient transferred to:PACU  Comments: Pt transferred to PACU intubated, sedated. VSS throughout transport. Upon arrival placed on vent, Suggamadex given, report to RN at bedside. Plan to extubate.Handoff Report: Identifed the Patient, Identified the Reponsible Provider, Reviewed the pertinent medical history, Discussed the surgical course, Reviewed Intra-OP anesthesia mangement and issues during anesthesia, Set expectations for post-procedure period and Allowed opportunity for questions and acknowledgement of understanding      Vitals: (Last set prior to Anesthesia Care Transfer)    CRNA VITALS  8/27/2019 1040 - 8/27/2019 1110      8/27/2019             Resp Rate (observed):  2  (Abnormal)                 Electronically Signed By: JOSIANE Figueroa CRNA  August 27, 2019  11:10 AM  
complains of pain/discomfort

## 2019-12-09 NOTE — ED PROVIDER NOTE - PHYSICAL EXAMINATION
VITAL SIGNS: I have reviewed nursing notes and confirm.  CONSTITUTIONAL: Well-developed; well-nourished; in no acute distress.   SKIN:  warm and dry, no acute rash.   HEAD:  normocephalic, atraumatic.  EYES: EOM intact; conjunctiva and sclera clear.  ENT: No nasal discharge; airway clear.   NECK: Supple; non tender.  CARD: Regular rate and rhythm.   RESP:  Clear to auscultation b/l, no wheezes, rales or rhonchi.  ABD: Well appearing large horizontal abdominal lac incision site. Minimal diffuse abdominal tenderness with normal bowel sounds. ( Pt reports abdominal tenderness improving since surgery)   EXT: Normal ROM. No clubbing, cyanosis or edema. 2+ pulses to b/l ue/le.  NEURO: Alert, oriented, grossly unremarkable  PSYCH: Cooperative, mood and affect appropriate. VITAL SIGNS: I have reviewed nursing notes and confirm.  CONSTITUTIONAL: Well-developed; well-nourished; in no acute distress.   SKIN:  warm and dry, no acute rash.   HEAD:  normocephalic, atraumatic.  EYES: EOM intact; conjunctiva and sclera clear.  ENT: No nasal discharge; airway clear.   NECK: Supple; non tender.  CARD: Regular rate and rhythm.   RESP:  Clear to auscultation b/l, no wheezes, rales or rhonchi.  ABD: Well appearing large horizontal abdominal lap incision site. Minimal diffuse abdominal tenderness with normal bowel sounds. ( Pt reports abdominal tenderness improving since surgery)   EXT: Normal ROM. No clubbing, cyanosis or edema. 2+ pulses to b/l ue/le.  NEURO: Alert, oriented, grossly unremarkable  PSYCH: Cooperative, mood and affect appropriate.

## 2019-12-09 NOTE — ED ADULT NURSE NOTE - NSIMPLEMENTINTERV_GEN_ALL_ED
Implemented All Universal Safety Interventions:  [place] to call system. Call bell, personal items and telephone within reach. Instruct patient to call for assistance. Room bathroom lighting operational. Non-slip footwear when patient is off stretcher. Physically safe environment: no spills, clutter or unnecessary equipment. Stretcher in lowest position, wheels locked, appropriate side rails in place.

## 2019-12-09 NOTE — CONSULT NOTE ADULT - SUBJECTIVE AND OBJECTIVE BOX
HPI:    Vascular: Patient seen and examined at bedside - breathing comfortably on RA, patient is accompanied with daughter who states that at 1am this morning the patient was woken up by palpatations, generalized mediastinal chest pain radiating into the back, constant in nature that was worse with inspiration. Denies dizziness or lightheadedness. Patient complaints of mild abdominal incisional pain denying N/V, patient has been eating well and having normal BM. Denies fever/chills.         PAST MEDICAL & SURGICAL HISTORY:  Diverticulosis  Hypothyroid  High cholesterol  HTN (hypertension)  Diabetes      ROS: See HPI    MEDICATIONS  (STANDING):    MEDICATIONS  (PRN):      Allergies    No Known Allergies    Intolerances        SOCIAL HISTORY:  Smoke: Never Smoker  EtOH: occasional    FAMILY HISTORY:      Vital Signs Last 24 Hrs  T(C): 36.6 (09 Dec 2019 08:44), Max: 36.6 (09 Dec 2019 08:44)  T(F): 97.8 (09 Dec 2019 08:44), Max: 97.8 (09 Dec 2019 08:44)  HR: 77 (09 Dec 2019 10:42) (77 - 80)  BP: 120/77 (09 Dec 2019 10:42) (120/77 - 139/82)  BP(mean): --  RR: 18 (09 Dec 2019 10:42) (16 - 18)  SpO2: 99% (09 Dec 2019 10:42) (98% - 99%)    PHYSICAL EXAM  Exam:   Neuro: AOX3, calm, NAD.   Gen: WD, WN, appropriately groomed.    HEENT: AT, NC  Neck: No JVD, Normal thyroid, NO carotid bruits bilaterally.   Res: Nml BS, breathing with normal inspiratory effort.   CV: Normal HS, RRR, no MRG  Pulses:                     R:      L:   Femoral: 2+ / 2+  Popliteal: 2+ / 2+  Post Tib: 2+ / 2+  D. Pedis 2+ / 2+  Ankle Edema: No  Varicose Veins: No  Venous Stasis: No  Wounds: abdominal chevron incision CDI with steri strips in place, no surrounding erythema or drainage.   Abd: soft, ND, ATTP, no masses or organomegaly appreciated  Musculoskeletal: sensation grossly intact, strength 5/5, FROM bilaterally     LABS:                        12.6   12.57 )-----------( 579      ( 09 Dec 2019 09:11 )             41.3     12-09    136  |  101  |  15  ----------------------------<  178<H>  5.6<H>   |  25  |  1.16    Ca    10.4      09 Dec 2019 09:11    TPro  8.3  /  Alb  4.3  /  TBili  0.4  /  DBili  x   /  AST  18  /  ALT  14  /  AlkPhos  92  12-09    PT/INR - ( 09 Dec 2019 09:11 )   PT: 11.2 sec;   INR: 0.99            Urinalysis Basic - ( 09 Dec 2019 09:11 )    Color: Yellow / Appearance: Clear / SG: <=1.005 / pH: x  Gluc: x / Ketone: NEGATIVE  / Bili: Negative / Urobili: 0.2 E.U./dL   Blood: x / Protein: NEGATIVE mg/dL / Nitrite: NEGATIVE   Leuk Esterase: NEGATIVE / RBC: x / WBC x   Sq Epi: x / Non Sq Epi: x / Bacteria: x        RADIOLOGY & ADDITIONAL STUDIES:    Assessment and Plan:  69yFemale s/p R nephrectomy, IVC tumor thrombectomy with bovine patch venoplasty (11/22/19) here in the ED c/o chest pain radiating to the back since 1am.     - Patients family state that last night patients HR was 125, in the ED when patient presented she was HD stable 139/82 with HR 80. She was afebrile, satting well on RA, breathing comfortably.   - Leukocytosis 12.57, H/H stable from last admission, K5.6 Cr nml 1.16, trop negative with no acute findings on EKG.   - in light of patients clinical presentation with isolated leukocytosis and no clear source of infection (CXR nml, UA neg) there is a concern for possible PE.   - vascular recommend CTA Chest PE protocol with CTA delayed phase to visualize IVC now s/p 15 days post reconstruction with patch venoplasty.   - discussed with chief resident and vascular attending. HPI: 70 y/o Jordanian speaking F with a PMHx of HTN, DM, HLD, hypothyroidism, diverticulosis, COPD, emphysema, with recent lung biopsy on 11/19/19 which was negative for ca. On 11/22/19 pt underwent right radical nephrectomy, and IVC tumor thrombectomy with bovine pericardial patch venoplasty. She had PE protocol on 11/24/19. As of CT on 11/21/19, pt has a right renal mass extending into the right renal vein and adjacent IVC. She presents to the ED with c/o chest pain.    Vascular: Patient seen and examined at bedside - breathing comfortably on RA, patient is accompanied with daughter who states that at 1am this morning the patient was woken up by palpatations, generalized mediastinal chest pain radiating into the back, constant in nature that was worse with inspiration. Denies dizziness or lightheadedness. Patient complaints of mild abdominal incisional pain denying N/V, patient has been eating well and having normal BM. Denies fever/chills.         PAST MEDICAL & SURGICAL HISTORY:  Diverticulosis  Hypothyroid  High cholesterol  HTN (hypertension)  Diabetes      ROS: See HPI    MEDICATIONS  (STANDING):    MEDICATIONS  (PRN):      Allergies    No Known Allergies    Intolerances        SOCIAL HISTORY:  Smoke: Never Smoker  EtOH: occasional    FAMILY HISTORY:      Vital Signs Last 24 Hrs  T(C): 36.6 (09 Dec 2019 08:44), Max: 36.6 (09 Dec 2019 08:44)  T(F): 97.8 (09 Dec 2019 08:44), Max: 97.8 (09 Dec 2019 08:44)  HR: 77 (09 Dec 2019 10:42) (77 - 80)  BP: 120/77 (09 Dec 2019 10:42) (120/77 - 139/82)  BP(mean): --  RR: 18 (09 Dec 2019 10:42) (16 - 18)  SpO2: 99% (09 Dec 2019 10:42) (98% - 99%)    PHYSICAL EXAM  Exam:   Neuro: AOX3, calm, NAD.   Gen: WD, WN, appropriately groomed.    HEENT: AT, NC  Neck: No JVD, Normal thyroid, NO carotid bruits bilaterally.   Res: Nml BS, breathing with normal inspiratory effort.   CV: Normal HS, RRR, no MRG  Pulses:                     R:      L:   Femoral: 2+ / 2+  Popliteal: 2+ / 2+  Post Tib: 2+ / 2+  D. Pedis 2+ / 2+  Ankle Edema: No  Varicose Veins: No  Venous Stasis: No  Wounds: abdominal chevron incision CDI with steri strips in place, no surrounding erythema or drainage.   Abd: soft, ND, ATTP, no masses or organomegaly appreciated  Musculoskeletal: sensation grossly intact, strength 5/5, FROM bilaterally     LABS:                        12.6   12.57 )-----------( 579      ( 09 Dec 2019 09:11 )             41.3     12-09    136  |  101  |  15  ----------------------------<  178<H>  5.6<H>   |  25  |  1.16    Ca    10.4      09 Dec 2019 09:11    TPro  8.3  /  Alb  4.3  /  TBili  0.4  /  DBili  x   /  AST  18  /  ALT  14  /  AlkPhos  92  12-09    PT/INR - ( 09 Dec 2019 09:11 )   PT: 11.2 sec;   INR: 0.99            Urinalysis Basic - ( 09 Dec 2019 09:11 )    Color: Yellow / Appearance: Clear / SG: <=1.005 / pH: x  Gluc: x / Ketone: NEGATIVE  / Bili: Negative / Urobili: 0.2 E.U./dL   Blood: x / Protein: NEGATIVE mg/dL / Nitrite: NEGATIVE   Leuk Esterase: NEGATIVE / RBC: x / WBC x   Sq Epi: x / Non Sq Epi: x / Bacteria: x        RADIOLOGY & ADDITIONAL STUDIES:    Assessment and Plan:  69yFemale s/p R nephrectomy, IVC tumor thrombectomy with bovine patch venoplasty (11/22/19) here in the ED c/o chest pain radiating to the back since 1am.     - Patients family state that last night patients HR was 125, in the ED when patient presented she was HD stable 139/82 with HR 80. She was afebrile, satting well on RA, breathing comfortably.   - Leukocytosis 12.57, H/H stable from last admission, K5.6 Cr nml 1.16, trop negative with no acute findings on EKG.   - in light of patients clinical presentation with isolated leukocytosis and no clear source of infection (CXR nml, UA neg) there is a concern for possible PE.   - vascular recommend CTA Chest PE protocol with CTA delayed phase to visualize IVC now s/p 15 days post reconstruction with patch venoplasty.   - discussed with chief resident and vascular attending.

## 2019-12-09 NOTE — CONSULT NOTE ADULT - SUBJECTIVE AND OBJECTIVE BOX
HPI: 70 y/o Iranian speaking F with a PMHx of HTN, DM, HLD, hypothyroidism, diverticulosis, COPD, emphysema, with recent lung biopsy on 11/19/19 which was negative for ca. On 11/22/19 pt underwent right radical nephrectomy, and IVC tumor thrombectomy with bovine pericardial patch venoplasty. She had PE protocol on 11/24/19. As of CT on 11/21/19, pt has a right renal mass extending into the right renal vein and adjacent IVC. She presents to the ED with c/o chest pain radiating to mid upper back , worse w movement and deep inspiration, no cough, no fevers, abd discomfort diffuse but improving since surgery.  Voiding well. +BM's.       PAST MEDICAL & SURGICAL HISTORY:  Diverticulosis  Hypothyroid  High cholesterol  HTN (hypertension)  Diabetes      MEDICATIONS  (STANDING):    MEDICATIONS  (PRN):      Allergies    No Known Allergies    Intolerances        SOCIAL HISTORY:    FAMILY HISTORY:      Vital Signs Last 24 Hrs  T(C): 36.7 (09 Dec 2019 14:13), Max: 36.7 (09 Dec 2019 14:13)  T(F): 98 (09 Dec 2019 14:13), Max: 98 (09 Dec 2019 14:13)  HR: 68 (09 Dec 2019 14:13) (68 - 80)  BP: 135/77 (09 Dec 2019 14:13) (120/77 - 139/82)  BP(mean): --  RR: 18 (09 Dec 2019 14:13) (16 - 18)  SpO2: 99% (09 Dec 2019 14:13) (98% - 99%)    On PE:  General: alert and awake  Abdomen: soft, incision site RUQ/epigastric area healing well    : no SP discomfort, no CVAT    EXT: no calf tenderness    LABS:                        12.6   12.57 )-----------( 579      ( 09 Dec 2019 09:11 )             41.3     12-09    136  |  101  |  15  ----------------------------<  178<H>  5.6<H>   |  25  |  1.16    Ca    10.4      09 Dec 2019 09:11    TPro  8.3  /  Alb  4.3  /  TBili  0.4  /  DBili  x   /  AST  18  /  ALT  14  /  AlkPhos  92  12-09    PT/INR - ( 09 Dec 2019 09:11 )   PT: 11.2 sec;   INR: 0.99            Urinalysis Basic - ( 09 Dec 2019 09:11 )    Color: Yellow / Appearance: Clear / SG: <=1.005 / pH: x  Gluc: x / Ketone: NEGATIVE  / Bili: Negative / Urobili: 0.2 E.U./dL   Blood: x / Protein: NEGATIVE mg/dL / Nitrite: NEGATIVE   Leuk Esterase: NEGATIVE / RBC: x / WBC x   Sq Epi: x / Non Sq Epi: x / Bacteria: x        RADIOLOGY & ADDITIONAL STUDIES:< from: CT Angio Abdomen and Pelvis w/ IV Cont (12.09.19 @ 17:02) >  IMPRESSION:     No pulmonary embolism or acute chest findings.    No IVC thrombus or other acute abdominal/pelvic findings. Postsurgical     < end of copied text >

## 2019-12-09 NOTE — ED PROVIDER NOTE - OBJECTIVE STATEMENT
68 y/o Syriac speaking F with a PMHx of HTN, DM, HLD, hypothyroidism, diverticulosis, COPD, emphysema, with recent lung biopsy on 11/19/19 which was negative for ca. On 11/22/19 pt underwent right radical nephrectomy, and IVC tumor thrombectomy with bovine pericardial patch venoplasty. She had PE protocol on 11/24/19. As of CT on 11/21/19, pt has a right renal mass extending into the right renal vein and adjacent IVC. She presents to the ED with c/o chest pain. 68 y/o Arabic speaking F with a PMHx of HTN, DM, HLD, hypothyroidism, diverticulosis, COPD, emphysema, with recent lung biopsy on 11/19/19 which was negative for ca. On 11/22/19 pt underwent right radical nephrectomy, and IVC tumor thrombectomy with bovine pericardial patch venoplasty. She had PE protocol on 11/24/19. As of CT on 11/21/19, pt has a right renal mass extending into the right renal vein and adjacent IVC. She presents to the ED with c/o chest pain radiating to mid upper back , worse w movement and deep inspiration, no cough, no fevers, abd discomfort diffuse but improving since surgery.

## 2019-12-09 NOTE — ED ADULT NURSE NOTE - CAS EDN DISCHARGE ASSESSMENT
53 yo s/p meningioma resection (06/2017) presents with worsening AMS concerning for metabolic or infectious encephalopathy:   -would stop vanco given lack of MRSA on culture data  -continue cefepime for possible aspiration  -would treat x 7 days (stop date 7/24)  - continue acyclovir until HSV PCR negative (called lab and [facility] may have cancelled the test, will discuss with send out lab on Monday)  -afebrile, improving leukocytosis  -LP consistent with viral infection     Alert and oriented to person, place and time/Patient baseline mental status

## 2019-12-09 NOTE — CONSULT NOTE ADULT - ASSESSMENT
68 yo female s/p R neprhectomy, IVC thrombectomy and bovine pericardial patch venoplasty with pleuritic chest pain, resolved. No evidence PE.

## 2019-12-11 LAB
CULTURE RESULTS: NO GROWTH — SIGNIFICANT CHANGE UP
SPECIMEN SOURCE: SIGNIFICANT CHANGE UP

## 2019-12-18 DIAGNOSIS — E11.9 TYPE 2 DIABETES MELLITUS WITHOUT COMPLICATIONS: ICD-10-CM

## 2019-12-18 DIAGNOSIS — I10 ESSENTIAL (PRIMARY) HYPERTENSION: ICD-10-CM

## 2019-12-18 DIAGNOSIS — Z79.899 OTHER LONG TERM (CURRENT) DRUG THERAPY: ICD-10-CM

## 2019-12-18 DIAGNOSIS — R07.89 OTHER CHEST PAIN: ICD-10-CM

## 2019-12-18 DIAGNOSIS — Z79.84 LONG TERM (CURRENT) USE OF ORAL HYPOGLYCEMIC DRUGS: ICD-10-CM

## 2019-12-18 DIAGNOSIS — E78.5 HYPERLIPIDEMIA, UNSPECIFIED: ICD-10-CM

## 2019-12-18 LAB
CULTURE RESULTS: SIGNIFICANT CHANGE UP
SPECIMEN SOURCE: SIGNIFICANT CHANGE UP

## 2020-01-24 ENCOUNTER — EMERGENCY (EMERGENCY)
Facility: HOSPITAL | Age: 70
LOS: 1 days | Discharge: HOME | End: 2020-01-24
Admitting: EMERGENCY MEDICINE
Payer: MEDICARE

## 2020-01-24 VITALS
DIASTOLIC BLOOD PRESSURE: 84 MMHG | TEMPERATURE: 98 F | SYSTOLIC BLOOD PRESSURE: 148 MMHG | RESPIRATION RATE: 18 BRPM | HEART RATE: 83 BPM | OXYGEN SATURATION: 100 %

## 2020-01-24 LAB
ALBUMIN SERPL ELPH-MCNC: 4.7 G/DL — SIGNIFICANT CHANGE UP (ref 3.3–5)
ALP SERPL-CCNC: 74 U/L — SIGNIFICANT CHANGE UP (ref 40–120)
ALT FLD-CCNC: 13 U/L — SIGNIFICANT CHANGE UP (ref 10–45)
ANION GAP SERPL CALC-SCNC: 14 MMOL/L — SIGNIFICANT CHANGE UP (ref 5–17)
APTT BLD: 33.3 SEC — SIGNIFICANT CHANGE UP (ref 27.5–36.3)
AST SERPL-CCNC: 14 U/L — SIGNIFICANT CHANGE UP (ref 10–40)
BASOPHILS # BLD AUTO: 0.05 K/UL — SIGNIFICANT CHANGE UP (ref 0–0.2)
BASOPHILS NFR BLD AUTO: 0.6 % — SIGNIFICANT CHANGE UP (ref 0–2)
BILIRUB SERPL-MCNC: 0.2 MG/DL — SIGNIFICANT CHANGE UP (ref 0.2–1.2)
BUN SERPL-MCNC: 28 MG/DL — HIGH (ref 7–23)
CALCIUM SERPL-MCNC: 11.1 MG/DL — HIGH (ref 8.4–10.5)
CHLORIDE SERPL-SCNC: 101 MMOL/L — SIGNIFICANT CHANGE UP (ref 96–108)
CO2 SERPL-SCNC: 25 MMOL/L — SIGNIFICANT CHANGE UP (ref 22–31)
CREAT SERPL-MCNC: 1.05 MG/DL — SIGNIFICANT CHANGE UP (ref 0.5–1.3)
EOSINOPHIL # BLD AUTO: 0.65 K/UL — HIGH (ref 0–0.5)
EOSINOPHIL NFR BLD AUTO: 7.3 % — HIGH (ref 0–6)
GLUCOSE SERPL-MCNC: 138 MG/DL — HIGH (ref 70–99)
HCT VFR BLD CALC: 39.1 % — SIGNIFICANT CHANGE UP (ref 34.5–45)
HGB BLD-MCNC: 12.5 G/DL — SIGNIFICANT CHANGE UP (ref 11.5–15.5)
IMM GRANULOCYTES NFR BLD AUTO: 0.4 % — SIGNIFICANT CHANGE UP (ref 0–1.5)
INR BLD: 0.93 — SIGNIFICANT CHANGE UP (ref 0.88–1.16)
LYMPHOCYTES # BLD AUTO: 3.26 K/UL — SIGNIFICANT CHANGE UP (ref 1–3.3)
LYMPHOCYTES # BLD AUTO: 36.5 % — SIGNIFICANT CHANGE UP (ref 13–44)
MAGNESIUM SERPL-MCNC: 2.1 MG/DL — SIGNIFICANT CHANGE UP (ref 1.6–2.6)
MCHC RBC-ENTMCNC: 27.5 PG — SIGNIFICANT CHANGE UP (ref 27–34)
MCHC RBC-ENTMCNC: 32 GM/DL — SIGNIFICANT CHANGE UP (ref 32–36)
MCV RBC AUTO: 85.9 FL — SIGNIFICANT CHANGE UP (ref 80–100)
MONOCYTES # BLD AUTO: 0.77 K/UL — SIGNIFICANT CHANGE UP (ref 0–0.9)
MONOCYTES NFR BLD AUTO: 8.6 % — SIGNIFICANT CHANGE UP (ref 2–14)
NEUTROPHILS # BLD AUTO: 4.15 K/UL — SIGNIFICANT CHANGE UP (ref 1.8–7.4)
NEUTROPHILS NFR BLD AUTO: 46.6 % — SIGNIFICANT CHANGE UP (ref 43–77)
NRBC # BLD: 0 /100 WBCS — SIGNIFICANT CHANGE UP (ref 0–0)
PLATELET # BLD AUTO: 315 K/UL — SIGNIFICANT CHANGE UP (ref 150–400)
POTASSIUM SERPL-MCNC: 4.9 MMOL/L — SIGNIFICANT CHANGE UP (ref 3.5–5.3)
POTASSIUM SERPL-SCNC: 4.9 MMOL/L — SIGNIFICANT CHANGE UP (ref 3.5–5.3)
PROT SERPL-MCNC: 7.7 G/DL — SIGNIFICANT CHANGE UP (ref 6–8.3)
PROTHROM AB SERPL-ACNC: 10.6 SEC — SIGNIFICANT CHANGE UP (ref 10–12.9)
RBC # BLD: 4.55 M/UL — SIGNIFICANT CHANGE UP (ref 3.8–5.2)
RBC # FLD: 14.3 % — SIGNIFICANT CHANGE UP (ref 10.3–14.5)
SODIUM SERPL-SCNC: 140 MMOL/L — SIGNIFICANT CHANGE UP (ref 135–145)
WBC # BLD: 8.92 K/UL — SIGNIFICANT CHANGE UP (ref 3.8–10.5)
WBC # FLD AUTO: 8.92 K/UL — SIGNIFICANT CHANGE UP (ref 3.8–10.5)

## 2020-01-24 PROCEDURE — 80053 COMPREHEN METABOLIC PANEL: CPT

## 2020-01-24 PROCEDURE — 93971 EXTREMITY STUDY: CPT

## 2020-01-24 PROCEDURE — 93971 EXTREMITY STUDY: CPT | Mod: 26,RT

## 2020-01-24 PROCEDURE — 85610 PROTHROMBIN TIME: CPT

## 2020-01-24 PROCEDURE — 85025 COMPLETE CBC W/AUTO DIFF WBC: CPT

## 2020-01-24 PROCEDURE — 36415 COLL VENOUS BLD VENIPUNCTURE: CPT

## 2020-01-24 PROCEDURE — 83735 ASSAY OF MAGNESIUM: CPT

## 2020-01-24 PROCEDURE — 85730 THROMBOPLASTIN TIME PARTIAL: CPT

## 2020-01-24 PROCEDURE — 99284 EMERGENCY DEPT VISIT MOD MDM: CPT | Mod: 25

## 2020-01-24 PROCEDURE — 99284 EMERGENCY DEPT VISIT MOD MDM: CPT

## 2020-01-24 NOTE — ED ADULT NURSE NOTE - CHPI ED NUR SYMPTOMS NEG
no deformity/no tingling/no numbness/no difficulty bearing weight/no weakness/no fever/no bruising/no back pain/no abrasion

## 2020-01-24 NOTE — ED PROVIDER NOTE - PHYSICAL EXAMINATION
CONSTITUTIONAL: Well-appearing; well-nourished; in no apparent distress.   HEAD: Normocephalic; atraumatic.   EYES: PERRL; EOM intact; conjunctiva and sclera clear  ENT: normal nose; no rhinorrhea; normal pharynx with no erythema or lesions.   NECK: Supple; non-tender; no LAD  CARDIOVASCULAR: Normal S1, S2; no murmurs, rubs, or gallops. Regular rate and rhythm.   RESPIRATORY: Breathing easily; breath sounds clear and equal bilaterally; no wheezes, rhonchi, or rales.  GI: Soft; non-distended; non-tender; no palpable organomegaly.   MSK: FROM at all extremities, normal tone. R leg no swelling, erythema, warmth, calf tenderness. FROM at hip and knee   EXT: No cyanosis or edema; N/V intact  SKIN: Normal for age and race; warm; dry; good turgor; no apparent lesions or rash.   NEURO: A & O x 3; face symmetric; grossly unremarkable.   PSYCHOLOGICAL: The patient’s mood and manner are appropriate.

## 2020-01-24 NOTE — ED PROVIDER NOTE - OBJECTIVE STATEMENT
70 y/o F with pmh of HTN, DM, HLD, hypothyroidism, diverticulosis, COPD, emphysema, s/p right radical nephrectomy, and IVC tumor thrombectomy with bovine pericardial patch venoplasty c/o intermittent R leg stiffness x 2 days. Pt feels stiffness in groin going down her leg. Usually happens with walking and improves with rest. Associated with some mild pain. No pain now. Denies fever, chills, abd pain, n/v, numbness, tingling, back pain, incontinence, cp, sob. Family concerned about DVT.

## 2020-01-24 NOTE — ED ADULT TRIAGE NOTE - MEANS OF ARRIVAL
ambulatory
Airway patent, Nasal mucosa clear. Mouth with normal mucosa. Throat has no vesicles, no oropharyngeal exudates and uvula is midline.

## 2020-01-24 NOTE — ED ADULT NURSE NOTE - OBJECTIVE STATEMENT
pt received sitting in chair, A&O x 3. PMH of HTN, DM, HLD, hypothyroidism, diverticulosis, COPD, emphysema, s/p right radical nephrectomy, and IVC tumor thrombectomy with bovine pericardial patch venoplasty. Pt arrived to ED with c/o intermittent R leg stiffness x 2 days. Pt feels stiffness in groin going down her leg. Usually happens with walking and improves with rest. Associated with some mild pain. No pain now. Denies fever, chills, abd pain, n/v, numbness, tingling, back pain, incontinence, cp, sob. Family concerned about DVT. NAD noted, will continue to monitor.

## 2020-01-24 NOTE — ED PROVIDER NOTE - CLINICAL SUMMARY MEDICAL DECISION MAKING FREE TEXT BOX
68 y/o F with pmh of HTN, DM, HLD, hypothyroidism, diverticulosis, COPD, emphysema, s/p right radical nephrectomy, and IVC tumor thrombectomy with bovine pericardial patch venoplasty c/o intermittent R leg stiffness x 2 days. R leg no swelling, erythema, warmth, calf tenderness. FROM at hip and knee 70 y/o F with pmh of HTN, DM, HLD, hypothyroidism, diverticulosis, COPD, emphysema, s/p right radical nephrectomy, and IVC tumor thrombectomy with bovine pericardial patch venoplasty c/o intermittent R leg stiffness x 2 days. R leg no swelling, erythema, warmth, calf tenderness. FROM at hip and knee. Will check lytes, sono to r/o dvt

## 2020-01-24 NOTE — ED PROVIDER NOTE - PATIENT PORTAL LINK FT
You can access the FollowMyHealth Patient Portal offered by [facility] by registering at the following website: [email or web address]. By joining [employer]’s FollowMyHealth portal, you will also be able to view your health information using other applications (apps) compatible with our system.

## 2020-01-30 DIAGNOSIS — M79.661 PAIN IN RIGHT LOWER LEG: ICD-10-CM

## 2020-01-30 DIAGNOSIS — E03.9 HYPOTHYROIDISM, UNSPECIFIED: ICD-10-CM

## 2020-01-30 DIAGNOSIS — Z79.4 LONG TERM (CURRENT) USE OF INSULIN: ICD-10-CM

## 2020-01-30 DIAGNOSIS — E11.9 TYPE 2 DIABETES MELLITUS WITHOUT COMPLICATIONS: ICD-10-CM

## 2020-01-30 DIAGNOSIS — Z79.899 OTHER LONG TERM (CURRENT) DRUG THERAPY: ICD-10-CM

## 2020-01-30 DIAGNOSIS — J44.9 CHRONIC OBSTRUCTIVE PULMONARY DISEASE, UNSPECIFIED: ICD-10-CM

## 2020-01-30 DIAGNOSIS — I10 ESSENTIAL (PRIMARY) HYPERTENSION: ICD-10-CM

## 2020-01-30 DIAGNOSIS — E78.5 HYPERLIPIDEMIA, UNSPECIFIED: ICD-10-CM

## 2020-01-30 DIAGNOSIS — M79.604 PAIN IN RIGHT LEG: ICD-10-CM

## 2020-01-31 NOTE — PRE-OP CHECKLIST - ALLERGIES REVIEWED
Problem: Mobility Impaired (Adult and Pediatric)  Goal: *Acute Goals and Plan of Care (Insert Text)  Description  Physical Therapy Goals  Initiated 1/18/2020 reevaluated 1/29/2020, reevaluated 1/31/2020 after R AKA and to be accomplished within 7 day(s)  1. Patient will move from supine to sit and sit to supine , scoot up and down and roll side to side in bed with minimal assistance/contact guard assist.     2.  Patient will transfer from bed to chair and chair to bed with maximal assistance using the least restrictive device. 3.  Patient will perform sit to stand with moderate/maximal assistance. 4.  Patient will improve sitting balance static good and dynamic fair to increase safety with transfers. 5.  Patient will perform WC mobility minimal assistance 50 feet for increased functional independence. Prior Level of Function:   Patient was independence for all mobility including gait using no AD. Patient lives alone in a single story home no steps to enter. Unsure how pt was ambulatory prior to hospital with the noticeable gangrene of R foot but pt states she has no equipment at home and was ambulatory. - now s/p R AKA        Outcome: Progressing Towards Goal    PHYSICAL THERAPY RE-EVALUATION    Patient: Tia Nesbitt (42 y.o. female)  Date: 1/31/2020  Primary Diagnosis: Pancreatitis [K85.90]  Hyperosmolar hyperglycemic coma due to diabetes mellitus without ketoacidosis ([facility]) [E11.01]  Hyperosmolar non-ketotic state in patient with type 2 diabetes mellitus ([facility] [address] [E11.00]  Procedure(s) (LRB):  RIGHT ABOVE KNEE AMPUTATION (AKA) (Right) 1 Day Post-Op   Precautions:  Fall, NWB(RLE)  PLOF: see above     ASSESSMENT :  Based on the objective data described below, the patient presents with increased pain s/p R AKA with decreased functional mobility. PT entering room with RN. RN removing soft wrist restraints. Pt reporting 15/10 pain, but reporting that she just received pain medication.  Pt attempting to complete strength testing through LLE but reporting pain through L knee and lower leg. Attempting supine to sit, refusing assistance from PT but unable to make transition. Pt intermittently confused throughout session requiring increased verbal cues, becoming upset with any tactile cues. Completed supine exercise on L side, unwilling to attempt hip flexion through L side. Attempted rolling but reporting she was unable to. Pt left with all needs met and call bell in reach. Bed alarm activated by RN. Patient will benefit from skilled intervention to address the above impairments. Patient's rehabilitation potential is considered to be Fair  Factors which may influence rehabilitation potential include:   []         None noted  []         Mental ability/status  []         Medical condition  []         Home/family situation and support systems  []         Safety awareness  [x]         Pain tolerance/management  []         Other:      PLAN :  Recommendations and Planned Interventions:   [x]           Bed Mobility Training             []    Neuromuscular Re-Education  [x]           Transfer Training                   []    Orthotic/Prosthetic Training  [x]           Gait Training                          []    Modalities  [x]           Therapeutic Exercises           []    Edema Management/Control  [x]           Therapeutic Activities            []    Family Training/Education  [x]           Patient Education  []           Other (comment):    Frequency/Duration: Patient will be followed by physical therapy 1-2 times per day/4-7 days per week to address goals. Discharge Recommendations: Skilled Nursing Facility  Further Equipment Recommendations for Discharge: TBD     SUBJECTIVE:   Patient stated I wish I would have known you were coming, I am not ready. I will do as much as I can.     OBJECTIVE DATA SUMMARY:   Hospital course since last seen and reason for re-evaluation: Pt underwent R AKA on 1/30/20, since last done re-evaluation. Past Medical History:   Diagnosis Date    Diabetes ([facility] [address]     Hypercholesterolemia     Hypertension    History reviewed. No pertinent surgical history. Barriers to Learning/Limitations: yes;  physical and other pain  Compensate with: Visual Cues, Verbal Cues, and Kinesthetic Cues  Home Situation:   Home Situation  Home Environment: Private residence  # Steps to Enter: 0  One/Two Story Residence: One story  Living Alone: Yes  Support Systems: Friends \ neighbors, Randene Flirt / anitha community  Patient Expects to be Discharged to[de-identified] Private residence  Current DME Used/Available at Home: Crutches(niece also reported cane; patient could not remember cane)  Tub or Shower Type: Tub/Shower combination  Critical Behavior:                                Strength:    Strength: Generally decreased, functional(3-/5)                    Tone & Sensation:   Tone: Normal              Sensation: Intact(on LLE, did not complete through RLE )               Range Of Motion:  AROM: Generally decreased, functional(on L, AKA on R)           PROM: Within functional limits(LLE)           Posture:        Functional Mobility:  Bed Mobility:  Rolling: Maximum assistance(due to pain )  Supine to Sit: (refused)     Scooting: Maximum assistance(due to pain )  Transfers:                             Balance:   Sitting: (pt refused to s it )  Wheelchair Mobility:        Ambulation/Gait Training:                                                     Stairs: Therapeutic Exercises:   Instructed in and completed supine SLR, heel slides and hip add/abd on LLE x10 reps. Pt deferring attempts at hip flexion through RLE. Pain:  Pain level pre-treatment: 15/10   Pain level post-treatment: 15/10   Pain Intervention(s) :  Rest,Repositioning      Activity Tolerance:   Poor activity tolerance due to pain and unwilling to accept assistance due to possible pain.    Please refer to the flowsheet for vital signs taken during this treatment. After treatment:   []         Patient left in no apparent distress sitting up in chair  [x]         Patient left in no apparent distress in bed  [x]         Call bell left within reach  [x]         Nursing notified  []         Caregiver present  [x]         Bed alarm activated  []         SCDs applied    COMMUNICATION/EDUCATION:   [x]         Role of Physical Therapy in the acute care setting. [x]         Fall prevention education was provided and the patient/caregiver indicated understanding. [x]         Patient/family have participated as able in goal setting and plan of care. [x]         Patient/family agree to work toward stated goals and plan of care. []         Patient understands intent and goals of therapy, but is neutral about his/her participation. []         Patient is unable to participate in goal setting/plan of care: ongoing with therapy staff.  []         Other:     Thank you for this referral.  Bandar Woodall, PT   Time Calculation: 23 mins

## 2020-02-16 ENCOUNTER — OUTPATIENT (OUTPATIENT)
Dept: OUTPATIENT SERVICES | Facility: HOSPITAL | Age: 70
LOS: 1 days | End: 2020-02-16
Payer: MEDICARE

## 2020-02-16 PROCEDURE — 82962 GLUCOSE BLOOD TEST: CPT

## 2020-02-16 PROCEDURE — 78815 PET IMAGE W/CT SKULL-THIGH: CPT

## 2020-02-16 PROCEDURE — 78815 PET IMAGE W/CT SKULL-THIGH: CPT | Mod: 26

## 2020-02-16 PROCEDURE — A9552: CPT

## 2020-03-09 ENCOUNTER — APPOINTMENT (OUTPATIENT)
Dept: UROLOGY | Facility: CLINIC | Age: 70
End: 2020-03-09
Payer: MEDICARE

## 2020-03-09 VITALS — TEMPERATURE: 97.4 F | HEART RATE: 63 BPM | DIASTOLIC BLOOD PRESSURE: 81 MMHG | SYSTOLIC BLOOD PRESSURE: 155 MMHG

## 2020-03-09 PROCEDURE — 99213 OFFICE O/P EST LOW 20 MIN: CPT

## 2020-03-09 NOTE — LETTER BODY
[Dear  ___] : Dear  [unfilled], [Courtesy Letter:] : I had the pleasure of seeing your patient, [unfilled], in my office today. [Please see my note below.] : Please see my note below. [Consult Closing:] : Thank you very much for allowing me to participate in the care of this patient.  If you have any questions, please do not hesitate to contact me. [Sincerely,] : Sincerely, [FreeTextEntry2] : Delaney Erazo DO\par [address]\[place], [place], [address] [FreeTextEntry3] : Jose Villafuerte MD\par Urologic Oncology\par Department of Urology\par [facility]\par \par [employer] [facility] \par \par

## 2020-03-09 NOTE — ASSESSMENT
[FreeTextEntry1] : 68yo female with [id number] clear cell carcinoma s/p nephrectomy, IVC thrombectomy 11/22/19\par Doing well\par 3 month surveillance PET appears negative, likely postsurgical changes\par Recommend CT or MRI in 3 months\par Check labs today

## 2020-03-09 NOTE — PHYSICAL EXAM
[General Appearance - Well Developed] : well developed [General Appearance - Well Nourished] : well nourished [Normal Appearance] : normal appearance [Well Groomed] : well groomed [General Appearance - In No Acute Distress] : no acute distress [Abdomen Soft] : soft [Abdomen Tenderness] : non-tender [Costovertebral Angle Tenderness] : no ~M costovertebral angle tenderness [FreeTextEntry1] : incision well healed [Oriented To Time, Place, And Person] : oriented to person, place, and time [Affect] : the affect was normal [Mood] : the mood was normal [Not Anxious] : not anxious [Normal Station and Gait] : the gait and station were normal for the patient's age [No Focal Deficits] : no focal deficits

## 2020-03-09 NOTE — REVIEW OF SYSTEMS
[Fever] : no fever [Chills] : no chills [Feeling Poorly] : not feeling poorly [Feeling Tired] : not feeling tired [Negative] : Gastrointestinal

## 2020-03-09 NOTE — HISTORY OF PRESENT ILLNESS
[FreeTextEntry1] : 70yo female recently discharged from [facility]. 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 She underwent right radical nephrectomy with IVC thrombectomy 11/22. Did well postoperatively. Received 1 unit pRBC before discharge. Discharge hgb was 10. \par \par 3/09/20 Here for f/u. Doing well. Had PET scan recently which shows mild FDG activity near renal hilum with no appreciable mass, likely postsurgical changes.  [None] : no symptoms

## 2020-03-10 LAB
ALBUMIN SERPL ELPH-MCNC: 4.8 G/DL
ALP BLD-CCNC: 61 U/L
ALT SERPL-CCNC: 9 U/L
ANION GAP SERPL CALC-SCNC: 11 MMOL/L
AST SERPL-CCNC: 13 U/L
BASOPHILS # BLD AUTO: 0.06 K/UL
BASOPHILS NFR BLD AUTO: 0.8 %
BILIRUB SERPL-MCNC: 0.4 MG/DL
BUN SERPL-MCNC: 29 MG/DL
CALCIUM SERPL-MCNC: 11 MG/DL
CHLORIDE SERPL-SCNC: 102 MMOL/L
CO2 SERPL-SCNC: 28 MMOL/L
CREAT SERPL-MCNC: 1.03 MG/DL
EOSINOPHIL # BLD AUTO: 0.48 K/UL
EOSINOPHIL NFR BLD AUTO: 6.8 %
GLUCOSE SERPL-MCNC: 143 MG/DL
HCT VFR BLD CALC: 42.5 %
HGB BLD-MCNC: 13 G/DL
IMM GRANULOCYTES NFR BLD AUTO: 0.6 %
LYMPHOCYTES # BLD AUTO: 2.9 K/UL
LYMPHOCYTES NFR BLD AUTO: 40.8 %
MAN DIFF?: NORMAL
MCHC RBC-ENTMCNC: 26.4 PG
MCHC RBC-ENTMCNC: 30.6 GM/DL
MCV RBC AUTO: 86.4 FL
MONOCYTES # BLD AUTO: 0.58 K/UL
MONOCYTES NFR BLD AUTO: 8.2 %
NEUTROPHILS # BLD AUTO: 3.05 K/UL
NEUTROPHILS NFR BLD AUTO: 42.8 %
PLATELET # BLD AUTO: 320 K/UL
POTASSIUM SERPL-SCNC: 5.4 MMOL/L
PROT SERPL-MCNC: 7.5 G/DL
RBC # BLD: 4.92 M/UL
RBC # FLD: 14 %
SODIUM SERPL-SCNC: 142 MMOL/L
WBC # FLD AUTO: 7.11 K/UL

## 2020-04-03 ENCOUNTER — EMERGENCY (EMERGENCY)
Facility: HOSPITAL | Age: 70
LOS: 1 days | Discharge: HOME | End: 2020-04-03
Attending: EMERGENCY MEDICINE | Admitting: EMERGENCY MEDICINE
Payer: MEDICARE

## 2020-04-03 VITALS
DIASTOLIC BLOOD PRESSURE: 69 MMHG | SYSTOLIC BLOOD PRESSURE: 166 MMHG | OXYGEN SATURATION: 98 % | RESPIRATION RATE: 17 BRPM | WEIGHT: 125 LBS | TEMPERATURE: 99 F | HEART RATE: 88 BPM | HEIGHT: 63 IN

## 2020-04-03 VITALS — OXYGEN SATURATION: 97 %

## 2020-04-03 DIAGNOSIS — E11.9 TYPE 2 DIABETES MELLITUS WITHOUT COMPLICATIONS: ICD-10-CM

## 2020-04-03 DIAGNOSIS — I10 ESSENTIAL (PRIMARY) HYPERTENSION: ICD-10-CM

## 2020-04-03 DIAGNOSIS — R50.9 FEVER, UNSPECIFIED: ICD-10-CM

## 2020-04-03 DIAGNOSIS — Z20.818 CONTACT WITH AND (SUSPECTED) EXPOSURE TO OTHER BACTERIAL COMMUNICABLE DISEASES: ICD-10-CM

## 2020-04-03 DIAGNOSIS — E03.9 HYPOTHYROIDISM, UNSPECIFIED: ICD-10-CM

## 2020-04-03 DIAGNOSIS — B34.9 VIRAL INFECTION, UNSPECIFIED: ICD-10-CM

## 2020-04-03 LAB
ALBUMIN SERPL ELPH-MCNC: 4.4 G/DL — SIGNIFICANT CHANGE UP (ref 3.3–5)
ALP SERPL-CCNC: 62 U/L — SIGNIFICANT CHANGE UP (ref 40–120)
ALT FLD-CCNC: 12 U/L — SIGNIFICANT CHANGE UP (ref 10–45)
ANION GAP SERPL CALC-SCNC: 12 MMOL/L — SIGNIFICANT CHANGE UP (ref 5–17)
APPEARANCE UR: CLEAR — SIGNIFICANT CHANGE UP
AST SERPL-CCNC: 14 U/L — SIGNIFICANT CHANGE UP (ref 10–40)
BASOPHILS # BLD AUTO: 0.01 K/UL — SIGNIFICANT CHANGE UP (ref 0–0.2)
BASOPHILS NFR BLD AUTO: 0.2 % — SIGNIFICANT CHANGE UP (ref 0–2)
BILIRUB SERPL-MCNC: 0.4 MG/DL — SIGNIFICANT CHANGE UP (ref 0.2–1.2)
BILIRUB UR-MCNC: NEGATIVE — SIGNIFICANT CHANGE UP
BUN SERPL-MCNC: 19 MG/DL — SIGNIFICANT CHANGE UP (ref 7–23)
CALCIUM SERPL-MCNC: 10.2 MG/DL — SIGNIFICANT CHANGE UP (ref 8.4–10.5)
CHLORIDE SERPL-SCNC: 102 MMOL/L — SIGNIFICANT CHANGE UP (ref 96–108)
CK MB CFR SERPL CALC: 2.1 NG/ML — SIGNIFICANT CHANGE UP (ref 0–6.7)
CO2 SERPL-SCNC: 25 MMOL/L — SIGNIFICANT CHANGE UP (ref 22–31)
COLOR SPEC: YELLOW — SIGNIFICANT CHANGE UP
CREAT SERPL-MCNC: 0.99 MG/DL — SIGNIFICANT CHANGE UP (ref 0.5–1.3)
DIFF PNL FLD: ABNORMAL
EOSINOPHIL # BLD AUTO: 0.07 K/UL — SIGNIFICANT CHANGE UP (ref 0–0.5)
EOSINOPHIL NFR BLD AUTO: 1.3 % — SIGNIFICANT CHANGE UP (ref 0–6)
GLUCOSE SERPL-MCNC: 236 MG/DL — HIGH (ref 70–99)
GLUCOSE UR QL: 500
HCT VFR BLD CALC: 39.7 % — SIGNIFICANT CHANGE UP (ref 34.5–45)
HGB BLD-MCNC: 12.7 G/DL — SIGNIFICANT CHANGE UP (ref 11.5–15.5)
IMM GRANULOCYTES NFR BLD AUTO: 0.6 % — SIGNIFICANT CHANGE UP (ref 0–1.5)
KETONES UR-MCNC: ABNORMAL MG/DL
LEUKOCYTE ESTERASE UR-ACNC: NEGATIVE — SIGNIFICANT CHANGE UP
LIDOCAIN IGE QN: 48 U/L — SIGNIFICANT CHANGE UP (ref 7–60)
LYMPHOCYTES # BLD AUTO: 1.46 K/UL — SIGNIFICANT CHANGE UP (ref 1–3.3)
LYMPHOCYTES # BLD AUTO: 27.9 % — SIGNIFICANT CHANGE UP (ref 13–44)
MCHC RBC-ENTMCNC: 27 PG — SIGNIFICANT CHANGE UP (ref 27–34)
MCHC RBC-ENTMCNC: 32 GM/DL — SIGNIFICANT CHANGE UP (ref 32–36)
MCV RBC AUTO: 84.5 FL — SIGNIFICANT CHANGE UP (ref 80–100)
MONOCYTES # BLD AUTO: 0.53 K/UL — SIGNIFICANT CHANGE UP (ref 0–0.9)
MONOCYTES NFR BLD AUTO: 10.1 % — SIGNIFICANT CHANGE UP (ref 2–14)
NEUTROPHILS # BLD AUTO: 3.13 K/UL — SIGNIFICANT CHANGE UP (ref 1.8–7.4)
NEUTROPHILS NFR BLD AUTO: 59.9 % — SIGNIFICANT CHANGE UP (ref 43–77)
NITRITE UR-MCNC: NEGATIVE — SIGNIFICANT CHANGE UP
NRBC # BLD: 0 /100 WBCS — SIGNIFICANT CHANGE UP (ref 0–0)
PH UR: 6.5 — SIGNIFICANT CHANGE UP (ref 5–8)
PLATELET # BLD AUTO: 253 K/UL — SIGNIFICANT CHANGE UP (ref 150–400)
POTASSIUM SERPL-MCNC: 4.3 MMOL/L — SIGNIFICANT CHANGE UP (ref 3.5–5.3)
POTASSIUM SERPL-SCNC: 4.3 MMOL/L — SIGNIFICANT CHANGE UP (ref 3.5–5.3)
PROT SERPL-MCNC: 7.6 G/DL — SIGNIFICANT CHANGE UP (ref 6–8.3)
PROT UR-MCNC: NEGATIVE MG/DL — SIGNIFICANT CHANGE UP
RBC # BLD: 4.7 M/UL — SIGNIFICANT CHANGE UP (ref 3.8–5.2)
RBC # FLD: 14.1 % — SIGNIFICANT CHANGE UP (ref 10.3–14.5)
S PYO AG SPEC QL IA: NEGATIVE — SIGNIFICANT CHANGE UP
SODIUM SERPL-SCNC: 139 MMOL/L — SIGNIFICANT CHANGE UP (ref 135–145)
SP GR SPEC: <=1.005 — SIGNIFICANT CHANGE UP (ref 1–1.03)
TROPONIN T SERPL-MCNC: <0.01 NG/ML — SIGNIFICANT CHANGE UP (ref 0–0.01)
UROBILINOGEN FLD QL: 0.2 E.U./DL — SIGNIFICANT CHANGE UP
WBC # BLD: 5.23 K/UL — SIGNIFICANT CHANGE UP (ref 3.8–10.5)
WBC # FLD AUTO: 5.23 K/UL — SIGNIFICANT CHANGE UP (ref 3.8–10.5)

## 2020-04-03 PROCEDURE — 71045 X-RAY EXAM CHEST 1 VIEW: CPT | Mod: 26

## 2020-04-03 PROCEDURE — 82553 CREATINE MB FRACTION: CPT

## 2020-04-03 PROCEDURE — 80053 COMPREHEN METABOLIC PANEL: CPT

## 2020-04-03 PROCEDURE — 96374 THER/PROPH/DIAG INJ IV PUSH: CPT

## 2020-04-03 PROCEDURE — 82550 ASSAY OF CK (CPK): CPT

## 2020-04-03 PROCEDURE — 87880 STREP A ASSAY W/OPTIC: CPT

## 2020-04-03 PROCEDURE — 84484 ASSAY OF TROPONIN QUANT: CPT

## 2020-04-03 PROCEDURE — 93010 ELECTROCARDIOGRAM REPORT: CPT

## 2020-04-03 PROCEDURE — 81001 URINALYSIS AUTO W/SCOPE: CPT

## 2020-04-03 PROCEDURE — 36415 COLL VENOUS BLD VENIPUNCTURE: CPT

## 2020-04-03 PROCEDURE — 71045 X-RAY EXAM CHEST 1 VIEW: CPT

## 2020-04-03 PROCEDURE — 87081 CULTURE SCREEN ONLY: CPT

## 2020-04-03 PROCEDURE — 83690 ASSAY OF LIPASE: CPT

## 2020-04-03 PROCEDURE — 93005 ELECTROCARDIOGRAM TRACING: CPT

## 2020-04-03 PROCEDURE — 99284 EMERGENCY DEPT VISIT MOD MDM: CPT | Mod: 25

## 2020-04-03 PROCEDURE — 99285 EMERGENCY DEPT VISIT HI MDM: CPT | Mod: CS

## 2020-04-03 PROCEDURE — 85025 COMPLETE CBC W/AUTO DIFF WBC: CPT

## 2020-04-03 RX ORDER — FAMOTIDINE 10 MG/ML
20 INJECTION INTRAVENOUS ONCE
Refills: 0 | Status: COMPLETED | OUTPATIENT
Start: 2020-04-03 | End: 2020-04-03

## 2020-04-03 RX ADMIN — FAMOTIDINE 104 MILLIGRAM(S): 10 INJECTION INTRAVENOUS at 20:02

## 2020-04-03 NOTE — ED PROVIDER NOTE - PATIENT PORTAL LINK FT
You can access the FollowMyHealth Patient Portal offered by [facility] by registering at the following website: [email or web address]. By joining [employer]’s FollowMyHealth portal, you will also be able to view your health information using other applications (apps) compatible with our system.

## 2020-04-03 NOTE — ED ADULT NURSE NOTE - CHPI ED NUR SYMPTOMS POS
Inpatient Anticoagulation Service Note    Date: 6/25/2017  Reason for Anticoagulation: Deep Vein Thrombosis, Pulmonary Embolism   Hemoglobin Value: 9.2  Hematocrit Value: 26.4  Lab Platelet Value: 358  Target INR: 2.0 to 3.0    INR from last 7 days     Date/Time INR Value    06/25/17 0510 (!)3.73    06/24/17 0539 (!)2.59        Dose from last 7 days     Date/Time Dose (mg)    06/25/17 1000 0    06/24/17 0900 7.5    06/23/17 1034 7.5        Significant Interactions: Not Applicable  Bridge Therapy: No  Outpatient regimen: 5 mg SuTuTh, 10 mg MWFSa    Comments: intractions: diltiazem, iron tablets    Plan:  HOLD Coumadin today for INR 3.73     Pharmacist suggested discharge dosing: resume outpatient regimen once INR < 3     Karen MalikD   CHEST PAIN

## 2020-04-03 NOTE — ED PROVIDER NOTE - OBJECTIVE STATEMENT
68 y/o f with h/o DM, HTN, hypothyroidism recently test + covid. Present with ear, throat pain, with burning  sensation to chest. Denies fever, sob, n, v,d, abd pain , dysuria.

## 2020-04-03 NOTE — ED PROVIDER NOTE - NSFOLLOWUPINSTRUCTIONS_ED_ALL_ED_FT
Recommend to monitor symptoms and follow up with PMD for nonemergent CT for lung nodules. Return to ED if condition worsen

## 2020-04-06 ENCOUNTER — EMERGENCY (EMERGENCY)
Facility: HOSPITAL | Age: 70
LOS: 1 days | Discharge: HOME | End: 2020-04-06
Attending: EMERGENCY MEDICINE | Admitting: EMERGENCY MEDICINE
Payer: MEDICARE

## 2020-04-06 VITALS
WEIGHT: 125 LBS | OXYGEN SATURATION: 95 % | HEART RATE: 103 BPM | RESPIRATION RATE: 18 BRPM | DIASTOLIC BLOOD PRESSURE: 82 MMHG | SYSTOLIC BLOOD PRESSURE: 161 MMHG | TEMPERATURE: 98 F | HEIGHT: 63 IN

## 2020-04-06 DIAGNOSIS — I10 ESSENTIAL (PRIMARY) HYPERTENSION: ICD-10-CM

## 2020-04-06 DIAGNOSIS — R06.02 SHORTNESS OF BREATH: ICD-10-CM

## 2020-04-06 DIAGNOSIS — U07.1 COVID-19: ICD-10-CM

## 2020-04-06 DIAGNOSIS — Z79.899 OTHER LONG TERM (CURRENT) DRUG THERAPY: ICD-10-CM

## 2020-04-06 DIAGNOSIS — Z79.84 LONG TERM (CURRENT) USE OF ORAL HYPOGLYCEMIC DRUGS: ICD-10-CM

## 2020-04-06 DIAGNOSIS — E78.00 PURE HYPERCHOLESTEROLEMIA, UNSPECIFIED: ICD-10-CM

## 2020-04-06 DIAGNOSIS — E11.9 TYPE 2 DIABETES MELLITUS WITHOUT COMPLICATIONS: ICD-10-CM

## 2020-04-06 DIAGNOSIS — E03.9 HYPOTHYROIDISM, UNSPECIFIED: ICD-10-CM

## 2020-04-06 LAB
BASE EXCESS BLDV CALC-SCNC: 1.6 MMOL/L — SIGNIFICANT CHANGE UP
BASOPHILS # BLD AUTO: 0.01 K/UL — SIGNIFICANT CHANGE UP (ref 0–0.2)
BASOPHILS NFR BLD AUTO: 0.2 % — SIGNIFICANT CHANGE UP (ref 0–2)
EOSINOPHIL # BLD AUTO: 0.02 K/UL — SIGNIFICANT CHANGE UP (ref 0–0.5)
EOSINOPHIL NFR BLD AUTO: 0.4 % — SIGNIFICANT CHANGE UP (ref 0–6)
HCO3 BLDV-SCNC: 27 MMOL/L — SIGNIFICANT CHANGE UP (ref 20–27)
HCT VFR BLD CALC: 36.8 % — SIGNIFICANT CHANGE UP (ref 34.5–45)
HGB BLD-MCNC: 11.6 G/DL — SIGNIFICANT CHANGE UP (ref 11.5–15.5)
IMM GRANULOCYTES NFR BLD AUTO: 0.6 % — SIGNIFICANT CHANGE UP (ref 0–1.5)
LACTATE SERPL-SCNC: 0.9 MMOL/L — SIGNIFICANT CHANGE UP (ref 0.5–2)
LYMPHOCYTES # BLD AUTO: 1.35 K/UL — SIGNIFICANT CHANGE UP (ref 1–3.3)
LYMPHOCYTES # BLD AUTO: 29.2 % — SIGNIFICANT CHANGE UP (ref 13–44)
MCHC RBC-ENTMCNC: 26.4 PG — LOW (ref 27–34)
MCHC RBC-ENTMCNC: 31.5 GM/DL — LOW (ref 32–36)
MCV RBC AUTO: 83.6 FL — SIGNIFICANT CHANGE UP (ref 80–100)
MONOCYTES # BLD AUTO: 0.44 K/UL — SIGNIFICANT CHANGE UP (ref 0–0.9)
MONOCYTES NFR BLD AUTO: 9.5 % — SIGNIFICANT CHANGE UP (ref 2–14)
NEUTROPHILS # BLD AUTO: 2.78 K/UL — SIGNIFICANT CHANGE UP (ref 1.8–7.4)
NEUTROPHILS NFR BLD AUTO: 60.1 % — SIGNIFICANT CHANGE UP (ref 43–77)
NRBC # BLD: 0 /100 WBCS — SIGNIFICANT CHANGE UP (ref 0–0)
PCO2 BLDV: 48 MMHG — SIGNIFICANT CHANGE UP (ref 41–51)
PH BLDV: 7.37 — SIGNIFICANT CHANGE UP (ref 7.32–7.43)
PLATELET # BLD AUTO: 235 K/UL — SIGNIFICANT CHANGE UP (ref 150–400)
PO2 BLDV: 27 MMHG — SIGNIFICANT CHANGE UP
RBC # BLD: 4.4 M/UL — SIGNIFICANT CHANGE UP (ref 3.8–5.2)
RBC # FLD: 13.8 % — SIGNIFICANT CHANGE UP (ref 10.3–14.5)
SAO2 % BLDV: 47 % — SIGNIFICANT CHANGE UP
WBC # BLD: 4.63 K/UL — SIGNIFICANT CHANGE UP (ref 3.8–10.5)
WBC # FLD AUTO: 4.63 K/UL — SIGNIFICANT CHANGE UP (ref 3.8–10.5)

## 2020-04-06 PROCEDURE — 99285 EMERGENCY DEPT VISIT HI MDM: CPT

## 2020-04-06 PROCEDURE — 93010 ELECTROCARDIOGRAM REPORT: CPT

## 2020-04-06 PROCEDURE — 71045 X-RAY EXAM CHEST 1 VIEW: CPT | Mod: 26

## 2020-04-06 RX ORDER — ALBUTEROL 90 UG/1
2 AEROSOL, METERED ORAL ONCE
Refills: 0 | Status: COMPLETED | OUTPATIENT
Start: 2020-04-06 | End: 2020-04-06

## 2020-04-06 RX ORDER — ACETAMINOPHEN 500 MG
650 TABLET ORAL ONCE
Refills: 0 | Status: COMPLETED | OUTPATIENT
Start: 2020-04-06 | End: 2020-04-06

## 2020-04-06 RX ADMIN — ALBUTEROL 2 PUFF(S): 90 AEROSOL, METERED ORAL at 23:32

## 2020-04-06 RX ADMIN — Medication 650 MILLIGRAM(S): at 23:32

## 2020-04-06 NOTE — ED PROVIDER NOTE - PATIENT PORTAL LINK FT
You can access the FollowMyHealth Patient Portal offered by [facility] by registering at the following website: [email or web address]. By joining [employer]’s FollowMyHealth portal, you will also be able to view your health information using other applications (apps) compatible with our system.

## 2020-04-06 NOTE — ED PROVIDER NOTE - OBJECTIVE STATEMENT
69 F hx of DM, high lipids, RCC, HTN, hypothyroid- co 8 days of cough, low grade fever +COVID about 4 days ago now presents with higher fever worsening sob over the last 3 days- tylenol taken 4 hrs pta  moderate severity  fmr smoker w emphysema  no exac/allev factors

## 2020-04-06 NOTE — ED ADULT NURSE NOTE - CHIEF COMPLAINT QUOTE
pt tested + for covid x3 days ago, returns with worsening SOB and sharp CP "all the time," NAD on arrival, Hx emphysema and DM; last tylenol 6PM tonight

## 2020-04-06 NOTE — ED PROVIDER NOTE - CLINICAL SUMMARY MEDICAL DECISION MAKING FREE TEXT BOX
COVID infection- co worsening sx- rm air sat ml, vss- will resend labs- inhaler, cxr, reassess COVID infection- co worsening sx- rm air sat ml, vss- will resend labs- inhaler, cxr, reassess  ambulatory sat is 98% w no visible resp difficulties- hx obtained with daughter and discharge  instructions explained to daughter

## 2020-04-06 NOTE — ED PROVIDER NOTE - NSFOLLOWUPINSTRUCTIONS_ED_ALL_ED_FT
COVID-19  COVID-19, also known as coronavirus disease or novel coronavirus, is caused by a type of virus that causes respiratory illness. This may lead to inflammation and the buildup of mucus and fluids in the airway of the lungs (pneumonia). There are many different coronaviruses. Most of these viruses only affect animals, but sometimes these viruses can change and infect people.  What are the causes?  This illness is caused by a virus. You may catch the virus by:  Breathing in droplets from an infected person's cough or sneeze.Touching something, like a table or a doorknob, that was exposed to the virus (contaminated) and then touching your mouth, nose, or eyes.Being around animals that carry the virus, or eating uncooked or undercooked meat or animal products that contain the virus.What increases the risk?  You are more likely to develop this condition if you:  Live in or travel to an area with a COVID-19 outbreak.Come in contact with a sick person who recently traveled to an area with a COVID-19 outbreak.Provide care for or live with a person who is infected with COVID-19.What are the signs or symptoms?  COVID-19 causes respiratory illness that can lead to pneumonia. Symptoms of pneumonia may include:  A fever.A cough.Difficulty breathing.How is this diagnosed?  This condition may be diagnosed based on:  Your signs and symptoms, especially if:  You live in an area with a COVID-19 outbreak.You recently traveled to or from an area where the virus is common.You provide care for or live with a person who was diagnosed with COVID-19.A physical exam.Lab tests, which may include:  A nasal swab to take a sample of fluid from your nose.A throat swab to take a sample of fluid from your throat.A sample of mucus from your lungs (sputum).Blood tests.How is this treated?  There is no medicine to treat COVID-19. Your health care provider will talk with you about ways to treat your symptoms. This may include rest, fluids, and over-the-counter medicines.  Follow these instructions at home:  Lifestyle     Use a cool-mist humidifier to add moisture to the air. This can help you breathe more easily.Do not use any products that contain nicotine or tobacco, such as cigarettes, e-cigarettes, and chewing tobacco. If you need help quitting, ask your health care provider.Rest at home as told by your health care provider.Return to your normal activities as told by your health care provider. Ask your health care provider what activities are safe for you.General instructions     Take over-the-counter and prescription medicines only as told by your health care provider.Drink enough fluid to keep your urine pale yellow.Keep all follow-up visits as told by your health care provider. This is important.How is this prevented?     There is no vaccine to help prevent COVID-19 infection. However, there are steps you can take to protect yourself and others from this virus.  To protect yourself:     Do not travel to areas where COVID-19 is a risk. The areas where COVID-19 is reported change often. To identify high-risk areas, check the CDC travel website: wwwnc.cdc.gov/travel/noticesIf you live in, or must travel to, an area where COVID-19 is a risk, take precautions to avoid infection.  Stay away from people who are sick.Stay away from places where there are animals that may carry the virus. This includes places where animals and animal products are sold. Note that both living and dead animals can carry the virus.Wash your hands often with soap and water. If soap and water are not available, use an alcohol-based hand .Avoid touching your mouth, face, eyes, or nose.To protect others:     If you have symptoms, take steps to prevent the virus from spreading to others.  If you think you have a COVID-19 infection, contact your health care provider right away. Tell your health care team that you think you may have a COVID-19 infection.Stay home. Leave your house only to seek medical care.Do not travel while you are sick.Wash your hands often with soap and water. If soap and water are not available, use alcohol-based hand .Stay away from other members of your household. If possible, stay in your own room, separate from others. Use a different bathroom.Make sure that all people in your household wash their hands well and often.Cough or sneeze into a tissue or your sleeve or elbow. Do not cough or sneeze into your hand or into the air.Wear a face mask.Where to find more information  Centers for Disease Control and Prevention: www.cdc.gov/coronavirus/2019-ncov/index.htmlWInland [facility] Health Organization: www.who.int/health-topics/coronavirusContact a health care provider if:  You have traveled to an area where COVID-19 is a risk and you have symptoms of the infection.You have contact with someone who has traveled to an area where COVID-19 is a risk and you have symptoms of the infection.Get help right away if:  You have trouble breathing.You have chest pain.Summary  COVID-19 is caused by a type of virus that causes respiratory illness. This may lead to inflammation and the buildup of mucus and fluids in the airway of the lungs (pneumonia).You are more likely to develop this condition if you live in or travel to an area with a COVID-19 outbreak.There is no medicine to treat COVID-19. Your health care provider will talk with you about ways to treat your symptoms.Take steps to protect yourself and others from infection. Wash your hands often. Stay away from other people who are sick and wear a mask if you are sick.This information is not intended to replace advice given to you by your health care provider. Make sure you discuss any questions you have with your health care provider.    Document Released: 01/23/2020 Document Revised: 03/13/2020 Document Reviewed: 01/23/2020  Elsevier Patient Education © 2020 [employer]

## 2020-04-06 NOTE — ED ADULT TRIAGE NOTE - CHIEF COMPLAINT QUOTE
pt tested + for covid x3 days ago, returns with worsening SOB and sharp CP "all the time," NAD on arrival, Hx emphysema and DM pt tested + for covid x3 days ago, returns with worsening SOB and sharp CP "all the time," NAD on arrival, Hx emphysema and DM; last tylenol 6PM tonight

## 2020-04-06 NOTE — ED ADULT NURSE REASSESSMENT NOTE - NS ED NURSE REASSESS COMMENT FT1
Pt presents to ER c/o SOB, ER physician evaluation complete, orders received, IV access established, labs drawn and sent, waiting results. Assessment as noted.

## 2020-04-07 VITALS — RESPIRATION RATE: 18 BRPM | OXYGEN SATURATION: 95 % | HEART RATE: 98 BPM

## 2020-04-07 LAB
ALBUMIN SERPL ELPH-MCNC: 4 G/DL — SIGNIFICANT CHANGE UP (ref 3.3–5)
ALP SERPL-CCNC: 58 U/L — SIGNIFICANT CHANGE UP (ref 40–120)
ALT FLD-CCNC: 11 U/L — SIGNIFICANT CHANGE UP (ref 10–45)
ANION GAP SERPL CALC-SCNC: 13 MMOL/L — SIGNIFICANT CHANGE UP (ref 5–17)
APTT BLD: 31.1 SEC — SIGNIFICANT CHANGE UP (ref 27.5–36.3)
AST SERPL-CCNC: 16 U/L — SIGNIFICANT CHANGE UP (ref 10–40)
BILIRUB SERPL-MCNC: 0.3 MG/DL — SIGNIFICANT CHANGE UP (ref 0.2–1.2)
BUN SERPL-MCNC: 17 MG/DL — SIGNIFICANT CHANGE UP (ref 7–23)
CALCIUM SERPL-MCNC: 9.1 MG/DL — SIGNIFICANT CHANGE UP (ref 8.4–10.5)
CHLORIDE SERPL-SCNC: 100 MMOL/L — SIGNIFICANT CHANGE UP (ref 96–108)
CO2 SERPL-SCNC: 24 MMOL/L — SIGNIFICANT CHANGE UP (ref 22–31)
CREAT SERPL-MCNC: 0.93 MG/DL — SIGNIFICANT CHANGE UP (ref 0.5–1.3)
CRP SERPL-MCNC: 2.46 MG/DL — HIGH (ref 0–0.4)
D DIMER BLD IA.RAPID-MCNC: <150 NG/ML DDU — SIGNIFICANT CHANGE UP
ERYTHROCYTE [SEDIMENTATION RATE] IN BLOOD: 23 MM/HR — SIGNIFICANT CHANGE UP
FERRITIN SERPL-MCNC: 96 NG/ML — SIGNIFICANT CHANGE UP (ref 15–150)
GLUCOSE SERPL-MCNC: 175 MG/DL — HIGH (ref 70–99)
INR BLD: 1 — SIGNIFICANT CHANGE UP (ref 0.88–1.16)
NT-PROBNP SERPL-SCNC: 125 PG/ML — SIGNIFICANT CHANGE UP (ref 0–300)
POTASSIUM SERPL-MCNC: 4.1 MMOL/L — SIGNIFICANT CHANGE UP (ref 3.5–5.3)
POTASSIUM SERPL-SCNC: 4.1 MMOL/L — SIGNIFICANT CHANGE UP (ref 3.5–5.3)
PROT SERPL-MCNC: 6.9 G/DL — SIGNIFICANT CHANGE UP (ref 6–8.3)
PROTHROM AB SERPL-ACNC: 11.4 SEC — SIGNIFICANT CHANGE UP (ref 10–12.9)
SODIUM SERPL-SCNC: 137 MMOL/L — SIGNIFICANT CHANGE UP (ref 135–145)
TROPONIN T SERPL-MCNC: <0.01 NG/ML — SIGNIFICANT CHANGE UP (ref 0–0.01)

## 2020-04-07 PROCEDURE — 84484 ASSAY OF TROPONIN QUANT: CPT

## 2020-04-07 PROCEDURE — 93005 ELECTROCARDIOGRAM TRACING: CPT

## 2020-04-07 PROCEDURE — 36415 COLL VENOUS BLD VENIPUNCTURE: CPT

## 2020-04-07 PROCEDURE — 80053 COMPREHEN METABOLIC PANEL: CPT

## 2020-04-07 PROCEDURE — 82803 BLOOD GASES ANY COMBINATION: CPT

## 2020-04-07 PROCEDURE — 85610 PROTHROMBIN TIME: CPT

## 2020-04-07 PROCEDURE — 83880 ASSAY OF NATRIURETIC PEPTIDE: CPT

## 2020-04-07 PROCEDURE — 85730 THROMBOPLASTIN TIME PARTIAL: CPT

## 2020-04-07 PROCEDURE — 71045 X-RAY EXAM CHEST 1 VIEW: CPT

## 2020-04-07 PROCEDURE — 83605 ASSAY OF LACTIC ACID: CPT

## 2020-04-07 PROCEDURE — 94640 AIRWAY INHALATION TREATMENT: CPT

## 2020-04-07 PROCEDURE — 99284 EMERGENCY DEPT VISIT MOD MDM: CPT | Mod: 25

## 2020-04-07 PROCEDURE — 85379 FIBRIN DEGRADATION QUANT: CPT

## 2020-04-07 PROCEDURE — 84145 PROCALCITONIN (PCT): CPT

## 2020-04-07 PROCEDURE — 85652 RBC SED RATE AUTOMATED: CPT

## 2020-04-07 PROCEDURE — 85025 COMPLETE CBC W/AUTO DIFF WBC: CPT

## 2020-04-07 PROCEDURE — 82728 ASSAY OF FERRITIN: CPT

## 2020-04-07 PROCEDURE — 86140 C-REACTIVE PROTEIN: CPT

## 2020-04-07 RX ORDER — AZITHROMYCIN 500 MG/1
1 TABLET, FILM COATED ORAL
Qty: 4 | Refills: 0
Start: 2020-04-07 | End: 2020-04-10

## 2020-04-07 RX ORDER — AZITHROMYCIN 500 MG/1
500 TABLET, FILM COATED ORAL ONCE
Refills: 0 | Status: COMPLETED | OUTPATIENT
Start: 2020-04-07 | End: 2020-04-07

## 2020-04-07 RX ADMIN — AZITHROMYCIN 500 MILLIGRAM(S): 500 TABLET, FILM COATED ORAL at 00:25

## 2020-06-08 ENCOUNTER — APPOINTMENT (OUTPATIENT)
Dept: UROLOGY | Facility: CLINIC | Age: 70
End: 2020-06-08
Payer: MEDICARE

## 2020-06-08 VITALS
OXYGEN SATURATION: 98 % | HEIGHT: 64 IN | HEART RATE: 74 BPM | DIASTOLIC BLOOD PRESSURE: 85 MMHG | TEMPERATURE: 98.2 F | SYSTOLIC BLOOD PRESSURE: 140 MMHG | BODY MASS INDEX: 21 KG/M2 | WEIGHT: 123 LBS

## 2020-06-08 PROCEDURE — 99213 OFFICE O/P EST LOW 20 MIN: CPT

## 2020-06-08 NOTE — ASSESSMENT
[FreeTextEntry1] : 69yo female with [id number] clear cell carcinoma s/p nephrectomy, IVC thrombectomy 11/22/19\par Doing well\par 3 month surveillance PET appears negative, likely postsurgical changes\par Will get repeat imaging now\par Check BMP today\par F/u 3 months

## 2020-06-08 NOTE — HISTORY OF PRESENT ILLNESS
[FreeTextEntry1] : 70yo female recently discharged from [facility]. 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 She underwent right radical nephrectomy with IVC thrombectomy 11/22. Did well postoperatively. Received 1 unit pRBC before discharge. Discharge hgb was 10. \par \par 3/09/20 Here for f/u. Doing well. Had PET scan recently which shows mild FDG activity near renal hilum with no appreciable mass, likely postsurgical changes. \par \par 6/08/20 Here for f/u. Doing well. Scheduled for imaging this week of chest/abdomen/pelvis.  [Weight Loss] : no recent ~M [unfilled] weight loss [Fever] : no fever [Fatigue] : no fatigue [Nausea] : no nausea [Incisional Drainage] : no incisional drainage [Incisional Pain] : no incisional pain [None] : None

## 2020-06-08 NOTE — LETTER BODY
[Dear  ___] : Dear  [unfilled], [Courtesy Letter:] : I had the pleasure of seeing your patient, [unfilled], in my office today. [Please see my note below.] : Please see my note below. [Consult Closing:] : Thank you very much for allowing me to participate in the care of this patient.  If you have any questions, please do not hesitate to contact me. [Sincerely,] : Sincerely, [FreeTextEntry2] : Delaney Erazo DO\par [address]\[place], [place], [address] [FreeTextEntry3] : Jose Villafuerte MD\par Urologic Oncology\par Department of Urology\par [facility]\par \par [employer] [facility] \par \par

## 2020-06-08 NOTE — PHYSICAL EXAM
[General Appearance - Well Developed] : well developed [General Appearance - Well Nourished] : well nourished [Normal Appearance] : normal appearance [Well Groomed] : well groomed [General Appearance - In No Acute Distress] : no acute distress [Abdomen Soft] : soft [Abdomen Tenderness] : non-tender [Costovertebral Angle Tenderness] : no ~M costovertebral angle tenderness [FreeTextEntry1] : incision well healed; possible incisional hernia on left side of [place] incision, not bothersome [Oriented To Time, Place, And Person] : oriented to person, place, and time [Affect] : the affect was normal [Mood] : the mood was normal [Not Anxious] : not anxious [Normal Station and Gait] : the gait and station were normal for the patient's age [No Focal Deficits] : no focal deficits

## 2020-06-09 LAB
ANION GAP SERPL CALC-SCNC: 13 MMOL/L
BUN SERPL-MCNC: 34 MG/DL
CALCIUM SERPL-MCNC: 11.2 MG/DL
CHLORIDE SERPL-SCNC: 100 MMOL/L
CO2 SERPL-SCNC: 25 MMOL/L
CREAT SERPL-MCNC: 1.17 MG/DL
GLUCOSE SERPL-MCNC: 149 MG/DL
POTASSIUM SERPL-SCNC: 6.1 MMOL/L
SODIUM SERPL-SCNC: 138 MMOL/L

## 2020-06-20 ENCOUNTER — APPOINTMENT (OUTPATIENT)
Dept: MRI IMAGING | Facility: HOSPITAL | Age: 70
End: 2020-06-20
Payer: MEDICARE

## 2020-07-07 ENCOUNTER — APPOINTMENT (OUTPATIENT)
Dept: MRI IMAGING | Facility: HOSPITAL | Age: 70
End: 2020-07-07

## 2020-07-07 ENCOUNTER — OUTPATIENT (OUTPATIENT)
Dept: OUTPATIENT SERVICES | Facility: HOSPITAL | Age: 70
LOS: 1 days | End: 2020-07-07
Payer: MEDICARE

## 2020-07-07 PROCEDURE — A9585: CPT

## 2020-07-07 PROCEDURE — 71552 MRI CHEST W/O & W/DYE: CPT | Mod: 26

## 2020-07-07 PROCEDURE — 74183 MRI ABD W/O CNTR FLWD CNTR: CPT

## 2020-07-07 PROCEDURE — 71552 MRI CHEST W/O & W/DYE: CPT

## 2020-07-07 PROCEDURE — 72197 MRI PELVIS W/O & W/DYE: CPT

## 2020-07-07 PROCEDURE — 74183 MRI ABD W/O CNTR FLWD CNTR: CPT | Mod: 26

## 2020-07-07 PROCEDURE — 72197 MRI PELVIS W/O & W/DYE: CPT | Mod: 26

## 2020-09-21 ENCOUNTER — APPOINTMENT (OUTPATIENT)
Dept: UROLOGY | Facility: CLINIC | Age: 70
End: 2020-09-21
Payer: MEDICARE

## 2020-09-21 VITALS
OXYGEN SATURATION: 98 % | HEART RATE: 66 BPM | TEMPERATURE: 97.9 F | SYSTOLIC BLOOD PRESSURE: 146 MMHG | DIASTOLIC BLOOD PRESSURE: 84 MMHG

## 2020-09-21 PROCEDURE — 99213 OFFICE O/P EST LOW 20 MIN: CPT

## 2020-09-21 NOTE — LETTER BODY
[Dear  ___] : Dear  [unfilled], [Courtesy Letter:] : I had the pleasure of seeing your patient, [unfilled], in my office today. [Please see my note below.] : Please see my note below. [Consult Closing:] : Thank you very much for allowing me to participate in the care of this patient.  If you have any questions, please do not hesitate to contact me. [Sincerely,] : Sincerely, [FreeTextEntry2] : Delaney Erazo DO\par [address]\[place], [address] [FreeTextEntry3] : Jose Villafuerte MD\par Urologic Oncology\par Department of Urology\par [facility]\par \par [employer] [facility] \par \par

## 2020-09-21 NOTE — ASSESSMENT
[FreeTextEntry1] : 71yo female with [id number] clear cell carcinoma s/p nephrectomy, IVC thrombectomy 11/22/19\par Doing well\par 3 month surveillance PET appears negative\par 6 month MRI C/A/P negative\par Referred to Gen Surg for incisional hernia\par F/u 3 months

## 2020-09-21 NOTE — HISTORY OF PRESENT ILLNESS
[FreeTextEntry1] : 70yo female recently discharged from [facility]. 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 She underwent right radical nephrectomy with IVC thrombectomy 11/22. Did well postoperatively. Received 1 unit pRBC before discharge. Discharge hgb was 10. \par \par 3/09/20 Here for f/u. Doing well. Had PET scan recently which shows mild FDG activity near renal hilum with no appreciable mass, likely postsurgical changes. \par \par 6/08/20 Here for f/u. Doing well. Scheduled for imaging this week of chest/abdomen/pelvis. \par \par 9/21/20 Here for f/u. Had MRI 7/2020 which shows no recurrence. Primary complaint is incisional hernia.  [Fever] : no fever [Fatigue] : no fatigue [Nausea] : no nausea [Incisional Drainage] : no incisional drainage [Incisional Pain] : no incisional pain [None] : None

## 2020-09-21 NOTE — PHYSICAL EXAM
[General Appearance - Well Developed] : well developed [General Appearance - Well Nourished] : well nourished [Normal Appearance] : normal appearance [Well Groomed] : well groomed [General Appearance - In No Acute Distress] : no acute distress [Abdomen Soft] : soft [Abdomen Tenderness] : non-tender [Costovertebral Angle Tenderness] : no ~M costovertebral angle tenderness [FreeTextEntry1] : incisional hernia on left side of [place] incision [Oriented To Time, Place, And Person] : oriented to person, place, and time [Affect] : the affect was normal [Mood] : the mood was normal [Not Anxious] : not anxious [Normal Station and Gait] : the gait and station were normal for the patient's age [No Focal Deficits] : no focal deficits

## 2020-10-09 ENCOUNTER — APPOINTMENT (OUTPATIENT)
Dept: SURGERY | Facility: CLINIC | Age: 70
End: 2020-10-09
Payer: MEDICARE

## 2020-10-09 VITALS
OXYGEN SATURATION: 99 % | SYSTOLIC BLOOD PRESSURE: 151 MMHG | WEIGHT: 131 LBS | BODY MASS INDEX: 23.21 KG/M2 | HEART RATE: 59 BPM | DIASTOLIC BLOOD PRESSURE: 82 MMHG | HEIGHT: 63 IN | TEMPERATURE: 97 F

## 2020-10-09 PROCEDURE — 99204 OFFICE O/P NEW MOD 45 MIN: CPT

## 2020-10-09 NOTE — ASSESSMENT
[FreeTextEntry1] : Pt is a 69 y/o Niuean-speaking F former smoker (quit ~3 years ago) with PMHx of DM (Metformin), HTN (on Amlodipine), HLD, hypothyroidism (on Synthroid), left eye blindness, mild emphysema (no meds) and  PSHx of hysterectomy ~10 years ago, partial parathyroidectomy, R nephrectomy for Stage III kidney cancer in November 2019 who presents with an incisional hernia that has been increasing in size. I informed pt that I recommend removing the hernia because it will grow in size, increasing discomfort and possibly causing an intestinal blockage requiring an emergent operation. The risks, benefits, and alternatives to the proposed procedure were discussed with the patient and all questions were answered to their satisfaction. Will schedule open ventral hernia repair with mesh and obtain necessary medical clearance.

## 2020-10-09 NOTE — END OF VISIT
[FreeTextEntry3] : All medical record entries made by the Scribe were at my, Dr. Lee's, discretion and personally dictated by me on 10/09/2020. I have reviewed the chart and agree that the record accurately reflects my personal performance of the history, physical exam, assessment and plan. I have also personally directed, reviewed and agreed to the chart.

## 2020-10-09 NOTE — HISTORY OF PRESENT ILLNESS
[de-identified] : Pt is a 71 y/o Azeri-speaking F former smoker (quit ~3 years ago) with PMHx of DM (Metformin), HTN (on Amlodipine), HLD, hypothyroidism (on Synthroid), left eye blindness, mild emphysema (no meds) and PSHx of hysterectomy ~10 years ago, partial parathyroidectomy, R nephrectomy for Stage III kidney cancer in November 2019 who presents with a ventral incisional hernia that has been increasing in size. Pt presents with someone who is helping to translate. She states that the hernia causes occasional pain and discomfort, which is exacerbated by eating. Pt does not drink. She is retired.

## 2020-10-09 NOTE — ADDENDUM
[FreeTextEntry1] : This note was written by Rafaela Jung on 10/09/2020 acting as scribe for Dr. Lee

## 2020-10-22 ENCOUNTER — EMERGENCY (EMERGENCY)
Facility: HOSPITAL | Age: 70
LOS: 1 days | Discharge: HOME | End: 2020-10-22
Attending: EMERGENCY MEDICINE | Admitting: EMERGENCY MEDICINE
Payer: MEDICARE

## 2020-10-22 VITALS
OXYGEN SATURATION: 98 % | HEART RATE: 80 BPM | DIASTOLIC BLOOD PRESSURE: 84 MMHG | WEIGHT: 130.07 LBS | SYSTOLIC BLOOD PRESSURE: 153 MMHG | RESPIRATION RATE: 18 BRPM | TEMPERATURE: 98 F | HEIGHT: 63 IN

## 2020-10-22 DIAGNOSIS — R42 DIZZINESS AND GIDDINESS: ICD-10-CM

## 2020-10-22 DIAGNOSIS — E11.9 TYPE 2 DIABETES MELLITUS WITHOUT COMPLICATIONS: ICD-10-CM

## 2020-10-22 DIAGNOSIS — Z79.84 LONG TERM (CURRENT) USE OF ORAL HYPOGLYCEMIC DRUGS: ICD-10-CM

## 2020-10-22 DIAGNOSIS — Z79.899 OTHER LONG TERM (CURRENT) DRUG THERAPY: ICD-10-CM

## 2020-10-22 DIAGNOSIS — I10 ESSENTIAL (PRIMARY) HYPERTENSION: ICD-10-CM

## 2020-10-22 LAB
ALBUMIN SERPL ELPH-MCNC: 4.5 G/DL — SIGNIFICANT CHANGE UP (ref 3.3–5)
ALP SERPL-CCNC: 70 U/L — SIGNIFICANT CHANGE UP (ref 40–120)
ALT FLD-CCNC: 15 U/L — SIGNIFICANT CHANGE UP (ref 10–45)
ANION GAP SERPL CALC-SCNC: 10 MMOL/L — SIGNIFICANT CHANGE UP (ref 5–17)
AST SERPL-CCNC: 17 U/L — SIGNIFICANT CHANGE UP (ref 10–40)
BASOPHILS # BLD AUTO: 0.05 K/UL — SIGNIFICANT CHANGE UP (ref 0–0.2)
BASOPHILS NFR BLD AUTO: 0.6 % — SIGNIFICANT CHANGE UP (ref 0–2)
BILIRUB SERPL-MCNC: 0.2 MG/DL — SIGNIFICANT CHANGE UP (ref 0.2–1.2)
BUN SERPL-MCNC: 25 MG/DL — HIGH (ref 7–23)
CALCIUM SERPL-MCNC: 10.2 MG/DL — SIGNIFICANT CHANGE UP (ref 8.4–10.5)
CHLORIDE SERPL-SCNC: 104 MMOL/L — SIGNIFICANT CHANGE UP (ref 96–108)
CO2 SERPL-SCNC: 25 MMOL/L — SIGNIFICANT CHANGE UP (ref 22–31)
CREAT SERPL-MCNC: 1.09 MG/DL — SIGNIFICANT CHANGE UP (ref 0.5–1.3)
EOSINOPHIL # BLD AUTO: 0.53 K/UL — HIGH (ref 0–0.5)
EOSINOPHIL NFR BLD AUTO: 6.2 % — HIGH (ref 0–6)
GLUCOSE SERPL-MCNC: 149 MG/DL — HIGH (ref 70–99)
HCT VFR BLD CALC: 41.6 % — SIGNIFICANT CHANGE UP (ref 34.5–45)
HGB BLD-MCNC: 13.2 G/DL — SIGNIFICANT CHANGE UP (ref 11.5–15.5)
IMM GRANULOCYTES NFR BLD AUTO: 0.6 % — SIGNIFICANT CHANGE UP (ref 0–1.5)
LYMPHOCYTES # BLD AUTO: 2.69 K/UL — SIGNIFICANT CHANGE UP (ref 1–3.3)
LYMPHOCYTES # BLD AUTO: 31.3 % — SIGNIFICANT CHANGE UP (ref 13–44)
MCHC RBC-ENTMCNC: 27.6 PG — SIGNIFICANT CHANGE UP (ref 27–34)
MCHC RBC-ENTMCNC: 31.7 GM/DL — LOW (ref 32–36)
MCV RBC AUTO: 87 FL — SIGNIFICANT CHANGE UP (ref 80–100)
MONOCYTES # BLD AUTO: 0.69 K/UL — SIGNIFICANT CHANGE UP (ref 0–0.9)
MONOCYTES NFR BLD AUTO: 8 % — SIGNIFICANT CHANGE UP (ref 2–14)
NEUTROPHILS # BLD AUTO: 4.59 K/UL — SIGNIFICANT CHANGE UP (ref 1.8–7.4)
NEUTROPHILS NFR BLD AUTO: 53.3 % — SIGNIFICANT CHANGE UP (ref 43–77)
NRBC # BLD: 0 /100 WBCS — SIGNIFICANT CHANGE UP (ref 0–0)
PLATELET # BLD AUTO: 280 K/UL — SIGNIFICANT CHANGE UP (ref 150–400)
POTASSIUM SERPL-MCNC: 5.2 MMOL/L — SIGNIFICANT CHANGE UP (ref 3.5–5.3)
POTASSIUM SERPL-SCNC: 5.2 MMOL/L — SIGNIFICANT CHANGE UP (ref 3.5–5.3)
PROT SERPL-MCNC: 7.5 G/DL — SIGNIFICANT CHANGE UP (ref 6–8.3)
RBC # BLD: 4.78 M/UL — SIGNIFICANT CHANGE UP (ref 3.8–5.2)
RBC # FLD: 13.5 % — SIGNIFICANT CHANGE UP (ref 10.3–14.5)
SODIUM SERPL-SCNC: 139 MMOL/L — SIGNIFICANT CHANGE UP (ref 135–145)
WBC # BLD: 8.6 K/UL — SIGNIFICANT CHANGE UP (ref 3.8–10.5)
WBC # FLD AUTO: 8.6 K/UL — SIGNIFICANT CHANGE UP (ref 3.8–10.5)

## 2020-10-22 PROCEDURE — 99284 EMERGENCY DEPT VISIT MOD MDM: CPT

## 2020-10-22 NOTE — ED ADULT NURSE NOTE - NSIMPLEMENTINTERV_GEN_ALL_ED
Implemented All Universal Safety Interventions:  [employer] to call system. Call bell, personal items and telephone within reach. Instruct patient to call for assistance. Room bathroom lighting operational. Non-slip footwear when patient is off stretcher. Physically safe environment: no spills, clutter or unnecessary equipment. Stretcher in lowest position, wheels locked, appropriate side rails in place.

## 2020-10-22 NOTE — ED ADULT NURSE NOTE - OBJECTIVE STATEMENT
pt to ED for dizziness for few days, was told low sodium. pt denies n/v/d, numbness, tingling, fever/chills/cough. pt is A+Ox4

## 2020-10-22 NOTE — ED ADULT NURSE NOTE - CHPI ED NUR SYMPTOMS NEG
no blurred vision/no numbness/no loss of consciousness/no confusion/no nausea/no vomiting/no weakness/no fever/no change in level of consciousness

## 2020-10-23 VITALS
TEMPERATURE: 98 F | HEART RATE: 64 BPM | DIASTOLIC BLOOD PRESSURE: 75 MMHG | RESPIRATION RATE: 18 BRPM | OXYGEN SATURATION: 99 % | SYSTOLIC BLOOD PRESSURE: 148 MMHG

## 2020-10-23 PROCEDURE — 70450 CT HEAD/BRAIN W/O DYE: CPT | Mod: 26

## 2020-10-23 PROCEDURE — 84484 ASSAY OF TROPONIN QUANT: CPT

## 2020-10-23 PROCEDURE — 80053 COMPREHEN METABOLIC PANEL: CPT

## 2020-10-23 PROCEDURE — 70450 CT HEAD/BRAIN W/O DYE: CPT

## 2020-10-23 PROCEDURE — 36415 COLL VENOUS BLD VENIPUNCTURE: CPT

## 2020-10-23 PROCEDURE — 85025 COMPLETE CBC W/AUTO DIFF WBC: CPT

## 2020-10-23 PROCEDURE — 99284 EMERGENCY DEPT VISIT MOD MDM: CPT | Mod: 25

## 2020-10-23 RX ORDER — SODIUM CHLORIDE 9 MG/ML
1000 INJECTION INTRAMUSCULAR; INTRAVENOUS; SUBCUTANEOUS
Refills: 0 | Status: DISCONTINUED | OUTPATIENT
Start: 2020-10-23 | End: 2020-10-26

## 2020-10-23 RX ADMIN — SODIUM CHLORIDE 500 MILLILITER(S): 9 INJECTION INTRAMUSCULAR; INTRAVENOUS; SUBCUTANEOUS at 00:44

## 2020-10-23 NOTE — ED PROVIDER NOTE - NSFOLLOWUPINSTRUCTIONS_ED_ALL_ED_FT
Vertigo    WHAT YOU NEED TO KNOW:    Vertigo is a condition that causes you to feel dizzy. You may feel that you or everything around you is moving or spinning. You may also feel like you are being pulled down or toward your side.     DISCHARGE INSTRUCTIONS:    Seek care immediately if:   •You have a headache and a stiff neck.      •You have shaking chills and a fever.       •You vomit over and over with no relief.       •You have blood, pus, or fluid coming out of your ears.      •You are confused.       Contact your healthcare provider if:   •Your symptoms do not get better with treatment.       •You have questions about your condition or care.      Medicines:   •Medicine may be given to help relieve your symptoms.      •Take your medicine as directed. Contact your healthcare provider if you think your medicine is not helping or if you have side effects. Tell him or her if you are allergic to any medicine. Keep a list of the medicines, vitamins, and herbs you take. Include the amounts, and when and why you take them. Bring the list or the pill bottles to follow-up visits. Carry your medicine list with you in case of an emergency.      Manage your symptoms:   •Do not drive, walk without help, or operate heavy machinery when you are dizzy.       •Move slowly when you move from one position to another position. Get up slowly from sitting or lying down. Sit or lie down right away if you feel dizzy.      •Drink plenty of liquids. Liquids help prevent dehydration. Ask how much liquid to drink each day and which liquids are best for you.      •Vestibular and balance rehabilitation therapy (VBRT) is used to teach you exercises to improve your balance and strength. These exercises may help decrease your vertigo and improve your balance. Ask for more information about this therapy.      Follow up with your healthcare provider as directed: Write down your questions so you remember to ask them during your visits.        © Copyright [employer] 2020           back to top                          © Copyright [employer] 2020

## 2020-10-23 NOTE — ED PROVIDER NOTE - CLINICAL SUMMARY MEDICAL DECISION MAKING FREE TEXT BOX
Pt with vertigo, now improved. Labs done, EKG, CXR and CT head. Pt ambulating at baseline in ED. Pt in NAD. Pt with vertigo, now improved. Labs done, EKG, CXR and CT head. Pt ambulating at baseline in ED. Pt in NAD.  Pt given a copy of labs, return precautions given. pt and family express understanding.

## 2020-10-23 NOTE — ED PROVIDER NOTE - OBJECTIVE STATEMENT
Pt is a 70F who presents to ED for dizziness and "low sodium". Pt states she has history of vertigo with exacerbation of dizziness over the past day. Pt was seen by her physician for a physical and told her sodium level was low and to present to ED. Pt has no acute complaints. Pt well appearing in ED, ambulating at baseline. In NAD.

## 2020-10-23 NOTE — ED PROVIDER NOTE - CARE PROVIDER_API CALL
Jyotsna Nix)  Neurology; Vascular Neurology  [address], [address] [place] [address]  Phone: [phone]  Fax: [phone]  Follow Up Time:

## 2020-10-23 NOTE — ED PROVIDER NOTE - PATIENT PORTAL LINK FT
You can access the FollowMyHealth Patient Portal offered by [facility] by registering at the following website: [email or web address]. By joining [employer]’s FollowMyHealth portal, you will also be able to view your health information using other applications (apps) compatible with our system.

## 2020-11-06 ENCOUNTER — EMERGENCY (EMERGENCY)
Facility: HOSPITAL | Age: 70
LOS: 1 days | Discharge: HOME | End: 2020-11-06
Attending: EMERGENCY MEDICINE | Admitting: EMERGENCY MEDICINE
Payer: MEDICARE

## 2020-11-06 VITALS
TEMPERATURE: 98 F | HEIGHT: 63 IN | RESPIRATION RATE: 18 BRPM | WEIGHT: 125 LBS | OXYGEN SATURATION: 99 % | DIASTOLIC BLOOD PRESSURE: 77 MMHG | SYSTOLIC BLOOD PRESSURE: 145 MMHG | HEART RATE: 86 BPM

## 2020-11-06 VITALS
TEMPERATURE: 98 F | OXYGEN SATURATION: 98 % | SYSTOLIC BLOOD PRESSURE: 160 MMHG | RESPIRATION RATE: 17 BRPM | HEART RATE: 67 BPM | DIASTOLIC BLOOD PRESSURE: 79 MMHG

## 2020-11-06 DIAGNOSIS — M54.2 CERVICALGIA: ICD-10-CM

## 2020-11-06 DIAGNOSIS — Z79.4 LONG TERM (CURRENT) USE OF INSULIN: ICD-10-CM

## 2020-11-06 DIAGNOSIS — R42 DIZZINESS AND GIDDINESS: ICD-10-CM

## 2020-11-06 DIAGNOSIS — Z79.899 OTHER LONG TERM (CURRENT) DRUG THERAPY: ICD-10-CM

## 2020-11-06 DIAGNOSIS — I10 ESSENTIAL (PRIMARY) HYPERTENSION: ICD-10-CM

## 2020-11-06 DIAGNOSIS — E11.9 TYPE 2 DIABETES MELLITUS WITHOUT COMPLICATIONS: ICD-10-CM

## 2020-11-06 LAB
ALBUMIN SERPL ELPH-MCNC: 4.8 G/DL — SIGNIFICANT CHANGE UP (ref 3.3–5)
ALP SERPL-CCNC: 80 U/L — SIGNIFICANT CHANGE UP (ref 40–120)
ALT FLD-CCNC: 14 U/L — SIGNIFICANT CHANGE UP (ref 10–45)
ANION GAP SERPL CALC-SCNC: 13 MMOL/L — SIGNIFICANT CHANGE UP (ref 5–17)
APTT BLD: 33.8 SEC — SIGNIFICANT CHANGE UP (ref 27.5–35.5)
AST SERPL-CCNC: 13 U/L — SIGNIFICANT CHANGE UP (ref 10–40)
BASOPHILS # BLD AUTO: 0.05 K/UL — SIGNIFICANT CHANGE UP (ref 0–0.2)
BASOPHILS NFR BLD AUTO: 0.6 % — SIGNIFICANT CHANGE UP (ref 0–2)
BILIRUB SERPL-MCNC: 0.4 MG/DL — SIGNIFICANT CHANGE UP (ref 0.2–1.2)
BUN SERPL-MCNC: 21 MG/DL — SIGNIFICANT CHANGE UP (ref 7–23)
CALCIUM SERPL-MCNC: 11 MG/DL — HIGH (ref 8.4–10.5)
CHLORIDE SERPL-SCNC: 100 MMOL/L — SIGNIFICANT CHANGE UP (ref 96–108)
CO2 SERPL-SCNC: 26 MMOL/L — SIGNIFICANT CHANGE UP (ref 22–31)
CREAT SERPL-MCNC: 1.05 MG/DL — SIGNIFICANT CHANGE UP (ref 0.5–1.3)
EOSINOPHIL # BLD AUTO: 0.38 K/UL — SIGNIFICANT CHANGE UP (ref 0–0.5)
EOSINOPHIL NFR BLD AUTO: 4.2 % — SIGNIFICANT CHANGE UP (ref 0–6)
GLUCOSE SERPL-MCNC: 179 MG/DL — HIGH (ref 70–99)
HCT VFR BLD CALC: 43.3 % — SIGNIFICANT CHANGE UP (ref 34.5–45)
HGB BLD-MCNC: 13.7 G/DL — SIGNIFICANT CHANGE UP (ref 11.5–15.5)
IMM GRANULOCYTES NFR BLD AUTO: 0.9 % — SIGNIFICANT CHANGE UP (ref 0–1.5)
INR BLD: 0.94 — SIGNIFICANT CHANGE UP (ref 0.88–1.16)
LYMPHOCYTES # BLD AUTO: 2.5 K/UL — SIGNIFICANT CHANGE UP (ref 1–3.3)
LYMPHOCYTES # BLD AUTO: 27.5 % — SIGNIFICANT CHANGE UP (ref 13–44)
MCHC RBC-ENTMCNC: 27.7 PG — SIGNIFICANT CHANGE UP (ref 27–34)
MCHC RBC-ENTMCNC: 31.6 GM/DL — LOW (ref 32–36)
MCV RBC AUTO: 87.5 FL — SIGNIFICANT CHANGE UP (ref 80–100)
MONOCYTES # BLD AUTO: 0.71 K/UL — SIGNIFICANT CHANGE UP (ref 0–0.9)
MONOCYTES NFR BLD AUTO: 7.8 % — SIGNIFICANT CHANGE UP (ref 2–14)
NEUTROPHILS # BLD AUTO: 5.37 K/UL — SIGNIFICANT CHANGE UP (ref 1.8–7.4)
NEUTROPHILS NFR BLD AUTO: 59 % — SIGNIFICANT CHANGE UP (ref 43–77)
NRBC # BLD: 0 /100 WBCS — SIGNIFICANT CHANGE UP (ref 0–0)
PLATELET # BLD AUTO: 275 K/UL — SIGNIFICANT CHANGE UP (ref 150–400)
POTASSIUM SERPL-MCNC: 5.2 MMOL/L — SIGNIFICANT CHANGE UP (ref 3.5–5.3)
POTASSIUM SERPL-SCNC: 5.2 MMOL/L — SIGNIFICANT CHANGE UP (ref 3.5–5.3)
PROT SERPL-MCNC: 8.1 G/DL — SIGNIFICANT CHANGE UP (ref 6–8.3)
PROTHROM AB SERPL-ACNC: 11.3 SEC — SIGNIFICANT CHANGE UP (ref 10.6–13.6)
RBC # BLD: 4.95 M/UL — SIGNIFICANT CHANGE UP (ref 3.8–5.2)
RBC # FLD: 13.8 % — SIGNIFICANT CHANGE UP (ref 10.3–14.5)
SODIUM SERPL-SCNC: 139 MMOL/L — SIGNIFICANT CHANGE UP (ref 135–145)
WBC # BLD: 9.09 K/UL — SIGNIFICANT CHANGE UP (ref 3.8–10.5)
WBC # FLD AUTO: 9.09 K/UL — SIGNIFICANT CHANGE UP (ref 3.8–10.5)

## 2020-11-06 PROCEDURE — 70496 CT ANGIOGRAPHY HEAD: CPT | Mod: 26

## 2020-11-06 PROCEDURE — 96374 THER/PROPH/DIAG INJ IV PUSH: CPT | Mod: XU

## 2020-11-06 PROCEDURE — 70498 CT ANGIOGRAPHY NECK: CPT

## 2020-11-06 PROCEDURE — 82962 GLUCOSE BLOOD TEST: CPT

## 2020-11-06 PROCEDURE — 70498 CT ANGIOGRAPHY NECK: CPT | Mod: 26

## 2020-11-06 PROCEDURE — 85730 THROMBOPLASTIN TIME PARTIAL: CPT

## 2020-11-06 PROCEDURE — 70496 CT ANGIOGRAPHY HEAD: CPT

## 2020-11-06 PROCEDURE — 99284 EMERGENCY DEPT VISIT MOD MDM: CPT | Mod: 25

## 2020-11-06 PROCEDURE — 96375 TX/PRO/DX INJ NEW DRUG ADDON: CPT | Mod: XU

## 2020-11-06 PROCEDURE — 99284 EMERGENCY DEPT VISIT MOD MDM: CPT

## 2020-11-06 PROCEDURE — 36415 COLL VENOUS BLD VENIPUNCTURE: CPT

## 2020-11-06 PROCEDURE — 85025 COMPLETE CBC W/AUTO DIFF WBC: CPT

## 2020-11-06 PROCEDURE — 85610 PROTHROMBIN TIME: CPT

## 2020-11-06 PROCEDURE — 80053 COMPREHEN METABOLIC PANEL: CPT

## 2020-11-06 PROCEDURE — 70450 CT HEAD/BRAIN W/O DYE: CPT

## 2020-11-06 RX ORDER — LIDOCAINE 4 G/100G
1 CREAM TOPICAL
Qty: 30 | Refills: 0
Start: 2020-11-06 | End: 2020-12-05

## 2020-11-06 RX ORDER — KETOROLAC TROMETHAMINE 30 MG/ML
15 SYRINGE (ML) INJECTION ONCE
Refills: 0 | Status: DISCONTINUED | OUTPATIENT
Start: 2020-11-06 | End: 2020-11-06

## 2020-11-06 RX ORDER — LIDOCAINE 4 G/100G
1 CREAM TOPICAL ONCE
Refills: 0 | Status: COMPLETED | OUTPATIENT
Start: 2020-11-06 | End: 2020-11-06

## 2020-11-06 RX ORDER — ACETAMINOPHEN 500 MG
1000 TABLET ORAL ONCE
Refills: 0 | Status: COMPLETED | OUTPATIENT
Start: 2020-11-06 | End: 2020-11-06

## 2020-11-06 RX ORDER — TRAMADOL HYDROCHLORIDE 50 MG/1
0.5 TABLET ORAL
Qty: 15 | Refills: 0
Start: 2020-11-06

## 2020-11-06 RX ORDER — IBUPROFEN 200 MG
1 TABLET ORAL
Qty: 12 | Refills: 0
Start: 2020-11-06

## 2020-11-06 RX ADMIN — Medication 400 MILLIGRAM(S): at 10:05

## 2020-11-06 RX ADMIN — Medication 1000 MILLIGRAM(S): at 10:20

## 2020-11-06 RX ADMIN — LIDOCAINE 1 PATCH: 4 CREAM TOPICAL at 12:00

## 2020-11-06 RX ADMIN — Medication 15 MILLIGRAM(S): at 12:01

## 2020-11-06 NOTE — ED PROVIDER NOTE - OBJECTIVE STATEMENT
71 y/o f with PMH of HTN, HLD, DM, hypothyroid presents to ED with right sided lateral neck pain x several days after waking.  Pain with lateral movement.  Hx of vertigo dx in last several months.  Currently with mild vertigo in department.  Denies extremity weakness and numbness.  NO severe headache.

## 2020-11-06 NOTE — ED PROVIDER NOTE - NSFOLLOWUPINSTRUCTIONS_ED_ALL_ED_FT
Take ibuprofen 600mg every six hours for two days only.    Use lidocaine patch as directed.    Take tramadol as directed for moderate to severe pain or before bedtime.    Follow up with your PMD.    Return to ED with worsening symptoms or other concerns.                 Log Out.      QPD CareNotes®     :  [facility]  	                       NECK PAIN - Discharge Care           Neck Pain    WHAT YOU NEED TO KNOW:    You may have sudden neck pain that increases quickly. You may instead feel pain build slowly over time. Neck pain may go away in a few days or weeks, or it may continue for months. The pain may come and go, or be worse with certain movements. The pain may be only in your neck, or it may move to your arms, back, or shoulders. You may also have pain that starts in another body area and moves to your neck. Some types of neck pain are permanent.    Vertebral Column         DISCHARGE INSTRUCTIONS:    Seek care immediately if:   •You have an injury that causes neck pain and shooting pain down your arms or legs.      •Your neck pain suddenly becomes severe.      •You have neck pain along with numbness, tingling, or weakness in your arms or legs.      •You have a stiff neck, a headache, and a fever.      Contact your healthcare provider if:   •You have new or worsening symptoms.      •Your symptoms continue even after treatment.      •You have questions or concerns about your condition or care.      Medicines: You may need any of the following:   •NSAIDs, such as ibuprofen, help decrease swelling, pain, and fever. This medicine is available without a doctor's order. Ask your healthcare provider which medicine to take and how often to take it. Follow directions. NSAIDs can cause stomach bleeding or kidney problems if not taken correctly. If you take blood thinner medicine, always ask if NSAIDs are safe for you.      •Acetaminophen helps decrease pain and fever. Ask your healthcare provider how much to take and how often to take it. Follow directions. Acetaminophen can cause liver damage if not taken correctly.      •Steroid medicine may be used to reduce inflammation. This can help relieve pain caused by swelling.      •Take your medicine as directed. Contact your healthcare provider if you think your medicine is not helping or if you have side effects. Tell him or her if you are allergic to any medicine. Keep a list of the medicines, vitamins, and herbs you take. Include the amounts, and when and why you take them. Bring the list or the pill bottles to follow-up visits. Carry your medicine list with you in case of an emergency.      Manage or prevent neck pain:   •Rest your neck as directed. Do not make sudden movements, such as turning your head quickly. Your healthcare provider may recommend you wear a cervical collar for a short time. The collar will prevent you from moving your head. This will give your neck time to heal if an injury is causing your neck pain. Ask your healthcare provider when you can return to sports or other normal daily activities.      •Apply heat as directed. Heat helps relieve pain and swelling. Use a heat wrap, or soak a small towel in warm water. Wring out the extra water. Apply the heat wrap or towel for 20 minutes every hour, or as directed.      •Apply ice as directed. Ice helps relieve pain and swelling, and can help prevent tissue damage. Use an ice pack, or put ice in a bag. Cover the ice pack or back with a towel before you apply it to your neck. Apply the ice pack or ice for 15 minutes every hour, or as directed. Your healthcare provider can tell you how often to apply ice.      •Do neck exercises as directed. Neck exercises help strengthen the muscles and increase range of motion. Your healthcare provider will tell you which exercises are right for you. He may give you instructions, or he may recommend that you work with a physical therapist. Your healthcare provider or therapist can make sure you are doing the exercises correctly.       •Maintain good posture. Try to keep your head and shoulders lifted when you sit. If you work in front of a computer, make sure the monitor is at the right level. You should not need to look up down to see the screen. You should also not have to lean forward to be able to read what is on the screen. Make sure your keyboard, mouse, and other computer items are placed where you do not have to extend your shoulder to reach them. Get up often if you work in front of a computer or sit for long periods of time. Stretch or walk around to keep your neck muscles loose.      Follow up with your healthcare provider as directed: Your healthcare provider may refer you to a specialist if your pain does not get better with treatment. Write down your questions so you remember to ask them during your visits.       © Copyright [email or web address] 2020           back to top                          © Copyright [email or web address] 2020

## 2020-11-06 NOTE — ED PROVIDER NOTE - CLINICAL SUMMARY MEDICAL DECISION MAKING FREE TEXT BOX
Pt with right lateral neck pain reproducible with palpation, hx of vertigo - mild vertigo in ED, no other focal deficits, will obtain basic labs, imaging, iv tylenol and reevaluate. Pt with right lateral neck pain reproducible with palpation, hx of vertigo - mild vertigo in ED, no other focal deficits, will obtain basic labs, imaging, iv tylenol and reevaluate.  Pt with incidental findings on CTA - no dissection or arterial compromise.  Pt aware of incidental findings - also degenerative change in neck which could be causing symptoms.  Discussed pain regimen with granddaughter and pt aware.

## 2020-11-06 NOTE — ED PROVIDER NOTE - PATIENT PORTAL LINK FT
You can access the FollowMyHealth Patient Portal offered by [facility] by registering at the following website: [email or web address]. By joining [employer]’s FollowMyHealth portal, you will also be able to view your health information using other applications (apps) compatible with our system.

## 2020-11-06 NOTE — ED ADULT TRIAGE NOTE - CHIEF COMPLAINT QUOTE
69 y/o female returns to ED for evaluation of neck pain and dizziness for one week. Pt reports that she was seen in ED last week and was diagnosed with vertigo. Pt however reports neck pain is worse since last visit. Pt denies fever, chills or headache.

## 2020-11-06 NOTE — ED ADULT NURSE NOTE - OBJECTIVE STATEMENT
Patient presents to the ED complaining of bilateral neck pain for a few days. Pain with ROM. Denies any dizziness or weakness. Patient presents to the ED complaining of bilateral neck pain for a few days, with more pain on the right side of the neck. Pain with ROM. Denies any dizziness or weakness. No headache. No vision changes. Patient recently in the ED here for vertigo.

## 2020-12-14 ENCOUNTER — APPOINTMENT (OUTPATIENT)
Dept: UROLOGY | Facility: CLINIC | Age: 70
End: 2020-12-14
Payer: MEDICARE

## 2020-12-14 VITALS — DIASTOLIC BLOOD PRESSURE: 81 MMHG | SYSTOLIC BLOOD PRESSURE: 159 MMHG | HEART RATE: 64 BPM | TEMPERATURE: 97.3 F

## 2020-12-14 LAB
BASOPHILS # BLD AUTO: 0.08 K/UL
BASOPHILS NFR BLD AUTO: 0.9 %
EOSINOPHIL # BLD AUTO: 0.63 K/UL
EOSINOPHIL NFR BLD AUTO: 7.4 %
HCT VFR BLD CALC: 43.6 %
HGB BLD-MCNC: 13.2 G/DL
IMM GRANULOCYTES NFR BLD AUTO: 0.8 %
LYMPHOCYTES # BLD AUTO: 2.56 K/UL
LYMPHOCYTES NFR BLD AUTO: 29.9 %
MAN DIFF?: NORMAL
MCHC RBC-ENTMCNC: 27.2 PG
MCHC RBC-ENTMCNC: 30.3 GM/DL
MCV RBC AUTO: 89.7 FL
MONOCYTES # BLD AUTO: 0.57 K/UL
MONOCYTES NFR BLD AUTO: 6.7 %
NEUTROPHILS # BLD AUTO: 4.64 K/UL
NEUTROPHILS NFR BLD AUTO: 54.3 %
PLATELET # BLD AUTO: 288 K/UL
RBC # BLD: 4.86 M/UL
RBC # FLD: 13.8 %
WBC # FLD AUTO: 8.55 K/UL

## 2020-12-14 PROCEDURE — 99213 OFFICE O/P EST LOW 20 MIN: CPT

## 2020-12-14 NOTE — LETTER BODY
[Dear  ___] : Dear  [unfilled], [Courtesy Letter:] : I had the pleasure of seeing your patient, [unfilled], in my office today. [Please see my note below.] : Please see my note below. [Consult Closing:] : Thank you very much for allowing me to participate in the care of this patient.  If you have any questions, please do not hesitate to contact me. [Sincerely,] : Sincerely, [FreeTextEntry2] : Delaney Erazo DO\par [address]\[place], [address] [FreeTextEntry3] : Jose Villafuerte MD\par Urologic Oncology\par Department of Urology\par [facility]\par \par [employer] [facility] \par \par

## 2020-12-14 NOTE — HISTORY OF PRESENT ILLNESS
[FreeTextEntry1] : 70yo female recently discharged from [facility]. 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 She underwent right radical nephrectomy with IVC thrombectomy 11/22. Did well postoperatively. Received 1 unit pRBC before discharge. Discharge hgb was 10. \par \par 3/09/20 Here for f/u. Doing well. Had PET scan recently which shows mild FDG activity near renal hilum with no appreciable mass, likely postsurgical changes. \par \par 6/08/20 Here for f/u. Doing well. Scheduled for imaging this week of chest/abdomen/pelvis. \par \par 9/21/20 Here for f/u. Had MRI 7/2020 which shows no recurrence. Primary complaint is incisional hernia. \par \par 12/14/20 Here for f/u. She decided to defer hernia surgery for now, not bothersome. No recent imaging. [Fever] : no fever [Fatigue] : no fatigue [Nausea] : no nausea [Incisional Drainage] : no incisional drainage [Incisional Pain] : no incisional pain [None] : None

## 2020-12-14 NOTE — ASSESSMENT
[FreeTextEntry1] : 69yo female with [id number] clear cell carcinoma s/p nephrectomy, IVC thrombectomy 11/22/19\par Doing well\par CBC, CMP today\par CT C/A/P \par f/u 6 months pending test results

## 2020-12-15 ENCOUNTER — NON-APPOINTMENT (OUTPATIENT)
Age: 70
End: 2020-12-15

## 2020-12-15 LAB
ALBUMIN SERPL ELPH-MCNC: 4.6 G/DL
ALP BLD-CCNC: 78 U/L
ALT SERPL-CCNC: 18 U/L
ANION GAP SERPL CALC-SCNC: 12 MMOL/L
AST SERPL-CCNC: 18 U/L
BILIRUB SERPL-MCNC: 0.4 MG/DL
BUN SERPL-MCNC: 24 MG/DL
CALCIUM SERPL-MCNC: 10.7 MG/DL
CHLORIDE SERPL-SCNC: 100 MMOL/L
CO2 SERPL-SCNC: 25 MMOL/L
CREAT SERPL-MCNC: 1.37 MG/DL
GLUCOSE SERPL-MCNC: 176 MG/DL
POTASSIUM SERPL-SCNC: 5.6 MMOL/L
PROT SERPL-MCNC: 7.5 G/DL
SODIUM SERPL-SCNC: 137 MMOL/L

## 2020-12-19 ENCOUNTER — APPOINTMENT (OUTPATIENT)
Dept: CT IMAGING | Facility: HOSPITAL | Age: 70
End: 2020-12-19
Payer: MEDICARE

## 2020-12-19 ENCOUNTER — RESULT REVIEW (OUTPATIENT)
Age: 70
End: 2020-12-19

## 2020-12-19 ENCOUNTER — OUTPATIENT (OUTPATIENT)
Dept: OUTPATIENT SERVICES | Facility: HOSPITAL | Age: 70
LOS: 1 days | End: 2020-12-19
Payer: MEDICARE

## 2020-12-19 PROCEDURE — 74178 CT ABD&PLV WO CNTR FLWD CNTR: CPT | Mod: 26,MH

## 2020-12-19 PROCEDURE — 71270 CT THORAX DX C-/C+: CPT | Mod: 26,MH

## 2020-12-22 ENCOUNTER — NON-APPOINTMENT (OUTPATIENT)
Age: 70
End: 2020-12-22

## 2021-01-08 NOTE — ED ADULT NURSE NOTE - NSFALLRSKASSESASSIST_ED_ALL_ED
Pt stated  pounds. As per chart- weight history from previous admission - 394.8 pounds (11/25/20) .   Dosing weight from current admission 399 pounds (01/06/21).     Pt stated PO navitke is good in-house. Consumed 100% of her breakfast. Denies any chewing and swallowing difficulties. Denies any cause of nausea/vomiting/diarrhea. Pt stated she has constipation. Noted pt is on bowel regimen Miralax.     Provided verbal + written consistent carbohydrate nutrition therapy and hypertension nutrition therapy  diet education. Discussed carbohydrate counting, and foods that have carbohydrates. Explained meal planning with diabetes and serving sizes of carbohydrates. Emphasized consumption of foods with fiber (ie whole grains) and limiting salt and saturated fat intake. Recommended limited salt intake. Reviewed foods high in salt and amount of salt recommended per day. Emphasized the importance of limiting processed foods and frozen meals. Discussed importance of adequate intake & consuming protein w/ all meals and snacks for optimal nutrition & glycemic control. Discussed protein rich foods available on menu. Discussed consuming protein first at meal times and then eating the other foods. Pt able to teach back 2 points of nutrition education provided. Offered breanna x1 per day for optimal protein intake. Pt is amenable to try the supplement and follow the diet education. Pt made aware  Dietetic Intern to remain available. no Pt stated  pounds. As per chart- weight history from previous admission - 394.8 pounds (11/25/20) .   Dosing weight from current admission 399 pounds (01/06/21).     Pt stated PO rina is good in-house. Consumed 100% of her breakfast. Denies any chewing and swallowing difficulties. Denies any cause of nausea/vomiting/diarrhea. Pt stated she has constipation. Last bowel movement on 01/05/21. Noted pt is on bowel regimen Miralax.     Provided verbal + written consistent carbohydrate nutrition therapy and hypertension nutrition therapy  diet education. Discussed carbohydrate counting, and foods that have carbohydrates. Explained meal planning with diabetes and serving sizes of carbohydrates. Emphasized consumption of foods with fiber (ie whole grains) and limiting salt and saturated fat intake. Recommended limited salt intake. Reviewed foods high in salt and amount of salt recommended per day. Emphasized the importance of limiting processed foods and frozen meals. Discussed importance of adequate intake & consuming protein w/ all meals and snacks for optimal nutrition & glycemic control. Discussed protein rich foods available on menu. Discussed consuming protein first at meal times and then eating the other foods. Pt able to teach back 2 points of nutrition education provided. Offered breanna x1 per day for optimal protein intake. Pt is amenable to try the supplement and follow the diet education. Pt made aware  Dietetic Intern to remain available. Pt stated  pounds. As per chart- weight history from previous admission - 394.8 pounds (11/25/20) .   Dosing weight from current admission 399 pounds (01/06/21).     Pt stated PO inatke is good in-house. Consumed 100% of her breakfast. Denies any chewing and swallowing difficulties. Denies any cause of nausea/vomiting/diarrhea. Pt stated she has constipation. Last bowel movement on 01/05/21. Noted pt is on bowel regimen Miralax.     Pt reported she checks her finger stick at home 4 times a day and  finger stick reading average number . However, elevated A1C 10.4 (11/10/20) does not correspond with reported finger stick.     Pts education focusing on her high A1C 10.4 level.   Provided verbal + written consistent carbohydrate nutrition therapy and hypertension nutrition therapy  diet education. Discussed carbohydrate counting, and foods that have carbohydrates. Explained meal planning with diabetes and serving sizes of carbohydrates. Emphasized consumption of foods with fiber (ie whole grains) and limiting salt and saturated fat intake. Recommended limited salt intake. Emphasized the importance of limiting processed foods and frozen meals. Discussed importance of adequate intake & consuming protein w/ all meals and snacks for optimal nutrition & glycemic control. Discussed protein rich foods available on menu. Discussed consuming protein first at meal times and then eating the other foods. Pt able to teach back 2 points of nutrition education provided. Offered breanna x1 per day for optimal protein intake. Pt is amenable to try the supplement and follow the diet education. Pt made aware  Dietetic Intern to remain available. Pt stated  pounds. As per chart- weight history from previous admission - 394.8 pounds (11/25/20) .   Dosing weight from current admission 399 pounds (01/06/21).     Pt stated PO inatke is good in-house. Consumed 100% of her breakfast. Denies any chewing and swallowing difficulties. Denies any cause of nausea/vomiting/diarrhea. Pt stated she has constipation. Last bowel movement on 01/05/21. Noted pt is on bowel regimen Miralax.     Pt reported she checks her finger stick at home 4 times a day and  finger stick reading average number . However, elevated A1C 10.4 (11/10/20) does not correspond with reported finger stick.     Pts education focusing on her high A1C 10.4 level.                                                                 Provided verbal + written consistent carbohydrate nutrition therapy  diet education. Discussed carbohydrate counting, and foods that have carbohydrates. Explained meal planning with diabetes and serving sizes of carbohydrates. Emphasized consumption of foods with fiber (ie whole grains) and limiting salt and saturated fat intake. Recommended limited salt intake. Emphasized the importance of limiting processed foods and frozen meals. Discussed importance of adequate intake & consuming protein w/ all meals and snacks for optimal nutrition & glycemic control. Discussed protein rich foods available on menu. Discussed consuming protein first at meal times and then eating the other foods. Pt able to teach back 2 points of nutrition education provided. Offered breanna x1 per day for optimal protein intake. Pt is amenable to try the supplement and follow the diet education. Pt made aware  Dietetic Intern to remain available.

## 2021-01-11 PROCEDURE — 97116 GAIT TRAINING THERAPY: CPT

## 2021-01-11 PROCEDURE — 74178 CT ABD&PLV WO CNTR FLWD CNTR: CPT

## 2021-01-11 PROCEDURE — 71270 CT THORAX DX C-/C+: CPT

## 2021-01-27 ENCOUNTER — APPOINTMENT (OUTPATIENT)
Dept: NEPHROLOGY | Facility: CLINIC | Age: 71
End: 2021-01-27
Payer: MEDICARE

## 2021-01-27 VITALS
DIASTOLIC BLOOD PRESSURE: 80 MMHG | OXYGEN SATURATION: 96 % | RESPIRATION RATE: 14 BRPM | HEART RATE: 68 BPM | SYSTOLIC BLOOD PRESSURE: 150 MMHG

## 2021-01-27 DIAGNOSIS — H54.7 UNSPECIFIED VISUAL LOSS: ICD-10-CM

## 2021-01-27 DIAGNOSIS — Z85.528 PERSONAL HISTORY OF OTHER MALIGNANT NEOPLASM OF KIDNEY: ICD-10-CM

## 2021-01-27 PROCEDURE — 99204 OFFICE O/P NEW MOD 45 MIN: CPT

## 2021-01-27 RX ORDER — DENOSUMAB 60 MG/ML
INJECTION SUBCUTANEOUS
Refills: 0 | Status: ACTIVE | COMMUNITY

## 2021-01-27 NOTE — HISTORY OF PRESENT ILLNESS
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 Kirk Jasmine\par \par Daughter who is an NP, is main historian and . \par Says high K noted pre-nephrectomy, stopped ARB (Losartan) over 1 year, follows low K diet and placed on once weekly Kayexalate however hyperkalemia persists. \par Pt feels well, very active. Good appetite and energy, stable weight. No nimbuses'/tingling or muscle cramping, no twitches/tremors. \par Occasional lumbar or cervical pain for many years. \par \par OTC: occ CoQ10

## 2021-01-27 NOTE — PHYSICAL EXAM
[General Appearance - Alert] : alert [Sclera] : the sclera and conjunctiva were normal [Outer Ear] : the ears and nose were normal in appearance [Jugular Venous Distention Increased] : there was no jugular-venous distention [Exaggerated Use Of Accessory Muscles For Inspiration] : no accessory muscle use [Heart Rate And Rhythm] : heart rate was normal and rhythm regular [Edema] : there was no peripheral edema [Abnormal Walk] : normal gait [Musculoskeletal - Swelling] : no joint swelling seen [] : no rash [Cranial Nerves] : cranial nerves 2-12 were intact [No Focal Deficits] : no focal deficits [Oriented To Time, Place, And Person] : oriented to person, place, and time [Impaired Insight] : insight and judgment were intact

## 2021-01-27 NOTE — CONSULT LETTER
[Dear  ___] : Dear  [unfilled], [Consult Letter:] : I had the pleasure of evaluating your patient, [unfilled]. [Please see my note below.] : Please see my note below. [Consult Closing:] : Thank you very much for allowing me to participate in the care of this patient.  If you have any questions, please do not hesitate to contact me. [Sincerely,] : Sincerely, [FreeTextEntry3] : Swetha Suero MD\par  of Medicine\par Division of Kidney Diseases and Hypertension\par [facility] \par [employer] [facility] Tel: [phone]\par Email: [email or web address] \par  [DrBraden  ___] : Dr. CALIX

## 2021-01-27 NOTE — ASSESSMENT
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 Cr 1.37 egfr 39-46 with mild eosinophilia, Cr up from  1.1 baseline 2019-june 2020. k's mid 5's low 6's. Ca 10-11s\par CT 12/20 L kidney 4.6cm exophytic cyst and several small ones \par \par - check hypercalcemia w/u, may have primary hyperpara still given the whole parathyroid wasn't taken out, vs paraneoplastic hypercalcemia 2/2 clear cell renal carcinoma. r/o paraproteinemia although less likely, normal serum protein gap and no anemia.\par - check FeK, cystatin C - suspect hyperkalemia is 2/2 PO intake in the setting of mild renal dysfunction in solitary kidney. Check ashleigh/renin. CBC wnl, no evidence of cell lysis. No urinary obstruction on CT\par \par RTC in 2 months for f/u\par

## 2021-02-01 ENCOUNTER — TRANSCRIPTION ENCOUNTER (OUTPATIENT)
Age: 71
End: 2021-02-01

## 2021-02-28 LAB
24R-OH-CALCIDIOL SERPL-MCNC: 17.4 PG/ML
25(OH)D3 SERPL-MCNC: 19.3 NG/ML
ALBUMIN MFR SERPL ELPH: 57.6 %
ALBUMIN SERPL ELPH-MCNC: 4.8 G/DL
ALBUMIN SERPL ELPH-MCNC: 4.8 G/DL
ALBUMIN SERPL-MCNC: 4.7 G/DL
ALBUMIN/GLOB SERPL: 1.4 RATIO
ALDOSTERONE SERUM: 9.4 NG/DL
ALP BLD-CCNC: 84 U/L
ALPHA1 GLOB MFR SERPL ELPH: 3.6 %
ALPHA1 GLOB SERPL ELPH-MCNC: 0.3 G/DL
ALPHA2 GLOB MFR SERPL ELPH: 9.4 %
ALPHA2 GLOB SERPL ELPH-MCNC: 0.8 G/DL
ALT SERPL-CCNC: 20 U/L
ANION GAP SERPL CALC-SCNC: 13 MMOL/L
APPEARANCE: CLEAR
APPEARANCE: CLEAR
AST SERPL-CCNC: 16 U/L
B-GLOBULIN MFR SERPL ELPH: 13.1 %
B-GLOBULIN SERPL ELPH-MCNC: 1.1 G/DL
BACTERIA: NEGATIVE
BASOPHILS # BLD AUTO: 0.05 K/UL
BASOPHILS NFR BLD AUTO: 0.6 %
BILIRUB DIRECT SERPL-MCNC: 0.1 MG/DL
BILIRUB INDIRECT SERPL-MCNC: 0.2 MG/DL
BILIRUB SERPL-MCNC: 0.4 MG/DL
BILIRUBIN URINE: NEGATIVE
BILIRUBIN URINE: NEGATIVE
BLOOD URINE: NEGATIVE
BLOOD URINE: NEGATIVE
BUN SERPL-MCNC: 20 MG/DL
CALCIUM ?TM UR-MCNC: 6.9 MG/DL
CALCIUM SERPL-MCNC: 11.1 MG/DL
CALCIUM SERPL-MCNC: 11.1 MG/DL
CHLORIDE SERPL-SCNC: 104 MMOL/L
CO2 SERPL-SCNC: 25 MMOL/L
COLOR: COLORLESS
COLOR: COLORLESS
CREAT SERPL-MCNC: 1.16 MG/DL
CREAT SPEC-SCNC: 33 MG/DL
CREAT SPEC-SCNC: 33 MG/DL
CREAT/PROT UR: 0.2 RATIO
CYSTATIN C SERPL-MCNC: 1.31 MG/L
DEPRECATED KAPPA LC FREE/LAMBDA SER: 1.72 RATIO
EOSINOPHIL # BLD AUTO: 0.62 K/UL
EOSINOPHIL NFR BLD AUTO: 7.8 %
GAMMA GLOB FLD ELPH-MCNC: 1.3 G/DL
GAMMA GLOB MFR SERPL ELPH: 16.3 %
GFR/BSA.PRED SERPLBLD CYS-BASED-ARV: 49 ML/MIN
GLUCOSE QUALITATIVE U: NEGATIVE
GLUCOSE QUALITATIVE U: NEGATIVE
GLUCOSE SERPL-MCNC: 158 MG/DL
HCT VFR BLD CALC: 44.5 %
HGB BLD-MCNC: 14 G/DL
HYALINE CASTS: 0 /LPF
IGA SER QL IEP: 419 MG/DL
IGG SER QL IEP: 1348 MG/DL
IGM SER QL IEP: 66 MG/DL
IMM GRANULOCYTES NFR BLD AUTO: 0.9 %
INTERPRETATION SERPL IEP-IMP: NORMAL
KAPPA LC CSF-MCNC: 2.7 MG/DL
KAPPA LC SERPL-MCNC: 4.64 MG/DL
KETONES URINE: NEGATIVE
KETONES URINE: NEGATIVE
LEUKOCYTE ESTERASE URINE: NEGATIVE
LEUKOCYTE ESTERASE URINE: NEGATIVE
LYMPHOCYTES # BLD AUTO: 2.71 K/UL
LYMPHOCYTES NFR BLD AUTO: 33.9 %
M PROTEIN SPEC IFE-MCNC: NORMAL
MAN DIFF?: NORMAL
MCHC RBC-ENTMCNC: 27.9 PG
MCHC RBC-ENTMCNC: 31.5 GM/DL
MCV RBC AUTO: 88.8 FL
MICROALBUMIN 24H UR DL<=1MG/L-MCNC: 2.2 MG/DL
MICROALBUMIN/CREAT 24H UR-RTO: 67 MG/G
MICROSCOPIC-UA: NORMAL
MONOCYTES # BLD AUTO: 0.55 K/UL
MONOCYTES NFR BLD AUTO: 6.9 %
NEUTROPHILS # BLD AUTO: 4 K/UL
NEUTROPHILS NFR BLD AUTO: 49.9 %
NITRITE URINE: NEGATIVE
NITRITE URINE: NEGATIVE
PARATHYROID HORMONE INTACT: 43 PG/ML
PH URINE: 6
PH URINE: 6
PHOSPHATE 24H UR-MCNC: 24.4 MG/DL
PHOSPHATE SERPL-MCNC: 3.3 MG/DL
PLATELET # BLD AUTO: 318 K/UL
POTASSIUM SERPL-SCNC: 5.4 MMOL/L
POTASSIUM UR-SCNC: 25.3 MMOL/L
PROT SERPL-MCNC: 7.8 G/DL
PROT SERPL-MCNC: 8.1 G/DL
PROT SERPL-MCNC: 8.1 G/DL
PROT UR-MCNC: 7 MG/DL
PROTEIN URINE: NEGATIVE
PROTEIN URINE: NEGATIVE
PTH RELATED PROT SERPL-MCNC: <2 PMOL/L
RBC # BLD: 5.01 M/UL
RBC # FLD: 13.5 %
RED BLOOD CELLS URINE: 0 /HPF
RENIN ACTIVITY, PLASMA: 0.41 NG/ML/HR
SODIUM ?TM SUB UR QN: 134 MMOL/L
SODIUM SERPL-SCNC: 142 MMOL/L
SPECIFIC GRAVITY URINE: 1.01
SPECIFIC GRAVITY URINE: 1.01
SQUAMOUS EPITHELIAL CELLS: 0 /HPF
TSH SERPL-ACNC: 2.02 UIU/ML
UROBILINOGEN URINE: NORMAL
UROBILINOGEN URINE: NORMAL
WBC # FLD AUTO: 8 K/UL
WHITE BLOOD CELLS URINE: 0 /HPF

## 2021-03-02 ENCOUNTER — RX RENEWAL (OUTPATIENT)
Age: 71
End: 2021-03-02

## 2021-03-14 NOTE — ED ADULT NURSE NOTE - FACIAL SYMMETRY
symmetrical
I personally performed the service described in the documentation recorded by the scribe in my presence, and it accurately and completely records my words and actions.

## 2021-04-05 ENCOUNTER — EMERGENCY (EMERGENCY)
Facility: HOSPITAL | Age: 71
LOS: 1 days | Discharge: HOME | End: 2021-04-05
Attending: EMERGENCY MEDICINE | Admitting: EMERGENCY MEDICINE
Payer: MEDICARE

## 2021-04-05 VITALS
SYSTOLIC BLOOD PRESSURE: 179 MMHG | WEIGHT: 134.92 LBS | TEMPERATURE: 98 F | RESPIRATION RATE: 18 BRPM | HEART RATE: 72 BPM | DIASTOLIC BLOOD PRESSURE: 82 MMHG | HEIGHT: 63 IN | OXYGEN SATURATION: 98 %

## 2021-04-05 DIAGNOSIS — Z90.5 ACQUIRED ABSENCE OF KIDNEY: Chronic | ICD-10-CM

## 2021-04-05 DIAGNOSIS — E11.9 TYPE 2 DIABETES MELLITUS WITHOUT COMPLICATIONS: ICD-10-CM

## 2021-04-05 DIAGNOSIS — K57.92 DIVERTICULITIS OF INTESTINE, PART UNSPECIFIED, WITHOUT PERFORATION OR ABSCESS WITHOUT BLEEDING: ICD-10-CM

## 2021-04-05 DIAGNOSIS — E03.9 HYPOTHYROIDISM, UNSPECIFIED: ICD-10-CM

## 2021-04-05 DIAGNOSIS — Z79.899 OTHER LONG TERM (CURRENT) DRUG THERAPY: ICD-10-CM

## 2021-04-05 DIAGNOSIS — E78.5 HYPERLIPIDEMIA, UNSPECIFIED: ICD-10-CM

## 2021-04-05 DIAGNOSIS — Z79.84 LONG TERM (CURRENT) USE OF ORAL HYPOGLYCEMIC DRUGS: ICD-10-CM

## 2021-04-05 DIAGNOSIS — Z79.1 LONG TERM (CURRENT) USE OF NON-STEROIDAL ANTI-INFLAMMATORIES (NSAID): ICD-10-CM

## 2021-04-05 DIAGNOSIS — E78.00 PURE HYPERCHOLESTEROLEMIA, UNSPECIFIED: ICD-10-CM

## 2021-04-05 DIAGNOSIS — R10.32 LEFT LOWER QUADRANT PAIN: ICD-10-CM

## 2021-04-05 DIAGNOSIS — Z90.5 ACQUIRED ABSENCE OF KIDNEY: ICD-10-CM

## 2021-04-05 DIAGNOSIS — I10 ESSENTIAL (PRIMARY) HYPERTENSION: ICD-10-CM

## 2021-04-05 LAB
ALBUMIN SERPL ELPH-MCNC: 4.5 G/DL — SIGNIFICANT CHANGE UP (ref 3.3–5)
ALP SERPL-CCNC: 67 U/L — SIGNIFICANT CHANGE UP (ref 40–120)
ALT FLD-CCNC: 15 U/L — SIGNIFICANT CHANGE UP (ref 10–45)
ANION GAP SERPL CALC-SCNC: 10 MMOL/L — SIGNIFICANT CHANGE UP (ref 5–17)
APPEARANCE UR: CLEAR — SIGNIFICANT CHANGE UP
AST SERPL-CCNC: 16 U/L — SIGNIFICANT CHANGE UP (ref 10–40)
BASOPHILS # BLD AUTO: 0.04 K/UL — SIGNIFICANT CHANGE UP (ref 0–0.2)
BASOPHILS NFR BLD AUTO: 0.4 % — SIGNIFICANT CHANGE UP (ref 0–2)
BILIRUB SERPL-MCNC: 0.3 MG/DL — SIGNIFICANT CHANGE UP (ref 0.2–1.2)
BILIRUB UR-MCNC: NEGATIVE — SIGNIFICANT CHANGE UP
BUN SERPL-MCNC: 28 MG/DL — HIGH (ref 7–23)
CALCIUM SERPL-MCNC: 9.8 MG/DL — SIGNIFICANT CHANGE UP (ref 8.4–10.5)
CHLORIDE SERPL-SCNC: 106 MMOL/L — SIGNIFICANT CHANGE UP (ref 96–108)
CO2 SERPL-SCNC: 26 MMOL/L — SIGNIFICANT CHANGE UP (ref 22–31)
COLOR SPEC: YELLOW — SIGNIFICANT CHANGE UP
CREAT SERPL-MCNC: 1.05 MG/DL — SIGNIFICANT CHANGE UP (ref 0.5–1.3)
DIFF PNL FLD: NEGATIVE — SIGNIFICANT CHANGE UP
EOSINOPHIL # BLD AUTO: 0.6 K/UL — HIGH (ref 0–0.5)
EOSINOPHIL NFR BLD AUTO: 6.1 % — HIGH (ref 0–6)
GLUCOSE SERPL-MCNC: 145 MG/DL — HIGH (ref 70–99)
GLUCOSE UR QL: NEGATIVE — SIGNIFICANT CHANGE UP
HCT VFR BLD CALC: 39.1 % — SIGNIFICANT CHANGE UP (ref 34.5–45)
HGB BLD-MCNC: 12.5 G/DL — SIGNIFICANT CHANGE UP (ref 11.5–15.5)
IMM GRANULOCYTES NFR BLD AUTO: 0.4 % — SIGNIFICANT CHANGE UP (ref 0–1.5)
KETONES UR-MCNC: ABNORMAL MG/DL
LACTATE SERPL-SCNC: 1 MMOL/L — SIGNIFICANT CHANGE UP (ref 0.5–2)
LEUKOCYTE ESTERASE UR-ACNC: NEGATIVE — SIGNIFICANT CHANGE UP
LIDOCAIN IGE QN: 50 U/L — SIGNIFICANT CHANGE UP (ref 7–60)
LYMPHOCYTES # BLD AUTO: 2.17 K/UL — SIGNIFICANT CHANGE UP (ref 1–3.3)
LYMPHOCYTES # BLD AUTO: 22 % — SIGNIFICANT CHANGE UP (ref 13–44)
MCHC RBC-ENTMCNC: 27.5 PG — SIGNIFICANT CHANGE UP (ref 27–34)
MCHC RBC-ENTMCNC: 32 GM/DL — SIGNIFICANT CHANGE UP (ref 32–36)
MCV RBC AUTO: 85.9 FL — SIGNIFICANT CHANGE UP (ref 80–100)
MONOCYTES # BLD AUTO: 0.66 K/UL — SIGNIFICANT CHANGE UP (ref 0–0.9)
MONOCYTES NFR BLD AUTO: 6.7 % — SIGNIFICANT CHANGE UP (ref 2–14)
NEUTROPHILS # BLD AUTO: 6.36 K/UL — SIGNIFICANT CHANGE UP (ref 1.8–7.4)
NEUTROPHILS NFR BLD AUTO: 64.4 % — SIGNIFICANT CHANGE UP (ref 43–77)
NITRITE UR-MCNC: NEGATIVE — SIGNIFICANT CHANGE UP
NRBC # BLD: 0 /100 WBCS — SIGNIFICANT CHANGE UP (ref 0–0)
PH UR: 6 — SIGNIFICANT CHANGE UP (ref 5–8)
PLATELET # BLD AUTO: 282 K/UL — SIGNIFICANT CHANGE UP (ref 150–400)
POTASSIUM SERPL-MCNC: 4.3 MMOL/L — SIGNIFICANT CHANGE UP (ref 3.5–5.3)
POTASSIUM SERPL-SCNC: 4.3 MMOL/L — SIGNIFICANT CHANGE UP (ref 3.5–5.3)
PROT SERPL-MCNC: 7.5 G/DL — SIGNIFICANT CHANGE UP (ref 6–8.3)
PROT UR-MCNC: NEGATIVE MG/DL — SIGNIFICANT CHANGE UP
RBC # BLD: 4.55 M/UL — SIGNIFICANT CHANGE UP (ref 3.8–5.2)
RBC # FLD: 13.5 % — SIGNIFICANT CHANGE UP (ref 10.3–14.5)
SODIUM SERPL-SCNC: 142 MMOL/L — SIGNIFICANT CHANGE UP (ref 135–145)
SP GR SPEC: 1.01 — SIGNIFICANT CHANGE UP (ref 1–1.03)
UROBILINOGEN FLD QL: 0.2 E.U./DL — SIGNIFICANT CHANGE UP
WBC # BLD: 9.87 K/UL — SIGNIFICANT CHANGE UP (ref 3.8–10.5)
WBC # FLD AUTO: 9.87 K/UL — SIGNIFICANT CHANGE UP (ref 3.8–10.5)

## 2021-04-05 PROCEDURE — 74177 CT ABD & PELVIS W/CONTRAST: CPT | Mod: 26,MA

## 2021-04-05 PROCEDURE — 99284 EMERGENCY DEPT VISIT MOD MDM: CPT | Mod: CS

## 2021-04-05 RX ORDER — IOHEXOL 300 MG/ML
30 INJECTION, SOLUTION INTRAVENOUS ONCE
Refills: 0 | Status: COMPLETED | OUTPATIENT
Start: 2021-04-05 | End: 2021-04-05

## 2021-04-05 RX ORDER — ONDANSETRON 8 MG/1
4 TABLET, FILM COATED ORAL ONCE
Refills: 0 | Status: COMPLETED | OUTPATIENT
Start: 2021-04-05 | End: 2021-04-05

## 2021-04-05 RX ORDER — ACETAMINOPHEN 500 MG
1000 TABLET ORAL ONCE
Refills: 0 | Status: COMPLETED | OUTPATIENT
Start: 2021-04-05 | End: 2021-04-05

## 2021-04-05 RX ORDER — MORPHINE SULFATE 50 MG/1
4 CAPSULE, EXTENDED RELEASE ORAL ONCE
Refills: 0 | Status: DISCONTINUED | OUTPATIENT
Start: 2021-04-05 | End: 2021-04-05

## 2021-04-05 RX ORDER — SODIUM CHLORIDE 9 MG/ML
1000 INJECTION INTRAMUSCULAR; INTRAVENOUS; SUBCUTANEOUS ONCE
Refills: 0 | Status: COMPLETED | OUTPATIENT
Start: 2021-04-05 | End: 2021-04-05

## 2021-04-05 RX ADMIN — ONDANSETRON 4 MILLIGRAM(S): 8 TABLET, FILM COATED ORAL at 21:31

## 2021-04-05 RX ADMIN — IOHEXOL 30 MILLILITER(S): 300 INJECTION, SOLUTION INTRAVENOUS at 21:31

## 2021-04-05 RX ADMIN — SODIUM CHLORIDE 2000 MILLILITER(S): 9 INJECTION INTRAMUSCULAR; INTRAVENOUS; SUBCUTANEOUS at 21:31

## 2021-04-05 RX ADMIN — Medication 400 MILLIGRAM(S): at 21:31

## 2021-04-05 NOTE — ED ADULT NURSE NOTE - OBJECTIVE STATEMENT
pt. presents with c/o LLQ abdominal pain radiating to flank/ back and intermittently spreading all over lower abdomen. pt. denies n/v/d, shortness of breath, chest pain, fever, urinary symptoms, blood in stool. pt. also noted more swelling to ankles. pt. is A&Ox3, communicates w/o difficulty, skin warm, dry. blood was sent, pt. finished drinking po contrast.

## 2021-04-05 NOTE — ED PROVIDER NOTE - CHPI ED SYMPTOMS NEG
no abdominal distension/no blood in stool/no diarrhea/no dysuria/no fever/no hematuria/no vomiting/no burning urination/no chills

## 2021-04-05 NOTE — ED PROVIDER NOTE - PHYSICAL EXAMINATION
CONSTITUTIONAL: Well-appearing; well-nourished; in no apparent distress.   HEAD: Normocephalic; atraumatic.   EYES:  conjunctiva and sclera clear  ENT: normal nose; no rhinorrhea; normal pharynx with no erythema or lesions.   NECK: Supple; non-tender;   CARDIOVASCULAR: Normal S1, S2; no murmurs, rubs, or gallops. Regular rate and rhythm.   RESPIRATORY: Breathing easily; breath sounds clear and equal bilaterally; no wheezes, rhonchi, or rales.  GI: Soft; non-distended; mild TTP to LLQ  EXT: No cyanosis or edema; N/V intact  SKIN: Normal for age and race; warm; dry; good turgor; no apparent lesions or rash.   NEURO: A & O x 3; face symmetric; grossly unremarkable.   PSYCHOLOGICAL: The patient’s mood and manner are appropriate.

## 2021-04-05 NOTE — ED PROVIDER NOTE - PATIENT PORTAL LINK FT
You can access the FollowMyHealth Patient Portal offered by [facility] by registering at the following website: [email or web address]. By joining [facility]’s FollowMyHealth portal, you will also be able to view your health information using other applications (apps) compatible with our system.

## 2021-04-05 NOTE — ED PROVIDER NOTE - OBJECTIVE STATEMENT
71 y/o f Chinese speaking with PMH of HTN, HLD, DM, hypothyroid, RCC s/p R nephrectomy, known ventral hernia, here w/ complaint of several days of LLQ pain that radiates into suprapubic and flank area.

## 2021-04-05 NOTE — ED PROVIDER NOTE - NSFOLLOWUPINSTRUCTIONS_ED_ALL_ED_FT
Complete full course of antibiotics as prescribed.    Take tylenol 650mg or motrin 400-800mg as needed every 4-6 hours for pain.     Please follow up with primary care doctor within one week.    Return to ED if you experience fever, worsening pain, vomiting or other concerns      Diverticulitis    WHAT YOU NEED TO KNOW:    Diverticulitis is a condition that causes small pockets along your intestine called diverticula to become inflamed or infected. This is caused by hard bowel movements, food, or bacteria that get stuck in the pockets.    DISCHARGE INSTRUCTIONS:    Seek care immediately if:   •You have bowel movement or foul-smelling discharge leaking from your vagina or in your urine.      •You have severe diarrhea.      •You urinate less than usual or not at all.      •You are not able to have a bowel movement.      •You cannot stop vomiting.       •You have severe abdominal pain, a fever, and your abdomen is larger than usual.       •You have new or increased blood in your bowel movements.       Contact your healthcare provider if:   •You have pain when you urinate.      •Your symptoms get worse or do not go away.       •You have questions or concerns about your condition or care.       Medicines:   •Antibiotics may be given to help prevent or treat a bacterial infection.      •Prescription pain medicine may be given. Ask your healthcare provider how to take this medicine safely. Some prescription pain medicines contain acetaminophen. Do not take other medicines that contain acetaminophen without talking to your healthcare provider. Too much acetaminophen may cause liver damage. Prescription pain medicine may cause constipation. Ask your healthcare provider how to prevent or treat constipation.       •Take your medicine as directed. Contact your healthcare provider if you think your medicine is not helping or if you have side effects. Tell him or her if you are allergic to any medicine. Keep a list of the medicines, vitamins, and herbs you take. Include the amounts, and when and why you take them. Bring the list or the pill bottles to follow-up visits. Carry your medicine list with you in case of an emergency.      Clear liquid diet: A clear liquid diet includes any liquids that you can see through. Examples include water, ginger-jil, cranberry or apple juice, frozen fruit ice, or broth. Stay on a clear liquid diet until your symptoms are gone, or as directed.    Follow up with your healthcare provider as directed: You may need to return for a colonoscopy. When your symptoms are gone, you may need a low-fat, high-fiber diet to help prevent diverticulitis from developing again. Your healthcare provider or dietitian can help you create meal plans. Write down your questions so you remember to ask them during your visits.

## 2021-04-05 NOTE — ED PROVIDER NOTE - CLINICAL SUMMARY MEDICAL DECISION MAKING FREE TEXT BOX
here w/ symptoms and exam concerning for poss diverticulitis, will check ct scan w/ po and IV contrast and reassess

## 2021-04-06 VITALS
HEART RATE: 78 BPM | DIASTOLIC BLOOD PRESSURE: 82 MMHG | OXYGEN SATURATION: 98 % | TEMPERATURE: 98 F | SYSTOLIC BLOOD PRESSURE: 154 MMHG | RESPIRATION RATE: 19 BRPM

## 2021-04-06 LAB — SARS-COV-2 RNA SPEC QL NAA+PROBE: SIGNIFICANT CHANGE UP

## 2021-04-06 RX ADMIN — Medication 1 TABLET(S): at 02:53

## 2021-04-07 LAB
CULTURE RESULTS: NO GROWTH — SIGNIFICANT CHANGE UP
SPECIMEN SOURCE: SIGNIFICANT CHANGE UP

## 2021-04-08 ENCOUNTER — INPATIENT (INPATIENT)
Facility: HOSPITAL | Age: 71
LOS: 0 days | Discharge: HOME | DRG: 313 | End: 2021-04-09
Attending: INTERNAL MEDICINE | Admitting: INTERNAL MEDICINE
Payer: COMMERCIAL

## 2021-04-08 VITALS
HEART RATE: 92 BPM | DIASTOLIC BLOOD PRESSURE: 97 MMHG | HEIGHT: 63 IN | SYSTOLIC BLOOD PRESSURE: 158 MMHG | RESPIRATION RATE: 18 BRPM | OXYGEN SATURATION: 97 % | WEIGHT: 130.07 LBS | TEMPERATURE: 98 F

## 2021-04-08 DIAGNOSIS — E87.5 HYPERKALEMIA: ICD-10-CM

## 2021-04-08 DIAGNOSIS — Z90.5 ACQUIRED ABSENCE OF KIDNEY: Chronic | ICD-10-CM

## 2021-04-08 DIAGNOSIS — E89.2 POSTPROCEDURAL HYPOPARATHYROIDISM: Chronic | ICD-10-CM

## 2021-04-08 DIAGNOSIS — E78.5 HYPERLIPIDEMIA, UNSPECIFIED: ICD-10-CM

## 2021-04-08 DIAGNOSIS — K57.90 DIVERTICULOSIS OF INTESTINE, PART UNSPECIFIED, WITHOUT PERFORATION OR ABSCESS WITHOUT BLEEDING: ICD-10-CM

## 2021-04-08 DIAGNOSIS — I10 ESSENTIAL (PRIMARY) HYPERTENSION: ICD-10-CM

## 2021-04-08 DIAGNOSIS — E03.9 HYPOTHYROIDISM, UNSPECIFIED: ICD-10-CM

## 2021-04-08 DIAGNOSIS — Z90.710 ACQUIRED ABSENCE OF BOTH CERVIX AND UTERUS: Chronic | ICD-10-CM

## 2021-04-08 DIAGNOSIS — E11.9 TYPE 2 DIABETES MELLITUS WITHOUT COMPLICATIONS: ICD-10-CM

## 2021-04-08 DIAGNOSIS — R07.9 CHEST PAIN, UNSPECIFIED: ICD-10-CM

## 2021-04-08 LAB
ALBUMIN SERPL ELPH-MCNC: 4.5 G/DL — SIGNIFICANT CHANGE UP (ref 3.3–5)
ALP SERPL-CCNC: 70 U/L — SIGNIFICANT CHANGE UP (ref 40–120)
ALT FLD-CCNC: 15 U/L — SIGNIFICANT CHANGE UP (ref 10–45)
ANION GAP SERPL CALC-SCNC: 13 MMOL/L — SIGNIFICANT CHANGE UP (ref 5–17)
APTT BLD: 31.8 SEC — SIGNIFICANT CHANGE UP (ref 27.5–35.5)
AST SERPL-CCNC: 18 U/L — SIGNIFICANT CHANGE UP (ref 10–40)
BASOPHILS # BLD AUTO: 0.05 K/UL — SIGNIFICANT CHANGE UP (ref 0–0.2)
BASOPHILS NFR BLD AUTO: 0.5 % — SIGNIFICANT CHANGE UP (ref 0–2)
BILIRUB SERPL-MCNC: 0.5 MG/DL — SIGNIFICANT CHANGE UP (ref 0.2–1.2)
BUN SERPL-MCNC: 25 MG/DL — HIGH (ref 7–23)
CALCIUM SERPL-MCNC: 11.1 MG/DL — HIGH (ref 8.4–10.5)
CHLORIDE SERPL-SCNC: 100 MMOL/L — SIGNIFICANT CHANGE UP (ref 96–108)
CO2 SERPL-SCNC: 27 MMOL/L — SIGNIFICANT CHANGE UP (ref 22–31)
CREAT SERPL-MCNC: 1 MG/DL — SIGNIFICANT CHANGE UP (ref 0.5–1.3)
EOSINOPHIL # BLD AUTO: 0.44 K/UL — SIGNIFICANT CHANGE UP (ref 0–0.5)
EOSINOPHIL NFR BLD AUTO: 4.8 % — SIGNIFICANT CHANGE UP (ref 0–6)
GLUCOSE BLDC GLUCOMTR-MCNC: 129 MG/DL — HIGH (ref 70–99)
GLUCOSE SERPL-MCNC: 172 MG/DL — HIGH (ref 70–99)
HCT VFR BLD CALC: 42.9 % — SIGNIFICANT CHANGE UP (ref 34.5–45)
HGB BLD-MCNC: 13.8 G/DL — SIGNIFICANT CHANGE UP (ref 11.5–15.5)
IMM GRANULOCYTES NFR BLD AUTO: 0.4 % — SIGNIFICANT CHANGE UP (ref 0–1.5)
INR BLD: 1.02 — SIGNIFICANT CHANGE UP (ref 0.88–1.16)
LYMPHOCYTES # BLD AUTO: 2.34 K/UL — SIGNIFICANT CHANGE UP (ref 1–3.3)
LYMPHOCYTES # BLD AUTO: 25.5 % — SIGNIFICANT CHANGE UP (ref 13–44)
MCHC RBC-ENTMCNC: 27.5 PG — SIGNIFICANT CHANGE UP (ref 27–34)
MCHC RBC-ENTMCNC: 32.2 GM/DL — SIGNIFICANT CHANGE UP (ref 32–36)
MCV RBC AUTO: 85.6 FL — SIGNIFICANT CHANGE UP (ref 80–100)
MONOCYTES # BLD AUTO: 0.62 K/UL — SIGNIFICANT CHANGE UP (ref 0–0.9)
MONOCYTES NFR BLD AUTO: 6.8 % — SIGNIFICANT CHANGE UP (ref 2–14)
NEUTROPHILS # BLD AUTO: 5.69 K/UL — SIGNIFICANT CHANGE UP (ref 1.8–7.4)
NEUTROPHILS NFR BLD AUTO: 62 % — SIGNIFICANT CHANGE UP (ref 43–77)
NRBC # BLD: 0 /100 WBCS — SIGNIFICANT CHANGE UP (ref 0–0)
PLATELET # BLD AUTO: 321 K/UL — SIGNIFICANT CHANGE UP (ref 150–400)
POTASSIUM SERPL-MCNC: 3.9 MMOL/L — SIGNIFICANT CHANGE UP (ref 3.5–5.3)
POTASSIUM SERPL-SCNC: 3.9 MMOL/L — SIGNIFICANT CHANGE UP (ref 3.5–5.3)
PROT SERPL-MCNC: 8.1 G/DL — SIGNIFICANT CHANGE UP (ref 6–8.3)
PROTHROM AB SERPL-ACNC: 12.2 SEC — SIGNIFICANT CHANGE UP (ref 10.6–13.6)
RBC # BLD: 5.01 M/UL — SIGNIFICANT CHANGE UP (ref 3.8–5.2)
RBC # FLD: 13.3 % — SIGNIFICANT CHANGE UP (ref 10.3–14.5)
SARS-COV-2 RNA SPEC QL NAA+PROBE: SIGNIFICANT CHANGE UP
SODIUM SERPL-SCNC: 140 MMOL/L — SIGNIFICANT CHANGE UP (ref 135–145)
TROPONIN T SERPL-MCNC: 0.01 NG/ML — SIGNIFICANT CHANGE UP (ref 0–0.01)
TROPONIN T SERPL-MCNC: 0.01 NG/ML — SIGNIFICANT CHANGE UP (ref 0–0.01)
TROPONIN T SERPL-MCNC: <0.01 NG/ML — SIGNIFICANT CHANGE UP (ref 0–0.01)
WBC # BLD: 9.18 K/UL — SIGNIFICANT CHANGE UP (ref 3.8–10.5)
WBC # FLD AUTO: 9.18 K/UL — SIGNIFICANT CHANGE UP (ref 3.8–10.5)

## 2021-04-08 PROCEDURE — 70498 CT ANGIOGRAPHY NECK: CPT | Mod: 26,MA

## 2021-04-08 PROCEDURE — 70496 CT ANGIOGRAPHY HEAD: CPT | Mod: 26,MA

## 2021-04-08 PROCEDURE — 93010 ELECTROCARDIOGRAM REPORT: CPT

## 2021-04-08 PROCEDURE — 0042T: CPT

## 2021-04-08 PROCEDURE — 99232 SBSQ HOSP IP/OBS MODERATE 35: CPT

## 2021-04-08 PROCEDURE — 99285 EMERGENCY DEPT VISIT HI MDM: CPT | Mod: CS,25

## 2021-04-08 PROCEDURE — 71045 X-RAY EXAM CHEST 1 VIEW: CPT | Mod: 26

## 2021-04-08 PROCEDURE — 70450 CT HEAD/BRAIN W/O DYE: CPT | Mod: 26,59,MA

## 2021-04-08 RX ORDER — DEXTROSE 50 % IN WATER 50 %
12.5 SYRINGE (ML) INTRAVENOUS ONCE
Refills: 0 | Status: DISCONTINUED | OUTPATIENT
Start: 2021-04-08 | End: 2021-04-09

## 2021-04-08 RX ORDER — ISOSORBIDE MONONITRATE 60 MG/1
30 TABLET, EXTENDED RELEASE ORAL DAILY
Refills: 0 | Status: DISCONTINUED | OUTPATIENT
Start: 2021-04-09 | End: 2021-04-09

## 2021-04-08 RX ORDER — SODIUM CHLORIDE 9 MG/ML
1000 INJECTION, SOLUTION INTRAVENOUS
Refills: 0 | Status: DISCONTINUED | OUTPATIENT
Start: 2021-04-08 | End: 2021-04-09

## 2021-04-08 RX ORDER — DEXTROSE 50 % IN WATER 50 %
25 SYRINGE (ML) INTRAVENOUS ONCE
Refills: 0 | Status: DISCONTINUED | OUTPATIENT
Start: 2021-04-08 | End: 2021-04-09

## 2021-04-08 RX ORDER — HYDROCHLOROTHIAZIDE 25 MG
25 TABLET ORAL DAILY
Refills: 0 | Status: DISCONTINUED | OUTPATIENT
Start: 2021-04-09 | End: 2021-04-09

## 2021-04-08 RX ORDER — ASPIRIN/CALCIUM CARB/MAGNESIUM 324 MG
81 TABLET ORAL DAILY
Refills: 0 | Status: DISCONTINUED | OUTPATIENT
Start: 2021-04-09 | End: 2021-04-09

## 2021-04-08 RX ORDER — DEXTROSE 50 % IN WATER 50 %
15 SYRINGE (ML) INTRAVENOUS ONCE
Refills: 0 | Status: DISCONTINUED | OUTPATIENT
Start: 2021-04-08 | End: 2021-04-09

## 2021-04-08 RX ORDER — HEPARIN SODIUM 5000 [USP'U]/ML
5000 INJECTION INTRAVENOUS; SUBCUTANEOUS EVERY 8 HOURS
Refills: 0 | Status: DISCONTINUED | OUTPATIENT
Start: 2021-04-08 | End: 2021-04-09

## 2021-04-08 RX ORDER — FAMOTIDINE 10 MG/ML
20 INJECTION INTRAVENOUS DAILY
Refills: 0 | Status: DISCONTINUED | OUTPATIENT
Start: 2021-04-08 | End: 2021-04-09

## 2021-04-08 RX ORDER — GLUCAGON INJECTION, SOLUTION 0.5 MG/.1ML
1 INJECTION, SOLUTION SUBCUTANEOUS ONCE
Refills: 0 | Status: DISCONTINUED | OUTPATIENT
Start: 2021-04-08 | End: 2021-04-09

## 2021-04-08 RX ORDER — AMLODIPINE BESYLATE 2.5 MG/1
10 TABLET ORAL DAILY
Refills: 0 | Status: DISCONTINUED | OUTPATIENT
Start: 2021-04-09 | End: 2021-04-09

## 2021-04-08 RX ORDER — LEVOTHYROXINE SODIUM 125 MCG
1 TABLET ORAL
Qty: 0 | Refills: 0 | DISCHARGE

## 2021-04-08 RX ORDER — SIMVASTATIN 20 MG/1
20 TABLET, FILM COATED ORAL AT BEDTIME
Refills: 0 | Status: DISCONTINUED | OUTPATIENT
Start: 2021-04-08 | End: 2021-04-09

## 2021-04-08 RX ORDER — ISOSORBIDE MONONITRATE 60 MG/1
30 TABLET, EXTENDED RELEASE ORAL ONCE
Refills: 0 | Status: COMPLETED | OUTPATIENT
Start: 2021-04-08 | End: 2021-04-08

## 2021-04-08 RX ORDER — INSULIN LISPRO 100/ML
VIAL (ML) SUBCUTANEOUS
Refills: 0 | Status: DISCONTINUED | OUTPATIENT
Start: 2021-04-08 | End: 2021-04-09

## 2021-04-08 RX ORDER — ASPIRIN/CALCIUM CARB/MAGNESIUM 324 MG
325 TABLET ORAL ONCE
Refills: 0 | Status: COMPLETED | OUTPATIENT
Start: 2021-04-08 | End: 2021-04-08

## 2021-04-08 RX ORDER — LEVOTHYROXINE SODIUM 125 MCG
50 TABLET ORAL DAILY
Refills: 0 | Status: DISCONTINUED | OUTPATIENT
Start: 2021-04-08 | End: 2021-04-09

## 2021-04-08 RX ADMIN — ISOSORBIDE MONONITRATE 30 MILLIGRAM(S): 60 TABLET, EXTENDED RELEASE ORAL at 19:22

## 2021-04-08 RX ADMIN — Medication 325 MILLIGRAM(S): at 16:42

## 2021-04-08 RX ADMIN — HEPARIN SODIUM 5000 UNIT(S): 5000 INJECTION INTRAVENOUS; SUBCUTANEOUS at 22:50

## 2021-04-08 RX ADMIN — Medication 30 MILLILITER(S): at 18:50

## 2021-04-08 RX ADMIN — SIMVASTATIN 20 MILLIGRAM(S): 20 TABLET, FILM COATED ORAL at 22:51

## 2021-04-08 RX ADMIN — FAMOTIDINE 20 MILLIGRAM(S): 10 INJECTION INTRAVENOUS at 18:50

## 2021-04-08 NOTE — CONSULT NOTE ADULT - ATTENDING COMMENTS
Patient is a 70 year old female with a PMH of HTN, HLD, DM, and blindness who reports having intermittent chest discomfort and dizziness for the last 10 days. Today, she was seen in the outpatient setting her BP was significantly elevated with systolics in the 200's.  She was sent to the ED.  Upon arrival, BP was 152/82. Glu 146. Neurologically she was intact. CTH, CTA, CTP were unremarkable.       Overall suspicions for stroke is low. She has no focal neurologic deficits and dizziness is likely secondary to elevated BP.  Recommend gradually reduction in BP. Dispo per ED.

## 2021-04-08 NOTE — CONSULT NOTE ADULT - SUBJECTIVE AND OBJECTIVE BOX
**STROKE CODE CONSULT NOTE**    Last known well time/Time of onset of symptoms:    HPI:     PAST MEDICAL & SURGICAL HISTORY:  Diabetes    HTN (hypertension)    High cholesterol    Hypothyroid    Diverticulosis    H/O right nephrectomy        FAMILY HISTORY:  No pertinent family history in first degree relatives        SOCIAL HISTORY:  Denies smoking, drinking, or drug use    ROS:  Constitutional: No fever, weight loss or fatigue  Eyes: No eye pain, visual disturbances, or discharge  ENMT:  No difficulty hearing, tinnitus, vertigo; No sinus or throat pain  Neck: No pain or stiffness  Respiratory: No cough, wheezing, chills or hemoptysis  Cardiovascular: No chest pain, palpitations, shortness of breath, dizziness or leg swelling  Gastrointestinal: No abdominal pain. No nausea, vomiting or hematemesis; No diarrhea or constipation. No hematochezia.  Genitourinary: No dysuria, frequency, hematuria or incontinence  Neurological: As per HPI  Skin: No itching, burning, rashes or lesions   Endocrine: No heat or cold intolerance; No hair loss  Musculoskeletal: No joint pain or swelling; No muscle, back or extremity pain  Psychiatric: No depression, anxiety, mood swings or difficulty sleeping  Heme/Lymph: No easy bruising or bleeding gums    MEDICATIONS  (STANDING):    MEDICATIONS  (PRN):      Allergies    No Known Allergies    Intolerances        Vital Signs Last 24 Hrs  T(C): 36.4 (08 Apr 2021 13:36), Max: 36.4 (08 Apr 2021 13:36)  T(F): 97.6 (08 Apr 2021 13:36), Max: 97.6 (08 Apr 2021 13:36)  HR: 78 (08 Apr 2021 14:12) (78 - 92)  BP: 161/78 (08 Apr 2021 14:12) (158/97 - 161/78)  BP(mean): --  RR: 18 (08 Apr 2021 14:12) (18 - 18)  SpO2: 99% (08 Apr 2021 14:12) (97% - 99%)    PHYSICAL EXAM:  General: No acute distress, awake and alert  Cardiovascular: Regular rate and rhythm, no murmurs, rubs, or gallops. No bruits  Pulmonary: Anterior breath sounds clear bilaterally, no crackles or wheezing. No use of accessory muscles  Extremities: Radial and DP pulses +2, no edema    Neurologic:  -Mental status: Awake, alert and oriented to person, place, and time. Speech is fluent with intact naming, repetition, and comprehension.  Recent and remote memory intact.  Attention/concentration intact.  No dysarthria, no aphasia.  Fund of knowledge appropriate.    -Cranial nerves:  II: Visual fields full to confrontation. Pupils PERRL  III, IV, VI: extraocular movements are intact without nystagmus  V: Facial sensation intact V1 through V3 intact bilaterally  VII: Face is symmetric with normal eye closure and smile, no facial droop.  VIII: hearing is intact to finger rub  IX, X: uvula is midline and soft palate rises symmetrically  XI: Head turning and shoulder shrug intact  XII: Tongue protrudes in the midline    -Motor:  Normal bulk and tone, strength 5/5 in bilateral upper and lower extremities.   strength 5/5.  Rapid alternating movements intact and symmetric. No pronator drift.   -Sensation: Intact to light touch, proprioception, and pinprick.  Intact pain and temperature sense. No neglect. Romberg negative.  -Coordination: No dysmetria on finger-to-nose and heel-to-shin.  No clumsiness.  -Reflexes: 2+ in upper and lower extremities, downgoing toes bilaterally  -Gait: Narrow and steady. No ataxia. Able to perform tandem and heel to toe walk.    NIHSS:  ICH:    Fingerstick Blood Glucose: CAPILLARY BLOOD GLUCOSE  146 (08 Apr 2021 14:33)      POCT Blood Glucose.: 146 mg/dL (08 Apr 2021 14:18)       LABS:                        13.8   9.18  )-----------( 321      ( 08 Apr 2021 14:17 )             42.9                     RADIOLOGY & ADDITIONAL STUDIES:    IV-tPA (Y/N):                                   Bolus time:  Reason IV-tPA not given:    ASSESSMENT/PLAN:    70y yo Female w/ PMH coming in with            **STROKE CODE CONSULT NOTE**    Last known well time/Time of onset of symptoms: 11 am this morning     HPI: Pt is a 69 yo Indonesian speaking F w/ PMH HTN, DM, HLD, hypothyroid recently seen in ED, prior nephrectomy due to CA, presented to ED with dizziness and, pressing like chest discomfort, and burning sensation in chest/back all intermittently for the past 10 days. Pt is accompanied by aissatou who is translating. Pt states she was at the cardiologist with an elevated SBP in the 200s who referred her to come to the ED. 2 hrs prior to ED arrival, pt took HCTZ and Norvasc. Pt developed dizziness and unsteady gait this morning at 11 am without associated N/V, sob, or diaphoresis. Of note, pt was evaluated in the ED 2 days ago for diverticulitis now on Augmentin. Pt denies acute onset of numbness, weakness, tingling, slurred speech, blurry vision, double vision, headache.     PAST MEDICAL & SURGICAL HISTORY:  Diabetes    HTN (hypertension)    High cholesterol    Hypothyroid    Diverticulosis    H/O right nephrectomy        FAMILY HISTORY:  No pertinent family history in first degree relatives        SOCIAL HISTORY:  Denies smoking, drinking, or drug use    ROS:  Constitutional: No fever, weight loss or fatigue  Eyes: No eye pain, visual disturbances, or discharge   shortness of breath, dizziness or leg swelling  Gastrointestinal: No nausea, vomiting or hematemesis.  Neurological: As per HPI    MEDICATIONS  (STANDING):    MEDICATIONS  (PRN):      Allergies    No Known Allergies    Intolerances        Vital Signs Last 24 Hrs  T(C): 36.4 (08 Apr 2021 13:36), Max: 36.4 (08 Apr 2021 13:36)  T(F): 97.6 (08 Apr 2021 13:36), Max: 97.6 (08 Apr 2021 13:36)  HR: 78 (08 Apr 2021 14:12) (78 - 92)  BP: 161/78 (08 Apr 2021 14:12) (158/97 - 161/78)  BP(mean): --  RR: 18 (08 Apr 2021 14:12) (18 - 18)  SpO2: 99% (08 Apr 2021 14:12) (97% - 99%)    PHYSICAL EXAM:  General: No acute distress, awake and alert  Extremities: no edema    Neurologic:  -Mental status: Awake, and oriented to person, place, and time. Speech is fluent with intact naming, repetition, and comprehension.  Recent and remote memory intact.  Attention/concentration intact.  No dysarthria, no aphasia.  Fund of knowledge appropriate.    -Cranial nerves:  II: Visual fields full to confrontation. Pupils PERRL  III, IV, VI: extraocular movements are intact without nystagmus  V: Facial sensation intact V1 through V3 intact bilaterally  VII: Face is symmetric with normal eye closure and smile, no facial droop.  VIII: Gross hearing intact  -Motor:  Normal bulk and tone, strength 5/5 in bilateral upper and lower extremities.   strength 5/5.  Rapid alternating movements intact and symmetric. +Possible slight pronator drift    -Sensation: Intact to light touch,   -Coordination: No dysmetria on finger-to-nose and heel-to-shin.    -Gait: Narrow and steady. No ataxia. Able to perform heel to toe walk.    NIHSS: 1      Fingerstick Blood Glucose: CAPILLARY BLOOD GLUCOSE  146 (08 Apr 2021 14:33)      POCT Blood Glucose.: 146 mg/dL (08 Apr 2021 14:18)       LABS:                        13.8   9.18  )-----------( 321      ( 08 Apr 2021 14:17 )             42.9           RADIOLOGY & ADDITIONAL STUDIES:  < from: CT Brain Stroke Protocol (04.08.21 @ 14:42) >  MPRESSION:    No acute intracranial hemorrhage, mass effect or CT evidence of recent transcortical infarct.    < end of copied text >    < from: CT Perfusion w/ Maps w/ IV Cont (04.08.21 @ 14:42) >  IMPRESSION:    Negative CT perfusion study.      < end of copied text >    < from: CT Angio Neck w/ IV Cont (04.08.21 @ 14:43) >  IMPRESSION: No large vessel occlusion.    < end of copied text >  < from: CT Angio Neck w/ IV Cont (04.08.21 @ 14:43) >  IMPRESSION: Normal CTA of the neck.    < end of copied text >      IV-tPA (Y/N):       No                           Reason IV-tPA not given: No focal/disabling neurological deficits                 **STROKE CODE CONSULT NOTE**    Last known well time/Time of onset of symptoms: 11 am this morning     HPI: Pt is a 69 yo [place] speaking F w/ PMH HTN, DM, HLD, hypothyroid recently seen in ED, prior nephrectomy due to CA, presented to ED with dizziness and, pressing like chest discomfort, and burning sensation in chest/back all intermittently for the past 10 days. Pt is accompanied by aissatou who is translating. Pt states she was at the cardiologist with an elevated SBP in the 200s who referred her to come to the ED. 2 hrs prior to ED arrival, pt took HCTZ and Norvasc. Pt developed dizziness and unsteady gait this morning at 11 am without associated N/V, sob, or diaphoresis. Of note, pt was evaluated in the ED 2 days ago for diverticulitis now on Augmentin. 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 Pt denies acute onset of numbness, weakness, tingling, slurred speech, blurry vision, double vision, headache.     PAST MEDICAL & SURGICAL HISTORY:  Diabetes    HTN (hypertension)    High cholesterol    Hypothyroid    Diverticulosis    H/O right nephrectomy        FAMILY HISTORY:  No pertinent family history in first degree relatives        SOCIAL HISTORY:  Denies smoking, drinking, or drug use    ROS:  Constitutional: No fever, weight loss or fatigue  Eyes: No eye pain, visual disturbances, or discharge   shortness of breath, dizziness or leg swelling  Gastrointestinal: No nausea, vomiting or hematemesis.  Neurological: As per HPI    MEDICATIONS  (STANDING):    MEDICATIONS  (PRN):      Allergies    No Known Allergies    Intolerances        Vital Signs Last 24 Hrs  T(C): 36.4 (08 Apr 2021 13:36), Max: 36.4 (08 Apr 2021 13:36)  T(F): 97.6 (08 Apr 2021 13:36), Max: 97.6 (08 Apr 2021 13:36)  HR: 78 (08 Apr 2021 14:12) (78 - 92)  BP: 161/78 (08 Apr 2021 14:12) (158/97 - 161/78)  BP(mean): --  RR: 18 (08 Apr 2021 14:12) (18 - 18)  SpO2: 99% (08 Apr 2021 14:12) (97% - 99%)    PHYSICAL EXAM:  General: No acute distress, awake and alert  Extremities: no edema    Neurologic:  -Mental status: Awake, and oriented to person, place, and time. Speech is fluent with intact naming, repetition, and comprehension.  Recent and remote memory intact.  Attention/concentration intact.  No dysarthria, no aphasia.  Fund of knowledge appropriate.    -Cranial nerves:  II: Visual fields full to confrontation. Pupils PERRL  III, IV, VI: extraocular movements are intact without nystagmus  V: Facial sensation intact V1 through V3 intact bilaterally  VII: Face is symmetric with normal eye closure and smile, no facial droop.  VIII: Gross hearing intact  -Motor:  Normal bulk and tone, strength 5/5 in bilateral upper and lower extremities.   strength 5/5.  Rapid alternating movements intact and symmetric. +Possible slight pronator drift    -Sensation: Intact to light touch,   -Coordination: No dysmetria on finger-to-nose and heel-to-shin.    -Gait: Narrow and steady. No ataxia. Able to perform heel to toe walk.    NIHSS: 1      Fingerstick Blood Glucose: CAPILLARY BLOOD GLUCOSE  146 (08 Apr 2021 14:33)      POCT Blood Glucose.: 146 mg/dL (08 Apr 2021 14:18)       LABS:                        13.8   9.18  )-----------( 321      ( 08 Apr 2021 14:17 )             42.9           RADIOLOGY & ADDITIONAL STUDIES:  < from: CT Brain Stroke Protocol (04.08.21 @ 14:42) >  MPRESSION:    No acute intracranial hemorrhage, mass effect or CT evidence of recent transcortical infarct.    < end of copied text >    < from: CT Perfusion w/ Maps w/ IV Cont (04.08.21 @ 14:42) >  IMPRESSION:    Negative CT perfusion study.      < end of copied text >    < from: CT Angio Neck w/ IV Cont (04.08.21 @ 14:43) >  IMPRESSION: No large vessel occlusion.    < end of copied text >  < from: CT Angio Neck w/ IV Cont (04.08.21 @ 14:43) >  IMPRESSION: Normal CTA of the neck.    < end of copied text >      IV-tPA (Y/N):       No                           Reason IV-tPA not given: No focal/disabling neurological deficits                 **STROKE CODE CONSULT NOTE**    Last known well time/Time of onset of symptoms: 11 am this morning     HPI: Pt is a 71 yo [place]andan speaking F w/ PMH HTN, DM, HLD, hypothyroid recently seen in ED, prior nephrectomy due to CA, presented to ED with dizziness and, pressing like chest discomfort, and burning sensation in chest/back all intermittently for the past 10 days. Pt is accompanied by aissatou who is translating. Pt states she was at the cardiologist with an elevated SBP in the 200s who referred her to come to the ED. 2 hrs prior to ED arrival, pt took HCTZ and Norvasc. Pt developed dizziness and unsteady gait this morning at 11 am without associated N/V, sob, or diaphoresis. Of note, pt was evaluated in the ED 2 days ago for diverticulitis now on Augmentin. 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 Pt denies acute onset of numbness, weakness, tingling, slurred speech, blurry vision, double vision, headache.     PAST MEDICAL & SURGICAL HISTORY:  Diabetes    HTN (hypertension)    High cholesterol    Hypothyroid    Diverticulosis    H/O right nephrectomy        FAMILY HISTORY:  No pertinent family history in first degree relatives        SOCIAL HISTORY:  Denies smoking, drinking, or drug use    ROS:  Constitutional: No fever, weight loss or fatigue  Eyes: No eye pain, visual disturbances, or discharge   shortness of breath, dizziness or leg swelling  Gastrointestinal: No nausea, vomiting or hematemesis.  Neurological: As per HPI    MEDICATIONS  (STANDING):    MEDICATIONS  (PRN):      Allergies    No Known Allergies    Intolerances        Vital Signs Last 24 Hrs  T(C): 36.4 (08 Apr 2021 13:36), Max: 36.4 (08 Apr 2021 13:36)  T(F): 97.6 (08 Apr 2021 13:36), Max: 97.6 (08 Apr 2021 13:36)  HR: 78 (08 Apr 2021 14:12) (78 - 92)  BP: 161/78 (08 Apr 2021 14:12) (158/97 - 161/78)  BP(mean): --  RR: 18 (08 Apr 2021 14:12) (18 - 18)  SpO2: 99% (08 Apr 2021 14:12) (97% - 99%)    PHYSICAL EXAM:  General: No acute distress, awake and alert  Extremities: no edema    Neurologic:  -Mental status: Awake, and oriented to person, place, and time. Speech is fluent with intact naming, repetition, and comprehension.  Recent and remote memory intact.  Attention/concentration intact.  No dysarthria, no aphasia.  Fund of knowledge appropriate.    -Cranial nerves:  II: Visual fields full to confrontation. Pupils PERRL  III, IV, VI: extraocular movements are intact without nystagmus  V: Facial sensation intact V1 through V3 intact bilaterally  VII: Face is symmetric with normal eye closure and smile, no facial droop.  VIII: Gross hearing intact  -Motor:  Normal bulk and tone, strength 5/5 in bilateral upper and lower extremities.   strength 5/5.  Rapid alternating movements intact and symmetric. +Slight pronator drift    -Sensation: Intact to light touch,   -Coordination: No dysmetria on finger-to-nose and heel-to-shin.    -Gait: Narrow and steady. No ataxia. Able to perform heel to toe walk.    NIHSS: 1      Fingerstick Blood Glucose: CAPILLARY BLOOD GLUCOSE  146 (08 Apr 2021 14:33)      POCT Blood Glucose.: 146 mg/dL (08 Apr 2021 14:18)       LABS:                        13.8   9.18  )-----------( 321      ( 08 Apr 2021 14:17 )             42.9           RADIOLOGY & ADDITIONAL STUDIES:  < from: CT Brain Stroke Protocol (04.08.21 @ 14:42) >  MPRESSION:    No acute intracranial hemorrhage, mass effect or CT evidence of recent transcortical infarct.    < end of copied text >    < from: CT Perfusion w/ Maps w/ IV Cont (04.08.21 @ 14:42) >  IMPRESSION:    Negative CT perfusion study.      < end of copied text >    < from: CT Angio Neck w/ IV Cont (04.08.21 @ 14:43) >  IMPRESSION: No large vessel occlusion.    < end of copied text >  < from: CT Angio Neck w/ IV Cont (04.08.21 @ 14:43) >  IMPRESSION: Normal CTA of the neck.    < end of copied text >      IV-tPA (Y/N):       No                           Reason IV-tPA not given: No focal/disabling neurological deficits

## 2021-04-08 NOTE — STROKE CODE NOTE - NIH STROKE SCALE: 5B. MOTOR ARM, RIGHT, QM
(1) Drift; limb holds 90 (or 45) degrees, but drifts down before full 10 secs; does not hit bed or other support
Yes

## 2021-04-08 NOTE — H&P ADULT - ASSESSMENT
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

## 2021-04-08 NOTE — H&P ADULT - ATTENDING COMMENTS
Initial attending contact date 4/9/21 on morning bedside rounds. See attending addendum to HPI for initial attending contact documentation. See PA note written above, for details. I reviewed the PA documentation.  I have personally seen and examined this patient today. I reviewed vitals, labs, medications, cardiac studies and additional imaging.  I agree with the PA's findings and plans as written above with the following additions/amendments:  Plan for:  Obtain TTE for cardiac structural assessment  CCTA for coronary evaluation  HCTZ discontinued by outpt nephro (dr Suero) due to chronic hyperCa  Disposition pending cardiac work up  Details discussed with the patient and cardiac care team  Carrie Orellana M.D.  Cardiology Attending

## 2021-04-08 NOTE — ED ADULT NURSE NOTE - EXTENSIONS OF SELF_ADULT
Called Pharmacy and spoke to Pharmacist in regard to skipping Placebo pill.  Per LAURIE Valladares it is alright for Pharmacy to give prescription without placebo pill.  Pharmacy stated if they do not hear back from this office within the hour she will go ahead and fill OCP without placebo pill.     None

## 2021-04-08 NOTE — ED PROVIDER NOTE - OBJECTIVE STATEMENT
69 yo F with hx HTN DM hypothyroid recently seen in Ed no sob  took HCTZ and norvasc 2 hrs before arrival due to elev BP  2 days ago for diverticulosis now on Augmentin  developed dizziness and unstable gait at 11 am today no falls or head trauma   no prior CVA or TIA 69 yo F with hx HTN DM  HLD hypothyroid recently seen in Ed no sob  took HCTZ and norvasc 2 hrs before arrival due to elev BP  2 days ago for diverticulosis now on Augmentin  developed dizziness and unstable gait at 11 am today no falls or head trauma   no prior CVA or TIA 71 yo F with hx HTN DM  HLD hypothyroid recently seen in Ed no sob  took HCTZ and norvasc 2 hrs before arrival due to elev BP  2 days ago for diverticulosis now on Augmentin had vague chest discomfort this am  no N/V or diaphoresis this am   developed dizziness and unstable gait at 11 am today no falls or head trauma   no prior CVA or TIA 69 yo F with hx HTN DM (prior nephrectomy due to CA)  HLD hypothyroid recently seen in Ed no sob  took HCTZ and norvasc 2 hrs before arrival due to elev BP--was seen in ED 2 days ago for diverticulosis now on Augmentin- had vague chest discomfort this am per ekaterinater and went to pms's office for eval of elev BP  no N/V or diaphoresis this am -  developed dizziness and unstable gait at 11 am today no falls or head trauma -  no prior CVA or TIA  no stents  no hx CAd or MI

## 2021-04-08 NOTE — ED PROVIDER NOTE - CARE PLAN
Principal Discharge DX:	Dizziness  Secondary Diagnosis:	Chest pain  Secondary Diagnosis:	HTN (hypertension)   Principal Discharge DX:	Dizziness  Secondary Diagnosis:	Chest pain  Secondary Diagnosis:	HTN (hypertension)  Secondary Diagnosis:	QT prolongation

## 2021-04-08 NOTE — H&P ADULT - NSHPLABSRESULTS_GEN_ALL_CORE
13.8   9.18  )-----------( 321      ( 08 Apr 2021 14:17 )             42.9   04-08    140  |  100  |  25<H>  ----------------------------<  172<H>  3.9   |  27  |  1.00    Ca    11.1<H>      08 Apr 2021 14:17  Mg     2.1     04-08    TPro  8.1  /  Alb  4.5  /  TBili  0.5  /  DBili  x   /  AST  18  /  ALT  15  /  AlkPhos  70  04-08  PT/INR - ( 08 Apr 2021 14:46 )   PT: 12.2 sec;   INR: 1.02          PTT - ( 08 Apr 2021 14:46 )  PTT:31.8 sec

## 2021-04-08 NOTE — ED ADULT NURSE NOTE - NSIMPLEMENTINTERV_GEN_ALL_ED
Implemented All Universal Safety Interventions:  [place] to call system. Call bell, personal items and telephone within reach. Instruct patient to call for assistance. Room bathroom lighting operational. Non-slip footwear when patient is off stretcher. Physically safe environment: no spills, clutter or unnecessary equipment. Stretcher in lowest position, wheels locked, appropriate side rails in place.

## 2021-04-08 NOTE — H&P ADULT - PROBLEM SELECTOR PLAN 1
-Chest pain X .. ; currently chest pain free  - EKG non-ischemic; f/u am EKG  - trop T neg X 2; f/u am trop  - ECHO ordered. f/u  - received  mg PO X 1in ED  - NPO after MN for possible ischemic w/u; discuss with Dr. Rodriges in am. - intermittent chest tightness/burning X 7-10 days;  currently chest pain free (received malox/pepcid/imdur in ED)  - EKG non-ischemic; f/u am EKG  - trop T neg X 2; f/u am trop; please trend trop earlier if pt with chest pain with EKG changes  - ECHO ordered. f/u  - received  mg PO X 1in ED- started on ASA 81 mg daily  - NPO after MN for NST. - intermittent chest tightness/burning X 7-10 days;  Pt endorsing 7/10 chest burning in ED ,(received malox/pepcid) with mild improvement in sx; also ordered for imdur 30 mg PO x 1.   - EKG non-ischemic; f/u 8 pm EKG with trop  - trop T neg X 2; f/u 8 pm trop  - ECHO ordered. f/u  - received  mg PO X 1in ED- started on ASA 81 mg daily  - NPO after MN for NST.

## 2021-04-08 NOTE — H&P ADULT - PROBLEM SELECTOR PLAN 3
Of note; reports elevated BP in   BP on arrival TO ED: 150s  - c/w Of note; reports elevated BP in 190s as o/p  BP on arrival TO ED: 150s, currently BP 160s  - took norvasc 5 mg PO x 2 and HCTZ 12.5 mg PO X 2 prior to coming in  - home med: norvasc 5 mg daily, HCTZ 12,5 mg daily   - started on imdur 30 mg daily, HCTZ 25 mg daily and Norvasc 10 mg daily  - no ACE-I/ARB 2/2 hx of hyperkalemia

## 2021-04-08 NOTE — H&P ADULT - PROBLEM SELECTOR PLAN 5
- c/w  - SSI  - holding home oral agents  - f/u am HgBA1c - SSI  - holding home oral agents  - f/u am HgBA1c

## 2021-04-08 NOTE — H&P ADULT - NSHPSOCIALHISTORY_GEN_ALL_CORE
former smoker former smoker; quit 2019 (smoked 1 PPD X 45 years)  denies any ETOH or illicit drug use

## 2021-04-08 NOTE — CHART NOTE - NSCHARTNOTEFT_GEN_A_CORE
EP called to review EKG for possible long QT. The EKG was reviewed with Dr. Castellanos - it was measured to be 376ms (corrected).

## 2021-04-08 NOTE — ED PROVIDER NOTE - PROGRESS NOTE DETAILS
QTC .634  vs Oct 2020 was .437  willy ok  wnl Ca 11.1  dw EP  will eval QTC .634  vs Oct 2020 was .437  lytes ok  wnl Ca 11.1  dw EP  will eval  EP feels no evidence of Qtc  - in fact unchanged from Oct 2020  case dw dr wilson and EP

## 2021-04-08 NOTE — ED ADULT NURSE NOTE - OBJECTIVE STATEMENT
Pt is a 69 y/o female A&Ox4 ambulatory with steady gait sent in by cardiologist for "abnormal EKG today." Pt reports chest tightness and "uncontrollable BP" x 10 days, endorses taking BP meds as prescribed at home today this am. Pt also reports dizziness and "cloudy vision" since 11 am. PT denies SOB, N/V/D, unilateral weakness, numbness/tingling. Pt upgraded to [facility] [address] per protocol, Stroke code initiated by MD, CT scans complete, EKG done and signed, PIV placed, labs sent, pt placed on CCM. Pt talking in clear, full sentences, respirations even and unlabored, no facial droop noted, sensation equal bilaterally, .

## 2021-04-08 NOTE — ED PROVIDER NOTE - SKIN NEGATIVE STATEMENT, MLM
Problem: Falls - Risk of:  Goal: Will remain free from falls  Description: Will remain free from falls  Outcome: Ongoing  Goal: Absence of physical injury  Description: Absence of physical injury  Outcome: Ongoing no abrasions, no jaundice, no lesions, no pruritis, and no rashes.

## 2021-04-08 NOTE — H&P ADULT - HISTORY OF PRESENT ILLNESS
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 Shoemaker)  to [facility] ED 4/8/21 for low HR in the 50s and elevated BP in the 170s. Pt reports having elevated BP with chest burning/tightness for the last 7-10 days; She was also recently seen at [facility] ED 4/5/21 with CC of abdominal pain, was diagnosed with diverticulitis and then d/c home on augmentin BID X 9 days. Today am, she reports that her BP as in the 190s , she took 2 pills of HCT 12.5 mg daily and then around 10:30 am checked BP again and it was in the 170s and she took 2 pills of Norvasc 5 mg daily, pressure was persistently in the 170s prompting them to go see her cardiologist/PCP Dr. Shoemaker who then refereed her to the ED for elevated BP and low HR. Of note: She reports being prescribed Toprol 25 mg daily in Jan for 1 month only  for elevated HR which she was not taking it regularly, but in the past few days she reports taking ¼-1/2 tablet occasionaly for elevated BP. She reports having baseline intermittent dizziness 2/2 her vertigo. Pt reports the chest tightness/burning she experiences starts from her neck and then radiates to her chest and back, rates it as 7/10 at its worst which occurs intermittently independent of activity and relieved with Pepcid /sx similar to when she was diagnosed with covid in the past. Pt denies any SOB, palpitations, N/V, syncope; loss of taste, Of note: Stroke code called in ER for unstable gait and w/u negative.  In ED, /97 HR 92 RR 18 T 97.6 02 97 RA; Labs revealed Trop negative X 2, calcium 11, COVID pending.  EKG: NSR with SR @ 81 bpm; KIM, qtc 634 (corrected qtc 376ms) with non-specific ST-T changes.  CXR 4/8 with no acute lung pathology. CT brain/stroke protocol 4/8/21: No acute intracranial hemorrhage, mass effect or CT evidence of recent transcortical infarct. CT perfusion brain 4/8/21: Negative. CTA neck 4/8 normal.  In ED, pt received  mg PO X 1 and is now admitted to cardiology service for further management of chest pain, R/O ACS.   Of note: Daughter tested + covid 4/2 and is currently admitted in hospital; lives together with her daughter; pt. COVID 4/5 neg HIE and also granddaughter reports getting testes for COVID 4/7 which was negative too.

## 2021-04-08 NOTE — H&P ADULT - PROBLEM SELECTOR PLAN 2
- stroke code called in ER for unstable gait as per s/o  - seen by stroke team and w/u negative  - CT brain/stroke protocol 4/8/21: No acute intracranial hemorrhage, mass effect or CT evidence of recent transcortical infarct.   - CT perfusion brain 4/8/21: Negative.   - CTA neck 4/8 normal.

## 2021-04-08 NOTE — ED ADULT TRIAGE NOTE - CHIEF COMPLAINT QUOTE
Pt sent by cardiologist (Dr. Shoemaker) due to low HR and High BP in office. HR currently 80's. Pt takes hctz and amlodipine, trace LE edema noted b/l. Denies CP or SOB. Endorses Chest tightness.

## 2021-04-08 NOTE — H&P ADULT - NSICDXFAMILYHX_GEN_ALL_CORE_FT
FAMILY HISTORY:  No pertinent family history in first degree relatives     FAMILY HISTORY:  Sibling  Still living? Unknown  FH: myocardial infarction, Age at diagnosis: Age Unknown

## 2021-04-08 NOTE — H&P ADULT - PROBLEM SELECTOR PLAN 8
- hx of hyerkalemia; takes lokelma 10 mg daily at home  - K 3.9 on admission; no lokelma ordered; f/u am K     DVT PPX: hep subQ  Dispo: f/u am trop; ECHO; NPO for NST, BP management    - Case d/w Dr. wilson - hx of hyerkalemia; takes lokelma 10 mg daily at home  - K 3.9 on admission; no lokelma ordered; f/u am K     DVT PPX: hep subQ  Dispo: hitesh trop/EKG ; ECHO; NPO for NST, BP management    - Case d/w Dr. wilson

## 2021-04-08 NOTE — H&P ADULT - PROBLEM SELECTOR PLAN 7
- dx with diverticulitis on 4/5 recent ED vist ; d/c home on augmentin BID X ..  - c/w augmentin     DVT PPX: hep subQ  Dispo: f/u am trop; ECHO; NPO for possible ischemic w/u - dx with diverticulitis on 4/5 recent ED vist ; d/c home on augmentin BID X 9 days (4/6-2/14)  - c/w augmentin BID until 4/14  - currently no abdomen pain; WBC normal afebrile

## 2021-04-08 NOTE — H&P ADULT - NSICDXPASTSURGICALHX_GEN_ALL_CORE_FT
PAST SURGICAL HISTORY:  H/O right nephrectomy      PAST SURGICAL HISTORY:  H/O parathyroidectomy     H/O right nephrectomy     S/P hysterectomy

## 2021-04-08 NOTE — CONSULT NOTE ADULT - ASSESSMENT
Pt is a 69 yo Angolan speaking F w/ PMH HTN, DM, HLD, hypothyroid recently seen in ED, prior nephrectomy due to CA, presented to ED with dizziness and, pressing like chest discomfort, and burning sensation in chest/back all intermittently for the past 10 days. Pt is accompanied by granddaughter who is translating. Pt states she was at the cardiologist with an elevated SBP in the 200s who referred her to come to the ED. 2 hrs prior to ED arrival, pt took HCTZ and Norvasc for treatment of high BP reading (dosage unknown). Pt developed dizziness and unsteady gait this morning at 11 am without associated N/V, sob, or diaphoresis. Of note, pt was evaluated in the ED 2 days ago for diverticulitis, being treated w/ Augmentin.     CTH no evidence of transcortical infarct or intracranial hemorrhage.  CTP/CTA no acute findings.     -No need for further neurological work up as symptoms most likely due to uncontrolled HTN   -Recommend continue current medication regimen and follow up closely with out patient cardio   -Dispo as per ED    Pt is a 71 yo Ethiopian speaking F w/ PMH HTN, DM, HLD, hypothyroid recently established in ED, prior nephrectomy due to CA, presented to ED with dizziness and, pressing like chest discomfort, and burning sensation in chest/back all intermittently for the past 10 days. Pt had dizziness at 11 am this morning and 2 hrs prior to ED arrival, took HCTZ and Norvasc for treatment of high SBP reading in the 200s (dosage unknown). DDx: peripheral vertigo vs. stroke vs. HTN urgency     CTH no evidence of transcortical infarct or intracranial hemorrhage.  CTP/CTA no acute findings.     -No need for further neurological work up as symptoms most likely due to uncontrolled HTN   -Recommend continue current medication regimen and follow up closely with out patient cardio   -Dispo as per ED    Pt is a 69 yo Moldovan speaking F w/ PMH HTN, DM, HLD, hypothyroid recently established in ED, prior nephrectomy due to CA, presented to ED with dizziness and, pressing like chest discomfort, and burning sensation in chest/back all intermittently for the past 10 days. Pt had dizziness at 11 am this morning and 2 hrs prior to ED arrival, took HCTZ and Norvasc for treatment of high SBP reading in the 200s (dosage unknown). DDx: stroke vs. HTN urgency     CTH no evidence of transcortical infarct or intracranial hemorrhage.  CTP/CTA no acute findings.     -No need for further neurological work up as symptoms most likely due to uncontrolled HTN   -Recommend continue current medication regimen and follow up closely with out patient cardio   -Dispo as per ED    Pt is a 69 yo Hungarian speaking F w/ PMH HTN, DM, HLD, hypothyroid recently established in ED, prior nephrectomy due to CA, presented to ED with dizziness and, pressing like chest discomfort, and burning sensation in chest/back all intermittently for the past 10 days. Pt had dizziness at 11 am this morning and 2 hrs prior to ED arrival, took HCTZ and Norvasc for treatment of high SBP reading in the 200s (dosage unknown). DDx: HTN urgency vs. cardiac etiology less likely stroke     CTH no evidence of transcortical infarct or intracranial hemorrhage.  CTP/CTA no acute findings.     -No need for further neurological work up as symptoms less likely due to stroke   -Recommend continue current medication regimen and follow up closely with out patient cardio   -Dispo as per ED

## 2021-04-09 ENCOUNTER — TRANSCRIPTION ENCOUNTER (OUTPATIENT)
Age: 71
End: 2021-04-09

## 2021-04-09 VITALS — TEMPERATURE: 98 F

## 2021-04-09 LAB
A1C WITH ESTIMATED AVERAGE GLUCOSE RESULT: 7.7 % — HIGH (ref 4–5.6)
ALBUMIN SERPL ELPH-MCNC: 4.1 G/DL — SIGNIFICANT CHANGE UP (ref 3.3–5)
ALP SERPL-CCNC: 58 U/L — SIGNIFICANT CHANGE UP (ref 40–120)
ALT FLD-CCNC: 13 U/L — SIGNIFICANT CHANGE UP (ref 10–45)
ANION GAP SERPL CALC-SCNC: 12 MMOL/L — SIGNIFICANT CHANGE UP (ref 5–17)
AST SERPL-CCNC: 14 U/L — SIGNIFICANT CHANGE UP (ref 10–40)
BILIRUB SERPL-MCNC: 0.5 MG/DL — SIGNIFICANT CHANGE UP (ref 0.2–1.2)
BUN SERPL-MCNC: 24 MG/DL — HIGH (ref 7–23)
CALCIUM SERPL-MCNC: 10.4 MG/DL — SIGNIFICANT CHANGE UP (ref 8.4–10.5)
CHLORIDE SERPL-SCNC: 98 MMOL/L — SIGNIFICANT CHANGE UP (ref 96–108)
CHOLEST SERPL-MCNC: 140 MG/DL — SIGNIFICANT CHANGE UP
CO2 SERPL-SCNC: 28 MMOL/L — SIGNIFICANT CHANGE UP (ref 22–31)
COVID-19 SPIKE DOMAIN AB INTERP: POSITIVE
COVID-19 SPIKE DOMAIN ANTIBODY RESULT: >250 U/ML — HIGH
CREAT SERPL-MCNC: 1.19 MG/DL — SIGNIFICANT CHANGE UP (ref 0.5–1.3)
ESTIMATED AVERAGE GLUCOSE: 174 MG/DL — HIGH (ref 68–114)
GLUCOSE BLDC GLUCOMTR-MCNC: 107 MG/DL — HIGH (ref 70–99)
GLUCOSE BLDC GLUCOMTR-MCNC: 133 MG/DL — HIGH (ref 70–99)
GLUCOSE BLDC GLUCOMTR-MCNC: 169 MG/DL — HIGH (ref 70–99)
GLUCOSE SERPL-MCNC: 135 MG/DL — HIGH (ref 70–99)
HCT VFR BLD CALC: 40.9 % — SIGNIFICANT CHANGE UP (ref 34.5–45)
HDLC SERPL-MCNC: 56 MG/DL — SIGNIFICANT CHANGE UP
HGB BLD-MCNC: 13.1 G/DL — SIGNIFICANT CHANGE UP (ref 11.5–15.5)
LIPID PNL WITH DIRECT LDL SERPL: 59 MG/DL — SIGNIFICANT CHANGE UP
MAGNESIUM SERPL-MCNC: 2.2 MG/DL — SIGNIFICANT CHANGE UP (ref 1.6–2.6)
MCHC RBC-ENTMCNC: 27.2 PG — SIGNIFICANT CHANGE UP (ref 27–34)
MCHC RBC-ENTMCNC: 32 GM/DL — SIGNIFICANT CHANGE UP (ref 32–36)
MCV RBC AUTO: 84.9 FL — SIGNIFICANT CHANGE UP (ref 80–100)
NON HDL CHOLESTEROL: 84 MG/DL — SIGNIFICANT CHANGE UP
NRBC # BLD: 0 /100 WBCS — SIGNIFICANT CHANGE UP (ref 0–0)
PLATELET # BLD AUTO: 302 K/UL — SIGNIFICANT CHANGE UP (ref 150–400)
POTASSIUM SERPL-MCNC: 3.9 MMOL/L — SIGNIFICANT CHANGE UP (ref 3.5–5.3)
POTASSIUM SERPL-SCNC: 3.9 MMOL/L — SIGNIFICANT CHANGE UP (ref 3.5–5.3)
PROT SERPL-MCNC: 7.1 G/DL — SIGNIFICANT CHANGE UP (ref 6–8.3)
RBC # BLD: 4.82 M/UL — SIGNIFICANT CHANGE UP (ref 3.8–5.2)
RBC # FLD: 13.3 % — SIGNIFICANT CHANGE UP (ref 10.3–14.5)
SARS-COV-2 IGG+IGM SERPL QL IA: >250 U/ML — HIGH
SARS-COV-2 IGG+IGM SERPL QL IA: POSITIVE
SODIUM SERPL-SCNC: 138 MMOL/L — SIGNIFICANT CHANGE UP (ref 135–145)
TRIGL SERPL-MCNC: 126 MG/DL — SIGNIFICANT CHANGE UP
TROPONIN T SERPL-MCNC: 0.01 NG/ML — SIGNIFICANT CHANGE UP (ref 0–0.01)
TSH SERPL-MCNC: 3.15 UIU/ML — SIGNIFICANT CHANGE UP (ref 0.35–4.94)
WBC # BLD: 8.41 K/UL — SIGNIFICANT CHANGE UP (ref 3.8–10.5)
WBC # FLD AUTO: 8.41 K/UL — SIGNIFICANT CHANGE UP (ref 3.8–10.5)

## 2021-04-09 PROCEDURE — 84443 ASSAY THYROID STIM HORMONE: CPT

## 2021-04-09 PROCEDURE — 86769 SARS-COV-2 COVID-19 ANTIBODY: CPT

## 2021-04-09 PROCEDURE — U0003: CPT

## 2021-04-09 PROCEDURE — 85027 COMPLETE CBC AUTOMATED: CPT

## 2021-04-09 PROCEDURE — 81003 URINALYSIS AUTO W/O SCOPE: CPT

## 2021-04-09 PROCEDURE — 85730 THROMBOPLASTIN TIME PARTIAL: CPT

## 2021-04-09 PROCEDURE — 83605 ASSAY OF LACTIC ACID: CPT

## 2021-04-09 PROCEDURE — 84484 ASSAY OF TROPONIN QUANT: CPT

## 2021-04-09 PROCEDURE — 87086 URINE CULTURE/COLONY COUNT: CPT

## 2021-04-09 PROCEDURE — 83690 ASSAY OF LIPASE: CPT

## 2021-04-09 PROCEDURE — 85025 COMPLETE CBC W/AUTO DIFF WBC: CPT

## 2021-04-09 PROCEDURE — 85610 PROTHROMBIN TIME: CPT

## 2021-04-09 PROCEDURE — 99285 EMERGENCY DEPT VISIT HI MDM: CPT

## 2021-04-09 PROCEDURE — 83735 ASSAY OF MAGNESIUM: CPT

## 2021-04-09 PROCEDURE — 70450 CT HEAD/BRAIN W/O DYE: CPT

## 2021-04-09 PROCEDURE — 0042T: CPT

## 2021-04-09 PROCEDURE — 80061 LIPID PANEL: CPT

## 2021-04-09 PROCEDURE — 93306 TTE W/DOPPLER COMPLETE: CPT

## 2021-04-09 PROCEDURE — 36415 COLL VENOUS BLD VENIPUNCTURE: CPT

## 2021-04-09 PROCEDURE — 75574 CT ANGIO HRT W/3D IMAGE: CPT | Mod: 26

## 2021-04-09 PROCEDURE — 83036 HEMOGLOBIN GLYCOSYLATED A1C: CPT

## 2021-04-09 PROCEDURE — U0005: CPT

## 2021-04-09 PROCEDURE — 70496 CT ANGIOGRAPHY HEAD: CPT

## 2021-04-09 PROCEDURE — 80053 COMPREHEN METABOLIC PANEL: CPT

## 2021-04-09 PROCEDURE — 99239 HOSP IP/OBS DSCHRG MGMT >30: CPT

## 2021-04-09 PROCEDURE — 93308 TTE F-UP OR LMTD: CPT | Mod: 26

## 2021-04-09 PROCEDURE — 71045 X-RAY EXAM CHEST 1 VIEW: CPT

## 2021-04-09 PROCEDURE — 75574 CT ANGIO HRT W/3D IMAGE: CPT

## 2021-04-09 PROCEDURE — 82962 GLUCOSE BLOOD TEST: CPT

## 2021-04-09 PROCEDURE — 70498 CT ANGIOGRAPHY NECK: CPT

## 2021-04-09 PROCEDURE — 83880 ASSAY OF NATRIURETIC PEPTIDE: CPT

## 2021-04-09 PROCEDURE — 93321 DOPPLER ECHO F-UP/LMTD STD: CPT | Mod: 26

## 2021-04-09 PROCEDURE — 93005 ELECTROCARDIOGRAM TRACING: CPT

## 2021-04-09 PROCEDURE — 74177 CT ABD & PELVIS W/CONTRAST: CPT

## 2021-04-09 RX ORDER — AMLODIPINE BESYLATE 2.5 MG/1
1 TABLET ORAL
Qty: 30 | Refills: 0
Start: 2021-04-09 | End: 2021-05-08

## 2021-04-09 RX ORDER — ISOSORBIDE MONONITRATE 60 MG/1
1 TABLET, EXTENDED RELEASE ORAL
Qty: 30 | Refills: 0
Start: 2021-04-09 | End: 2021-05-08

## 2021-04-09 RX ORDER — POTASSIUM CHLORIDE 20 MEQ
20 PACKET (EA) ORAL ONCE
Refills: 0 | Status: COMPLETED | OUTPATIENT
Start: 2021-04-09 | End: 2021-04-09

## 2021-04-09 RX ORDER — SODIUM CHLORIDE 9 MG/ML
500 INJECTION INTRAMUSCULAR; INTRAVENOUS; SUBCUTANEOUS
Refills: 0 | Status: DISCONTINUED | OUTPATIENT
Start: 2021-04-09 | End: 2021-04-09

## 2021-04-09 RX ORDER — ASPIRIN/CALCIUM CARB/MAGNESIUM 324 MG
1 TABLET ORAL
Qty: 30 | Refills: 0
Start: 2021-04-09 | End: 2021-05-08

## 2021-04-09 RX ORDER — METOPROLOL TARTRATE 50 MG
50 TABLET ORAL ONCE
Refills: 0 | Status: COMPLETED | OUTPATIENT
Start: 2021-04-09 | End: 2021-04-09

## 2021-04-09 RX ORDER — METFORMIN HYDROCHLORIDE 850 MG/1
1 TABLET ORAL
Qty: 0 | Refills: 0 | DISCHARGE

## 2021-04-09 RX ORDER — AMLODIPINE BESYLATE 2.5 MG/1
1 TABLET ORAL
Qty: 0 | Refills: 0 | DISCHARGE

## 2021-04-09 RX ADMIN — Medication 1 TABLET(S): at 18:35

## 2021-04-09 RX ADMIN — Medication 81 MILLIGRAM(S): at 10:00

## 2021-04-09 RX ADMIN — Medication 50 MILLIGRAM(S): at 14:30

## 2021-04-09 RX ADMIN — HEPARIN SODIUM 5000 UNIT(S): 5000 INJECTION INTRAVENOUS; SUBCUTANEOUS at 14:30

## 2021-04-09 RX ADMIN — HEPARIN SODIUM 5000 UNIT(S): 5000 INJECTION INTRAVENOUS; SUBCUTANEOUS at 06:58

## 2021-04-09 RX ADMIN — Medication 50 MICROGRAM(S): at 06:58

## 2021-04-09 RX ADMIN — SODIUM CHLORIDE 75 MILLILITER(S): 9 INJECTION INTRAMUSCULAR; INTRAVENOUS; SUBCUTANEOUS at 14:30

## 2021-04-09 RX ADMIN — Medication 1 TABLET(S): at 07:09

## 2021-04-09 RX ADMIN — FAMOTIDINE 20 MILLIGRAM(S): 10 INJECTION INTRAVENOUS at 10:00

## 2021-04-09 RX ADMIN — ISOSORBIDE MONONITRATE 30 MILLIGRAM(S): 60 TABLET, EXTENDED RELEASE ORAL at 12:38

## 2021-04-09 RX ADMIN — AMLODIPINE BESYLATE 10 MILLIGRAM(S): 2.5 TABLET ORAL at 06:58

## 2021-04-09 RX ADMIN — Medication 20 MILLIEQUIVALENT(S): at 10:00

## 2021-04-09 NOTE — DISCHARGE NOTE PROVIDER - CARE PROVIDERS DIRECT ADDRESSES
,DirectAddress_Unknown ,DirectAddress_Unknown,[email or web address]

## 2021-04-09 NOTE — DISCHARGE NOTE PROVIDER - PROVIDER TOKENS
PROVIDER:[TOKEN:[[id number]],FOLLOWUP:[2 weeks],ESTABLISHEDPATIENT:[T]] PROVIDER:[TOKEN:[[id number]],FOLLOWUP:[2 weeks],ESTABLISHEDPATIENT:[T]],PROVIDER:[TOKEN:[[id number]],FOLLOWUP:[Routine]]

## 2021-04-09 NOTE — DISCHARGE NOTE PROVIDER - CARE PROVIDER_API CALL
Delaney Erazo  INTERNAL MEDICINE  [address] [place] [address]  Phone: [phone]  Fax: [phone]  Established Patient  Follow Up Time: 2 weeks   Delaney Erazo  INTERNAL MEDICINE  [address] [place] [address]  Phone: [phone]  Fax: [phone]  Established Patient  Follow Up Time: 2 weeks    Elvie Muller)  Cardiovascular Disease; Internal Medicine; Nuclear Cardiology  [address] [address]  Phone: [phone]  Fax: [phone]  Follow Up Time: Routine

## 2021-04-09 NOTE — DISCHARGE NOTE PROVIDER - NSDCCPCAREPLAN_GEN_ALL_CORE_FT
PRINCIPAL DISCHARGE DIAGNOSIS  Diagnosis: Chest pain  Assessment and Plan of Treatment:       SECONDARY DISCHARGE DIAGNOSES  Diagnosis: HTN (hypertension)  Assessment and Plan of Treatment:     Diagnosis: Chest pain  Assessment and Plan of Treatment:      PRINCIPAL DISCHARGE DIAGNOSIS  Diagnosis: Chest pain  Assessment and Plan of Treatment: You came into the hospital for chest tightness and underwent cardiac evaluation. You had an echocardiogram was unremarkable. CT Scan of the Heart showed non-obstructive coronary artery disease, which means mild plaque buildup in the arteries of the heart. Start taking a baby Aspirin 81mg every day and continue taking cholesterol medication to help treat this for prevention. Your symptoms resolved with Pepcid. Take your antibiotic Augmentin with food to help prevent gastric upset. Follow up with your primary care physician for further evaluation.      SECONDARY DISCHARGE DIAGNOSES  Diagnosis: HTN (hypertension)  Assessment and Plan of Treatment: You came into the hospital reporting high blood pressure medications. Due to this, your blood pressure medications were changed. INCREASE Norvasc (amlodipine) from 5mg to 10mg once a day. START Imdur (isosorbide mononitrate) 30mg once a day. STOP Hydrochlorithiazide as per your Nephrologist Dr Suero due to your history of high calcium levels.

## 2021-04-09 NOTE — DISCHARGE NOTE PROVIDER - NSDCCPTREATMENT_GEN_ALL_CORE_FT
PRINCIPAL PROCEDURE  Procedure: 2D echocardiography  Findings and Treatment: CONCLUSIONS:   1. Limited study obtained for evaluation of left ventricular function.   2. The left ventricle is normal in size, wall thickness, and a hyperdynamic systolic function with a calculated ejection fraction of >75%.   3. Normal right ventricular size and systolic function.   4. No pericardial effusion.       PRINCIPAL PROCEDURE  Procedure: 2D echocardiography  Findings and Treatment: CONCLUSIONS:   1. Limited study obtained for evaluation of left ventricular function.   2. The left ventricle is normal in size, wall thickness, and a hyperdynamic systolic function with a calculated ejection fraction of >75%.   3. Normal right ventricular size and systolic function.   4. No pericardial effusion.      SECONDARY PROCEDURE  Procedure: Cardiac CT with calcium scoring  Findings and Treatment: Impression:  1.  The calcium score is severe at 811 Agatston units, which is at the 96th percentile, adjusted for age, gender and race.  2.  Non-obstructive coronary artery disease.

## 2021-04-09 NOTE — DISCHARGE NOTE PROVIDER - NSDCFUSCHEDAPPT_GEN_ALL_CORE_FT
NEFTALI LEZAMA ; 04/14/2021 ; NPP Endocrin [address]  NEFTALI LEZAMA ; 04/28/2021 ; NPP Nephro [address]  NEFTALI LEZAMA ; 06/14/2021 ; NPP Urology [address]

## 2021-04-09 NOTE — DISCHARGE NOTE PROVIDER - HOSPITAL COURSE
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 Shoemaker)  to [facility] ED 4/8/21 for low HR in the 50s and elevated BP in the 170s. Pt reports having elevated BP with chest burning/tightness for the last 7-10 days; She was also recently seen at [facility] ED 4/5/21 with CC of abdominal pain, was diagnosed with diverticulitis and then d/c home on augmentin BID X 9 days. Today am, she reports that her BP as in the 190s , she took 2 pills of HCT 12.5 mg daily and then around 10:30 am checked BP again and it was in the 170s and she took 2 pills of Norvasc 5 mg daily, pressure was persistently in the 170s prompting them to go see her cardiologist/PCP Dr. Shoemaker who then refereed her to the ED for elevated BP and low HR. Of note: She reports being prescribed Toprol 25 mg daily in Jan for 1 month only  for elevated HR which she was not taking it regularly, but in the past few days she reports taking ¼-1/2 tablet occasionaly for elevated BP. She reports having baseline intermittent dizziness 2/2 her vertigo. Pt reports the chest tightness/burning she experiences starts from her neck and then radiates to her chest and back, rates it as 7/10 at its worst which occurs intermittently independent of activity and relieved with Pepcid /sx similar to when she was diagnosed with covid in the past. Pt denies any SOB, palpitations, N/V, syncope; loss of taste, Of note: Stroke code called in ER for unstable gait and w/u negative.  In ED, /97 HR 92 RR 18 T 97.6 02 97 RA; Labs revealed Trop negative X 2, calcium 11, COVID pending.  EKG: NSR with SR @ 81 bpm; KIM, qtc 634 (corrected qtc 376ms) with non-specific ST-T changes.  CXR 4/8 with no acute lung pathology. CT brain/stroke protocol 4/8/21: No acute intracranial hemorrhage, mass effect or CT evidence of recent transcortical infarct. CT perfusion brain 4/8/21: Negative. CTA neck 4/8 normal.  In ED, pt received  mg PO X 1 and is now admitted to cardiology service for further management of chest pain, R/O ACS. 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 Shoemaker)  to [facility] ED 4/8/21 for low HR in the 50s and elevated BP in the 170s. Pt reports having elevated BP with chest burning/tightness for the last 7-10 days; She was also recently seen at [facility] ED 4/5/21 with CC of abdominal pain, was diagnosed with diverticulitis and then d/c home on augmentin BID X 9 days. Today am, she reports that her BP as in the 190s , she took 2 pills of HCT 12.5 mg daily and then around 10:30 am checked BP again and it was in the 170s and she took 2 pills of Norvasc 5 mg daily, pressure was persistently in the 170s prompting them to go see her cardiologist/PCP Dr. Shoemaker who then refereed her to the ED for elevated BP and low HR. Of note: She reports being prescribed Toprol 25 mg daily in Jan for 1 month only  for elevated HR which she was not taking it regularly, but in the past few days she reports taking ¼-1/2 tablet occasionaly for elevated BP. She reports having baseline intermittent dizziness 2/2 her vertigo. Pt reports the chest tightness/burning she experiences starts from her neck and then radiates to her chest and back, rates it as 7/10 at its worst which occurs intermittently independent of activity and relieved with Pepcid /sx similar to when she was diagnosed with covid in the past. Pt denies any SOB, palpitations, N/V, syncope; loss of taste, Of note: Stroke code called in ER for unstable gait and w/u negative.  In ED, /97 HR 92 RR 18 T 97.6 02 97 RA; Labs revealed Trop negative X 2, calcium 11, COVID pending.  EKG: NSR with SR @ 81 bpm; KIM, qtc 634 (corrected qtc 376ms) with non-specific ST-T changes.  CXR 4/8 with no acute lung pathology. CT brain/stroke protocol 4/8/21: No acute intracranial hemorrhage, mass effect or CT evidence of recent transcortical infarct. CT perfusion brain 4/8/21: Negative. CTA neck 4/8 normal.  In ED, pt received  mg PO X 1 and is now admitted to cardiology service for further management of chest pain, R/O ACS. 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 ED 4/5/21 with CC of abdominal pain, was diagnosed with diverticulitis and then d/c home on Augmentin BID x 9 days, who was referred by her PCP/cardiologist (Dr. Shoemaker) to [facility] ED 4/8/21 for low HR in the 50s and elevated SBP in the 170s. Pt reports having elevated BP with chest burning/tightness for the last 7-10 days. Pt reports the chest tightness/burning she experiences starts from her throat and then radiates to her chest and back, rates it as 7/10 at its worst which occurs intermittently independent of activity and relieved with Pepcid /sx similar to when she was diagnosed with covid in the past.  Of note: Stroke code called in ER for unstable gait and w/u negative.  In ED, /97 HR 92 RR 18 T 97.6 02 97 RA; Labs revealed Trop negative x3, calcium 11, EKG: NSR with SR @ 81 bpm; KIM, QTc 634 (corrected QTc 376ms) with non-specific ST-T changes.  CXR 4/8 with no acute lung pathology. CT brain/stroke protocol 4/8/21: No acute intracranial hemorrhage, mass effect or CT evidence of recent transcortical infarct. CT perfusion brain 4/8/21: Negative. CTA neck 4/8 normal. Admitted to cardiology service and ruled out for ACS. ECHO revealed hyperdynamic systolic function, EF 75%. CCTA revealed non-obstructive CAD. Tele review sinus rhythm 70-90s. BP was controlled 100-120s/60s w/ increased Norvasc to 10mg qd and adding Imdur 30mg qd. HCTz was D/c'ed per pt's Nephrologist Dr Suero 2/2 hx of hypercalcemia.     Of note, pt tested negative for COVID-19, but was placed in droplet isolation 2/2 daughter whom she lives with tested positive for COVID, currently admitted in a hospital. On the day of discharge, the patient was seen and examined. Symptoms improved. Vital signs are stable. Labs and imaging reviewed. Patient is medically optimized and hemodynamically stable. Return precautions discussed, medication teach back done, and importance of physician followup emphasized. The patient verbalized understanding.

## 2021-04-09 NOTE — DISCHARGE NOTE NURSING/CASE MANAGEMENT/SOCIAL WORK - PATIENT PORTAL LINK FT
You can access the FollowMyHealth Patient Portal offered by [facility] by registering at the following website: [email or web address]. By joining [employer]’s FollowMyHealth portal, you will also be able to view your health information using other applications (apps) compatible with our system.

## 2021-04-14 ENCOUNTER — APPOINTMENT (OUTPATIENT)
Dept: NEPHROLOGY | Facility: CLINIC | Age: 71
End: 2021-04-14
Payer: MEDICARE

## 2021-04-14 ENCOUNTER — APPOINTMENT (OUTPATIENT)
Dept: ENDOCRINOLOGY | Facility: CLINIC | Age: 71
End: 2021-04-14
Payer: MEDICARE

## 2021-04-14 VITALS
SYSTOLIC BLOOD PRESSURE: 129 MMHG | OXYGEN SATURATION: 99 % | DIASTOLIC BLOOD PRESSURE: 72 MMHG | HEART RATE: 73 BPM | WEIGHT: 126.64 LBS | TEMPERATURE: 98.4 F | HEIGHT: 61.81 IN | BODY MASS INDEX: 23.3 KG/M2

## 2021-04-14 VITALS — HEART RATE: 84 BPM | SYSTOLIC BLOOD PRESSURE: 135 MMHG | DIASTOLIC BLOOD PRESSURE: 78 MMHG

## 2021-04-14 DIAGNOSIS — M81.0 AGE-RELATED OSTEOPOROSIS W/OUT CURRENT PATHOLOGICAL FRACTURE: ICD-10-CM

## 2021-04-14 DIAGNOSIS — E55.9 VITAMIN D DEFICIENCY, UNSPECIFIED: ICD-10-CM

## 2021-04-14 DIAGNOSIS — E78.00 PURE HYPERCHOLESTEROLEMIA, UNSPECIFIED: ICD-10-CM

## 2021-04-14 DIAGNOSIS — C64.1 MALIGNANT NEOPLASM OF RIGHT KIDNEY, EXCEPT RENAL PELVIS: ICD-10-CM

## 2021-04-14 LAB — GLUCOSE BLDC GLUCOMTR-MCNC: 165

## 2021-04-14 PROCEDURE — 99215 OFFICE O/P EST HI 40 MIN: CPT

## 2021-04-14 PROCEDURE — 36415 COLL VENOUS BLD VENIPUNCTURE: CPT

## 2021-04-14 PROCEDURE — 99204 OFFICE O/P NEW MOD 45 MIN: CPT | Mod: 25

## 2021-04-14 RX ORDER — AMLODIPINE BESYLATE 5 MG/1
5 TABLET ORAL DAILY
Qty: 90 | Refills: 3 | Status: DISCONTINUED | COMMUNITY
End: 2021-04-14

## 2021-04-14 RX ORDER — METOPROLOL TARTRATE 25 MG/1
25 TABLET, FILM COATED ORAL
Refills: 0 | Status: ACTIVE | COMMUNITY

## 2021-04-14 RX ORDER — LOSARTAN POTASSIUM 50 MG/1
50 TABLET, FILM COATED ORAL DAILY
Qty: 90 | Refills: 3 | Status: DISCONTINUED | COMMUNITY
Start: 2021-04-14 | End: 2021-04-14

## 2021-04-15 LAB
25(OH)D3 SERPL-MCNC: 19.8 NG/ML
ALBUMIN SERPL ELPH-MCNC: 5 G/DL
ALP BLD-CCNC: 70 U/L
ALT SERPL-CCNC: 13 U/L
ANION GAP SERPL CALC-SCNC: 12 MMOL/L
AST SERPL-CCNC: 13 U/L
BASOPHILS # BLD AUTO: 0.06 K/UL
BASOPHILS NFR BLD AUTO: 0.7 %
BILIRUB SERPL-MCNC: 0.3 MG/DL
BUN SERPL-MCNC: 31 MG/DL
CALCIUM SERPL-MCNC: 11.4 MG/DL
CALCIUM SERPL-MCNC: 11.4 MG/DL
CHLORIDE SERPL-SCNC: 102 MMOL/L
CHOLEST SERPL-MCNC: 164 MG/DL
CO2 SERPL-SCNC: 25 MMOL/L
CREAT SERPL-MCNC: 1.22 MG/DL
CREAT SPEC-SCNC: 45 MG/DL
EOSINOPHIL # BLD AUTO: 0.53 K/UL
EOSINOPHIL NFR BLD AUTO: 6.5 %
ESTIMATED AVERAGE GLUCOSE: 166 MG/DL
GLUCOSE SERPL-MCNC: 181 MG/DL
HBA1C MFR BLD HPLC: 7.4 %
HCT VFR BLD CALC: 46.1 %
HDLC SERPL-MCNC: 66 MG/DL
HGB BLD-MCNC: 14.1 G/DL
IMM GRANULOCYTES NFR BLD AUTO: 0.4 %
LDLC SERPL CALC-MCNC: 76 MG/DL
LYMPHOCYTES # BLD AUTO: 2.7 K/UL
LYMPHOCYTES NFR BLD AUTO: 32.9 %
MAN DIFF?: NORMAL
MCHC RBC-ENTMCNC: 28 PG
MCHC RBC-ENTMCNC: 30.6 GM/DL
MCV RBC AUTO: 91.5 FL
MICROALBUMIN 24H UR DL<=1MG/L-MCNC: 4.6 MG/DL
MICROALBUMIN/CREAT 24H UR-RTO: 103 MG/G
MONOCYTES # BLD AUTO: 0.6 K/UL
MONOCYTES NFR BLD AUTO: 7.3 %
NEUTROPHILS # BLD AUTO: 4.29 K/UL
NEUTROPHILS NFR BLD AUTO: 52.2 %
NONHDLC SERPL-MCNC: 99 MG/DL
PARATHYROID HORMONE INTACT: 37 PG/ML
PLATELET # BLD AUTO: 324 K/UL
POTASSIUM SERPL-SCNC: 5.2 MMOL/L
PROT SERPL-MCNC: 7.8 G/DL
RBC # BLD: 5.04 M/UL
RBC # FLD: 14.2 %
SODIUM SERPL-SCNC: 139 MMOL/L
T4 FREE SERPL-MCNC: 1.5 NG/DL
TRIGL SERPL-MCNC: 111 MG/DL
TSH SERPL-ACNC: 5.61 UIU/ML
VIT B12 SERPL-MCNC: 564 PG/ML
WBC # FLD AUTO: 8.21 K/UL

## 2021-04-16 ENCOUNTER — OUTPATIENT (OUTPATIENT)
Dept: OUTPATIENT SERVICES | Facility: HOSPITAL | Age: 71
LOS: 1 days | End: 2021-04-16
Payer: MEDICARE

## 2021-04-16 ENCOUNTER — APPOINTMENT (OUTPATIENT)
Dept: ULTRASOUND IMAGING | Facility: CLINIC | Age: 71
End: 2021-04-16
Payer: MEDICARE

## 2021-04-16 DIAGNOSIS — E03.9 HYPOTHYROIDISM, UNSPECIFIED: ICD-10-CM

## 2021-04-16 DIAGNOSIS — Z90.5 ACQUIRED ABSENCE OF KIDNEY: Chronic | ICD-10-CM

## 2021-04-16 DIAGNOSIS — E89.2 POSTPROCEDURAL HYPOPARATHYROIDISM: Chronic | ICD-10-CM

## 2021-04-16 DIAGNOSIS — Z90.710 ACQUIRED ABSENCE OF BOTH CERVIX AND UTERUS: Chronic | ICD-10-CM

## 2021-04-16 LAB
THYROGLOB AB SERPL-ACNC: <20 IU/ML
THYROPEROXIDASE AB SERPL IA-ACNC: 70.9 IU/ML

## 2021-04-16 PROCEDURE — 76536 US EXAM OF HEAD AND NECK: CPT | Mod: 26

## 2021-04-16 PROCEDURE — 76536 US EXAM OF HEAD AND NECK: CPT

## 2021-04-17 PROBLEM — M81.0 OSTEOPOROSIS, SENILE: Status: ACTIVE | Noted: 2021-04-17

## 2021-04-17 PROBLEM — E55.9 VITAMIN D INSUFFICIENCY: Status: ACTIVE | Noted: 2021-04-17

## 2021-04-17 LAB
CAU: 9 MG/DL
CREAT 24H UR-MCNC: 0.8 G/24 H
CREAT ?TM UR-MCNC: 30 MG/DL
PANC ISLET CELL AB SER QL: NORMAL
PROT ?TM UR-MCNC: 24 HR
SPECIMEN VOL 24H UR: 238 MG/24 H
SPECIMEN VOL 24H UR: 2650 ML

## 2021-04-17 RX ORDER — FAMOTIDINE 20 MG/1
20 TABLET, FILM COATED ORAL
Qty: 60 | Refills: 0 | Status: ACTIVE | COMMUNITY
Start: 2021-04-08

## 2021-04-17 RX ORDER — AMLODIPINE BESYLATE 10 MG/1
10 TABLET ORAL
Qty: 30 | Refills: 0 | Status: ACTIVE | COMMUNITY
Start: 2021-04-09

## 2021-04-17 NOTE — PHYSICAL EXAM
[Alert] : alert [Well Nourished] : well nourished [Healthy Appearance] : healthy appearance [No Acute Distress] : no acute distress [Well Developed] : well developed [Normal Voice/Communication] : normal voice communication [No Proptosis] : no proptosis [Normal Sclera/Conjunctiva] : normal sclera/conjunctiva [No Neck Mass] : no neck mass was observed [No LAD] : no lymphadenopathy [Supple] : the neck was supple [Thyroid Not Enlarged] : the thyroid was not enlarged [No Thyroid Nodules] : no palpable thyroid nodules [No Respiratory Distress] : no respiratory distress [Normal Rate] : heart rate was normal [Pedal Pulses Normal] : the pedal pulses are present [No Stigmata of Cushings Syndrome] : no stigmata of Cushings Syndrome [Normal Gait] : normal gait [No Clubbing, Cyanosis] : no clubbing  or cyanosis of the fingernails [No Involuntary Movements] : no involuntary movements were seen [Acanthosis Nigricans] : no acanthosis nigricans [Right Foot Was Examined] : right foot ~C was examined [Left Foot Was Examined] : left foot ~C was examined [Normal] : normal [2+] : 2+ in the dorsalis pedis [Diminished Throughout Both Feet] : normal tactile sensation with monofilament testing throughout both feet [Normal Sensation on Monofilament Testing] : normal sensation on monofilament testing of lower extremities [No Tremors] : no tremors [Normal Affect] : the affect was normal [Normal Insight/Judgement] : insight and judgment were intact [Normal Mood] : the mood was normal [de-identified] : Trace edema

## 2021-04-17 NOTE — REVIEW OF SYSTEMS
[Recent Weight Gain (___ Lbs)] : recent weight gain: [unfilled] lbs [All other systems negative] : All other systems negative [FreeTextEntry9] : Swelling in legs

## 2021-04-17 NOTE — HISTORY OF PRESENT ILLNESS
[FreeTextEntry1] : Patient elected to have her granddaughter translate.  Her daughter was also on the phone.\par This is a 71-year-old female with type 2 diabetes mellitus, hypertension, hyperlipidemia, primary hypothyroidism, vitamin D insufficiency, osteoporosis, hypercalcemia status post parathyroidectomy (1 and 1/2 parathyroid glands removed), hyperkalemia, renal cell carcinoma with IVC thrombus status post thrombectomy and nephrectomy in November 2019, diverticulitis, supraorbital hemangioma resection, COVID-19 infection in April 2020 here for evaluation.\par Diabetes was diagnosed approximately age 46.  She is currently on Januvia 100 mg daily and Metformin 500 mg 2 times a day.\par She self monitors blood glucose 2-3 times per day.  Fasting 120-180s, after meals 160s.\par She saw her ophthalmologist in September 2020 and denies retinopathy.\par She denies neuropathy.\par She denies nephropathy.\par She takes simvastatin 20 mg daily.\par \par She currently takes Synthroid 50 mcg Mondays through Fridays and 75 mcg on Saturdays and Sundays.\par \par She is on Prolia every 6 months for osteoporosis.  Her last dose was approximately 1-1/2 months ago.\par \par She underwent 1-1/2 gland parathyroidectomy in 2015 or 2016 per patient.  There is no history of bone fracture in the past.

## 2021-04-17 NOTE — ASSESSMENT
[FreeTextEntry1] : This is a 71-year-old female with type 2 diabetes mellitus, hypertension, hyperlipidemia, primary hypothyroidism, vitamin D insufficiency, osteoporosis, hypercalcemia status post parathyroidectomy (1 and 1/2 parathyroid glands removed), hyperkalemia, renal cell carcinoma with IVC thrombus status post thrombectomy and nephrectomy in November 2019, diverticulitis, supraorbital hemangioma resection, COVID-19 infection in April 2020 here for evaluation.\par 1.  T2DM\par Check hemoglobin A1c.\par She is currently on Januvia 100 mg daily and Metformin 500 mg 2 times a day.  Continue for now pending blood work results.\par Check vitamin B12 as she is on Metformin.\par She saw her ophthalmologist in September 2020 and denies retinopathy.\par She denies neuropathy.\par She denies nephropathy.  Check urine microalbumin.\par She takes simvastatin 20 mg daily.\par 2.  Hypertension\par Well-controlled.\par 3.  Hyperlipidemia\par Check lipid panel.\par Continue simvastatin 20 mg daily pending results.\par 4.  Primary hypothyroidism\par She currently takes Synthroid 50 mcg Mondays through Fridays and 75 mcg on Saturdays and Sundays.\par Check TFTs.\par She is clinically euthyroid.\par Check thyroid ultrasound.\par 5.  Vitamin D insufficiency\par Check 25 vitamin D.\par 6.  Osteoporosis\par She is on Prolia every 6 months for osteoporosis.  Her last dose was approximately 1-1/2 months ago.\par Check DEXA.\par 7.  Hypercalcemia\par Check PTH.\par She underwent 1-1/2 gland parathyroidectomy in 2015 or 2016 per patient and she has persistent hypercalcemia.\par Check 24-hour urine calcium collection to evaluate for familial hypercalcemic hypocalciuria.\par Check parathyroid ultrasound.\par

## 2021-04-19 LAB
GAD65 AB SER-MCNC: 0.01 NMOL/L
ISLET CELL512 AB SER-SCNC: 0 NMOL/L

## 2021-04-19 NOTE — ASSESSMENT
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 Recently seen at [facility] ED 4/5/21 with CC of abdominal pain, was diagnosed with diverticulitis and then d/c home on Augmentin BID x 9 days\par \par # CKD III in the setting of solitary kidney s/p R resection '19\par Reviewed hospital labs -stable renal fx\par Continue lokelma twice weekly- K stable; repeat BMP\par CT 12/20 L kidney 4.6cm exophytic cyst and several small ones \par Renal dietary counseling\par \par #HTN\par Controlled on norvasc 10mg, however not tolerating leg swelling\par Will change to nifedipine 60mg to see if less swelling\par \par #tertiary hyperparathyroidism?\par #Vit D deficiency- level 19.3\par Non suppressed PTH, Ca upper limit of normal; PTHrP normal\par start vit D supplements \par Paraproteinaemia w/u nl\par Saw a new Endocrinologist today- calcium w/u repeated- including urinary Ca, PTH, rpt imaging of parathyroid\par \par Rpt appt on 4/28- will do video visit to f/u BP ranges\par \par \par

## 2021-04-19 NOTE — PHYSICAL EXAM
[General Appearance - Alert] : alert [General Appearance - In No Acute Distress] : in no acute distress [Sclera] : the sclera and conjunctiva were normal [Outer Ear] : the ears and nose were normal in appearance [Jugular Venous Distention Increased] : there was no jugular-venous distention [Exaggerated Use Of Accessory Muscles For Inspiration] : no accessory muscle use [Heart Rate And Rhythm] : heart rate was normal and rhythm regular [Edema] : there was no peripheral edema [Abdomen Soft] : soft [Abnormal Walk] : normal gait [] : no rash [Cranial Nerves] : cranial nerves 2-12 were intact [No Focal Deficits] : no focal deficits [Oriented To Time, Place, And Person] : oriented to person, place, and time [Impaired Insight] : insight and judgment were intact [FreeTextEntry1] : + b/l ankle oedema 1+

## 2021-04-19 NOTE — HISTORY OF PRESENT ILLNESS
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 Recently seen at [facility] ED 4/5/21 with CC of abdominal pain, was diagnosed with diverticulitis and then d/c home on Augmentin BID x 9 days\par \par PCP Dr Delaney Erazo \par  Dr Jose Villafuerte\par HemeOnc Dr. Kirk Jasmine\par \par Granddaugther accompanied to assist with history taking. \par Pt c/o ankle swelling since norvasc increased to 10mg.- wants to change medication. Her BP has been ranging 120-150s at home- lower at night\par Takes her BP meds in the morning. Takes 25mg metoprolol PRN for tachycardia\par \par OTC: occ CoQ10

## 2021-04-19 NOTE — CONSULT LETTER
[Dear  ___] : Dear  [unfilled], [Consult Letter:] : I had the pleasure of evaluating your patient, [unfilled]. [Please see my note below.] : Please see my note below. [Consult Closing:] : Thank you very much for allowing me to participate in the care of this patient.  If you have any questions, please do not hesitate to contact me. [Sincerely,] : Sincerely, [DrBraden  ___] : Dr. CALIX [FreeTextEntry3] : Swetha Suero MD\par  of Medicine\par Division of Kidney Diseases and Hypertension\par [facility] \par [employer] [facility] Tel: [phone]\par Email: [email or web address] \par

## 2021-04-19 NOTE — END OF VISIT
[] : Fellow [FreeTextEntry3] : Reviewed prior labs, radiology and documentation with patient.\par I was present throughout exam, reviewed all data and discussed with patient and fellow. Note reviewed and edited where appropriate, agree with above plan.\par \par  [Time Spent: ___ minutes] : I have spent [unfilled] minutes of time on the encounter.

## 2021-04-21 ENCOUNTER — NON-APPOINTMENT (OUTPATIENT)
Age: 71
End: 2021-04-21

## 2021-04-21 DIAGNOSIS — K43.9 VENTRAL HERNIA WITHOUT OBSTRUCTION OR GANGRENE: ICD-10-CM

## 2021-04-21 DIAGNOSIS — I10 ESSENTIAL (PRIMARY) HYPERTENSION: ICD-10-CM

## 2021-04-21 DIAGNOSIS — E78.00 PURE HYPERCHOLESTEROLEMIA, UNSPECIFIED: ICD-10-CM

## 2021-04-21 DIAGNOSIS — R07.9 CHEST PAIN, UNSPECIFIED: ICD-10-CM

## 2021-04-21 DIAGNOSIS — Z86.16 PERSONAL HISTORY OF COVID-19: ICD-10-CM

## 2021-04-21 DIAGNOSIS — R26.89 OTHER ABNORMALITIES OF GAIT AND MOBILITY: ICD-10-CM

## 2021-04-21 DIAGNOSIS — E11.9 TYPE 2 DIABETES MELLITUS WITHOUT COMPLICATIONS: ICD-10-CM

## 2021-04-21 DIAGNOSIS — H54.62 UNQUALIFIED VISUAL LOSS, LEFT EYE, NORMAL VISION RIGHT EYE: ICD-10-CM

## 2021-04-21 DIAGNOSIS — H81.09 MENIERE'S DISEASE, UNSPECIFIED EAR: ICD-10-CM

## 2021-04-21 DIAGNOSIS — I25.10 ATHEROSCLEROTIC HEART DISEASE OF NATIVE CORONARY ARTERY WITHOUT ANGINA PECTORIS: ICD-10-CM

## 2021-04-21 DIAGNOSIS — Z90.711 ACQUIRED ABSENCE OF UTERUS WITH REMAINING CERVICAL STUMP: ICD-10-CM

## 2021-04-21 DIAGNOSIS — Z87.891 PERSONAL HISTORY OF NICOTINE DEPENDENCE: ICD-10-CM

## 2021-04-21 DIAGNOSIS — R91.8 OTHER NONSPECIFIC ABNORMAL FINDING OF LUNG FIELD: ICD-10-CM

## 2021-04-21 DIAGNOSIS — K57.92 DIVERTICULITIS OF INTESTINE, PART UNSPECIFIED, WITHOUT PERFORATION OR ABSCESS WITHOUT BLEEDING: ICD-10-CM

## 2021-04-21 DIAGNOSIS — Z79.899 OTHER LONG TERM (CURRENT) DRUG THERAPY: ICD-10-CM

## 2021-04-21 DIAGNOSIS — Z79.84 LONG TERM (CURRENT) USE OF ORAL HYPOGLYCEMIC DRUGS: ICD-10-CM

## 2021-04-21 DIAGNOSIS — M81.0 AGE-RELATED OSTEOPOROSIS WITHOUT CURRENT PATHOLOGICAL FRACTURE: ICD-10-CM

## 2021-04-21 DIAGNOSIS — Z90.5 ACQUIRED ABSENCE OF KIDNEY: ICD-10-CM

## 2021-04-21 DIAGNOSIS — Z85.528 PERSONAL HISTORY OF OTHER MALIGNANT NEOPLASM OF KIDNEY: ICD-10-CM

## 2021-04-21 DIAGNOSIS — E83.52 HYPERCALCEMIA: ICD-10-CM

## 2021-04-21 DIAGNOSIS — E03.9 HYPOTHYROIDISM, UNSPECIFIED: ICD-10-CM

## 2021-04-27 LAB — ZINC TRANSPORTER 8 AB: <15 U/ML

## 2021-04-27 RX ORDER — DAPAGLIFLOZIN 5 MG/1
5 TABLET, FILM COATED ORAL
Qty: 90 | Refills: 2 | Status: DISCONTINUED | COMMUNITY
Start: 2021-04-27 | End: 2021-04-27

## 2021-04-27 RX ORDER — GLIMEPIRIDE 1 MG/1
1 TABLET ORAL
Qty: 90 | Refills: 2 | Status: COMPLETED | COMMUNITY
Start: 2021-04-27 | End: 2021-07-26

## 2021-04-28 ENCOUNTER — APPOINTMENT (OUTPATIENT)
Dept: NEPHROLOGY | Facility: CLINIC | Age: 71
End: 2021-04-28
Payer: MEDICARE

## 2021-04-28 ENCOUNTER — APPOINTMENT (OUTPATIENT)
Dept: GASTROENTEROLOGY | Facility: CLINIC | Age: 71
End: 2021-04-28
Payer: MEDICARE

## 2021-04-28 VITALS
OXYGEN SATURATION: 97 % | HEIGHT: 64 IN | BODY MASS INDEX: 21.85 KG/M2 | RESPIRATION RATE: 15 BRPM | HEART RATE: 92 BPM | WEIGHT: 128 LBS | TEMPERATURE: 98 F | SYSTOLIC BLOOD PRESSURE: 139 MMHG | DIASTOLIC BLOOD PRESSURE: 80 MMHG

## 2021-04-28 PROCEDURE — 99204 OFFICE O/P NEW MOD 45 MIN: CPT

## 2021-04-28 PROCEDURE — 99214 OFFICE O/P EST MOD 30 MIN: CPT | Mod: 95

## 2021-04-28 RX ORDER — FLUCONAZOLE 150 MG/1
150 TABLET ORAL
Qty: 1 | Refills: 0 | Status: DISCONTINUED | COMMUNITY
Start: 2021-03-19 | End: 2021-04-28

## 2021-04-28 RX ORDER — POLYETHYLENE GLYCOL 3350 17 G/17G
17 POWDER, FOR SOLUTION ORAL TWICE DAILY
Qty: 60 | Refills: 5 | Status: ACTIVE | COMMUNITY
Start: 2021-04-28 | End: 1900-01-01

## 2021-04-28 RX ORDER — NIFEDIPINE 30 MG/1
30 TABLET, EXTENDED RELEASE ORAL
Qty: 30 | Refills: 0 | Status: DISCONTINUED | COMMUNITY
Start: 2021-04-08 | End: 2021-04-28

## 2021-04-28 RX ORDER — AMOXICILLIN AND CLAVULANATE POTASSIUM 875; 125 MG/1; MG/1
875-125 TABLET, COATED ORAL
Qty: 18 | Refills: 0 | Status: DISCONTINUED | COMMUNITY
Start: 2021-04-06 | End: 2021-04-28

## 2021-04-28 RX ORDER — NIFEDIPINE 60 MG/1
60 TABLET, EXTENDED RELEASE ORAL DAILY
Qty: 30 | Refills: 2 | Status: DISCONTINUED | COMMUNITY
Start: 2021-04-14 | End: 2021-04-28

## 2021-04-29 NOTE — PHYSICAL EXAM
[General Appearance - Alert] : alert [General Appearance - In No Acute Distress] : in no acute distress [General Appearance - Well Nourished] : well nourished [General Appearance - Well Developed] : well developed [General Appearance - Well-Appearing] : healthy appearing [Sclera] : the sclera and conjunctiva were normal [PERRL With Normal Accommodation] : pupils were equal in size, round, and reactive to light [Extraocular Movements] : extraocular movements were intact [Outer Ear] : the ears and nose were normal in appearance [Both Tympanic Membranes Were Examined] : both tympanic membranes were normal [Oropharynx] : the oropharynx was normal [Neck Appearance] : the appearance of the neck was normal [Neck Cervical Mass (___cm)] : no neck mass was observed [Jugular Venous Distention Increased] : there was no jugular-venous distention [Thyroid Diffuse Enlargement] : the thyroid was not enlarged [Thyroid Nodule] : there were no palpable thyroid nodules [Auscultation Breath Sounds / Voice Sounds] : lungs were clear to auscultation bilaterally [Heart Rate And Rhythm] : heart rate was normal and rhythm regular [Heart Sounds] : normal S1 and S2 [Heart Sounds Gallop] : no gallops [Murmurs] : no murmurs [Heart Sounds Pericardial Friction Rub] : no pericardial rub [Edema] : there was no peripheral edema [Bowel Sounds] : normal bowel sounds [Abdomen Soft] : soft [Abdomen Mass (___ Cm)] : no abdominal mass palpated [Cervical Lymph Nodes Enlarged Posterior Bilaterally] : posterior cervical [Cervical Lymph Nodes Enlarged Anterior Bilaterally] : anterior cervical [No CVA Tenderness] : no ~M costovertebral angle tenderness [No Spinal Tenderness] : no spinal tenderness [Abnormal Walk] : normal gait [Nail Clubbing] : no clubbing  or cyanosis of the fingernails [Musculoskeletal - Swelling] : no joint swelling seen [Motor Tone] : muscle strength and tone were normal [Skin Color & Pigmentation] : normal skin color and pigmentation [Skin Turgor] : normal skin turgor [] : no rash [Deep Tendon Reflexes (DTR)] : deep tendon reflexes were 2+ and symmetric [Sensation] : the sensory exam was normal to light touch and pinprick [No Focal Deficits] : no focal deficits [Oriented To Time, Place, And Person] : oriented to person, place, and time [Impaired Insight] : insight and judgment were intact [Affect] : the affect was normal [FreeTextEntry1] : mild tenderness over supraumbilical hernia

## 2021-04-29 NOTE — HISTORY OF PRESENT ILLNESS
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 \par \par Of note patient presents to clinic visit with her daughter today, requests that daughter translates on her behalf. Daughter works as an ED nurse at [employer]. \par \par Recently she had LLQ abdominal pain and had a CT scan at [facility] and had diverticulitis. They gave her antibiotics. She still has some discomfort but overall improved. \par \par She has a hernia (incisional hernia)\par \par She has kidney cancer, hernia is from the surgery\par \par When she tries to go to the restroom feels like something is blocking. \par \par Sometimes she feels like when hs eis pushing the hernia comes out and the pushing thing comes through hernia but BM doesn't come out. \par She goes normallly every other day\par BMs depend on what she eats, sometimes hard sometimes not as much. \par No blood in the stool. \par No laxatives. \par Drinks 8-10 glasses of water a day (drinks 2L a day) \par \par She also complains of intermittent heartburn symptoms. Daughter and patient are wondering if she can get EGD because also with history H pylori (per patient was treated and eradicated). She takes pepcid as needed. She recently had severe episode of heartburn when she wetn tot he ED. She underwent CTA etc to ensure no other cause of the heartburn symptoms. \par \par Has been doing colonoscopy every 5 yrs, always has polyps. last colon was done somewhere in [facility]. \par \par PMH: \par DM\par HTN\par HLD\par ostoeporosis\par hypercalcemia\par parathyroid problems (1.5 glands removed) \par hypothyroidism\par Rclear clel renal carcinoma w/ IVC thrombus s/p thrombetomy + nephrectomy  with solitary kidney\par partial blindness 2/2 supraorbital hemanioma resection \par \par PSH: \par Hysterectomy in \par Supraorbital mass with cut of optic nerve\par parathyroid 1.5 gland removed \par Kidney resection \par \par MED: \par amlodipine\par metformin\par januvia\par glimepiride\par simvastatin\par synthroid\par potassium lowering med \par \par \par ALL: \par NKDA\par \par SH: \par used to smoke yrs ago\par No EtOH \par no drugs\par \par FH: \par They are not sure, mother  from some cancer but not sure which one \par

## 2021-04-29 NOTE — ASSESSMENT
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 \par \par Episode of severe heartburn, chest discomfort: patient and daughter requesting EGD for evaluation\par - plan for EGD to ensure no esophagitis, barretts, PUD\par \par Diverticulitis: plan for colonoscopy 6-8 weeks after resolution of episode (will plan for June 9th or afterwards). Still with some discomfort, though significantly improved after antibiotics. \par - plan for golytely preparation given her history of constipation\par - reviewed bowel preparation instructions with the patient\par - needs COVID-19 PCR within 2-3d of the procedure\par - needs escort post procedure\par - reviewed r/b/a/i of the procedure and patient and daughter express understanding wish to proceed\par \par Constipation\par - start miralax, dulcolax \par - avoid magnesium because prior kidney resection \par - start fiber supplement\par - continue adequate water intake\par \par Ventral hernia\par - per patient discomfort when straining to have BM, also discomfort on physical examination today (tenderness to palpation) \par - patient's daughter had previously scheduled her for a surgery eval, but then they held off because having other issues. Can meet surgeon again if this becomes more bothersome. REviewed recent imaging of CT scan and notable for " hernia" \par \par Personal history of polyps\par - try to obtain prior records form colonscopy\par - reports she "always has polyps on prior colonoscopies)\par \par Follow up post-procedure

## 2021-05-01 ENCOUNTER — OUTPATIENT (OUTPATIENT)
Dept: OUTPATIENT SERVICES | Facility: HOSPITAL | Age: 71
LOS: 1 days | End: 2021-05-01
Payer: MEDICARE

## 2021-05-01 ENCOUNTER — APPOINTMENT (OUTPATIENT)
Dept: NUCLEAR MEDICINE | Facility: IMAGING CENTER | Age: 71
End: 2021-05-01
Payer: MEDICARE

## 2021-05-01 DIAGNOSIS — Z90.710 ACQUIRED ABSENCE OF BOTH CERVIX AND UTERUS: Chronic | ICD-10-CM

## 2021-05-01 DIAGNOSIS — E89.2 POSTPROCEDURAL HYPOPARATHYROIDISM: Chronic | ICD-10-CM

## 2021-05-01 DIAGNOSIS — E83.52 HYPERCALCEMIA: ICD-10-CM

## 2021-05-01 DIAGNOSIS — Z90.5 ACQUIRED ABSENCE OF KIDNEY: Chronic | ICD-10-CM

## 2021-05-01 PROCEDURE — 78071 PARATHYRD PLANAR W/WO SUBTRJ: CPT | Mod: 26,ME

## 2021-05-01 PROCEDURE — 78071 PARATHYRD PLANAR W/WO SUBTRJ: CPT

## 2021-05-01 PROCEDURE — A9500: CPT

## 2021-05-01 PROCEDURE — G1004: CPT

## 2021-05-01 PROCEDURE — 78072 PARATHYRD PLANAR W/SPECT&CT: CPT

## 2021-05-01 PROCEDURE — A9512: CPT

## 2021-05-04 NOTE — END OF VISIT
[] : Fellow [Time Spent: ___ minutes] : I have spent [unfilled] minutes of time on the encounter. [FreeTextEntry3] : \par

## 2021-05-04 NOTE — HISTORY OF PRESENT ILLNESS
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 Kirk Jasmine\par \par Daughter accompanied to assist with history taking and translation\par \par Nifedipine gave her hot flashes, back on amlodipine 10mg daily with minimal ankle edema, tolerating well with BPs 130s/70s. No headaches. Saw new endocrinologist for evaluation for her elevated PTH and Ca despite partial parathyroidectomy for primary hyperpara in the past\par \par OTC: occ CoQ10

## 2021-05-04 NOTE — ASSESSMENT
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, h/o diverticulitis, frequent yeast infections, h/o covid 19 4/20, h/o R clear cell renal carcinoma w IVC thrombus s/p thrombectomy + nephrectomy 11/19 with solitary kidney, >20 years of HTN and DMII, primary hyperparathyroidism s/p 1.5 lobes removed in 2015 with persistent hypercalcemia and elevated PTH, here for f/u of hyperkalemia and CKD III via video:\par \par # CKD III in the setting of solitary kidney s/p R resection '19 - baseline Cr 1.1-1.4 egfr 39-56\par Reviewed 4/21 labs -stable renal fx\par Continue lokelma twice weekly- K stable\par CT 12/20 L kidney 4.6cm exophytic cyst and several small ones, simple\par \par #HTN\par Controlled on norvasc 10mg with tolerable/minimal ankle edema\par \par # Primary hyperparathyroidism s/p partial resection with persistently elevated PTH and calcium, primary vs tertiary hyperparathyroidism - getting w/u with endocrine, vit d def started on supplements\par \par \par \par \par

## 2021-05-11 ENCOUNTER — NON-APPOINTMENT (OUTPATIENT)
Age: 71
End: 2021-05-11

## 2021-05-12 ENCOUNTER — NON-APPOINTMENT (OUTPATIENT)
Age: 71
End: 2021-05-12

## 2021-05-13 ENCOUNTER — NON-APPOINTMENT (OUTPATIENT)
Age: 71
End: 2021-05-13

## 2021-05-14 ENCOUNTER — LABORATORY RESULT (OUTPATIENT)
Age: 71
End: 2021-05-14

## 2021-05-15 ENCOUNTER — EMERGENCY (EMERGENCY)
Facility: HOSPITAL | Age: 71
LOS: 1 days | Discharge: HOME | End: 2021-05-15
Attending: EMERGENCY MEDICINE | Admitting: EMERGENCY MEDICINE
Payer: MEDICARE

## 2021-05-15 VITALS
RESPIRATION RATE: 16 BRPM | SYSTOLIC BLOOD PRESSURE: 147 MMHG | WEIGHT: 130.07 LBS | OXYGEN SATURATION: 98 % | HEIGHT: 63 IN | DIASTOLIC BLOOD PRESSURE: 80 MMHG | HEART RATE: 83 BPM | TEMPERATURE: 98 F

## 2021-05-15 DIAGNOSIS — Z90.710 ACQUIRED ABSENCE OF BOTH CERVIX AND UTERUS: Chronic | ICD-10-CM

## 2021-05-15 DIAGNOSIS — Z90.5 ACQUIRED ABSENCE OF KIDNEY: Chronic | ICD-10-CM

## 2021-05-15 DIAGNOSIS — Z90.5 ACQUIRED ABSENCE OF KIDNEY: ICD-10-CM

## 2021-05-15 DIAGNOSIS — Z79.82 LONG TERM (CURRENT) USE OF ASPIRIN: ICD-10-CM

## 2021-05-15 DIAGNOSIS — E78.00 PURE HYPERCHOLESTEROLEMIA, UNSPECIFIED: ICD-10-CM

## 2021-05-15 DIAGNOSIS — I10 ESSENTIAL (PRIMARY) HYPERTENSION: ICD-10-CM

## 2021-05-15 DIAGNOSIS — Z90.710 ACQUIRED ABSENCE OF BOTH CERVIX AND UTERUS: ICD-10-CM

## 2021-05-15 DIAGNOSIS — E87.5 HYPERKALEMIA: ICD-10-CM

## 2021-05-15 DIAGNOSIS — E11.9 TYPE 2 DIABETES MELLITUS WITHOUT COMPLICATIONS: ICD-10-CM

## 2021-05-15 DIAGNOSIS — K57.90 DIVERTICULOSIS OF INTESTINE, PART UNSPECIFIED, WITHOUT PERFORATION OR ABSCESS WITHOUT BLEEDING: ICD-10-CM

## 2021-05-15 DIAGNOSIS — R79.9 ABNORMAL FINDING OF BLOOD CHEMISTRY, UNSPECIFIED: ICD-10-CM

## 2021-05-15 DIAGNOSIS — Z79.899 OTHER LONG TERM (CURRENT) DRUG THERAPY: ICD-10-CM

## 2021-05-15 DIAGNOSIS — E89.2 POSTPROCEDURAL HYPOPARATHYROIDISM: ICD-10-CM

## 2021-05-15 DIAGNOSIS — E03.9 HYPOTHYROIDISM, UNSPECIFIED: ICD-10-CM

## 2021-05-15 DIAGNOSIS — E89.2 POSTPROCEDURAL HYPOPARATHYROIDISM: Chronic | ICD-10-CM

## 2021-05-15 DIAGNOSIS — Z79.84 LONG TERM (CURRENT) USE OF ORAL HYPOGLYCEMIC DRUGS: ICD-10-CM

## 2021-05-15 DIAGNOSIS — Z82.49 FAMILY HISTORY OF ISCHEMIC HEART DISEASE AND OTHER DISEASES OF THE CIRCULATORY SYSTEM: ICD-10-CM

## 2021-05-15 LAB
ALBUMIN SERPL ELPH-MCNC: 4.5 G/DL — SIGNIFICANT CHANGE UP (ref 3.3–5)
ALP SERPL-CCNC: 78 U/L — SIGNIFICANT CHANGE UP (ref 40–120)
ALT FLD-CCNC: 13 U/L — SIGNIFICANT CHANGE UP (ref 10–45)
ANION GAP SERPL CALC-SCNC: 10 MMOL/L — SIGNIFICANT CHANGE UP (ref 5–17)
AST SERPL-CCNC: 17 U/L — SIGNIFICANT CHANGE UP (ref 10–40)
BASOPHILS # BLD AUTO: 0.07 K/UL — SIGNIFICANT CHANGE UP (ref 0–0.2)
BASOPHILS NFR BLD AUTO: 0.7 % — SIGNIFICANT CHANGE UP (ref 0–2)
BILIRUB SERPL-MCNC: 0.4 MG/DL — SIGNIFICANT CHANGE UP (ref 0.2–1.2)
BUN SERPL-MCNC: 39 MG/DL — HIGH (ref 7–23)
CALCIUM SERPL-MCNC: 11.6 MG/DL — HIGH (ref 8.4–10.5)
CHLORIDE SERPL-SCNC: 100 MMOL/L — SIGNIFICANT CHANGE UP (ref 96–108)
CO2 SERPL-SCNC: 27 MMOL/L — SIGNIFICANT CHANGE UP (ref 22–31)
CREAT SERPL-MCNC: 1.32 MG/DL — HIGH (ref 0.5–1.3)
EOSINOPHIL # BLD AUTO: 0.86 K/UL — HIGH (ref 0–0.5)
EOSINOPHIL NFR BLD AUTO: 9 % — HIGH (ref 0–6)
GLUCOSE SERPL-MCNC: 144 MG/DL — HIGH (ref 70–99)
HCT VFR BLD CALC: 40.2 % — SIGNIFICANT CHANGE UP (ref 34.5–45)
HGB BLD-MCNC: 12.8 G/DL — SIGNIFICANT CHANGE UP (ref 11.5–15.5)
IMM GRANULOCYTES NFR BLD AUTO: 0.7 % — SIGNIFICANT CHANGE UP (ref 0–1.5)
LYMPHOCYTES # BLD AUTO: 3.18 K/UL — SIGNIFICANT CHANGE UP (ref 1–3.3)
LYMPHOCYTES # BLD AUTO: 33.4 % — SIGNIFICANT CHANGE UP (ref 13–44)
MCHC RBC-ENTMCNC: 27.6 PG — SIGNIFICANT CHANGE UP (ref 27–34)
MCHC RBC-ENTMCNC: 31.8 GM/DL — LOW (ref 32–36)
MCV RBC AUTO: 86.8 FL — SIGNIFICANT CHANGE UP (ref 80–100)
MONOCYTES # BLD AUTO: 0.74 K/UL — SIGNIFICANT CHANGE UP (ref 0–0.9)
MONOCYTES NFR BLD AUTO: 7.8 % — SIGNIFICANT CHANGE UP (ref 2–14)
NEUTROPHILS # BLD AUTO: 4.6 K/UL — SIGNIFICANT CHANGE UP (ref 1.8–7.4)
NEUTROPHILS NFR BLD AUTO: 48.4 % — SIGNIFICANT CHANGE UP (ref 43–77)
NRBC # BLD: 0 /100 WBCS — SIGNIFICANT CHANGE UP (ref 0–0)
PLATELET # BLD AUTO: 264 K/UL — SIGNIFICANT CHANGE UP (ref 150–400)
POTASSIUM SERPL-MCNC: 5.5 MMOL/L — HIGH (ref 3.5–5.3)
POTASSIUM SERPL-SCNC: 5.5 MMOL/L — HIGH (ref 3.5–5.3)
PROT SERPL-MCNC: 7.9 G/DL — SIGNIFICANT CHANGE UP (ref 6–8.3)
RBC # BLD: 4.63 M/UL — SIGNIFICANT CHANGE UP (ref 3.8–5.2)
RBC # FLD: 13.5 % — SIGNIFICANT CHANGE UP (ref 10.3–14.5)
SODIUM SERPL-SCNC: 137 MMOL/L — SIGNIFICANT CHANGE UP (ref 135–145)
WBC # BLD: 9.52 K/UL — SIGNIFICANT CHANGE UP (ref 3.8–10.5)
WBC # FLD AUTO: 9.52 K/UL — SIGNIFICANT CHANGE UP (ref 3.8–10.5)

## 2021-05-15 PROCEDURE — 99284 EMERGENCY DEPT VISIT MOD MDM: CPT | Mod: 25

## 2021-05-15 PROCEDURE — 93005 ELECTROCARDIOGRAM TRACING: CPT

## 2021-05-15 PROCEDURE — 80053 COMPREHEN METABOLIC PANEL: CPT

## 2021-05-15 PROCEDURE — 93010 ELECTROCARDIOGRAM REPORT: CPT

## 2021-05-15 PROCEDURE — 99283 EMERGENCY DEPT VISIT LOW MDM: CPT

## 2021-05-15 PROCEDURE — 36415 COLL VENOUS BLD VENIPUNCTURE: CPT

## 2021-05-15 PROCEDURE — 85025 COMPLETE CBC W/AUTO DIFF WBC: CPT

## 2021-05-15 RX ORDER — SODIUM CHLORIDE 9 MG/ML
1000 INJECTION INTRAMUSCULAR; INTRAVENOUS; SUBCUTANEOUS ONCE
Refills: 0 | Status: COMPLETED | OUTPATIENT
Start: 2021-05-15 | End: 2021-05-15

## 2021-05-15 RX ORDER — SODIUM ZIRCONIUM CYCLOSILICATE 10 G/10G
10 POWDER, FOR SUSPENSION ORAL ONCE
Refills: 0 | Status: COMPLETED | OUTPATIENT
Start: 2021-05-15 | End: 2021-05-15

## 2021-05-15 RX ADMIN — SODIUM CHLORIDE 1000 MILLILITER(S): 9 INJECTION INTRAMUSCULAR; INTRAVENOUS; SUBCUTANEOUS at 01:20

## 2021-05-15 RX ADMIN — SODIUM ZIRCONIUM CYCLOSILICATE 10 GRAM(S): 10 POWDER, FOR SUSPENSION ORAL at 01:20

## 2021-05-15 NOTE — ED PROVIDER NOTE - PATIENT PORTAL LINK FT
You can access the FollowMyHealth Patient Portal offered by [facility] by registering at the following website: [email or web address]. By joining [facility]’s FollowMyHealth portal, you will also be able to view your health information using other applications (apps) compatible with our system.

## 2021-05-15 NOTE — ED ADULT NURSE REASSESSMENT NOTE - NS ED NURSE REASSESS COMMENT FT1
Pt presents to ER room 6 for evaluation with c/o "abnormal lab value", reported potassium from routine lab draw on 5/14 of 6.4. Pt denies nay physical complaints. Reports hx of "high potassium, usually 5.2 per pt", on [facility] 2x per week. Assessment as noted, EKG complete, presented to ER physician for interpretation, IV access established, labs drawn and sent, waiting results

## 2021-05-15 NOTE — ED PROVIDER NOTE - OBJECTIVE STATEMENT
Pt is a 71F who presents to ED for elevated potassium found on routine labs. Pt was called and instructed to come to ED for evaluation. Pt has history of hyperkalemia and hypercalcemia per pt, is on lokelma twice weekly. Pt has no chest pain, no numbness or tingling. Pt is well appearing in ED.

## 2021-05-15 NOTE — ED ADULT NURSE NOTE - OBJECTIVE STATEMENT
Pt presents for evaluation with c/o "abnormal lab value", reported potassium from routine lab draw on 5/14 of 6.4. Pt denies nay physical complaints. Reports hx of "high potassium, usually 5.2 per pt", on [facility] 2x per week.

## 2021-05-15 NOTE — ED PROVIDER NOTE - CLINICAL SUMMARY MEDICAL DECISION MAKING FREE TEXT BOX
Pt is a 71F with abnormal lab result. Will check labs, EKG. Re-assess. Pt remains asymptomatic in ED. Pt is a 71F with abnormal lab result. Will check labs, EKG. Re-assess. Pt remains asymptomatic in ED.  Pt given normal saline IV, lokelma PO.   Outpatient instructions given, return to ED for worsening condition. Pt expresses understanding.

## 2021-05-15 NOTE — ED PROVIDER NOTE - NSFOLLOWUPINSTRUCTIONS_ED_ALL_ED_FT
Hyperkalemia    WHAT YOU NEED TO KNOW:    Hyperkalemia is a high level of potassium in your blood. Potassium helps control how your muscles, heart, and digestive system work.    DISCHARGE INSTRUCTIONS:    Medicines:   •Medicines will be given to remove potassium from your body. This will lower your potassium levels. This medicine may be given as a pill or an enema.      •Take your medicine as directed. Contact your healthcare provider if you think your medicine is not helping or if you have side effects. Tell him of her if you are allergic to any medicine. Keep a list of the medicines, vitamins, and herbs you take. Include the amounts, and when and why you take them. Bring the list or the pill bottles to follow-up visits. Carry your medicine list with you in case of an emergency.      Limit the amount of potassium you eat: Foods that are high in potassium include bananas, tomatoes, oranges, turkey, and milk. Orange juice, citrus juices, and tomato juice are also high in potassium. Do not use salt substitutes. You may need to meet with a dietitian to help plan the best meals for you.    Follow up with your healthcare provider as directed: Write down your questions so you remember to ask them during your visits.     Contact your healthcare provider if:   •You have nausea or are vomiting.      •You have numbness or tingling in your arms or legs.      •Your symptoms do not go away or they get worse.      •You have questions or concerns about your condition or care.      Seek care immediately or call 911 if:   •You have trouble breathing.      •You have an irregular heartbeat.      •You have trouble controlling your muscles.      •You are too tired or weak to stand up.         © Copyright [employer] 2021

## 2021-05-15 NOTE — ED ADULT TRIAGE NOTE - CHIEF COMPLAINT QUOTE
Pt presents for evaluation with c/o "abnormal lab value", reported potassium from routine lab draw on 5/14 of 6.4. Pt denies nay physical complaints. Reports hx of "high potassium, usually 5.2 per pt", on [facility] 2x per week.

## 2021-05-17 LAB
24R-OH-CALCIDIOL SERPL-MCNC: 63.6 PG/ML
ALBUMIN SERPL ELPH-MCNC: 4.9 G/DL
ALP BLD-CCNC: 79 U/L
ALT SERPL-CCNC: 15 U/L
ANION GAP SERPL CALC-SCNC: 9 MMOL/L
AST SERPL-CCNC: 17 U/L
BILIRUB SERPL-MCNC: 0.5 MG/DL
BUN SERPL-MCNC: 37 MG/DL
CALCIUM SERPL-MCNC: 11.2 MG/DL
CALCIUM SERPL-MCNC: 11.2 MG/DL
CHLORIDE SERPL-SCNC: 101 MMOL/L
CO2 SERPL-SCNC: 24 MMOL/L
CREAT SERPL-MCNC: 1.31 MG/DL
GLUCOSE SERPL-MCNC: 136 MG/DL
PARATHYROID HORMONE INTACT: 71 PG/ML
POTASSIUM SERPL-SCNC: 6.4 MMOL/L
PROT SERPL-MCNC: 7.9 G/DL
SODIUM SERPL-SCNC: 134 MMOL/L
TSH SERPL-ACNC: 0.64 UIU/ML

## 2021-05-19 ENCOUNTER — NON-APPOINTMENT (OUTPATIENT)
Age: 71
End: 2021-05-19

## 2021-05-19 DIAGNOSIS — E67.0 HYPERVITAMINOSIS A: ICD-10-CM

## 2021-05-19 LAB
CA-I SERPL-SCNC: 1.1 MMOL/L
CORTIS SERPL-MCNC: 8.5 UG/DL
IGA 24H UR QL IFE: NORMAL
M PROTEIN SPEC IFE-MCNC: NORMAL
VIT A SERPL-MCNC: 80.7 UG/DL

## 2021-05-24 LAB — PTH RELATED PROT SERPL-MCNC: <2 PMOL/L

## 2021-05-26 ENCOUNTER — APPOINTMENT (OUTPATIENT)
Dept: INTERNAL MEDICINE | Facility: CLINIC | Age: 71
End: 2021-05-26
Payer: MEDICARE

## 2021-05-26 PROCEDURE — 36415 COLL VENOUS BLD VENIPUNCTURE: CPT

## 2021-06-01 LAB — TSH SERPL-ACNC: 0.16 UIU/ML

## 2021-06-14 ENCOUNTER — APPOINTMENT (OUTPATIENT)
Dept: UROLOGY | Facility: CLINIC | Age: 71
End: 2021-06-14
Payer: MEDICARE

## 2021-06-14 VITALS — SYSTOLIC BLOOD PRESSURE: 156 MMHG | DIASTOLIC BLOOD PRESSURE: 84 MMHG | HEART RATE: 65 BPM | TEMPERATURE: 97.9 F

## 2021-06-14 PROCEDURE — 99214 OFFICE O/P EST MOD 30 MIN: CPT

## 2021-06-14 NOTE — LETTER BODY
[Dear  ___] : Dear  [unfilled], [Courtesy Letter:] : I had the pleasure of seeing your patient, [unfilled], in my office today. [Please see my note below.] : Please see my note below. [Consult Closing:] : Thank you very much for allowing me to participate in the care of this patient.  If you have any questions, please do not hesitate to contact me. [Sincerely,] : Sincerely, [FreeTextEntry2] : Delaney Erazo DO\par [address]\[place], [place], [address] [FreeTextEntry3] : Jose Villafuerte MD\par Urologic Oncology\par Department of Urology\par [facility]\par \par [employer] [facility] \par \par

## 2021-06-14 NOTE — PHYSICAL EXAM
[General Appearance - Well Developed] : well developed [General Appearance - Well Nourished] : well nourished [Normal Appearance] : normal appearance [Well Groomed] : well groomed [Abdomen Soft] : soft [General Appearance - In No Acute Distress] : no acute distress [Abdomen Tenderness] : non-tender [Costovertebral Angle Tenderness] : no ~M costovertebral angle tenderness [FreeTextEntry1] : incisional hernia on left side of [place] incision [Oriented To Time, Place, And Person] : oriented to person, place, and time [Affect] : the affect was normal [Mood] : the mood was normal [Not Anxious] : not anxious [Normal Station and Gait] : the gait and station were normal for the patient's age [No Focal Deficits] : no focal deficits

## 2021-06-14 NOTE — HISTORY OF PRESENT ILLNESS
[FreeTextEntry1] : 68yo female recently discharged from [facility]. 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 She underwent right radical nephrectomy with IVC thrombectomy 11/22. Did well postoperatively. Received 1 unit pRBC before discharge. Discharge hgb was 10. \par \par 3/09/20 Here for f/u. Doing well. Had PET scan recently which shows mild FDG activity near renal hilum with no appreciable mass, likely postsurgical changes. \par \par 6/08/20 Here for f/u. Doing well. Scheduled for imaging this week of chest/abdomen/pelvis. \par \par 9/21/20 Here for f/u. Had MRI 7/2020 which shows no recurrence. Primary complaint is incisional hernia. \par \par 12/14/20 Here for f/u. She decided to defer hernia surgery for now, not bothersome. No recent imaging.\par \par 6/14/21 Here for f/u. She decided to defer hernia surgery for now, not bothersome. Last imaging 12/2020 no evidence of recurrence. Creatinine 1.3.  [Fever] : no fever [Fatigue] : no fatigue [Nausea] : no nausea [Incisional Drainage] : no incisional drainage [Incisional Pain] : no incisional pain [None] : None

## 2021-06-14 NOTE — ASSESSMENT
[FreeTextEntry1] : 72yo female with [id number] clear cell carcinoma s/p nephrectomy, IVC thrombectomy 11/22/19\par Doing well\par Reviewed labs from 5/2021, creatinine 1.3\par Reviewed CT C/A/P 12/2020, TAMIKO. Check CT chest and MRI abd/pelvis \par f/u 6 months pending test results

## 2021-06-24 ENCOUNTER — RESULT REVIEW (OUTPATIENT)
Age: 71
End: 2021-06-24

## 2021-06-24 ENCOUNTER — APPOINTMENT (OUTPATIENT)
Dept: MRI IMAGING | Facility: CLINIC | Age: 71
End: 2021-06-24
Payer: MEDICARE

## 2021-06-24 ENCOUNTER — OUTPATIENT (OUTPATIENT)
Dept: OUTPATIENT SERVICES | Facility: HOSPITAL | Age: 71
LOS: 1 days | End: 2021-06-24

## 2021-06-24 ENCOUNTER — APPOINTMENT (OUTPATIENT)
Dept: CT IMAGING | Facility: CLINIC | Age: 71
End: 2021-06-24
Payer: MEDICARE

## 2021-06-24 DIAGNOSIS — Z90.5 ACQUIRED ABSENCE OF KIDNEY: Chronic | ICD-10-CM

## 2021-06-24 DIAGNOSIS — E89.2 POSTPROCEDURAL HYPOPARATHYROIDISM: Chronic | ICD-10-CM

## 2021-06-24 DIAGNOSIS — Z90.710 ACQUIRED ABSENCE OF BOTH CERVIX AND UTERUS: Chronic | ICD-10-CM

## 2021-06-24 PROCEDURE — 71250 CT THORAX DX C-: CPT | Mod: 26,MH

## 2021-06-24 PROCEDURE — 74183 MRI ABD W/O CNTR FLWD CNTR: CPT | Mod: 26,MH

## 2021-06-29 ENCOUNTER — NON-APPOINTMENT (OUTPATIENT)
Age: 71
End: 2021-06-29

## 2021-07-14 ENCOUNTER — APPOINTMENT (OUTPATIENT)
Dept: NEPHROLOGY | Facility: CLINIC | Age: 71
End: 2021-07-14
Payer: MEDICARE

## 2021-07-14 VITALS
RESPIRATION RATE: 12 BRPM | DIASTOLIC BLOOD PRESSURE: 77 MMHG | SYSTOLIC BLOOD PRESSURE: 138 MMHG | HEART RATE: 65 BPM | OXYGEN SATURATION: 98 %

## 2021-07-14 PROCEDURE — 99214 OFFICE O/P EST MOD 30 MIN: CPT

## 2021-07-15 RX ORDER — CHOLECALCIFEROL (VITAMIN D3) 50 MCG
2000 CAPSULE ORAL
Refills: 0 | Status: DISCONTINUED | COMMUNITY
End: 2021-07-14

## 2021-07-26 NOTE — ASSESSMENT
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, h/o diverticulitis, frequent yeast infections, h/o covid 19 4/20, h/o R clear cell renal carcinoma w IVC thrombus s/p thrombectomy + nephrectomy 11/19 with solitary kidney, >20 years of HTN and DMII, primary hyperparathyroidism s/p 1.5 lobes removed in 2015 with persistent hypercalcemia and non-suppressed PTH, here for f/u of hyperkalemia and CKD III: \par \par # CKD III in the setting of solitary kidney s/p R resection '19 - baseline Cr 1.1-1.4 egfr 39-56\par Reviewed 4/21 labs -stable renal fx\par Continue lokelma twice weekly- K stable\par CT 12/20 L kidney 4.6cm exophytic cyst and several small ones, simple\par \par #HTN\par Controlled on norvasc 10mg with tolerable/minimal ankle edema\par \par # Primary hyperparathyroidism s/p partial resection with persistently elevated PTH and calcium, primary vs tertiary hyperparathyroidism - getting w/u with endocrine\par \par \par \par \par

## 2021-07-26 NOTE — HISTORY OF PRESENT ILLNESS
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 Kirk Jasmine\par \par Daughter accompanied to assist with history taking and translation\par \par Not eating any foods excessively that are high in Vit A, denies vitamins, supplements, nutrient shakes etc. \par Eats a lot of bread and cheese since she's worried about having to restrict K, carbs/sugar and sodium. \par Chronic back pain, R shoulder and R elbow pain, worse at nighttime, and unchanged from baseline.\par Denies muscle cramping or weakness, numbness/tingling. No constipation. \par Taking only amlodipine for -130 sys. No edema. \par Stopped vit D because of her high Ca\par Next prolia injection due in sept, last one about 4 months ago\par \par OTC: none

## 2021-07-27 ENCOUNTER — NON-APPOINTMENT (OUTPATIENT)
Age: 71
End: 2021-07-27

## 2021-08-23 NOTE — ED ADULT NURSE NOTE - DRUG PRE-SCREENING (DAST -1)
Nursing note reviewed. Will need to see patient back for repeat INR as scheduled.    Statement Selected

## 2021-08-27 NOTE — ED PROVIDER NOTE - NSPTACCESSSVCSAPPTDETAILS_ED_ALL_ED_FT
----- Message from Kisha Abdirizak sent at 8/27/2021  4:10 PM EDT -----  Subject: Message to Provider    QUESTIONS  Information for Provider? patient states his video visit 3/3/2022 at 9:15   will have to be a phone visit, he does not have a web cam   ---------------------------------------------------------------------------  --------------  [address]  What is the best way for the office to contact you? OK to leave message on   voicemail  Preferred Call Back Phone Number? [phone]  ---------------------------------------------------------------------------  --------------  SCRIPT ANSWERS  Relationship to Patient?  Self
Already is a telephone visit
Neurology follow up in 1-2 days for vertigo.

## 2021-12-20 ENCOUNTER — APPOINTMENT (OUTPATIENT)
Dept: UROLOGY | Facility: CLINIC | Age: 71
End: 2021-12-20
Payer: MEDICARE

## 2021-12-20 VITALS — HEART RATE: 76 BPM | TEMPERATURE: 97.3 F | DIASTOLIC BLOOD PRESSURE: 82 MMHG | SYSTOLIC BLOOD PRESSURE: 156 MMHG

## 2021-12-20 LAB
ALBUMIN SERPL ELPH-MCNC: 4.6 G/DL
ALP BLD-CCNC: 70 U/L
ALT SERPL-CCNC: 13 U/L
ANION GAP SERPL CALC-SCNC: 10 MMOL/L
AST SERPL-CCNC: 15 U/L
BILIRUB SERPL-MCNC: 0.4 MG/DL
BUN SERPL-MCNC: 31 MG/DL
CALCIUM SERPL-MCNC: 10.2 MG/DL
CHLORIDE SERPL-SCNC: 103 MMOL/L
CO2 SERPL-SCNC: 24 MMOL/L
CREAT SERPL-MCNC: 1.15 MG/DL
GLUCOSE SERPL-MCNC: 169 MG/DL
POTASSIUM SERPL-SCNC: 6 MMOL/L
PROT SERPL-MCNC: 7.4 G/DL
SODIUM SERPL-SCNC: 137 MMOL/L

## 2021-12-20 PROCEDURE — 99213 OFFICE O/P EST LOW 20 MIN: CPT

## 2021-12-20 NOTE — LETTER BODY
[Dear  ___] : Dear  [unfilled], [Courtesy Letter:] : I had the pleasure of seeing your patient, [unfilled], in my office today. [Please see my note below.] : Please see my note below. [Consult Closing:] : Thank you very much for allowing me to participate in the care of this patient.  If you have any questions, please do not hesitate to contact me. [Sincerely,] : Sincerely, [FreeTextEntry2] : Delaney Erazo DO\par [address]\[place], [place], [address] [FreeTextEntry3] : Jose Villafuerte MD\par Urologic Oncology\par Department of Urology\par [facility]\par \par [employer] [facility] \par \par

## 2021-12-20 NOTE — HISTORY OF PRESENT ILLNESS
[FreeTextEntry1] : 70yo female recently discharged from [facility]. 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 She underwent right radical nephrectomy with IVC thrombectomy 11/22. Did well postoperatively. Received 1 unit pRBC before discharge. Discharge hgb was 10. \par \par 3/09/20 Here for f/u. Doing well. Had PET scan recently which shows mild FDG activity near renal hilum with no appreciable mass, likely postsurgical changes. \par \par 6/08/20 Here for f/u. Doing well. Scheduled for imaging this week of chest/abdomen/pelvis. \par \par 9/21/20 Here for f/u. Had MRI 7/2020 which shows no recurrence. Primary complaint is incisional hernia. \par \par 12/14/20 Here for f/u. She decided to defer hernia surgery for now, not bothersome. No recent imaging.\par \par 6/14/21 Here for f/u. She decided to defer hernia surgery for now, not bothersome. Last imaging 12/2020 no evidence of recurrence. Creatinine 1.3. \par \par 12/20/21 Here for f/u. CT chest and MRI abdomen in 6/2021 were negative. No current complaints.  [Fever] : no fever [Fatigue] : no fatigue [Nausea] : no nausea [Incisional Drainage] : no incisional drainage [Incisional Pain] : no incisional pain [None] : None

## 2021-12-20 NOTE — ASSESSMENT
[FreeTextEntry1] : 72yo female with [id number] clear cell carcinoma s/p nephrectomy, IVC thrombectomy 11/22/19\par Doing well\par Reviewed labs from 5/2021, creatinine 1.3\par Repeat CBC, CMP, urine culture\par Reviewed CT chest, MRI abdomen 6/2021, TAMIKO. Repeat CT chest and MRI abd \par f/u 6 months pending test results

## 2021-12-21 ENCOUNTER — NON-APPOINTMENT (OUTPATIENT)
Age: 71
End: 2021-12-21

## 2021-12-21 LAB
BACTERIA UR CULT: NORMAL
BASOPHILS # BLD AUTO: 0.09 K/UL
BASOPHILS NFR BLD AUTO: 1.2 %
EOSINOPHIL # BLD AUTO: 0.72 K/UL
EOSINOPHIL NFR BLD AUTO: 9.7 %
HCT VFR BLD CALC: 44.5 %
HGB BLD-MCNC: 13.7 G/DL
IMM GRANULOCYTES NFR BLD AUTO: 0.8 %
LYMPHOCYTES # BLD AUTO: 2.72 K/UL
LYMPHOCYTES NFR BLD AUTO: 36.5 %
MAN DIFF?: NORMAL
MCHC RBC-ENTMCNC: 28.1 PG
MCHC RBC-ENTMCNC: 30.8 GM/DL
MCV RBC AUTO: 91.4 FL
MONOCYTES # BLD AUTO: 0.6 K/UL
MONOCYTES NFR BLD AUTO: 8 %
NEUTROPHILS # BLD AUTO: 3.27 K/UL
NEUTROPHILS NFR BLD AUTO: 43.8 %
PLATELET # BLD AUTO: 300 K/UL
RBC # BLD: 4.87 M/UL
RBC # FLD: 13.2 %
WBC # FLD AUTO: 7.46 K/UL

## 2021-12-27 ENCOUNTER — APPOINTMENT (OUTPATIENT)
Dept: NEPHROLOGY | Facility: CLINIC | Age: 71
End: 2021-12-27
Payer: MEDICARE

## 2021-12-27 VITALS — RESPIRATION RATE: 14 BRPM | HEART RATE: 70 BPM | DIASTOLIC BLOOD PRESSURE: 77 MMHG | SYSTOLIC BLOOD PRESSURE: 125 MMHG

## 2021-12-27 DIAGNOSIS — E03.9 HYPOTHYROIDISM, UNSPECIFIED: ICD-10-CM

## 2021-12-27 DIAGNOSIS — E83.52 HYPERCALCEMIA: ICD-10-CM

## 2021-12-27 DIAGNOSIS — K59.00 CONSTIPATION, UNSPECIFIED: ICD-10-CM

## 2021-12-27 DIAGNOSIS — E21.0 PRIMARY HYPERPARATHYROIDISM: ICD-10-CM

## 2021-12-27 DIAGNOSIS — E87.5 HYPERKALEMIA: ICD-10-CM

## 2021-12-27 PROCEDURE — 99214 OFFICE O/P EST MOD 30 MIN: CPT

## 2021-12-27 RX ORDER — ISOSORBIDE MONONITRATE 30 MG/1
30 TABLET, EXTENDED RELEASE ORAL
Qty: 30 | Refills: 0 | Status: DISCONTINUED | COMMUNITY
Start: 2021-04-09 | End: 2021-12-27

## 2021-12-27 RX ORDER — GLIMEPIRIDE 2 MG/1
2 TABLET ORAL
Refills: 0 | Status: ACTIVE | COMMUNITY
Start: 2021-12-27

## 2021-12-27 RX ORDER — SITAGLIPTIN 100 MG/1
100 TABLET, FILM COATED ORAL DAILY
Qty: 30 | Refills: 0 | Status: DISCONTINUED | COMMUNITY
Start: 2021-01-27 | End: 2021-12-27

## 2021-12-27 NOTE — PHYSICAL EXAM
[General Appearance - Alert] : alert [General Appearance - In No Acute Distress] : in no acute distress [Oriented To Time, Place, And Person] : oriented to person, place, and time [Impaired Insight] : insight and judgment were intact [Jugular Venous Distention Increased] : there was no jugular-venous distention [Respiration, Rhythm And Depth] : normal respiratory rhythm and effort [Exaggerated Use Of Accessory Muscles For Inspiration] : no accessory muscle use [Abnormal Walk] : normal gait [] : no rash

## 2021-12-28 LAB
25(OH)D3 SERPL-MCNC: 26.1 NG/ML
ALBUMIN SERPL ELPH-MCNC: 4.8 G/DL
ANION GAP SERPL CALC-SCNC: 9 MMOL/L
BUN SERPL-MCNC: 23 MG/DL
CALCIUM SERPL-MCNC: 10.9 MG/DL
CALCIUM SERPL-MCNC: 10.9 MG/DL
CHLORIDE SERPL-SCNC: 104 MMOL/L
CO2 SERPL-SCNC: 26 MMOL/L
CREAT SERPL-MCNC: 1.1 MG/DL
CYSTATIN C SERPL-MCNC: 1.37 MG/L
GFR/BSA.PRED SERPLBLD CYS-BASED-ARV: 46 ML/MIN
GLUCOSE SERPL-MCNC: 149 MG/DL
PARATHYROID HORMONE INTACT: 52 PG/ML
PHOSPHATE SERPL-MCNC: 3 MG/DL
POTASSIUM SERPL-SCNC: 6.2 MMOL/L
POTASSIUM SERPL-SCNC: 6.8 MMOL/L
SODIUM SERPL-SCNC: 140 MMOL/L

## 2021-12-28 RX ORDER — SODIUM ZIRCONIUM CYCLOSILICATE 10 G/10G
10 POWDER, FOR SUSPENSION ORAL DAILY
Qty: 90 | Refills: 3 | Status: ACTIVE | COMMUNITY
Start: 2021-01-27 | End: 1900-01-01

## 2021-12-28 NOTE — ASSESSMENT
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 \par Continue lokelma q2 days and low K diet.\par CT 12/20 L kidney 4.6cm exophytic cyst and several small ones, simple\par \par #HTN\par Controlled on norvasc 10mg with tolerable/minimal ankle edema\par \par # Primary hyperparathyroidism s/p partial resection with persistently elevated PTH and calcium, primary vs tertiary hyperparathyroidism - getting w/u with endocrine, on prolia, dec labs show normal calcium \par \par Plan -\par - recheck potassium including plasma potassium, may need to increase lokelma to daily\par - check ph, ca, vitamin D and iPTH\par \par Addendum 12/28: called pt's daughter for plasma K 6.4, pt asymptomatic, to take TID lokelma today and then increase frequency to once daily. Check repeat K tomorrow along with am cortisol, TFTs and vitamin A level. \par \par \par

## 2021-12-28 NOTE — HISTORY OF PRESENT ILLNESS
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 Kirk Jasmine\par \par Daughter accompanied to assist with history taking and translation\par \par Feeling well, no new health issues. K high last labs, daughter thinks her mother was non compliant with lokelma, now confirms she's taking q2 days. No weakness, dizziness, chest pain, SOB. \par \par OTC: none

## 2021-12-29 LAB
ANION GAP SERPL CALC-SCNC: 7 MMOL/L
BUN SERPL-MCNC: 23 MG/DL
CALCIUM SERPL-MCNC: 10.6 MG/DL
CHLORIDE SERPL-SCNC: 108 MMOL/L
CO2 SERPL-SCNC: 29 MMOL/L
CREAT SERPL-MCNC: 1.04 MG/DL
GLUCOSE SERPL-MCNC: 155 MG/DL
POTASSIUM SERPL-SCNC: 5.1 MMOL/L
SODIUM SERPL-SCNC: 144 MMOL/L

## 2021-12-29 NOTE — ED PROVIDER NOTE - CLINICAL SUMMARY MEDICAL DECISION MAKING FREE TEXT BOX
Pt resting comfortably in bed. VSS. Monitors in place. No acute distress noted at this time. Pt is clear for admission.
Patient well appearing, NAD and VSS. Labs and ekg wnl. CXR no ac infiltrates.

## 2022-01-01 NOTE — REASON FOR VISIT
Pt normothermic in incubator on servo control mode.  Remains on NIPPV, FiO2 22%.  Tolerating continuous feeds as ordered without emesis.  One short 4 second bradycardic episode this morning requiring stim.  Shunt incision site without complication.  PICC without complication infusing as ordered.  Voiding and stooling well.  No contact from family.  Will continue to monitor.   [Follow-Up] : a follow-up visit

## 2022-01-02 ENCOUNTER — APPOINTMENT (OUTPATIENT)
Dept: MRI IMAGING | Facility: HOSPITAL | Age: 72
End: 2022-01-02
Payer: MEDICARE

## 2022-01-02 ENCOUNTER — OUTPATIENT (OUTPATIENT)
Dept: OUTPATIENT SERVICES | Facility: HOSPITAL | Age: 72
LOS: 1 days | End: 2022-01-02
Payer: MEDICARE

## 2022-01-02 ENCOUNTER — RESULT REVIEW (OUTPATIENT)
Age: 72
End: 2022-01-02

## 2022-01-02 ENCOUNTER — APPOINTMENT (OUTPATIENT)
Dept: CT IMAGING | Facility: HOSPITAL | Age: 72
End: 2022-01-02
Payer: MEDICARE

## 2022-01-02 DIAGNOSIS — E89.2 POSTPROCEDURAL HYPOPARATHYROIDISM: Chronic | ICD-10-CM

## 2022-01-02 DIAGNOSIS — Z90.5 ACQUIRED ABSENCE OF KIDNEY: Chronic | ICD-10-CM

## 2022-01-02 DIAGNOSIS — Z90.710 ACQUIRED ABSENCE OF BOTH CERVIX AND UTERUS: Chronic | ICD-10-CM

## 2022-01-02 PROCEDURE — 74183 MRI ABD W/O CNTR FLWD CNTR: CPT | Mod: 26,MH

## 2022-01-02 PROCEDURE — A9585: CPT

## 2022-01-02 PROCEDURE — 71250 CT THORAX DX C-: CPT | Mod: 26,MH

## 2022-01-02 PROCEDURE — 71250 CT THORAX DX C-: CPT

## 2022-01-02 PROCEDURE — 74183 MRI ABD W/O CNTR FLWD CNTR: CPT | Mod: MH

## 2022-01-03 ENCOUNTER — NON-APPOINTMENT (OUTPATIENT)
Age: 72
End: 2022-01-03

## 2022-01-03 LAB
CORTIS SERPL-MCNC: 12 UG/DL
POTASSIUM SERPL-SCNC: 5.3 MMOL/L
TSH SERPL-ACNC: 1.93 UIU/ML

## 2022-01-19 LAB — VIT A SERPL-MCNC: 68.6 UG/DL

## 2022-01-26 ENCOUNTER — APPOINTMENT (OUTPATIENT)
Dept: ENDOCRINOLOGY | Facility: CLINIC | Age: 72
End: 2022-01-26

## 2022-02-10 NOTE — ED PROVIDER NOTE - NS ED MD DISPO ISOLATION TYPES
[Normal] : normal rate, regular rhythm, normal S1 and S2 and no murmur heard [Soft] : abdomen soft [Non Tender] : non-tender [Non-distended] : non-distended [de-identified] : Mild middle ear effusion bilaterally None

## 2022-02-21 NOTE — ED ADULT NURSE NOTE - CAS ELECT INFOMATION PROVIDED
FAMILY PRACTICE OFFICE VISIT       NAME: Janey Black  AGE: 77 y o  SEX: female       : 1953        MRN: [id number]    DATE: 2022  TIME: 10:14 AM    Assessment and Plan     Problem List Items Addressed This Visit        Musculoskeletal and Integument    Rheumatoid arthritis ([facility] [address] )     Her methotrexate has been on hold  Unfortunately, she has had a flare of her RA in her right shoulder and right elbow in particular  She is following closely with Rheumatology  Preoperative elevation of CRP can certainly be explained by her currently untreated rheumatoid arthritis  Other    Iron deficiency anemia     Initial preoperative blood work did show some anemia  CBC and iron studies were ordered  Upon review of the labs this afternoon, her CBC is stable  She should continue with iron supplementation         Relevant Orders    CBC and differential (Completed)      Other Visit Diagnoses     Pre-op examination    -  Primary    The patient appears to be a suitable candidate for her upcoming total knee replacement  I will have Cardiology input on her abnormal EKG  Relevant Orders    CBC and differential (Completed)    Pre-op chest exam        Relevant Orders    POCT ECG (Completed)    Primary osteoarthritis of right knee              Iron deficiency anemia  Initial preoperative blood work did show some anemia  CBC and iron studies were ordered  Upon review of the labs this afternoon, her CBC is stable  She should continue with iron supplementation    Rheumatoid arthritis ([address] )  Her methotrexate has been on hold  Unfortunately, she has had a flare of her RA in her right shoulder and right elbow in particular  She is following closely with Rheumatology  Preoperative elevation of CRP can certainly be explained by her currently untreated rheumatoid arthritis      Osteoporosis  She continues on ibandronate    Abnormal ECG  Her EKG today does show some progression of her ST/T-wave changes concerning for anterior lateral ischemia  She certainly clinically has no cardiovascular limitations nor history of cardiac issues  I did reach out to Cardiology to see if they can review her EKG and potentially see her this week  She does appear clinically stable for this upcoming total knee replacement  There are no Patient Instructions on file for this visit  Chief Complaint     Chief Complaint   Patient presents with    Pre-op Exam       History of Present Illness   Magalie Whitaker is a 77y o -year-old female who presents today for preoperative visit for a right total knee replacement  She is having increasing pain with severe gait dysfunction  She does have a history of rheumatoid arthritis and has stopped her methotrexate in anticipation of this procedure in early March  Preoperative EKG today did reveal some progression of of some ST/T-wave changes from previous EKG in 2015  She has had no known event and certainly has no rate limiting cardiovascular symptoms such as shortness of breath or chest pain  She remains quite active especially at work and is only limited by her right knee pain  Unfortunately, since stopping her methotrexate she has had some flare of her rheumatoid arthritis including increasing right shoulder and knee pain  Review of Systems   Review of Systems   Constitutional: Negative for appetite change, chills, fatigue, fever and unexpected weight change  HENT: Negative for trouble swallowing  Eyes: Negative for visual disturbance  Respiratory: Negative for cough, chest tightness, shortness of breath and wheezing  Cardiovascular: Negative for chest pain, palpitations and leg swelling  Gastrointestinal: Negative for abdominal distention, abdominal pain, blood in stool, constipation and diarrhea  Endocrine: Negative for polyuria  Genitourinary: Negative for difficulty urinating and flank pain     Musculoskeletal: Positive for arthralgias and joint swelling (Right shoulder and elbow)  Negative for myalgias  Skin: Negative for rash  Neurological: Negative for dizziness and light-headedness  Hematological: Negative for adenopathy  Does not bruise/bleed easily  Psychiatric/Behavioral: Negative for dysphoric mood and sleep disturbance  The patient is not nervous/anxious          Active Problem List     Patient Active Problem List   Diagnosis    Anxiety disorder    Hyperlipidemia    Keratitis    Osteoarthritis of both hands    Arthritis of knee    Osteoporosis    Rheumatoid arthritis ([facility] [address] )    Salivary secretion disturbance    Tear film insufficiency    Elevated blood pressure reading    Urge incontinence of urine    Chronic fatigue    Iron deficiency anemia    Abnormal ECG         Past Medical History:  Past Medical History:   Diagnosis Date    Anemia     1995 - corrected with iron    Ankle fracture, right     last assessed: 3/9/15    Anxiety     Arthritis     1996    Chronic pain disorder     right side joint pain     Headache     Herpes zoster     2/2009    Moderate exercise     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    Osteoarthritis     PONV (postoperative nausea and vomiting)     per pt after ankle surgery    RA (rheumatoid arthritis) ([facility] [address] )     sees Rheumatologist q 6mths Dr Evon Whitley Right knee pain     Wears dentures     full upper    Wears glasses     reading       Past Surgical History:  Past Surgical History:   Procedure Laterality Date    ANKLE SURGERY      ORIF 5/16/plate and 4 screws    CYST REMOVAL      1969    ESOPHAGOGASTRODUODENOSCOPY      Diagnostic    FOOT SURGERY      plantar wart resection; resolved: 1969    TONSILLECTOMY AND ADENOIDECTOMY      1962    TUBAL LIGATION      WISDOM TOOTH EXTRACTION         Family History:  Family History   Problem Relation Age of Onset    Seizures Mother         epilepsy    Bone cancer Father     No Known Problems Sister     No Known Problems Brother     No Known Problems Son    Magalie [employer] No Known Problems Maternal Grandmother     No Known Problems Maternal Grandfather     No Known Problems Paternal Grandmother     No Known Problems Paternal Grandfather     Thyroid disease Sister     No Known Problems Sister     No Known Problems Brother        Social History:  Social History     Socioeconomic History    Marital status: /Civil Union     Spouse name: Not on file    Number of children: Not on file    Years of education: Not on file    Highest education level: Not on file   Occupational History    Not on file   Tobacco Use    Smoking status: Former Smoker     Quit date:      Years since quittin 1    Smokeless tobacco: Never Used   Vaping Use    Vaping Use: Never used   Substance and Sexual Activity    Alcohol use: Yes     Comment: rare, maybe on holidays    Drug use: No    Sexual activity: Not on file     Comment: defer   Other Topics Concern    Not on file   Social History Narrative    Not on file     Social Determinants of Health     Financial Resource Strain: Not on file   Food Insecurity: Not on file   Transportation Needs: Not on file   Physical Activity: Not on file   Stress: Not on file   Social Connections: Not on file   Intimate Partner Violence: Not on file   Housing Stability: Not on file       Objective     Vitals:    22 1520   BP: 130/70   Pulse: 72   Resp: 18   SpO2: 97%     Wt Readings from Last 3 Encounters:   22 53 5 kg (118 lb)   21 54 4 kg (120 lb)   11/15/21 53 1 kg (117 lb)       Physical Exam  Vitals reviewed  Constitutional:       Appearance: She is well-developed  HENT:      Head: Normocephalic  Right Ear: Tympanic membrane normal       Left Ear: Tympanic membrane normal       Nose: No congestion  Mouth/Throat:      Comments: She is edentulous on her maxillary jaw  Eyes:      Conjunctiva/sclera: Conjunctivae normal       Pupils: Pupils are equal, round, and reactive to light     Neck:      Vascular: No carotid bruit  Cardiovascular:      Rate and Rhythm: Normal rate and regular rhythm  Heart sounds: Normal heart sounds  No murmur heard  Pulmonary:      Effort: No respiratory distress  Breath sounds: No wheezing or rales  Abdominal:      General: There is no distension  Tenderness: There is no abdominal tenderness  Musculoskeletal:      Cervical back: No rigidity or tenderness  Lymphadenopathy:      Cervical: No cervical adenopathy  Skin:     Findings: No erythema or rash  Neurological:      Mental Status: She is alert and oriented to person, place, and time           Pertinent Laboratory/Diagnostic Studies:  Lab Results   Component Value Date    GLUCOSE 119 03/09/2015    BUN 11 03/09/2015    CREATININE 0 66 03/09/2015    CALCIUM 8 5 03/09/2015     03/09/2015    K 3 6 03/09/2015    CO2 30 03/09/2015     03/09/2015     No results found for: ALT, AST, GGT, ALKPHOS, BILITOT    Lab Results   Component Value Date    WBC 8 60 02/21/2022    HGB 12 0 02/21/2022    HCT 38 6 02/21/2022    MCV 99 (H) 02/21/2022     02/21/2022       No results found for: TSH    No results found for: CHOL  No results found for: TRIG  No results found for: HDL  No results found for: [address]  Lab Results   Component Value Date    HGBA1C 5 2 02/16/2022       Results for orders placed or performed in visit on 02/16/22   Hemoglobin A1C   Result Value Ref Range    Hemoglobin A1C 5 2        Orders Placed This Encounter   Procedures    CBC and differential    POCT ECG       ALLERGIES:  Allergies   Allergen Reactions    Aspirin Other (See Comments)     Per pt avoids taking due to rheumatology Meds       Current Medications     Current Outpatient Medications   Medication Sig Dispense Refill    ascorbic acid (VITAMIN C) 500 MG tablet Take 1 tablet (500 mg total) by mouth daily Start to take 30 days prior to surgery 30 tablet 1    Ca Carbonate-Mag Hydroxide (ROLAIDS) 550-110 MG CHEW Chew Twice daily      ferrous sulfate 324 (65 Fe) mg Take 1 tablet (324 mg total) by mouth daily before breakfast Start to take 30 days prior to surgery 30 tablet 1    folic acid (FOLVITE) 1 mg tablet Take by mouth      ibandronate (BONIVA) 150 MG tablet Take 1 tablet by mouth      methotrexate 2 5 mg tablet Take by mouth 8 Pills weekly takes on Fri and Sat       Multiple Vitamin (multivitamin) tablet Take 1 tablet by mouth daily Start to take 30 days prior to surgery 30 tablet 1    naproxen (NAPROSYN) 500 mg tablet Take 1 tablet up to twice daily as needed for moderate pain  90 tablet 1    PARoxetine (PAXIL) 10 mg tablet Take 1 tablet (10 mg total) by mouth daily 90 tablet 3    predniSONE 5 mg tablet Take 5 mg by mouth daily       solifenacin (VESICARE) 10 MG tablet Take 1 tablet (10 mg total) by mouth daily 90 tablet 3     No current facility-administered medications for this visit           Health Maintenance     Health Maintenance   Topic Date Due    Pneumococcal Vaccine: 65+ Years (1 of 4 - PCV13) Never done    Cervical Cancer Screening  Never done    Annual Physical  05/12/2022    COVID-19 Vaccine (4 - Booster for Pfizer series) 03/22/2022    Breast Cancer Screening: Mammogram  11/12/2022    Fall Risk  02/17/2023    Depression Screening  02/21/2023    BMI: Adult  02/21/2023    DXA SCAN  11/12/2023    Colorectal Cancer Screening  06/07/2024    DTaP,Tdap,and Td Vaccines (2 - Td or Tdap) 01/15/2030    Hepatitis C Screening  Completed    Osteoporosis Screening  Completed    Influenza Vaccine  Completed    HIB Vaccine  Aged Out    Hepatitis B Vaccine  Aged Out    IPV Vaccine  Aged Out    Hepatitis A Vaccine  Aged Out    Meningococcal ACWY Vaccine  Aged Out    HPV Vaccine  Aged Out     Immunization History   Administered Date(s) Administered    COVID-19 PFIZER VACCINE 0 3 ML IM 01/05/2021, 01/26/2021, 10/22/2021    INFLUENZA 10/24/2012, 12/23/2013, 12/16/2014, 11/28/2019, 10/22/2021    Influenza Quadrivalent Preservative Free 3 years and older IM 11/01/2016    Tdap 01/15/2020       Kelli Sicard, MD DC instructions

## 2022-03-15 ENCOUNTER — APPOINTMENT (OUTPATIENT)
Dept: SURGERY | Facility: CLINIC | Age: 72
End: 2022-03-15
Payer: MEDICARE

## 2022-03-15 VITALS
HEART RATE: 85 BPM | TEMPERATURE: 97.2 F | HEIGHT: 60 IN | OXYGEN SATURATION: 99 % | SYSTOLIC BLOOD PRESSURE: 136 MMHG | BODY MASS INDEX: 26.11 KG/M2 | DIASTOLIC BLOOD PRESSURE: 76 MMHG | WEIGHT: 133 LBS

## 2022-03-15 DIAGNOSIS — J43.2 CENTRILOBULAR EMPHYSEMA: ICD-10-CM

## 2022-03-15 DIAGNOSIS — N18.30 CHRONIC KIDNEY DISEASE, STAGE 3 UNSPECIFIED: ICD-10-CM

## 2022-03-15 DIAGNOSIS — E06.3 AUTOIMMUNE THYROIDITIS: ICD-10-CM

## 2022-03-15 PROCEDURE — 99214 OFFICE O/P EST MOD 30 MIN: CPT

## 2022-03-15 NOTE — PHYSICAL EXAM
[Calm] : calm [de-identified] : NAD, comfortable [de-identified] : NCAT, no scleral icterus [de-identified] : +BS soft NT ND.  No hepatosplenomegaly.  Well-healed chevron incision with moderate incisional hernia noted at the left-sided aspect of the incision.  The hernia is non-tender without overlying skin changes. [de-identified] : No clubbing, cyanosis, or edema. [de-identified] : Warm, dry. [de-identified] : A&Ox3

## 2022-03-15 NOTE — REASON FOR VISIT
[Consultation] : a consultation visit [Family Member] : family member [FreeTextEntry1] : Consultation requested by: Dr. Jose Villafuerte

## 2022-03-15 NOTE — HISTORY OF PRESENT ILLNESS
[de-identified] : Ms. Morse presented today for evaluation and management of an incisional hernia.  She stated the hernia has been present for 3 years.  She first noticed the hernia as a bulge at the incision.  She denied significant pain of the area initially when the hernia occurred.  She stated the hernia is enlarging and becoming more bothersome.  She stated the hernia becomes more painful after eating a big meal or when straining to have a bowel movement.  She stated "at times, it becomes very big and hard".

## 2022-03-15 NOTE — ASSESSMENT
[FreeTextEntry1] : Ms. Morse is a 71-year-old woman with an incisional hernia.  We will plan for an incisional hernia repair with mesh on May 17, 2022.

## 2022-03-15 NOTE — DATA REVIEWED
[FreeTextEntry1] : CT abdomen/pelvis (4/5/2021) - small interval enlargement of a supraumbilical midline fat-containing ventral hernia, now 8.5 x 3.0 x 5.0 cm.  Wall thickening of the sigmoid colon without pericolonic inflammatory change to suggest diverticulitis, and consistent with colitis.\par \par MRI abdomen (6/24/2021) - large midline ventral supraumbilical fat-containing hernia as before.  Status post right nephrectomy. No recurrent or metastatic tumor in the abdomen.\par \par MRI abdomen (1/2/2022) - stable fat-containing ventral hernia.  No locally recurrent or metastatic disease.\par \par CT chest (1/2/2022) - upper abdominal ventral hernia containing fat without complication.  No evidence of metastasis.  No significant interval change of small airway disease and bilateral lower pleural plaques.

## 2022-03-15 NOTE — CONSULT LETTER
[FreeTextEntry1] : March 15, 2022\par \par \par \par Delaney Erazo D.O.\par [address] [facility]\[place]  [address]\par Telephone #: [phone]\par \par \par Re: Nargis Morse\par : 1950\par \par \par Dear Dr. Erazo:\par \par I had the opportunity to see Ms. Morse today for evaluation and management of an incisional hernia.  She stated the hernia has been present for 3 years.  She first noticed the hernia as a bulge at the incision.  She denied significant pain of the area initially when the hernia occurred.  She stated the hernia is enlarging and becoming more bothersome.  She stated the hernia becomes more painful after eating a big meal or when straining to have a bowel movement.  She stated "at times, it becomes very big and hard".\par \par On physical examination, her height is 5 feet, weight is 133 pounds, and BMI 25.98.  Her temperature is 97.2 °F, blood pressure is 136/76, heart rate is 85, and O2 saturation is 99% on room air.  In general, she is a well-dressed, well-nourished woman who appears her stated age and is in no acute distress.  She is calm, alert and oriented x 3.  HEENT exam demonstrates a normocephalic atraumatic appearance with no scleral icterus.  Her abdomen has audible bowel sounds, is soft non-tender, and non-distended.  There is no hepatosplenomegaly appreciated.  There is a well-healed chevron incision from her prior nephrectomy with a soft, reducible, non-tender incisional hernia at the left-sided aspect of the incision.  Her extremities are warm and dry without clubbing, cyanosis or edema. \par \par I reviewed the images and report of the CT abdomen/pelvis that was performed on 2021, which demonstrated small interval enlargement of a supraumbilical midline fat-containing ventral hernia, now 8.5 x 3.0 x 5.0 cm.  Wall thickening of the sigmoid colon without pericolonic inflammatory change to suggest diverticulitis, and consistent with colitis.\par \par I reviewed the images and report of the MRI abdomen that was performed on 2021, which demonstrated a large midline ventral supraumbilical fat-containing hernia as before.  Status post right nephrectomy. No recurrent or metastatic tumor in the abdomen.\par \par I reviewed the images and report of the MRI abdomen that was performed on 2022, which demonstrated a stable fat-containing ventral hernia.  No locally recurrent or metastatic disease.\par \par I reviewed the images and report of the CT chest that was performed on 2022, which demonstrated an upper abdominal ventral hernia containing fat without complication.  No evidence of metastasis.  No significant interval change of small airway disease and bilateral lower pleural plaques.\par \par In summary, Ms. Morse is a 71-year-old woman with an incisional hernia.  We will plan for an incisional hernia repair with mesh on May 17, 2022.\par \par Thank you for the opportunity to care for this patient.  Please do not hesitate to contact me in the event that you have any questions or concerns about the care of this patient.\par \par Sincerely,\par \par \par \par \par Marielos Mahajan M.D.

## 2022-05-11 ENCOUNTER — TRANSCRIPTION ENCOUNTER (OUTPATIENT)
Age: 72
End: 2022-05-11

## 2022-05-11 RX ORDER — CHLORHEXIDINE GLUCONATE 213 G/1000ML
1 SOLUTION TOPICAL DAILY
Refills: 0 | Status: DISCONTINUED | OUTPATIENT
Start: 2022-05-12 | End: 2022-05-12

## 2022-05-11 NOTE — ASU PATIENT PROFILE, ADULT - FALL HARM RISK - UNIVERSAL INTERVENTIONS
Bed in lowest position, wheels locked, appropriate side rails in place/Call bell, personal items and telephone in reach/Instruct patient to call for assistance before getting out of bed or chair/Non-slip footwear when patient is out of bed/[place] to call system/Physically safe environment - no spills, clutter or unnecessary equipment/Purposeful Proactive Rounding/Room/bathroom lighting operational, light cord in reach

## 2022-05-12 ENCOUNTER — TRANSCRIPTION ENCOUNTER (OUTPATIENT)
Age: 72
End: 2022-05-12

## 2022-05-12 ENCOUNTER — APPOINTMENT (OUTPATIENT)
Dept: SURGERY | Facility: AMBULATORY SURGERY CENTER | Age: 72
End: 2022-05-12

## 2022-05-12 ENCOUNTER — RESULT REVIEW (OUTPATIENT)
Age: 72
End: 2022-05-12

## 2022-05-12 ENCOUNTER — OUTPATIENT (OUTPATIENT)
Dept: OUTPATIENT SERVICES | Facility: HOSPITAL | Age: 72
LOS: 1 days | Discharge: HOME | End: 2022-05-12
Payer: MEDICARE

## 2022-05-12 VITALS
SYSTOLIC BLOOD PRESSURE: 140 MMHG | OXYGEN SATURATION: 99 % | DIASTOLIC BLOOD PRESSURE: 75 MMHG | RESPIRATION RATE: 16 BRPM | TEMPERATURE: 97 F | HEART RATE: 73 BPM

## 2022-05-12 VITALS
SYSTOLIC BLOOD PRESSURE: 150 MMHG | TEMPERATURE: 99 F | RESPIRATION RATE: 67 BRPM | DIASTOLIC BLOOD PRESSURE: 64 MMHG | HEIGHT: 63 IN | WEIGHT: 134.48 LBS | OXYGEN SATURATION: 98 %

## 2022-05-12 DIAGNOSIS — E89.2 POSTPROCEDURAL HYPOPARATHYROIDISM: Chronic | ICD-10-CM

## 2022-05-12 DIAGNOSIS — Z90.5 ACQUIRED ABSENCE OF KIDNEY: Chronic | ICD-10-CM

## 2022-05-12 DIAGNOSIS — Z90.710 ACQUIRED ABSENCE OF BOTH CERVIX AND UTERUS: Chronic | ICD-10-CM

## 2022-05-12 PROCEDURE — 49568: CPT

## 2022-05-12 PROCEDURE — 49560: CPT

## 2022-05-12 PROCEDURE — 88302 TISSUE EXAM BY PATHOLOGIST: CPT | Mod: 26

## 2022-05-12 DEVICE — MESH HERNIA VENTRALEX ST LARGE 3.2": Type: IMPLANTABLE DEVICE | Status: FUNCTIONAL

## 2022-05-12 RX ORDER — ACETAMINOPHEN 500 MG
1000 TABLET ORAL ONCE
Refills: 0 | Status: COMPLETED | OUTPATIENT
Start: 2022-05-12 | End: 2022-05-12

## 2022-05-12 RX ORDER — ISOSORBIDE MONONITRATE 60 MG/1
30 TABLET, EXTENDED RELEASE ORAL DAILY
Refills: 0 | Status: DISCONTINUED | OUTPATIENT
Start: 2022-05-12 | End: 2022-05-12

## 2022-05-12 RX ORDER — OXYCODONE AND ACETAMINOPHEN 5; 325 MG/1; MG/1
1 TABLET ORAL
Qty: 12 | Refills: 0
Start: 2022-05-12 | End: 2022-05-14

## 2022-05-12 RX ORDER — ONDANSETRON 8 MG/1
4 TABLET, FILM COATED ORAL ONCE
Refills: 0 | Status: COMPLETED | OUTPATIENT
Start: 2022-05-12 | End: 2022-05-12

## 2022-05-12 RX ORDER — SODIUM CHLORIDE 9 MG/ML
1000 INJECTION, SOLUTION INTRAVENOUS
Refills: 0 | Status: DISCONTINUED | OUTPATIENT
Start: 2022-05-12 | End: 2022-05-12

## 2022-05-12 RX ORDER — FENTANYL CITRATE 50 UG/ML
25 INJECTION INTRAVENOUS
Refills: 0 | Status: DISCONTINUED | OUTPATIENT
Start: 2022-05-12 | End: 2022-05-12

## 2022-05-12 RX ORDER — DOCUSATE SODIUM 100 MG
1 CAPSULE ORAL
Qty: 14 | Refills: 0
Start: 2022-05-12 | End: 2022-05-18

## 2022-05-12 RX ADMIN — Medication 1000 MILLIGRAM(S): at 11:42

## 2022-05-12 RX ADMIN — ONDANSETRON 4 MILLIGRAM(S): 8 TABLET, FILM COATED ORAL at 17:16

## 2022-05-12 RX ADMIN — CHLORHEXIDINE GLUCONATE 1 APPLICATION(S): 213 SOLUTION TOPICAL at 11:42

## 2022-05-12 NOTE — ASU DISCHARGE PLAN (ADULT/PEDIATRIC) - NS MD DC FALL RISK RISK
For information on Fall & Injury Prevention, visit: [email or web address] OR  [email or web address] OR  https://www.cdc.gov/steadi/patient.html

## 2022-05-12 NOTE — ASU PREOP CHECKLIST - LATEX ALLERGY
Patient with swollen throat that started a couple of weeks ago, but has gotten worse over the last couple of days. States is painful, swollen and sometimes is having a hard time swallowing and talking. Denies fever.
no

## 2022-05-12 NOTE — ASU DISCHARGE PLAN (ADULT/PEDIATRIC) - CARE PROVIDER_API CALL
Marielos Mahajan)  Surgery  [address], [place] [address]  Phone: [phone]  Fax: [phone]  Established Patient  Follow Up Time: 2 weeks

## 2022-05-12 NOTE — BRIEF OPERATIVE NOTE - NSICDXBRIEFPOSTOP_GEN_ALL_CORE_FT
POST-OP DIAGNOSIS:  Incisional hernia without obstruction or gangrene 12-May-2022 14:59:24  Barbi Guerrero

## 2022-05-12 NOTE — BRIEF OPERATIVE NOTE - NSICDXBRIEFPREOP_GEN_ALL_CORE_FT
PRE-OP DIAGNOSIS:  Incisional hernia without obstruction or gangrene 12-May-2022 14:59:21  Barbi Guerrero

## 2022-05-12 NOTE — ASU DISCHARGE PLAN (ADULT/PEDIATRIC) - ASU DC SPECIAL INSTRUCTIONSFT
Please take Acetaminophen 650mg every 6 hrs as needed for mild to moderate pain.   Please take 1 tab of percocet every 6 hrs as needed for moderate to severe pain as needed.  Please wear abdominal binder at all times.   If taking percocet, please take colace 1 tab twice daily for 1 week

## 2022-05-23 ENCOUNTER — APPOINTMENT (OUTPATIENT)
Dept: SURGERY | Facility: CLINIC | Age: 72
End: 2022-05-23
Payer: MEDICARE

## 2022-05-23 VITALS
HEART RATE: 70 BPM | HEIGHT: 60 IN | DIASTOLIC BLOOD PRESSURE: 79 MMHG | SYSTOLIC BLOOD PRESSURE: 148 MMHG | TEMPERATURE: 97.6 F | OXYGEN SATURATION: 97 % | WEIGHT: 134 LBS | BODY MASS INDEX: 26.31 KG/M2

## 2022-05-23 DIAGNOSIS — E11.9 TYPE 2 DIABETES MELLITUS W/OUT COMPLICATIONS: ICD-10-CM

## 2022-05-23 DIAGNOSIS — Z85.528 PERSONAL HISTORY OF OTHER MALIGNANT NEOPLASM OF KIDNEY: ICD-10-CM

## 2022-05-23 DIAGNOSIS — Z87.891 PERSONAL HISTORY OF NICOTINE DEPENDENCE: ICD-10-CM

## 2022-05-23 DIAGNOSIS — K43.2 INCISIONAL HERNIA W/OUT OBSTRUCTION OR GANGRENE: ICD-10-CM

## 2022-05-23 DIAGNOSIS — Z86.39 PERSONAL HISTORY OF OTHER ENDOCRINE, NUTRITIONAL AND METABOLIC DISEASE: ICD-10-CM

## 2022-05-23 DIAGNOSIS — I10 ESSENTIAL (PRIMARY) HYPERTENSION: ICD-10-CM

## 2022-05-23 PROCEDURE — 99024 POSTOP FOLLOW-UP VISIT: CPT

## 2022-05-23 RX ORDER — METFORMIN HYDROCHLORIDE 1000 MG/1
1000 TABLET, COATED ORAL
Qty: 90 | Refills: 0 | Status: ACTIVE | COMMUNITY
Start: 2022-01-20

## 2022-05-23 RX ORDER — SITAGLIPTIN 50 MG/1
50 TABLET, FILM COATED ORAL
Qty: 30 | Refills: 0 | Status: ACTIVE | COMMUNITY
Start: 2022-03-24

## 2022-05-23 RX ORDER — LEVOTHYROXINE SODIUM 0.05 MG/1
50 TABLET ORAL
Qty: 30 | Refills: 0 | Status: ACTIVE | COMMUNITY
Start: 2022-04-21

## 2022-05-23 NOTE — CONSULT LETTER
[FreeTextEntry1] : May 23, 2022\par \par \par \par Delaney Erazo D.O.\par [address] [facility]\[place]  [address]\par Telephone #: [phone]\par \par \par Re: Nargis Morse\par : 1950\par \par \par Dear Dr. Erazo:\par \par I had the opportunity to see Ms. Morse today for a routine post-operative visit after she underwent an open incisional hernia repair with mesh on May 12, 2022.  She is overall feeling well.  She denied fever, chills, nausea, vomiting, diarrhea, or constipation.\par \par On physical examination, her height is 5 feet, weight is 134 pounds, and BMI 26.17.  Her temperature is 97.6 °F, blood pressure is 148/79, heart rate is 70, and O2 saturation is 97% on room air.  In general, she is a well-dressed, well-nourished woman who appears her stated age and is in no acute distress.  She is calm, alert and oriented x 3.  HEENT exam demonstrates a normocephalic atraumatic appearance with no scleral icterus.  Her abdomen is soft non-tender, and non-distended.  There is a well-healed chevron incision from her prior nephrectomy.  The new incision on the left side of the chevron incision is healing well without erythema or induration.  There is no evidence of a recurrent incisional hernia with Valsalva maneuver.  The Steri-Strips were removed.  Her extremities are warm and dry without clubbing, cyanosis or edema. \par \par In summary, Ms. Morse is a 72-year-old woman who underwent an open incisional hernia repair with mesh on May 12, 2022.  She is recovering as expected and will follow up with me as needed.\par \par Thank you for the opportunity to care for this patient.  Please do not hesitate to contact me in the event that you have any questions or concerns about the care of this patient.\par \par Sincerely,\par \par \par \par \par Marielos Mahajan M.D.

## 2022-05-23 NOTE — PHYSICAL EXAM
[Calm] : calm [de-identified] : NAD, comfortable [de-identified] : NCAT, no scleral icterus [de-identified] : +BS soft NT ND.  No hepatosplenomegaly.  Well-healed chevron incision.  The recent incision is healing well without erythema or induration.  Steri-Strips were removed.  There is no evidence of a recurrent incisional hernia with Valsalva maneuver. [de-identified] : No clubbing, cyanosis, or edema. [de-identified] : Warm, dry. [de-identified] : A&Ox3

## 2022-05-23 NOTE — ASSESSMENT
[FreeTextEntry1] : Ms. Morse is a 72-year-old woman who underwent an open incisional hernia repair with mesh on May 12, 2022.  She is recovering as expected and will follow up with me as needed.

## 2022-05-23 NOTE — HISTORY OF PRESENT ILLNESS
[de-identified] : Ms. Morse presented previously for evaluation and management of an incisional hernia.  She stated the hernia has been present for 3 years.  She first noticed the hernia as a bulge at the incision.  She denied significant pain of the area initially when the hernia occurred.  She stated the hernia is enlarging and becoming more bothersome.  She stated the hernia becomes more painful after eating a big meal or when straining to have a bowel movement.  She stated "at times, it becomes very big and hard".  She underwent an open incisional hernia repair with mesh on May 12, 2022. [de-identified] : She presented today for a routine post-operative visit.  She is overall feeling well.  She denied fever, chills, nausea, vomiting, diarrhea, or constipation.

## 2022-05-23 NOTE — REASON FOR VISIT
[Post Op: _________] : a [unfilled] post op visit [FreeTextEntry1] : She underwent an open incisional hernia repair with mesh on May 12, 2022

## 2022-06-07 LAB — SURGICAL PATHOLOGY STUDY: SIGNIFICANT CHANGE UP

## 2022-07-11 ENCOUNTER — APPOINTMENT (OUTPATIENT)
Dept: UROLOGY | Facility: CLINIC | Age: 72
End: 2022-07-11

## 2022-07-11 VITALS — SYSTOLIC BLOOD PRESSURE: 160 MMHG | DIASTOLIC BLOOD PRESSURE: 79 MMHG | TEMPERATURE: 97.6 F | HEART RATE: 80 BPM

## 2022-07-11 PROCEDURE — 99213 OFFICE O/P EST LOW 20 MIN: CPT

## 2022-07-12 LAB
ALBUMIN SERPL ELPH-MCNC: 4.9 G/DL
ALP BLD-CCNC: 85 U/L
ALT SERPL-CCNC: 19 U/L
ANION GAP SERPL CALC-SCNC: 11 MMOL/L
AST SERPL-CCNC: 19 U/L
BASOPHILS # BLD AUTO: 0.07 K/UL
BASOPHILS NFR BLD AUTO: 0.8 %
BILIRUB SERPL-MCNC: 0.3 MG/DL
BUN SERPL-MCNC: 19 MG/DL
CALCIUM SERPL-MCNC: 10 MG/DL
CHLORIDE SERPL-SCNC: 105 MMOL/L
CO2 SERPL-SCNC: 26 MMOL/L
CREAT SERPL-MCNC: 1.13 MG/DL
EGFR: 52 ML/MIN/1.73M2
EOSINOPHIL # BLD AUTO: 0.66 K/UL
EOSINOPHIL NFR BLD AUTO: 8 %
GLUCOSE SERPL-MCNC: 139 MG/DL
HCT VFR BLD CALC: 43.8 %
HGB BLD-MCNC: 13.6 G/DL
IMM GRANULOCYTES NFR BLD AUTO: 0.8 %
LYMPHOCYTES # BLD AUTO: 2.89 K/UL
LYMPHOCYTES NFR BLD AUTO: 34.8 %
MAN DIFF?: NORMAL
MCHC RBC-ENTMCNC: 27 PG
MCHC RBC-ENTMCNC: 31.1 GM/DL
MCV RBC AUTO: 87.1 FL
MONOCYTES # BLD AUTO: 0.72 K/UL
MONOCYTES NFR BLD AUTO: 8.7 %
NEUTROPHILS # BLD AUTO: 3.89 K/UL
NEUTROPHILS NFR BLD AUTO: 46.9 %
PLATELET # BLD AUTO: 293 K/UL
POTASSIUM SERPL-SCNC: 6 MMOL/L
PROT SERPL-MCNC: 7.9 G/DL
RBC # BLD: 5.03 M/UL
RBC # FLD: 14 %
SODIUM SERPL-SCNC: 142 MMOL/L
WBC # FLD AUTO: 8.3 K/UL

## 2022-07-12 NOTE — PHYSICAL EXAM
[General Appearance - Well Developed] : well developed [General Appearance - Well Nourished] : well nourished [Normal Appearance] : normal appearance [Well Groomed] : well groomed [General Appearance - In No Acute Distress] : no acute distress [Abdomen Soft] : soft [Abdomen Tenderness] : non-tender [Costovertebral Angle Tenderness] : no ~M costovertebral angle tenderness [Oriented To Time, Place, And Person] : oriented to person, place, and time [Affect] : the affect was normal [Mood] : the mood was normal [Not Anxious] : not anxious [Normal Station and Gait] : the gait and station were normal for the patient's age [No Focal Deficits] : no focal deficits [Abdomen Hernia] : no hernia was discovered

## 2022-07-12 NOTE — LETTER BODY
[Dear  ___] : Dear  [unfilled], [Courtesy Letter:] : I had the pleasure of seeing your patient, [unfilled], in my office today. [Please see my note below.] : Please see my note below. [Consult Closing:] : Thank you very much for allowing me to participate in the care of this patient.  If you have any questions, please do not hesitate to contact me. [Sincerely,] : Sincerely, [FreeTextEntry2] : Delaney Erazo DO\par [address]\[place], [address] [FreeTextEntry3] : Jose Villafuerte MD\par Urologic Oncology\par Department of Urology\par [facility]\par \par [employer] [facility] \par \par

## 2022-07-12 NOTE — HISTORY OF PRESENT ILLNESS
[None] : None [FreeTextEntry1] : 68yo female recently discharged from [facility]. 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 She underwent right radical nephrectomy with IVC thrombectomy 11/22. Did well postoperatively. Received 1 unit pRBC before discharge. Discharge hgb was 10. \par \par 3/09/20 Here for f/u. Doing well. Had PET scan recently which shows mild FDG activity near renal hilum with no appreciable mass, likely postsurgical changes. \par \par 6/08/20 Here for f/u. Doing well. Scheduled for imaging this week of chest/abdomen/pelvis. \par \par 9/21/20 Here for f/u. Had MRI 7/2020 which shows no recurrence. Primary complaint is incisional hernia. \par \par 12/14/20 Here for f/u. She decided to defer hernia surgery for now, not bothersome. No recent imaging.\par \par 6/14/21 Here for f/u. She decided to defer hernia surgery for now, not bothersome. Last imaging 12/2020 no evidence of recurrence. Creatinine 1.3. \par \par 12/20/21 Here for f/u. CT chest and MRI abdomen in 6/2021 were negative. No current complaints. \par \par 7/11/22 Here for f/u. CT chest and MRI abdomen 1/2022 negative. Underwent hernia repair with Dr. Mahajan, feeling much better  [Fever] : no fever [Fatigue] : no fatigue [Nausea] : no nausea [Incisional Drainage] : no incisional drainage [Incisional Pain] : no incisional pain

## 2022-07-12 NOTE — ASSESSMENT
[FreeTextEntry1] : 71yo female with [id number] clear cell carcinoma s/p nephrectomy, IVC thrombectomy 11/22/19\par Doing well\par Reviewed CT chest, MRI abdomen 1/2022, TAMIKO. \par Repeat CT chest and MRI abd \par CBC, CMP today\par f/u 6 months pending test results

## 2022-07-13 ENCOUNTER — RESULT REVIEW (OUTPATIENT)
Age: 72
End: 2022-07-13

## 2022-07-17 ENCOUNTER — APPOINTMENT (OUTPATIENT)
Dept: MRI IMAGING | Facility: HOSPITAL | Age: 72
End: 2022-07-17

## 2022-07-17 ENCOUNTER — OUTPATIENT (OUTPATIENT)
Dept: OUTPATIENT SERVICES | Facility: HOSPITAL | Age: 72
LOS: 1 days | End: 2022-07-17
Payer: MEDICARE

## 2022-07-17 ENCOUNTER — APPOINTMENT (OUTPATIENT)
Dept: CT IMAGING | Facility: HOSPITAL | Age: 72
End: 2022-07-17

## 2022-07-17 DIAGNOSIS — Z90.710 ACQUIRED ABSENCE OF BOTH CERVIX AND UTERUS: Chronic | ICD-10-CM

## 2022-07-17 DIAGNOSIS — Z90.5 ACQUIRED ABSENCE OF KIDNEY: Chronic | ICD-10-CM

## 2022-07-17 DIAGNOSIS — E89.2 POSTPROCEDURAL HYPOPARATHYROIDISM: Chronic | ICD-10-CM

## 2022-07-17 PROCEDURE — 71250 CT THORAX DX C-: CPT

## 2022-07-17 PROCEDURE — 71250 CT THORAX DX C-: CPT | Mod: 26,MH

## 2022-07-17 PROCEDURE — 74183 MRI ABD W/O CNTR FLWD CNTR: CPT

## 2022-07-17 PROCEDURE — 74183 MRI ABD W/O CNTR FLWD CNTR: CPT | Mod: 26,MH

## 2022-07-17 PROCEDURE — 72197 MRI PELVIS W/O & W/DYE: CPT | Mod: 26,MH

## 2022-07-17 PROCEDURE — 72197 MRI PELVIS W/O & W/DYE: CPT

## 2022-07-17 PROCEDURE — A9585: CPT

## 2022-07-21 DIAGNOSIS — J98.4 OTHER DISORDERS OF LUNG: ICD-10-CM

## 2022-07-21 DIAGNOSIS — D18.03 HEMANGIOMA OF INTRA-ABDOMINAL STRUCTURES: ICD-10-CM

## 2022-07-21 DIAGNOSIS — I25.84 CORONARY ATHEROSCLEROSIS DUE TO CALCIFIED CORONARY LESION: ICD-10-CM

## 2022-07-21 DIAGNOSIS — R91.8 OTHER NONSPECIFIC ABNORMAL FINDING OF LUNG FIELD: ICD-10-CM

## 2022-07-21 DIAGNOSIS — K57.30 DIVERTICULOSIS OF LARGE INTESTINE WITHOUT PERFORATION OR ABSCESS WITHOUT BLEEDING: ICD-10-CM

## 2022-07-21 DIAGNOSIS — C64.1 MALIGNANT NEOPLASM OF RIGHT KIDNEY, EXCEPT RENAL PELVIS: ICD-10-CM

## 2022-07-21 DIAGNOSIS — K76.0 FATTY (CHANGE OF) LIVER, NOT ELSEWHERE CLASSIFIED: ICD-10-CM

## 2022-07-22 ENCOUNTER — NON-APPOINTMENT (OUTPATIENT)
Age: 72
End: 2022-07-22

## 2022-07-25 NOTE — ED PROVIDER NOTE - CPE EDP ENMT NORM
What Type Of Note Output Would You Prefer (Optional)?: Standard Output How Severe Is Your Skin Lesion?: moderate Has Your Skin Lesion Been Treated?: not been treated Is This A New Presentation, Or A Follow-Up?: Skin Lesion normal...

## 2022-09-21 NOTE — ED ADULT NURSE NOTE - NURSING NEURO LEVEL OF CONSCIOUSNESS
alert and awake
[de-identified] :  DOI 8/25/21\par 9/21/22: f/u left knee. \par \par 9/7/22: f/u on the left knee and calf, had doppler that returned negative. Is attending PT. Still some difficulty with tasks. \par \par 8/3/22: Left knee and lower leg pain worsening over the last month. She has been working 16 hr shifts 4 days per week which she think flared up her pain. She is using the knee brace. Meloxicam PRN \par \par 2/9/22: Here for fu. Pain has returned. She has been working. \par \par 9/17/21: PT present to review MRI.\par \par

## 2022-11-07 ENCOUNTER — APPOINTMENT (OUTPATIENT)
Dept: NEPHROLOGY | Facility: CLINIC | Age: 72
End: 2022-11-07

## 2023-01-09 ENCOUNTER — APPOINTMENT (OUTPATIENT)
Dept: UROLOGY | Facility: CLINIC | Age: 73
End: 2023-01-09
Payer: MEDICARE

## 2023-01-09 VITALS
SYSTOLIC BLOOD PRESSURE: 147 MMHG | DIASTOLIC BLOOD PRESSURE: 73 MMHG | BODY MASS INDEX: 26.31 KG/M2 | TEMPERATURE: 97.5 F | HEART RATE: 76 BPM | WEIGHT: 134 LBS | HEIGHT: 60 IN

## 2023-01-09 DIAGNOSIS — Z85.528 PERSONAL HISTORY OF OTHER MALIGNANT NEOPLASM OF KIDNEY: ICD-10-CM

## 2023-01-09 PROCEDURE — 99213 OFFICE O/P EST LOW 20 MIN: CPT

## 2023-01-09 NOTE — HISTORY OF PRESENT ILLNESS
[None] : None [FreeTextEntry1] : 68yo female recently discharged from [facility]. 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 She underwent right radical nephrectomy with IVC thrombectomy 11/22. Did well postoperatively. Received 1 unit pRBC before discharge. Discharge hgb was 10. \par \par 3/09/20 Here for f/u. Doing well. Had PET scan recently which shows mild FDG activity near renal hilum with no appreciable mass, likely postsurgical changes. \par \par 6/08/20 Here for f/u. Doing well. Scheduled for imaging this week of chest/abdomen/pelvis. \par \par 9/21/20 Here for f/u. Had MRI 7/2020 which shows no recurrence. Primary complaint is incisional hernia. \par \par 12/14/20 Here for f/u. She decided to defer hernia surgery for now, not bothersome. No recent imaging.\par \par 6/14/21 Here for f/u. She decided to defer hernia surgery for now, not bothersome. Last imaging 12/2020 no evidence of recurrence. Creatinine 1.3. \par \par 12/20/21 Here for f/u. CT chest and MRI abdomen in 6/2021 were negative. No current complaints. \par \par 7/11/22 Here for f/u. CT chest and MRI abdomen 1/2022 negative. Underwent hernia repair with Dr. Mahajan, feeling much better \par \par 1/9/23 Here for follow up. Doing well, no complaints. CT chest and MRI abdomen 7/2022 negative.  [Fever] : no fever [Fatigue] : no fatigue [Nausea] : no nausea

## 2023-01-09 NOTE — END OF VISIT
[FreeTextEntry3] : I, Dr. Villafuerte, personally performed the evaluation and management (E/M) services for this established patient who presents today with (a) new problem(s)/exacerbation of (an) existing condition(s).  That E/M includes conducting the examination, assessing all new/exacerbated conditions, and establishing a new plan of care.  Today, our ACP, Carolyn Fontana, was here to observe the evaluation and management services for this new problem/exacerbated condition to be followed going forward.\par

## 2023-01-09 NOTE — ASSESSMENT
[FreeTextEntry1] : 71yo female with [id number] clear cell carcinoma s/p nephrectomy, IVC thrombectomy 11/22/19\par Doing well\par Reviewed CT chest, MRI abdomen 7/2022, TAMIKO. \par Repeat CT chest and MRI abd \par CBC, CMP today\par f/u 6 months pending test results

## 2023-01-09 NOTE — LETTER BODY
[Dear  ___] : Dear  [unfilled], [Courtesy Letter:] : I had the pleasure of seeing your patient, [unfilled], in my office today. [Please see my note below.] : Please see my note below. [Consult Closing:] : Thank you very much for allowing me to participate in the care of this patient.  If you have any questions, please do not hesitate to contact me. [Sincerely,] : Sincerely, [FreeTextEntry2] : Delaney Erazo DO\par [address]\[place], [place], [address] [FreeTextEntry3] : Jose Villafuerte MD\par Urologic Oncology\par Department of Urology\par [facility]\par \par [employer] [facility] \par \par

## 2023-01-10 LAB
ALBUMIN SERPL ELPH-MCNC: 4.5 G/DL
ALP BLD-CCNC: 74 U/L
ALT SERPL-CCNC: 18 U/L
ANION GAP SERPL CALC-SCNC: 11 MMOL/L
AST SERPL-CCNC: 18 U/L
BASOPHILS # BLD AUTO: 0.04 K/UL
BASOPHILS NFR BLD AUTO: 0.5 %
BILIRUB SERPL-MCNC: 0.3 MG/DL
BUN SERPL-MCNC: 15 MG/DL
CALCIUM SERPL-MCNC: 9.7 MG/DL
CHLORIDE SERPL-SCNC: 103 MMOL/L
CO2 SERPL-SCNC: 27 MMOL/L
CREAT SERPL-MCNC: 0.98 MG/DL
EGFR: 62 ML/MIN/1.73M2
EOSINOPHIL # BLD AUTO: 0.39 K/UL
EOSINOPHIL NFR BLD AUTO: 5 %
GLUCOSE SERPL-MCNC: 140 MG/DL
HCT VFR BLD CALC: 44.2 %
HGB BLD-MCNC: 14.1 G/DL
IMM GRANULOCYTES NFR BLD AUTO: 0.6 %
LYMPHOCYTES # BLD AUTO: 2.56 K/UL
LYMPHOCYTES NFR BLD AUTO: 33.1 %
MAN DIFF?: NORMAL
MCHC RBC-ENTMCNC: 27.8 PG
MCHC RBC-ENTMCNC: 31.9 GM/DL
MCV RBC AUTO: 87 FL
MONOCYTES # BLD AUTO: 0.88 K/UL
MONOCYTES NFR BLD AUTO: 11.4 %
NEUTROPHILS # BLD AUTO: 3.81 K/UL
NEUTROPHILS NFR BLD AUTO: 49.4 %
PLATELET # BLD AUTO: 270 K/UL
POTASSIUM SERPL-SCNC: 4.6 MMOL/L
PROT SERPL-MCNC: 7.1 G/DL
RBC # BLD: 5.08 M/UL
RBC # FLD: 13.8 %
SODIUM SERPL-SCNC: 141 MMOL/L
WBC # FLD AUTO: 7.73 K/UL

## 2023-01-11 ENCOUNTER — NON-APPOINTMENT (OUTPATIENT)
Age: 73
End: 2023-01-11

## 2023-01-16 ENCOUNTER — APPOINTMENT (OUTPATIENT)
Dept: CT IMAGING | Facility: CLINIC | Age: 73
End: 2023-01-16
Payer: MEDICARE

## 2023-01-16 ENCOUNTER — APPOINTMENT (OUTPATIENT)
Dept: MRI IMAGING | Facility: CLINIC | Age: 73
End: 2023-01-16
Payer: MEDICARE

## 2023-01-16 ENCOUNTER — OUTPATIENT (OUTPATIENT)
Dept: OUTPATIENT SERVICES | Facility: HOSPITAL | Age: 73
LOS: 1 days | End: 2023-01-16

## 2023-01-16 DIAGNOSIS — Z90.710 ACQUIRED ABSENCE OF BOTH CERVIX AND UTERUS: Chronic | ICD-10-CM

## 2023-01-16 DIAGNOSIS — E89.2 POSTPROCEDURAL HYPOPARATHYROIDISM: Chronic | ICD-10-CM

## 2023-01-16 DIAGNOSIS — Z90.5 ACQUIRED ABSENCE OF KIDNEY: Chronic | ICD-10-CM

## 2023-01-16 PROCEDURE — 74183 MRI ABD W/O CNTR FLWD CNTR: CPT | Mod: 26,MH

## 2023-01-16 PROCEDURE — 71250 CT THORAX DX C-: CPT | Mod: 26,MH

## 2023-01-31 ENCOUNTER — NON-APPOINTMENT (OUTPATIENT)
Age: 73
End: 2023-01-31

## 2023-05-09 NOTE — ED ADULT NURSE NOTE - NS_SISCREENINGSR_GEN_ALL_ED
Negative Dermal Autograft Text: The defect edges were debeveled with a #15 scalpel blade. Given the location of the defect, shape of the defect and the proximity to free margins a dermal autograft was deemed most appropriate. Using a sterile surgical marker, the primary defect shape was transferred to the donor site. The area thus outlined was incised deep to adipose tissue with a #15 scalpel blade.  The harvested graft was then trimmed of adipose and epidermal tissue until only dermis was left.  The skin graft was then placed in the primary defect and oriented appropriately.

## 2023-09-16 ENCOUNTER — EMERGENCY (EMERGENCY)
Facility: HOSPITAL | Age: 73
LOS: 1 days | Discharge: HOME | End: 2023-09-16
Attending: EMERGENCY MEDICINE | Admitting: EMERGENCY MEDICINE
Payer: MEDICARE

## 2023-09-16 VITALS
RESPIRATION RATE: 18 BRPM | DIASTOLIC BLOOD PRESSURE: 74 MMHG | TEMPERATURE: 97 F | OXYGEN SATURATION: 98 % | SYSTOLIC BLOOD PRESSURE: 146 MMHG | HEART RATE: 55 BPM | WEIGHT: 126.55 LBS

## 2023-09-16 VITALS
OXYGEN SATURATION: 98 % | RESPIRATION RATE: 17 BRPM | SYSTOLIC BLOOD PRESSURE: 118 MMHG | HEART RATE: 60 BPM | TEMPERATURE: 98 F | DIASTOLIC BLOOD PRESSURE: 68 MMHG

## 2023-09-16 DIAGNOSIS — Z85.528 PERSONAL HISTORY OF OTHER MALIGNANT NEOPLASM OF KIDNEY: ICD-10-CM

## 2023-09-16 DIAGNOSIS — K43.9 VENTRAL HERNIA WITHOUT OBSTRUCTION OR GANGRENE: ICD-10-CM

## 2023-09-16 DIAGNOSIS — R20.2 PARESTHESIA OF SKIN: ICD-10-CM

## 2023-09-16 DIAGNOSIS — Z82.49 FAMILY HISTORY OF ISCHEMIC HEART DISEASE AND OTHER DISEASES OF THE CIRCULATORY SYSTEM: ICD-10-CM

## 2023-09-16 DIAGNOSIS — E78.5 HYPERLIPIDEMIA, UNSPECIFIED: ICD-10-CM

## 2023-09-16 DIAGNOSIS — E87.5 HYPERKALEMIA: ICD-10-CM

## 2023-09-16 DIAGNOSIS — Z79.82 LONG TERM (CURRENT) USE OF ASPIRIN: ICD-10-CM

## 2023-09-16 DIAGNOSIS — E03.9 HYPOTHYROIDISM, UNSPECIFIED: ICD-10-CM

## 2023-09-16 DIAGNOSIS — Z86.16 PERSONAL HISTORY OF COVID-19: ICD-10-CM

## 2023-09-16 DIAGNOSIS — E89.2 POSTPROCEDURAL HYPOPARATHYROIDISM: Chronic | ICD-10-CM

## 2023-09-16 DIAGNOSIS — Z90.5 ACQUIRED ABSENCE OF KIDNEY: ICD-10-CM

## 2023-09-16 DIAGNOSIS — Z90.5 ACQUIRED ABSENCE OF KIDNEY: Chronic | ICD-10-CM

## 2023-09-16 DIAGNOSIS — I10 ESSENTIAL (PRIMARY) HYPERTENSION: ICD-10-CM

## 2023-09-16 DIAGNOSIS — Z79.84 LONG TERM (CURRENT) USE OF ORAL HYPOGLYCEMIC DRUGS: ICD-10-CM

## 2023-09-16 DIAGNOSIS — R42 DIZZINESS AND GIDDINESS: ICD-10-CM

## 2023-09-16 DIAGNOSIS — Z90.710 ACQUIRED ABSENCE OF BOTH CERVIX AND UTERUS: Chronic | ICD-10-CM

## 2023-09-16 DIAGNOSIS — Z87.891 PERSONAL HISTORY OF NICOTINE DEPENDENCE: ICD-10-CM

## 2023-09-16 DIAGNOSIS — H54.7 UNSPECIFIED VISUAL LOSS: ICD-10-CM

## 2023-09-16 DIAGNOSIS — E11.9 TYPE 2 DIABETES MELLITUS WITHOUT COMPLICATIONS: ICD-10-CM

## 2023-09-16 DIAGNOSIS — M81.0 AGE-RELATED OSTEOPOROSIS WITHOUT CURRENT PATHOLOGICAL FRACTURE: ICD-10-CM

## 2023-09-16 LAB
ANION GAP SERPL CALC-SCNC: 7 MMOL/L — SIGNIFICANT CHANGE UP (ref 5–17)
BASOPHILS # BLD AUTO: 0.07 K/UL — SIGNIFICANT CHANGE UP (ref 0–0.2)
BASOPHILS NFR BLD AUTO: 0.7 % — SIGNIFICANT CHANGE UP (ref 0–2)
BUN SERPL-MCNC: 19 MG/DL — SIGNIFICANT CHANGE UP (ref 7–23)
CALCIUM SERPL-MCNC: 10.9 MG/DL — HIGH (ref 8.4–10.5)
CHLORIDE SERPL-SCNC: 102 MMOL/L — SIGNIFICANT CHANGE UP (ref 96–108)
CO2 SERPL-SCNC: 28 MMOL/L — SIGNIFICANT CHANGE UP (ref 22–31)
CREAT SERPL-MCNC: 1.14 MG/DL — SIGNIFICANT CHANGE UP (ref 0.5–1.3)
EGFR: 51 ML/MIN/1.73M2 — LOW
EOSINOPHIL # BLD AUTO: 0.68 K/UL — HIGH (ref 0–0.5)
EOSINOPHIL NFR BLD AUTO: 6.5 % — HIGH (ref 0–6)
GLUCOSE SERPL-MCNC: 185 MG/DL — HIGH (ref 70–99)
HCT VFR BLD CALC: 44.4 % — SIGNIFICANT CHANGE UP (ref 34.5–45)
HGB BLD-MCNC: 14.2 G/DL — SIGNIFICANT CHANGE UP (ref 11.5–15.5)
IMM GRANULOCYTES NFR BLD AUTO: 0.5 % — SIGNIFICANT CHANGE UP (ref 0–0.9)
LYMPHOCYTES # BLD AUTO: 3.38 K/UL — HIGH (ref 1–3.3)
LYMPHOCYTES # BLD AUTO: 32.5 % — SIGNIFICANT CHANGE UP (ref 13–44)
MCHC RBC-ENTMCNC: 28.1 PG — SIGNIFICANT CHANGE UP (ref 27–34)
MCHC RBC-ENTMCNC: 32 GM/DL — SIGNIFICANT CHANGE UP (ref 32–36)
MCV RBC AUTO: 87.7 FL — SIGNIFICANT CHANGE UP (ref 80–100)
MONOCYTES # BLD AUTO: 0.75 K/UL — SIGNIFICANT CHANGE UP (ref 0–0.9)
MONOCYTES NFR BLD AUTO: 7.2 % — SIGNIFICANT CHANGE UP (ref 2–14)
NEUTROPHILS # BLD AUTO: 5.47 K/UL — SIGNIFICANT CHANGE UP (ref 1.8–7.4)
NEUTROPHILS NFR BLD AUTO: 52.6 % — SIGNIFICANT CHANGE UP (ref 43–77)
NRBC # BLD: 0 /100 WBCS — SIGNIFICANT CHANGE UP (ref 0–0)
PLATELET # BLD AUTO: 287 K/UL — SIGNIFICANT CHANGE UP (ref 150–400)
POTASSIUM SERPL-MCNC: 4.8 MMOL/L — SIGNIFICANT CHANGE UP (ref 3.5–5.3)
POTASSIUM SERPL-SCNC: 4.8 MMOL/L — SIGNIFICANT CHANGE UP (ref 3.5–5.3)
RBC # BLD: 5.06 M/UL — SIGNIFICANT CHANGE UP (ref 3.8–5.2)
RBC # FLD: 13.9 % — SIGNIFICANT CHANGE UP (ref 10.3–14.5)
SODIUM SERPL-SCNC: 137 MMOL/L — SIGNIFICANT CHANGE UP (ref 135–145)
WBC # BLD: 10.4 K/UL — SIGNIFICANT CHANGE UP (ref 3.8–10.5)
WBC # FLD AUTO: 10.4 K/UL — SIGNIFICANT CHANGE UP (ref 3.8–10.5)

## 2023-09-16 PROCEDURE — 36415 COLL VENOUS BLD VENIPUNCTURE: CPT

## 2023-09-16 PROCEDURE — 93010 ELECTROCARDIOGRAM REPORT: CPT

## 2023-09-16 PROCEDURE — 85025 COMPLETE CBC W/AUTO DIFF WBC: CPT

## 2023-09-16 PROCEDURE — 99284 EMERGENCY DEPT VISIT MOD MDM: CPT | Mod: 25

## 2023-09-16 PROCEDURE — 99284 EMERGENCY DEPT VISIT MOD MDM: CPT

## 2023-09-16 PROCEDURE — 82962 GLUCOSE BLOOD TEST: CPT

## 2023-09-16 PROCEDURE — 80048 BASIC METABOLIC PNL TOTAL CA: CPT

## 2023-09-16 PROCEDURE — 96374 THER/PROPH/DIAG INJ IV PUSH: CPT

## 2023-09-16 PROCEDURE — 93005 ELECTROCARDIOGRAM TRACING: CPT

## 2023-09-16 RX ORDER — MECLIZINE HCL 12.5 MG
1 TABLET ORAL
Qty: 21 | Refills: 0
Start: 2023-09-16 | End: 2023-09-22

## 2023-09-16 RX ORDER — SODIUM CHLORIDE 9 MG/ML
1000 INJECTION INTRAMUSCULAR; INTRAVENOUS; SUBCUTANEOUS ONCE
Refills: 0 | Status: COMPLETED | OUTPATIENT
Start: 2023-09-16 | End: 2023-09-16

## 2023-09-16 RX ORDER — DIAZEPAM 5 MG
2 TABLET ORAL ONCE
Refills: 0 | Status: DISCONTINUED | OUTPATIENT
Start: 2023-09-16 | End: 2023-09-16

## 2023-09-16 RX ORDER — ONDANSETRON 8 MG/1
4 TABLET, FILM COATED ORAL ONCE
Refills: 0 | Status: COMPLETED | OUTPATIENT
Start: 2023-09-16 | End: 2023-09-16

## 2023-09-16 RX ADMIN — Medication 2 MILLIGRAM(S): at 08:44

## 2023-09-16 RX ADMIN — SODIUM CHLORIDE 1000 MILLILITER(S): 9 INJECTION INTRAMUSCULAR; INTRAVENOUS; SUBCUTANEOUS at 08:44

## 2023-09-16 RX ADMIN — ONDANSETRON 4 MILLIGRAM(S): 8 TABLET, FILM COATED ORAL at 08:44

## 2023-09-16 NOTE — ED ADULT NURSE NOTE - NSFALLRISKINTERV_ED_ALL_ED
[de-identified] : The patient will have MRI left shoulder\par Moist heat to his neck p.r.n.\par Ice to his left shoulder p.r.n.\par He will avoid irritating activities\par Meloxicam 7.5 mg b.i.d. with food p.r.n.\par He is referred for physical therapy program 2-3 times a week for 8 weeks\par \par Impression:\par Cervical strain/spondylosis\par Left shoulder impingement Assistance OOB with selected safe patient handling equipment if applicable/Assistance with ambulation/Communicate fall risk and risk factors to all staff, patient, and family/Encourage patient to sit up slowly, dangle for a short time, stand at bedside before walking/Monitor gait and stability/Orthostatic vital signs/Provide visual cue: yellow wristband, yellow gown, etc/Reinforce activity limits and safety measures with patient and family/Call bell, personal items and telephone in reach/Instruct patient to call for assistance before getting out of bed/chair/stretcher/Non-slip footwear applied when patient is off stretcher/[place] to call system/Physically safe environment - no spills, clutter or unnecessary equipment/Purposeful Proactive Rounding/Room/bathroom lighting operational, light cord in reach

## 2023-09-16 NOTE — ED PROVIDER NOTE - WET READ LAUNCH FT
06-May-2023 16:59 There are no Wet Read(s) to document. Hydroxyzine Counseling: Patient advised that the medication is sedating and not to drive a car after taking this medication.  Patient informed of potential adverse effects including but not limited to dry mouth, urinary retention, and blurry vision.  The patient verbalized understanding of the proper use and possible adverse effects of hydroxyzine.  All of the patient's questions and concerns were addressed.

## 2023-09-16 NOTE — ED PROVIDER NOTE - NSFOLLOWUPINSTRUCTIONS_ED_ALL_ED_FT
Take meclizine as prescribed for dizziness/vertigo.  Follow up with ENT recommended below.  Return to ED with any worsening dizziness or other concerns.    Vertigo  Vertigo is the feeling that you or your surroundings are moving when they are not. This feeling can come and go at any time. Vertigo often goes away on its own. Vertigo can be dangerous if it occurs while you are doing something that could endanger yourself or others, such as driving or operating machinery.    Your health care provider will do tests to try to determine the cause of your vertigo. Tests will also help your health care provider decide how best to treat your condition.    Follow these instructions at home:  Eating and drinking    A comparison of three sample cups showing dark yellow, yellow, and pale yellow urine.  A sign showing that a person should not drink beer, wine, or liquor.  Dehydration can make vertigo worse. Drink enough fluid to keep your urine pale yellow.  Do not drink alcohol.  Activity    Return to your normal activities as told by your health care provider. Ask your health care provider what activities are safe for you.  In the morning, first sit up on the side of the bed. When you feel okay, stand slowly while you hold onto something until you know that your balance is fine.  Move slowly. Avoid sudden body or head movements or certain positions, as told by your health care provider.  If you have trouble walking or keeping your balance, try using a cane for stability. If you feel dizzy or unstable, sit down right away.  Avoid doing any tasks that would cause danger to you or others if vertigo occurs.  Avoid bending down if you feel dizzy. Place items in your home so that they are easy for you to reach without bending or leaning over.  Do not drive or use machinery if you feel dizzy.  General instructions    Take over-the-counter and prescription medicines only as told by your health care provider.  Keep all follow-up visits. This is important.  Contact a health care provider if:  Your medicines do not relieve your vertigo or they make it worse.  Your condition gets worse or you develop new symptoms.  You have a fever.  You develop nausea or vomiting, or if nausea gets worse.  Your family or friends notice any behavioral changes.  You have numbness or a prickling and tingling sensation in part of your body.  Get help right away if you:  Are always dizzy or you faint.  Develop severe headaches.  Develop a stiff neck.  Develop sensitivity to light.  Have difficulty moving or speaking.  Have weakness in your hands, arms, or legs.  Have changes in your hearing or vision.  These symptoms may represent a serious problem that is an emergency. Do not wait to see if the symptoms will go away. Get medical help right away. Call your local emergency services (911 in the [place] Do not drive yourself to the hospital.    Summary  Vertigo is the feeling that you or your surroundings are moving when they are not.  Your health care provider will do tests to try to determine the cause of your vertigo.  Follow instructions for home care. You may be told to avoid certain tasks, positions, or movements.  Contact a health care provider if your medicines do not relieve your symptoms, or if you have a fever, nausea, vomiting, or changes in behavior.  Get help right away if you have severe headaches or difficulty speaking, or you develop hearing or vision problems.  This information is not intended to replace advice given to you by your health care provider. Make sure you discuss any questions you have with your health care provider.    Document Revised: 11/17/2021 Document Reviewed: 11/17/2021

## 2023-09-16 NOTE — ED PROVIDER NOTE - PATIENT PORTAL LINK FT
You can access the FollowMyHealth Patient Portal offered by [facility] by registering at the following website: [email or web address]. By joining [employer]’s FollowMyHealth portal, you will also be able to view your health information using other applications (apps) compatible with our system.

## 2023-09-16 NOTE — ED PROVIDER NOTE - NEUROLOGICAL, MLM
Alert & Oriented x 3. CN II-XII intact. No facial droop. Clear speech. DE [place] w/ 5/5 strength x 4 ext. Normal sensation. No pronator drift. No dysdidokinesia nor dysmetria. Normal heel-to-shin.

## 2023-09-16 NOTE — ED PROVIDER NOTE - CLINICAL SUMMARY MEDICAL DECISION MAKING FREE TEXT BOX
Pt with hx of vertigo presenting with dizziness starting after waking this morning similar to prior episodes of vertigo but also has perioral tingling.  Denies other ext weakness or numbness.  VSS, PE no focal neuro deficits - perioral numbness described over the entire mouth.  Will check electrolytes, give valium 2mg and reevaluate.  No indication for imaging at this time as pt has had imaging in 2021 which was normal and this is baseline vertigo for pt - numbness around mouth bilateral. Pt with hx of vertigo presenting with dizziness starting after waking this morning similar to prior episodes of vertigo but also has perioral tingling.  Denies other ext weakness or numbness.  VSS, PE no focal neuro deficits - perioral numbness described over the entire mouth.  Will check electrolytes, give valium 2mg and reevaluate.  No indication for imaging at this time as pt has had imaging in 2021 which was normal and this is baseline vertigo for pt - numbness around mouth bilateral.    Pt feeling better in ED and ambulating and eating po.   Will prescribe meclizine and ENT follow up. Pt with hx of vertigo presenting with dizziness starting after waking this morning similar to prior episodes of vertigo but also has perioral tingling.  Denies other ext weakness or numbness.  VSS, PE no focal neuro deficits - perioral numbness described over the entire mouth.  Will check electrolytes, give valium 2mg and reevaluate.  No indication for imaging at this time as pt has had imaging in 2021 which was normal and this is baseline vertigo for pt - numbness around mouth bilateral.    Pt feeling better in ED with meds and ambulating with steady gait.  Understands outpatient plan of follow up with ENT.  Pt feeling better in ED and ambulating and eating po.   Will prescribe meclizine and ENT follow up.

## 2023-09-16 NOTE — ED ADULT TRIAGE NOTE - CHIEF COMPLAINT QUOTE
Pt c/o dizziness and right mouth numbness starting upon waking up at 6am. LKN 10:30 last night. Pt endorses hx of vertigo, took Meclizine at 7 am.

## 2023-09-16 NOTE — ED ADULT NURSE NOTE - OBJECTIVE STATEMENT
73 y.o. A/Ox4 Hungarian-speaking Female c/o perioral numbness, dizziness, generalized weakness since she woke up at 0630. LKW when she went to bed at 2230. Denies CP, SOB, fevers/chills, abd pain nvd, syncope, vision changes. pta took meclizine 25 mg. PMHx Meniere's Disease.

## 2023-09-16 NOTE — ED PROVIDER NOTE - OBJECTIVE STATEMENT
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  Pt describes numbness as around the entire mouth.  Pt presents to hospital because this has never happened before.  Denies extremity weakness or numbness.  Pt took meclizine 25 mg this morning.  Family member states that pt was able to ambulate at baseline.

## 2023-09-16 NOTE — ED PROVIDER NOTE - CARE PROVIDER_API CALL
Colten Coburn  Otolaryngology  [address]  [place] [address]  Phone: [phone]  Fax: [phone]  Follow Up Time:

## 2023-09-16 NOTE — ED ADULT NURSE NOTE - IN THE PAST 12 MONTHS HAVE YOU USED DRUGS OTHER THAN THOSE REQUIRED FOR MEDICAL REASON?
[facility]

 Created on: 2015



Farhat Bond

External Reference #: [id number]

: 1967

Sex: Male



Demographics







 Address  [address] 

[place]  [address]

 

 Home Phone  [phone]

 

 Preferred Language  Unknown

 

 Marital Status  Unknown

 

 Hindu Affiliation  Unknown

 

 Race  White

 

 Ethnic Group  Not  or 





Author







 Johanna Khan

 

 [facility]  eClinicalWorks

 

 Address  Unknown

 

 Phone  Unavailable







Care Team Providers







 Care Team Member Name  Role  Phone

 

 Johanna See  CP  Unavailable



                                                                



Allergies

          No Known Allergies                                                   
                                     



Problems

          





 Problem Type  Condition  Code  Onset Dates  Condition Status

 

 Problem  ED (erectile dysfunction)  607.84     Active

 

 Problem  Essential hypertension, benign  401.1     Active

 

 Problem  Hyperglycemia  790.29     Active

 

 Problem  Human immunodeficiency virus (HIV) disease  042     Active

 

 Problem  Pain in soft tissues of limb  729.5     Active

 

 Problem  Nondependent tobacco use disorder  305.1     Active



                                                                               
                                                           



Medications

          No Known Medications                                                 
                             



Results

          No Known Results                                                     
               



Summary Purpose

          eClinicalWorks Submission No

## 2023-09-26 NOTE — ED PROVIDER NOTE - BIRTH SEX
Female
,DirectAddress_Unknown,[email or web address]

## 2023-10-24 NOTE — STROKE CODE NOTE - CT READING
[id number]: Informed Consent Note     The consent form, including purpose, risks and benefits, was reviewed with Alonso Pettit, and all questions were answered before he signed the consent form. The patient understands that the study involves an active treatment phase as well as a post-treatment follow up phase.     Present during the discussion were myself, the patient's spouse, Dr. Gamez and the patient. A copy of the signed form was provided to the patient. No procedures specific to this study were performed prior to the patient signing the consent form.    Consent Version Date: [id number]  Consent obtained by: Kg Gamez    Date: 24OCT2023  HIPAA authorization signed?: yes  HIPAA authorization version date: Within the consent 08MAR2022    Cammy López RN    Form 503.03.01 (Version 2)     Effective date: 01AUG2018     Next Review Date: 01AUG2020      Race and ethnicity identification note     I discussed with Alonso Pettit that the National [employer] [facility] (NCI) has asked that information on race and ethnicity be obtained from NCI-sponsored studies' participants whenever possible and that the purpose for this request is to assist the NCI in evaluating whether persons of all races and ethnicity are given the opportunity to participate in new cancer treatment options. It was explained that the information will be kept in a confidential file in the [facility] Clinical Trials Office and will be sent to the NCI in a way in which he cannot be identified. It was also explained that providing this information is voluntary.    Alonso Pettit provided the following responses:    Ethnicity: non-  Race: white (May select more than one)   Country of birth: [place]  Country of residence[place]        Cammy López RN      Form 505.00.01 (Version 3)     Effective date: 19JUN2020     Next Review Date: 19JUN2022        
No acute findings
No acute findings

## 2024-01-08 ENCOUNTER — APPOINTMENT (OUTPATIENT)
Dept: UROLOGY | Facility: CLINIC | Age: 74
End: 2024-01-08

## 2024-02-01 ENCOUNTER — APPOINTMENT (OUTPATIENT)
Dept: UROLOGY | Facility: CLINIC | Age: 74
End: 2024-02-01
Payer: MEDICARE

## 2024-02-01 VITALS
DIASTOLIC BLOOD PRESSURE: 91 MMHG | WEIGHT: 126 LBS | TEMPERATURE: 97.6 F | OXYGEN SATURATION: 99 % | HEIGHT: 60 IN | BODY MASS INDEX: 24.74 KG/M2 | HEART RATE: 66 BPM | SYSTOLIC BLOOD PRESSURE: 138 MMHG

## 2024-02-01 PROCEDURE — 99213 OFFICE O/P EST LOW 20 MIN: CPT

## 2024-02-01 NOTE — HISTORY OF PRESENT ILLNESS
[FreeTextEntry1] : CC: history of RCC  Patient with history of nephrectomy/caval thrombectomy Doing well  No neuro, bone, resp complaints  Plan for MRI abdomen and CT chest.  If TAMIKO then follow up in one year   Tavares Judd MD, FACS, FRCS  of Urology [facility] Director of Laparoscopic and Robotic Surgery [facility] Director of Urology, [facility] Professor of Urology   (Office) [phone] (Cell)  [phone] [email or web address] The total amount of time I have personally spent preparing for this visit, reviewing the patient's test results, obtaining external history, ordering tests/medications, documenting clinical information, communicating with and counseling the patient/family and/or caregiver(s), and spent face to face with the patient explaining the above was minutes =15

## 2024-02-09 ENCOUNTER — EMERGENCY (EMERGENCY)
Facility: HOSPITAL | Age: 74
LOS: 1 days | Discharge: HOME | End: 2024-02-09
Attending: EMERGENCY MEDICINE | Admitting: EMERGENCY MEDICINE
Payer: MEDICARE

## 2024-02-09 VITALS
RESPIRATION RATE: 16 BRPM | HEART RATE: 74 BPM | DIASTOLIC BLOOD PRESSURE: 73 MMHG | WEIGHT: 125.66 LBS | OXYGEN SATURATION: 97 % | SYSTOLIC BLOOD PRESSURE: 136 MMHG | HEIGHT: 69 IN | TEMPERATURE: 97 F

## 2024-02-09 DIAGNOSIS — R10.13 EPIGASTRIC PAIN: ICD-10-CM

## 2024-02-09 DIAGNOSIS — E89.2 POSTPROCEDURAL HYPOPARATHYROIDISM: Chronic | ICD-10-CM

## 2024-02-09 DIAGNOSIS — E78.00 PURE HYPERCHOLESTEROLEMIA, UNSPECIFIED: ICD-10-CM

## 2024-02-09 DIAGNOSIS — Z79.82 LONG TERM (CURRENT) USE OF ASPIRIN: ICD-10-CM

## 2024-02-09 DIAGNOSIS — Z86.010 PERSONAL HISTORY OF COLONIC POLYPS: ICD-10-CM

## 2024-02-09 DIAGNOSIS — E11.9 TYPE 2 DIABETES MELLITUS WITHOUT COMPLICATIONS: ICD-10-CM

## 2024-02-09 DIAGNOSIS — Z90.5 ACQUIRED ABSENCE OF KIDNEY: Chronic | ICD-10-CM

## 2024-02-09 DIAGNOSIS — E03.9 HYPOTHYROIDISM, UNSPECIFIED: ICD-10-CM

## 2024-02-09 DIAGNOSIS — R11.0 NAUSEA: ICD-10-CM

## 2024-02-09 DIAGNOSIS — Z79.84 LONG TERM (CURRENT) USE OF ORAL HYPOGLYCEMIC DRUGS: ICD-10-CM

## 2024-02-09 DIAGNOSIS — I10 ESSENTIAL (PRIMARY) HYPERTENSION: ICD-10-CM

## 2024-02-09 DIAGNOSIS — Z90.710 ACQUIRED ABSENCE OF BOTH CERVIX AND UTERUS: Chronic | ICD-10-CM

## 2024-02-09 DIAGNOSIS — Z86.19 PERSONAL HISTORY OF OTHER INFECTIOUS AND PARASITIC DISEASES: ICD-10-CM

## 2024-02-09 LAB
ALBUMIN SERPL ELPH-MCNC: 4.2 G/DL — SIGNIFICANT CHANGE UP (ref 3.3–5)
ALP SERPL-CCNC: 77 U/L — SIGNIFICANT CHANGE UP (ref 40–120)
ALT FLD-CCNC: 15 U/L — SIGNIFICANT CHANGE UP (ref 10–45)
ANION GAP SERPL CALC-SCNC: 10 MMOL/L — SIGNIFICANT CHANGE UP (ref 5–17)
AST SERPL-CCNC: 19 U/L — SIGNIFICANT CHANGE UP (ref 10–40)
BILIRUB DIRECT SERPL-MCNC: 0.2 MG/DL — SIGNIFICANT CHANGE UP (ref 0–0.3)
BILIRUB INDIRECT FLD-MCNC: 0 MG/DL — LOW (ref 0.2–1)
BILIRUB SERPL-MCNC: 0.2 MG/DL — SIGNIFICANT CHANGE UP (ref 0.2–1.2)
BUN SERPL-MCNC: 24 MG/DL — HIGH (ref 7–23)
CALCIUM SERPL-MCNC: 9.8 MG/DL — SIGNIFICANT CHANGE UP (ref 8.4–10.5)
CHLORIDE SERPL-SCNC: 104 MMOL/L — SIGNIFICANT CHANGE UP (ref 96–108)
CO2 SERPL-SCNC: 25 MMOL/L — SIGNIFICANT CHANGE UP (ref 22–31)
CREAT SERPL-MCNC: 0.98 MG/DL — SIGNIFICANT CHANGE UP (ref 0.5–1.3)
EGFR: 61 ML/MIN/1.73M2 — SIGNIFICANT CHANGE UP
GLUCOSE SERPL-MCNC: 200 MG/DL — HIGH (ref 70–99)
HCT VFR BLD CALC: 40.9 % — SIGNIFICANT CHANGE UP (ref 34.5–45)
HGB BLD-MCNC: 12.9 G/DL — SIGNIFICANT CHANGE UP (ref 11.5–15.5)
LIDOCAIN IGE QN: 66 U/L — HIGH (ref 7–60)
MCHC RBC-ENTMCNC: 27.9 PG — SIGNIFICANT CHANGE UP (ref 27–34)
MCHC RBC-ENTMCNC: 31.5 GM/DL — LOW (ref 32–36)
MCV RBC AUTO: 88.3 FL — SIGNIFICANT CHANGE UP (ref 80–100)
NRBC # BLD: 0 /100 WBCS — SIGNIFICANT CHANGE UP (ref 0–0)
PLATELET # BLD AUTO: 284 K/UL — SIGNIFICANT CHANGE UP (ref 150–400)
POTASSIUM SERPL-MCNC: 4.8 MMOL/L — SIGNIFICANT CHANGE UP (ref 3.5–5.3)
POTASSIUM SERPL-SCNC: 4.8 MMOL/L — SIGNIFICANT CHANGE UP (ref 3.5–5.3)
PROT SERPL-MCNC: 7 G/DL — SIGNIFICANT CHANGE UP (ref 6–8.3)
RBC # BLD: 4.63 M/UL — SIGNIFICANT CHANGE UP (ref 3.8–5.2)
RBC # FLD: 13.7 % — SIGNIFICANT CHANGE UP (ref 10.3–14.5)
SODIUM SERPL-SCNC: 139 MMOL/L — SIGNIFICANT CHANGE UP (ref 135–145)
TROPONIN T, HIGH SENSITIVITY RESULT: 8 NG/L — SIGNIFICANT CHANGE UP (ref 0–51)
WBC # BLD: 10.41 K/UL — SIGNIFICANT CHANGE UP (ref 3.8–10.5)
WBC # FLD AUTO: 10.41 K/UL — SIGNIFICANT CHANGE UP (ref 3.8–10.5)

## 2024-02-09 PROCEDURE — 93010 ELECTROCARDIOGRAM REPORT: CPT

## 2024-02-09 PROCEDURE — 71045 X-RAY EXAM CHEST 1 VIEW: CPT | Mod: 26

## 2024-02-09 PROCEDURE — 99285 EMERGENCY DEPT VISIT HI MDM: CPT

## 2024-02-09 RX ORDER — CHOLECALCIFEROL (VITAMIN D3) 125 MCG
0 CAPSULE ORAL
Qty: 0 | Refills: 0 | DISCHARGE

## 2024-02-09 RX ORDER — SITAGLIPTIN 50 MG/1
1 TABLET, FILM COATED ORAL
Qty: 0 | Refills: 0 | DISCHARGE

## 2024-02-09 RX ORDER — LIDOCAINE 4 G/100G
10 CREAM TOPICAL ONCE
Refills: 0 | Status: COMPLETED | OUTPATIENT
Start: 2024-02-09 | End: 2024-02-09

## 2024-02-09 RX ORDER — SODIUM CHLORIDE 9 MG/ML
1000 INJECTION INTRAMUSCULAR; INTRAVENOUS; SUBCUTANEOUS ONCE
Refills: 0 | Status: COMPLETED | OUTPATIENT
Start: 2024-02-09 | End: 2024-02-09

## 2024-02-09 RX ORDER — DENOSUMAB 60 MG/ML
0 INJECTION SUBCUTANEOUS
Qty: 0 | Refills: 0 | DISCHARGE

## 2024-02-09 RX ORDER — FAMOTIDINE 10 MG/ML
20 INJECTION INTRAVENOUS ONCE
Refills: 0 | Status: COMPLETED | OUTPATIENT
Start: 2024-02-09 | End: 2024-02-09

## 2024-02-09 RX ORDER — SIMVASTATIN 20 MG/1
1 TABLET, FILM COATED ORAL
Qty: 0 | Refills: 0 | DISCHARGE

## 2024-02-09 RX ORDER — SODIUM ZIRCONIUM CYCLOSILICATE 10 G/10G
0 POWDER, FOR SUSPENSION ORAL
Qty: 0 | Refills: 0 | DISCHARGE

## 2024-02-09 RX ORDER — ONDANSETRON 8 MG/1
4 TABLET, FILM COATED ORAL ONCE
Refills: 0 | Status: COMPLETED | OUTPATIENT
Start: 2024-02-09 | End: 2024-02-09

## 2024-02-09 NOTE — ED PROVIDER NOTE - CLINICAL SUMMARY MEDICAL DECISION MAKING FREE TEXT BOX
Patient complaining of epigastric pain radiating to her throat and chest area with a history of H. pylori and that felt somewhat similar.  Suspect patient with gastritis possible ulcer and GERD, low concern for ACS.  EKG without ischemic changes, initial troponin 8.  Labs overall within normal limits with a slightly elevated lipase of 66.  Will repeat troponin and compare delta as well as get right upper quadrant ultrasound to evaluate for gallstones since patient with mildly limited elevated lipase.  Patient given Pepcid and GI cocktail, will reassess symptoms.  If labs within normal limits, likely discharge with PPI and follow-up with her GI and pmd; reassess. See progress notes for further mdm related documentation.

## 2024-02-09 NOTE — ED ADULT TRIAGE NOTE - CHIEF COMPLAINT QUOTE
Pt, with hx of HTN, HLD, DM, hypothyroidism, hyperkalemia diverticulitis and supraorbital hemangioma (blind in left eye) presents to ER c/o intermittent epigastric "burning" pain radiating to abdomen and chest with associated nausea and "burping" for ~5 days. Pt reports taking maalox with limited relief. Pt denies any other associated symptoms at this time.

## 2024-02-09 NOTE — ED PROVIDER NOTE - PROGRESS NOTE DETAILS
Labs wnl except for lipase 66.  RUQ u/s neg.  Serial trop neg.  Pt had felt improved but now c/o pain; additional meds ordered. Pt feeling improved.  Will dc.

## 2024-02-09 NOTE — ED PROVIDER NOTE - PHYSICAL EXAMINATION
VITAL SIGNS: I have reviewed nursing notes and confirm.  CONSTITUTIONAL:  in no acute distress.   SKIN:  warm and dry, no acute rash.   HEAD:  normocephalic, atraumatic.  EYES: PERRL, EOM intact; conjunctiva and sclera clear.  ENT: No nasal discharge; airway clear.   NECK: Supple; non tender.  CARD: S1, S2 normal; no murmurs, gallops, or rubs. Regular rate and rhythm.   RESP:  Clear to auscultation b/l, no wheezes, rales or rhonchi.  ABD: Normal bowel sounds; soft; non-distended; epigastric ttp, no ruq/luq ttp, no guarding/ rebound.  MSK: Normal ROM. No clubbing, cyanosis or edema. no ttp bilat le  NEURO: Alert, oriented, grossly unremarkable  PSYCH: Cooperative, mood and affect appropriate.

## 2024-02-09 NOTE — ED PROVIDER NOTE - NSFOLLOWUPINSTRUCTIONS_ED_ALL_ED_FT
Epigastric pain    Take omeprazole once a day as well as pepcid once a day.  Use tylenol as needed for pain - use as directed.  Add tums/maalox/mylanta etc as directed for additional relief.  Avoid spicy and/or acidic foods (citrus, tomatoes, etc), alcohol, caffeine, and NSAID medications (ibuprofen, aleve, advil, motrin, etc).  Follow up with your pmd and a gastroenterologist.  Return for increased pain, vomiting or diarrhea, fever, black/bloody stools, any other concerns.     Have your pmd or GI retest you for H Pylori.    Abdominal Pain    Many things can cause abdominal pain. Many times, abdominal pain is not caused by a disease and will improve without treatment. Your health care provider will do a physical exam to determine if there is a dangerous cause of your pain; blood tests and imaging may help determine the cause of your pain. However, in many cases, no cause may be found and you may need further testing as an outpatient. Monitor your abdominal pain for any changes.     SEEK IMMEDIATE MEDICAL CARE IF YOU HAVE ANY OF THE FOLLOWING SYMPTOMS: worsening abdominal pain, uncontrollable vomiting, profuse diarrhea, inability to have bowel movements or pass gas, black or bloody stools, fever accompanying chest pain or back pain, or fainting. These symptoms may represent a serious problem that is an emergency. Do not wait to see if the symptoms will go away. Get medical help right away. Call 911 and do not drive yourself to the hospital.     Gastritis    Gastritis is soreness and swelling (inflammation) of the lining of the stomach. Gastritis can develop as a sudden onset (acute) or long-term (chronic) condition. If gastritis is not treated, it can lead to stomach bleeding and ulcers. Causes include viral and bacterial infections, excessive alcohol consumption, tobacco use, or certain medications. Symptoms include nausea, vomiting, or abdominal pain or burning especially after eating. Avoid foods or drinks that make your symptoms worse such as caffeine, chocolate, spicy foods, acidic foods, or alcohol.    SEEK IMMEDIATE MEDICAL CARE IF YOU HAVE ANY OF THE FOLLOWING SYMPTOMS: black or bloody stools, blood or coffee-ground-colored vomitus, worsening abdominal pain, fever, or inability to keep fluids down.

## 2024-02-09 NOTE — ED ADULT NURSE NOTE - OBJECTIVE STATEMENT
pt received aox4, prefers family at bedside to translate. pt has been c/o burning epigastric pain for 5 days, endorses nausea, denies vomiting/diarrhea. pt endorses partial relief with mylanta but pain comes back. pt denies dizziness, visual changes, chest pain, sob, urinary sx/flank pain. peripheral iv placed. ekg completed in triage.

## 2024-02-09 NOTE — ED PROVIDER NOTE - PATIENT PORTAL LINK FT
You can access the FollowMyHealth Patient Portal offered by [facility] by registering at the following website: [email or web address]. By joining [employer]’s FollowMyHealth portal, you will also be able to view your health information using other applications (apps) compatible with our system.

## 2024-02-10 VITALS
DIASTOLIC BLOOD PRESSURE: 76 MMHG | TEMPERATURE: 98 F | SYSTOLIC BLOOD PRESSURE: 134 MMHG | HEART RATE: 68 BPM | OXYGEN SATURATION: 97 % | RESPIRATION RATE: 18 BRPM

## 2024-02-10 LAB — TROPONIN T, HIGH SENSITIVITY RESULT: 8 NG/L — SIGNIFICANT CHANGE UP (ref 0–51)

## 2024-02-10 PROCEDURE — 99285 EMERGENCY DEPT VISIT HI MDM: CPT | Mod: 25

## 2024-02-10 PROCEDURE — 83690 ASSAY OF LIPASE: CPT

## 2024-02-10 PROCEDURE — 76705 ECHO EXAM OF ABDOMEN: CPT

## 2024-02-10 PROCEDURE — 80076 HEPATIC FUNCTION PANEL: CPT

## 2024-02-10 PROCEDURE — 36415 COLL VENOUS BLD VENIPUNCTURE: CPT

## 2024-02-10 PROCEDURE — 96375 TX/PRO/DX INJ NEW DRUG ADDON: CPT

## 2024-02-10 PROCEDURE — 71045 X-RAY EXAM CHEST 1 VIEW: CPT

## 2024-02-10 PROCEDURE — 84484 ASSAY OF TROPONIN QUANT: CPT

## 2024-02-10 PROCEDURE — 93005 ELECTROCARDIOGRAM TRACING: CPT

## 2024-02-10 PROCEDURE — 96374 THER/PROPH/DIAG INJ IV PUSH: CPT

## 2024-02-10 PROCEDURE — 85027 COMPLETE CBC AUTOMATED: CPT

## 2024-02-10 PROCEDURE — 83735 ASSAY OF MAGNESIUM: CPT

## 2024-02-10 PROCEDURE — 76705 ECHO EXAM OF ABDOMEN: CPT | Mod: 26

## 2024-02-10 PROCEDURE — 80048 BASIC METABOLIC PNL TOTAL CA: CPT

## 2024-02-10 RX ORDER — ACETAMINOPHEN 500 MG
975 TABLET ORAL ONCE
Refills: 0 | Status: COMPLETED | OUTPATIENT
Start: 2024-02-10 | End: 2024-02-10

## 2024-02-10 RX ORDER — PANTOPRAZOLE SODIUM 20 MG/1
40 TABLET, DELAYED RELEASE ORAL ONCE
Refills: 0 | Status: COMPLETED | OUTPATIENT
Start: 2024-02-10 | End: 2024-02-10

## 2024-02-10 RX ORDER — FAMOTIDINE 10 MG/ML
1 INJECTION INTRAVENOUS
Qty: 14 | Refills: 0
Start: 2024-02-10 | End: 2024-02-23

## 2024-02-10 RX ORDER — OMEPRAZOLE 10 MG/1
1 CAPSULE, DELAYED RELEASE ORAL
Qty: 30 | Refills: 0
Start: 2024-02-10

## 2024-02-10 RX ADMIN — Medication 30 MILLILITER(S): at 00:07

## 2024-02-10 RX ADMIN — FAMOTIDINE 20 MILLIGRAM(S): 10 INJECTION INTRAVENOUS at 00:06

## 2024-02-10 RX ADMIN — SODIUM CHLORIDE 1000 MILLILITER(S): 9 INJECTION INTRAMUSCULAR; INTRAVENOUS; SUBCUTANEOUS at 00:06

## 2024-02-10 RX ADMIN — ONDANSETRON 4 MILLIGRAM(S): 8 TABLET, FILM COATED ORAL at 00:06

## 2024-02-10 RX ADMIN — PANTOPRAZOLE SODIUM 40 MILLIGRAM(S): 20 TABLET, DELAYED RELEASE ORAL at 02:11

## 2024-02-10 RX ADMIN — Medication 975 MILLIGRAM(S): at 02:10

## 2024-02-10 NOTE — ED ADULT NURSE REASSESSMENT NOTE - NS ED NURSE REASSESS COMMENT FT1
Pt sx improved, cleared for discharge. Discharge instructions reviewed with pt and family. Peripheral IV removed. Pt ambulated out of ED with family with a steady gait.
No

## 2024-02-11 ENCOUNTER — INPATIENT (INPATIENT)
Facility: HOSPITAL | Age: 74
LOS: 1 days | Discharge: HOME | DRG: 392 | End: 2024-02-13
Attending: STUDENT IN AN ORGANIZED HEALTH CARE EDUCATION/TRAINING PROGRAM | Admitting: STUDENT IN AN ORGANIZED HEALTH CARE EDUCATION/TRAINING PROGRAM
Payer: MEDICARE

## 2024-02-11 VITALS
SYSTOLIC BLOOD PRESSURE: 149 MMHG | DIASTOLIC BLOOD PRESSURE: 100 MMHG | RESPIRATION RATE: 18 BRPM | TEMPERATURE: 98 F | OXYGEN SATURATION: 97 % | HEART RATE: 85 BPM

## 2024-02-11 DIAGNOSIS — E89.2 POSTPROCEDURAL HYPOPARATHYROIDISM: Chronic | ICD-10-CM

## 2024-02-11 DIAGNOSIS — Z90.5 ACQUIRED ABSENCE OF KIDNEY: Chronic | ICD-10-CM

## 2024-02-11 DIAGNOSIS — R55 SYNCOPE AND COLLAPSE: ICD-10-CM

## 2024-02-11 DIAGNOSIS — R10.9 UNSPECIFIED ABDOMINAL PAIN: ICD-10-CM

## 2024-02-11 DIAGNOSIS — Z90.710 ACQUIRED ABSENCE OF BOTH CERVIX AND UTERUS: Chronic | ICD-10-CM

## 2024-02-11 DIAGNOSIS — E78.5 HYPERLIPIDEMIA, UNSPECIFIED: ICD-10-CM

## 2024-02-11 DIAGNOSIS — E11.9 TYPE 2 DIABETES MELLITUS WITHOUT COMPLICATIONS: ICD-10-CM

## 2024-02-11 DIAGNOSIS — Z29.9 ENCOUNTER FOR PROPHYLACTIC MEASURES, UNSPECIFIED: ICD-10-CM

## 2024-02-11 DIAGNOSIS — I10 ESSENTIAL (PRIMARY) HYPERTENSION: ICD-10-CM

## 2024-02-11 DIAGNOSIS — Z86.39 PERSONAL HISTORY OF OTHER ENDOCRINE, NUTRITIONAL AND METABOLIC DISEASE: ICD-10-CM

## 2024-02-11 DIAGNOSIS — E03.9 HYPOTHYROIDISM, UNSPECIFIED: ICD-10-CM

## 2024-02-11 LAB
ALBUMIN SERPL ELPH-MCNC: 3.6 G/DL — SIGNIFICANT CHANGE UP (ref 3.5–5)
ALP SERPL-CCNC: 73 U/L — SIGNIFICANT CHANGE UP (ref 40–120)
ALT FLD-CCNC: 28 U/L DA — SIGNIFICANT CHANGE UP (ref 10–60)
ANION GAP SERPL CALC-SCNC: 5 MMOL/L — SIGNIFICANT CHANGE UP (ref 5–17)
APPEARANCE UR: CLEAR — SIGNIFICANT CHANGE UP
APTT BLD: 28.6 SEC — SIGNIFICANT CHANGE UP (ref 24.5–35.6)
AST SERPL-CCNC: 15 U/L — SIGNIFICANT CHANGE UP (ref 10–40)
BASOPHILS # BLD AUTO: 0.02 K/UL — SIGNIFICANT CHANGE UP (ref 0–0.2)
BASOPHILS # BLD AUTO: 0.05 K/UL — SIGNIFICANT CHANGE UP (ref 0–0.2)
BASOPHILS NFR BLD AUTO: 0.1 % — SIGNIFICANT CHANGE UP (ref 0–2)
BASOPHILS NFR BLD AUTO: 0.4 % — SIGNIFICANT CHANGE UP (ref 0–2)
BILIRUB SERPL-MCNC: 0.4 MG/DL — SIGNIFICANT CHANGE UP (ref 0.2–1.2)
BILIRUB UR-MCNC: NEGATIVE — SIGNIFICANT CHANGE UP
BUN SERPL-MCNC: 23 MG/DL — HIGH (ref 7–18)
CALCIUM SERPL-MCNC: 8.7 MG/DL — SIGNIFICANT CHANGE UP (ref 8.4–10.5)
CHLORIDE SERPL-SCNC: 110 MMOL/L — HIGH (ref 96–108)
CK SERPL-CCNC: 82 U/L — SIGNIFICANT CHANGE UP (ref 21–215)
CO2 SERPL-SCNC: 27 MMOL/L — SIGNIFICANT CHANGE UP (ref 22–31)
COLOR SPEC: YELLOW — SIGNIFICANT CHANGE UP
CREAT SERPL-MCNC: 1.19 MG/DL — SIGNIFICANT CHANGE UP (ref 0.5–1.3)
DIFF PNL FLD: NEGATIVE — SIGNIFICANT CHANGE UP
EGFR: 49 ML/MIN/1.73M2 — LOW
EOSINOPHIL # BLD AUTO: 0.12 K/UL — SIGNIFICANT CHANGE UP (ref 0–0.5)
EOSINOPHIL # BLD AUTO: 0.21 K/UL — SIGNIFICANT CHANGE UP (ref 0–0.5)
EOSINOPHIL NFR BLD AUTO: 1 % — SIGNIFICANT CHANGE UP (ref 0–6)
EOSINOPHIL NFR BLD AUTO: 1.4 % — SIGNIFICANT CHANGE UP (ref 0–6)
FLUAV AG NPH QL: SIGNIFICANT CHANGE UP
FLUBV AG NPH QL: SIGNIFICANT CHANGE UP
GLUCOSE BLDC GLUCOMTR-MCNC: 134 MG/DL — HIGH (ref 70–99)
GLUCOSE BLDC GLUCOMTR-MCNC: 141 MG/DL — HIGH (ref 70–99)
GLUCOSE BLDC GLUCOMTR-MCNC: 187 MG/DL — HIGH (ref 70–99)
GLUCOSE SERPL-MCNC: 159 MG/DL — HIGH (ref 70–99)
GLUCOSE UR QL: >=1000 MG/DL
HCT VFR BLD CALC: 38.1 % — SIGNIFICANT CHANGE UP (ref 34.5–45)
HCT VFR BLD CALC: 43.7 % — SIGNIFICANT CHANGE UP (ref 34.5–45)
HGB BLD-MCNC: 11.8 G/DL — SIGNIFICANT CHANGE UP (ref 11.5–15.5)
HGB BLD-MCNC: 13.6 G/DL — SIGNIFICANT CHANGE UP (ref 11.5–15.5)
IMM GRANULOCYTES NFR BLD AUTO: 0.4 % — SIGNIFICANT CHANGE UP (ref 0–0.9)
IMM GRANULOCYTES NFR BLD AUTO: 0.4 % — SIGNIFICANT CHANGE UP (ref 0–0.9)
INR BLD: 0.92 RATIO — SIGNIFICANT CHANGE UP (ref 0.85–1.18)
KETONES UR-MCNC: ABNORMAL MG/DL
LEUKOCYTE ESTERASE UR-ACNC: NEGATIVE — SIGNIFICANT CHANGE UP
LIDOCAIN IGE QN: 45 U/L — SIGNIFICANT CHANGE UP (ref 13–75)
LYMPHOCYTES # BLD AUTO: 13.3 % — SIGNIFICANT CHANGE UP (ref 13–44)
LYMPHOCYTES # BLD AUTO: 2 K/UL — SIGNIFICANT CHANGE UP (ref 1–3.3)
LYMPHOCYTES # BLD AUTO: 27.7 % — SIGNIFICANT CHANGE UP (ref 13–44)
LYMPHOCYTES # BLD AUTO: 3.41 K/UL — HIGH (ref 1–3.3)
MCHC RBC-ENTMCNC: 27.4 PG — SIGNIFICANT CHANGE UP (ref 27–34)
MCHC RBC-ENTMCNC: 27.6 PG — SIGNIFICANT CHANGE UP (ref 27–34)
MCHC RBC-ENTMCNC: 31 GM/DL — LOW (ref 32–36)
MCHC RBC-ENTMCNC: 31.1 GM/DL — LOW (ref 32–36)
MCV RBC AUTO: 88.6 FL — SIGNIFICANT CHANGE UP (ref 80–100)
MCV RBC AUTO: 88.6 FL — SIGNIFICANT CHANGE UP (ref 80–100)
MONOCYTES # BLD AUTO: 0.56 K/UL — SIGNIFICANT CHANGE UP (ref 0–0.9)
MONOCYTES # BLD AUTO: 0.98 K/UL — HIGH (ref 0–0.9)
MONOCYTES NFR BLD AUTO: 4.6 % — SIGNIFICANT CHANGE UP (ref 2–14)
MONOCYTES NFR BLD AUTO: 6.5 % — SIGNIFICANT CHANGE UP (ref 2–14)
NEUTROPHILS # BLD AUTO: 11.79 K/UL — HIGH (ref 1.8–7.4)
NEUTROPHILS # BLD AUTO: 8.1 K/UL — HIGH (ref 1.8–7.4)
NEUTROPHILS NFR BLD AUTO: 65.9 % — SIGNIFICANT CHANGE UP (ref 43–77)
NEUTROPHILS NFR BLD AUTO: 78.3 % — HIGH (ref 43–77)
NITRITE UR-MCNC: NEGATIVE — SIGNIFICANT CHANGE UP
NRBC # BLD: 0 /100 WBCS — SIGNIFICANT CHANGE UP (ref 0–0)
NRBC # BLD: 0 /100 WBCS — SIGNIFICANT CHANGE UP (ref 0–0)
PH UR: 7 — SIGNIFICANT CHANGE UP (ref 5–8)
PLATELET # BLD AUTO: 254 K/UL — SIGNIFICANT CHANGE UP (ref 150–400)
PLATELET # BLD AUTO: 284 K/UL — SIGNIFICANT CHANGE UP (ref 150–400)
POTASSIUM SERPL-MCNC: 4.2 MMOL/L — SIGNIFICANT CHANGE UP (ref 3.5–5.3)
POTASSIUM SERPL-SCNC: 4.2 MMOL/L — SIGNIFICANT CHANGE UP (ref 3.5–5.3)
PROT SERPL-MCNC: 7.4 G/DL — SIGNIFICANT CHANGE UP (ref 6–8.3)
PROT UR-MCNC: NEGATIVE MG/DL — SIGNIFICANT CHANGE UP
PROTHROM AB SERPL-ACNC: 10.5 SEC — SIGNIFICANT CHANGE UP (ref 9.5–13)
RAPID RVP RESULT: SIGNIFICANT CHANGE UP
RBC # BLD: 4.3 M/UL — SIGNIFICANT CHANGE UP (ref 3.8–5.2)
RBC # BLD: 4.93 M/UL — SIGNIFICANT CHANGE UP (ref 3.8–5.2)
RBC # FLD: 13.6 % — SIGNIFICANT CHANGE UP (ref 10.3–14.5)
RBC # FLD: 13.8 % — SIGNIFICANT CHANGE UP (ref 10.3–14.5)
RETICS #: 59.1 K/UL — SIGNIFICANT CHANGE UP (ref 25–125)
RETICS/RBC NFR: 1.4 % — SIGNIFICANT CHANGE UP (ref 0.5–2.5)
SARS-COV-2 RNA SPEC QL NAA+PROBE: SIGNIFICANT CHANGE UP
SARS-COV-2 RNA SPEC QL NAA+PROBE: SIGNIFICANT CHANGE UP
SODIUM SERPL-SCNC: 142 MMOL/L — SIGNIFICANT CHANGE UP (ref 135–145)
SP GR SPEC: 1.01 — SIGNIFICANT CHANGE UP (ref 1–1.03)
TROPONIN I, HIGH SENSITIVITY RESULT: 4.7 NG/L — SIGNIFICANT CHANGE UP
TSH SERPL-MCNC: 0.56 UU/ML — SIGNIFICANT CHANGE UP (ref 0.34–4.82)
UROBILINOGEN FLD QL: 0.2 MG/DL — SIGNIFICANT CHANGE UP (ref 0.2–1)
WBC # BLD: 12.29 K/UL — HIGH (ref 3.8–10.5)
WBC # BLD: 15.06 K/UL — HIGH (ref 3.8–10.5)
WBC # FLD AUTO: 12.29 K/UL — HIGH (ref 3.8–10.5)
WBC # FLD AUTO: 15.06 K/UL — HIGH (ref 3.8–10.5)

## 2024-02-11 PROCEDURE — 93010 ELECTROCARDIOGRAM REPORT: CPT

## 2024-02-11 PROCEDURE — 70450 CT HEAD/BRAIN W/O DYE: CPT | Mod: 26,MA

## 2024-02-11 PROCEDURE — 74176 CT ABD & PELVIS W/O CONTRAST: CPT | Mod: 26,MA

## 2024-02-11 PROCEDURE — 99285 EMERGENCY DEPT VISIT HI MDM: CPT

## 2024-02-11 PROCEDURE — 99223 1ST HOSP IP/OBS HIGH 75: CPT | Mod: GC

## 2024-02-11 RX ORDER — INSULIN LISPRO 100/ML
VIAL (ML) SUBCUTANEOUS
Refills: 0 | Status: DISCONTINUED | OUTPATIENT
Start: 2024-02-11 | End: 2024-02-13

## 2024-02-11 RX ORDER — DEXTROSE 50 % IN WATER 50 %
25 SYRINGE (ML) INTRAVENOUS ONCE
Refills: 0 | Status: DISCONTINUED | OUTPATIENT
Start: 2024-02-11 | End: 2024-02-13

## 2024-02-11 RX ORDER — ATORVASTATIN CALCIUM 80 MG/1
40 TABLET, FILM COATED ORAL AT BEDTIME
Refills: 0 | Status: DISCONTINUED | OUTPATIENT
Start: 2024-02-11 | End: 2024-02-13

## 2024-02-11 RX ORDER — ACETAMINOPHEN 500 MG
1000 TABLET ORAL ONCE
Refills: 0 | Status: COMPLETED | OUTPATIENT
Start: 2024-02-11 | End: 2024-02-11

## 2024-02-11 RX ORDER — ACETAMINOPHEN 500 MG
650 TABLET ORAL EVERY 6 HOURS
Refills: 0 | Status: DISCONTINUED | OUTPATIENT
Start: 2024-02-11 | End: 2024-02-13

## 2024-02-11 RX ORDER — GLUCAGON INJECTION, SOLUTION 0.5 MG/.1ML
1 INJECTION, SOLUTION SUBCUTANEOUS ONCE
Refills: 0 | Status: DISCONTINUED | OUTPATIENT
Start: 2024-02-11 | End: 2024-02-13

## 2024-02-11 RX ORDER — SODIUM ZIRCONIUM CYCLOSILICATE 10 G/10G
10 POWDER, FOR SUSPENSION ORAL DAILY
Refills: 0 | Status: DISCONTINUED | OUTPATIENT
Start: 2024-02-12 | End: 2024-02-12

## 2024-02-11 RX ORDER — SODIUM CHLORIDE 9 MG/ML
1000 INJECTION, SOLUTION INTRAVENOUS
Refills: 0 | Status: DISCONTINUED | OUTPATIENT
Start: 2024-02-11 | End: 2024-02-13

## 2024-02-11 RX ORDER — DEXTROSE 50 % IN WATER 50 %
15 SYRINGE (ML) INTRAVENOUS ONCE
Refills: 0 | Status: DISCONTINUED | OUTPATIENT
Start: 2024-02-11 | End: 2024-02-13

## 2024-02-11 RX ORDER — SODIUM CHLORIDE 9 MG/ML
1000 INJECTION INTRAMUSCULAR; INTRAVENOUS; SUBCUTANEOUS ONCE
Refills: 0 | Status: COMPLETED | OUTPATIENT
Start: 2024-02-11 | End: 2024-02-11

## 2024-02-11 RX ORDER — FAMOTIDINE 10 MG/ML
20 INJECTION INTRAVENOUS ONCE
Refills: 0 | Status: COMPLETED | OUTPATIENT
Start: 2024-02-11 | End: 2024-02-11

## 2024-02-11 RX ORDER — DEXTROSE 50 % IN WATER 50 %
12.5 SYRINGE (ML) INTRAVENOUS ONCE
Refills: 0 | Status: DISCONTINUED | OUTPATIENT
Start: 2024-02-11 | End: 2024-02-13

## 2024-02-11 RX ORDER — LEVOTHYROXINE SODIUM 125 MCG
75 TABLET ORAL DAILY
Refills: 0 | Status: DISCONTINUED | OUTPATIENT
Start: 2024-02-12 | End: 2024-02-13

## 2024-02-11 RX ORDER — ONDANSETRON 8 MG/1
4 TABLET, FILM COATED ORAL ONCE
Refills: 0 | Status: COMPLETED | OUTPATIENT
Start: 2024-02-11 | End: 2024-02-11

## 2024-02-11 RX ORDER — PANTOPRAZOLE SODIUM 20 MG/1
40 TABLET, DELAYED RELEASE ORAL DAILY
Refills: 0 | Status: DISCONTINUED | OUTPATIENT
Start: 2024-02-11 | End: 2024-02-13

## 2024-02-11 RX ORDER — ENOXAPARIN SODIUM 100 MG/ML
40 INJECTION SUBCUTANEOUS EVERY 24 HOURS
Refills: 0 | Status: DISCONTINUED | OUTPATIENT
Start: 2024-02-11 | End: 2024-02-13

## 2024-02-11 RX ORDER — ONDANSETRON 8 MG/1
4 TABLET, FILM COATED ORAL EVERY 8 HOURS
Refills: 0 | Status: DISCONTINUED | OUTPATIENT
Start: 2024-02-11 | End: 2024-02-13

## 2024-02-11 RX ORDER — PANTOPRAZOLE SODIUM 20 MG/1
40 TABLET, DELAYED RELEASE ORAL ONCE
Refills: 0 | Status: COMPLETED | OUTPATIENT
Start: 2024-02-11 | End: 2024-02-11

## 2024-02-11 RX ADMIN — Medication 400 MILLIGRAM(S): at 07:39

## 2024-02-11 RX ADMIN — SODIUM CHLORIDE 1000 MILLILITER(S): 9 INJECTION INTRAMUSCULAR; INTRAVENOUS; SUBCUTANEOUS at 08:40

## 2024-02-11 RX ADMIN — Medication 650 MILLIGRAM(S): at 22:15

## 2024-02-11 RX ADMIN — Medication 1000 MILLIGRAM(S): at 08:19

## 2024-02-11 RX ADMIN — SODIUM CHLORIDE 1000 MILLILITER(S): 9 INJECTION INTRAMUSCULAR; INTRAVENOUS; SUBCUTANEOUS at 07:40

## 2024-02-11 RX ADMIN — PANTOPRAZOLE SODIUM 40 MILLIGRAM(S): 20 TABLET, DELAYED RELEASE ORAL at 07:39

## 2024-02-11 RX ADMIN — ONDANSETRON 4 MILLIGRAM(S): 8 TABLET, FILM COATED ORAL at 01:58

## 2024-02-11 RX ADMIN — ENOXAPARIN SODIUM 40 MILLIGRAM(S): 100 INJECTION SUBCUTANEOUS at 21:20

## 2024-02-11 RX ADMIN — PANTOPRAZOLE SODIUM 40 MILLIGRAM(S): 20 TABLET, DELAYED RELEASE ORAL at 12:32

## 2024-02-11 RX ADMIN — ATORVASTATIN CALCIUM 40 MILLIGRAM(S): 80 TABLET, FILM COATED ORAL at 21:20

## 2024-02-11 RX ADMIN — SODIUM CHLORIDE 75 MILLILITER(S): 9 INJECTION, SOLUTION INTRAVENOUS at 10:11

## 2024-02-11 RX ADMIN — ONDANSETRON 4 MILLIGRAM(S): 8 TABLET, FILM COATED ORAL at 21:20

## 2024-02-11 RX ADMIN — Medication 650 MILLIGRAM(S): at 21:20

## 2024-02-11 RX ADMIN — FAMOTIDINE 20 MILLIGRAM(S): 10 INJECTION INTRAVENOUS at 01:58

## 2024-02-11 RX ADMIN — Medication 1: at 11:02

## 2024-02-11 NOTE — ED ADULT NURSE REASSESSMENT NOTE - NS ED NURSE REASSESS COMMENT FT1
Patient received at 0700. awake. A&ox3. c/o abdominal pain. analgesics ordered. Patient admitted. a/w bed.

## 2024-02-11 NOTE — H&P ADULT - PROBLEM SELECTOR PLAN 2
Hx as above  Likely secondary, but not limited to Gastritis, PUD, or GERD.   CT ab/pelvis showed Mild wall thickening of the rectosigmoid colon, correlate for proctocolitis. s/p recent colonoscopy showing polyps. Patient not having any lower GI symptoms.   CT ab/pelvis showed Mild diffuse gastric wall thickening. Patient with symptoms related to this findings   NP Bar Vital consulted.   Advance diet   PPI  H Pylori test

## 2024-02-11 NOTE — H&P ADULT - ASSESSMENT
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 Admitted to telemetry for near syncope and abdominal pain evaluation and management.     Patient with different MRNs  [id number]   [id number]

## 2024-02-11 NOTE — ED PROVIDER NOTE - CLINICAL SUMMARY MEDICAL DECISION MAKING FREE TEXT BOX
Patient with abdominal pain and vomiting and episode of fainting in the setting of hypotension.  Patient initially felt somewhat better and was considering discharge but then pain recurred.  His CAT scan reveals possible gastritis.  Will give Protonix pain control and admit..No focal neurological deficits etiology of syncope likely due to vagal episode..

## 2024-02-11 NOTE — ED PROVIDER NOTE - OBJECTIVE STATEMENT
Patient with kidney cancer s/p resection several years ago presents with abdominal pain for the last several days.  She was at [facility] several days ago for similar symptoms and had ultrasound which was unremarkable and blood work was unremarkable and she was sent home with Protonix.  She was feeling okay but then the pain became severe today she vomited once and then fainted.  EMS arrived found her blood pressure to be 50/30.  They initiated IV fluids and brought patient to the ER.  Denies any chest pain any black or bloody stools.

## 2024-02-11 NOTE — H&P ADULT - NSICDXPASTMEDICALHX_GEN_ALL_CORE_FT
PAST MEDICAL HISTORY:  Hemangioma     HLD (hyperlipidemia)     HTN (hypertension)     Hyperkalemia     Hypothyroidism     Lung nodules     Menetrier disease     Renal cell carcinoma

## 2024-02-11 NOTE — H&P ADULT - NSHPPHYSICALEXAM_GEN_ALL_CORE
PHYSICAL EXAMINATION:  GENERAL: NAD, lying comfortable in bed   HEAD:  Atraumatic, Normocephalic  EYES:  Conjunctiva and sclera clear, pupils are equal, round, and reactive to light and accommodation.  NECK: Supple, No JVD, trachea is midline, no evidence of thyroid enlargement, no lymphadenopathy or tenderness.  CHEST/LUNG: Clear to auscultation; No rales, rhonchi, wheezing, or rubs  HEART: Regular rate and rhythm; No murmurs, rubs, or gallops  ABDOMEN: Soft, mild epigastric tenderness to deep palpation w/o guarding or rebound, Nondistended; Bowel sounds present  NERVOUS SYSTEM:  Alert & Oriented X3; No focal sensory or motor deficits are noted; Recent and remote memory is intact; Appropriate mood and affect.  EXTREMITIES:  2+ Peripheral Pulses, No clubbing, cyanosis, or edema  SKIN: Warm, dry, and well perfused; Good turgor; No lesions, nodules or rashes are noted.     Vital Signs Last 24 Hrs  T(C): 36.5 (11 Feb 2024 12:30), Max: 36.8 (11 Feb 2024 00:57)  T(F): 97.7 (11 Feb 2024 12:30), Max: 98.3 (11 Feb 2024 00:57)  HR: 64 (11 Feb 2024 12:30) (64 - 90)  BP: 114/53 (11 Feb 2024 12:30) (100/69 - 149/100)  BP(mean): --  RR: 18 (11 Feb 2024 12:30) (18 - 20)  SpO2: 97% (11 Feb 2024 12:30) (97% - 97%)    Parameters below as of 11 Feb 2024 06:06  Patient On (Oxygen Delivery Method): room air

## 2024-02-11 NOTE — H&P ADULT - PROBLEM SELECTOR PLAN 3
Rosuvastatin not available   Started on Atorvastatin   F/U Lipid panel  DASH/TLC diet  Patient needs education for lifestyle modifications  Limiting saturated fat and trans fat intake and increasing fresh fruit and vegetable intake are recommended  Encourage 30-60 minutes of moderate-intensity aerobic activity (brisk walking) on most days and resistance training 2 days/week  Weight management recommended with goals of body mass index (BMI) 18.5-24.9 kg/m2 and waist circumference < 40 inches (102 cm) for men, < 35 inches (89 cm) for women

## 2024-02-11 NOTE — H&P ADULT - PROBLEM SELECTOR PLAN 4
BP running below 130 now   Noted to have BP of 50/30 mmHg at home during near syncope episode and emesis. Unclear. Will hold medication for the next 24 hours.   Morning team to resume home medications as tolerated   BP target <130/90 mmHg  DASH/TLC diet

## 2024-02-11 NOTE — ED PROVIDER NOTE - PHYSICAL EXAMINATION
Heart regular lungs clear abdomen soft with epigastric tenderness to palpation awake alert oriented x 3.

## 2024-02-11 NOTE — H&P ADULT - PROBLEM SELECTOR PLAN 1
Likely orthostatic vs vasovagal. Hx as above.   F/U Orthostatic vitals  PT consult  EKG showed NSR. Normal troponin levels  No evidence of acute intracranial pathology on CT  Remote tele  Vitals q4hr  IVF  Fall precautions

## 2024-02-11 NOTE — H&P ADULT - ATTENDING COMMENTS
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    The abdominal pain, mainly epigastric, started about 10d ago and would be present throughout the day continuously but initially at a lower level. However int he past 3 days the pain had acutely worsened with accompanying nausea and vomiting, along with chills Denies any blood in the vomit, only food contents. Did not have any fever, chills, chest pain, sob, diarrhea or dysuria. She had also noted that at times the abdominal pain with accompanied by a burning in midchest/esophagus, but noticed that after eating that burning sensation would lessen. She had also noted that in the past few days her BP had been running lower than her baseline. The night prior to admission, patient was feeling nausea, and went to vomit, but felt lightheaded and sat on the floor. Denies loc or headstrike. During this time daughter was speaking to her and the patient did not respond because she was afraid she would vomit, but recalls the entire event. Her BP was also checked at that time and was noted to be low with SBP in 50s. Pt lives home with  and one of her daughters, prior smoker.     Recent visit reviewed: she had visit at [facility] 2/[facility]/24 - she presented with  3 days of epigastric pain with some radiation up to her chest that feels like a burning sensation and sometimes radiating to the lower abdomen without radiation to the back, with intermittent nausea.  Patient states eating makes her pain better.  Patient with relief briefly with Pepcid and Mylanta.  Discharged on ppi.     Labs, vitals and imaging reviewed. Afebrile on arrival, BP in 110s, HR in 60s, O2 sats on RA 97%. Labs notable for wbc 12->15, Hgb 13->11.8 (note pt got fluids 1L), lipase wnl, Cr, K wnl, LFTs wnl. CTH - No CT evidence of acute intracranial pathology.   CTa/p with Mild wall thickening of the rectosigmoid colon, correlate for proctocolitis. Mild diffuse gastric wall thickening, correlate for gastritis.  Multiple pleural-based nodules within the visualized bilateral lower  lobes.   Physical exam: AOx3, NAD, NC/AT, no signs of head trauma, RRR, lungs CTA b/l, epigastric ttp, bowel sounds+, no LE edema noted      A/P   #Abdominal Pain   #Nausea/Vomiting   #Gastritis   #?Possible duodenal ulcer   #Near syncope vs possible vasovagal episode  #DM   #HTN   #Hypothyroidism   #RCC s/p R nephrectomy   #Hyperkalemia (on daily lokelma)   #Leukocytosis   #Multiple pleural-based nodules   #Meniere's disease    -monitor on tele   -orthostatics   -check tsh, am cortisol   -check RVP  -trend cbc, monitor off abx for now   -trial of ppi    -maalox prn   -may add sulcralfate if not improving   -zofran prn for nausea, monitor qtc  -diet as tolerated for now, IVF if poor po  -GI consult   -hpylori stool ag   -avoid nsaids/asa   -hold home amlodipine d/t concerns for hypotension at home   -monitor FS/ISS, f/u a1c   -c/w home thyroid medications, lokelma, statin, meclizine  - Multiple pleural-based nodules and since patient has history of prior malignancy, findings are concerning for metastatic disease, would need further evaluation with PET/CT and/or tissue sampling, likely outpatient follow up   -dvt ppx

## 2024-02-11 NOTE — H&P ADULT - PROBLEM SELECTOR PLAN 5
Hold PO anti glycemic medications  Will start ISS  Finger stick before meal and bedtime   F/U HA1c  Diabetic diet.

## 2024-02-11 NOTE — ED ADULT NURSE NOTE - NSFALLRISKFACTORS_ED_ALL_ED
Bone Condition: Including osteoporosis, prolonged steroid use or metastatic bone disease/cancer/Coagulation: Bleeding disorder either through use of anticoagulants or underlying clinical condition(s)/Surgery: Recent surgery, recent lower limb amputation, major abdominal or thoracic surgery

## 2024-02-11 NOTE — ED PROVIDER NOTE - CARE PLAN
Principal Discharge DX:	Syncope  Secondary Diagnosis:	Intractable abdominal pain  Secondary Diagnosis:	Gastritis   1

## 2024-02-11 NOTE — H&P ADULT - HISTORY OF PRESENT ILLNESS
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 Pain does not radiate, slowly progressive, dull, 7/10 in intensity, intermittent, and triggered moderate to severe acid reflux and associated nausea, postprandial fullness, early satiety w/o anorexia. Denies fever, chills, diarrhea, constipation, or rectal pain. Patient was at [facility] 2 days ago for this matter and at that time there was suspicion for gastritis, possible ulcer and GERD, but low concerns for ACS. Last night, patient developed nausea and prior to an episode of emesis, she felt lightheadedness and called her daughter. Patient remembers everything and recalls hearing her daughter asking questions, but she did not want to talk due to fear of vomiting. No LOC or head trauma. Denies any chest pain, dyspnea, palpitations, chills, numbness, headache, visual spots, hearing/taste/smell changes, or weakness. No witnessed tonic clonic movements, tongue biting, urinary or stool incontinence. She does not have any other complaints.   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 Pain does not radiate, slowly progressive, dull, 7/10 in intensity, intermittent, and triggered moderate to severe acid reflux and associated nausea, postprandial fullness, early satiety w/o anorexia. Denies hematochezia, melena, fever, chills, diarrhea, constipation, or rectal pain. Patient was at [facility] 2 days ago for this matter and at that time there was suspicion for gastritis, possible ulcer and GERD, but low concerns for ACS. Last night, patient developed nausea and prior to an episode of emesis, she felt lightheadedness and called her daughter. Patient remembers everything and recalls hearing her daughter asking questions, but she did not want to talk due to fear of vomiting. Denies hematemesis. No LOC or head trauma. Denies any chest pain, dyspnea, palpitations, chills, numbness, headache, visual spots, hearing/taste/smell changes, or weakness. No witnessed tonic clonic movements, tongue biting, urinary or stool incontinence. She does not have any other complaints.      Patient with different MRNs  [id number]   [id number]

## 2024-02-11 NOTE — ED ADULT NURSE NOTE - NSFALLHARMRISKINTERV_ED_ALL_ED
Assistance OOB with selected safe patient handling equipment if applicable/Communicate risk of Fall with Harm to all staff, patient, and family/Orthostatic vital signs/Provide visual cue: red socks, yellow wristband, yellow gown, etc/Reinforce activity limits and safety measures with patient and family/Bed in lowest position, wheels locked, appropriate side rails in place/Call bell, personal items and telephone in reach/Instruct patient to call for assistance before getting out of bed/chair/stretcher/Non-slip footwear applied when patient is off stretcher/[place] to call system/Physically safe environment - no spills, clutter or unnecessary equipment/Purposeful Proactive Rounding/Room/bathroom lighting operational, light cord in reach

## 2024-02-12 PROBLEM — C64.9 MALIGNANT NEOPLASM OF UNSPECIFIED KIDNEY, EXCEPT RENAL PELVIS: Chronic | Status: ACTIVE | Noted: 2024-02-09

## 2024-02-12 LAB
A1C WITH ESTIMATED AVERAGE GLUCOSE RESULT: 7.1 % — HIGH (ref 4–5.6)
ALBUMIN SERPL ELPH-MCNC: 3.2 G/DL — LOW (ref 3.5–5)
ALP SERPL-CCNC: 56 U/L — SIGNIFICANT CHANGE UP (ref 40–120)
ALT FLD-CCNC: 22 U/L DA — SIGNIFICANT CHANGE UP (ref 10–60)
ANION GAP SERPL CALC-SCNC: 3 MMOL/L — LOW (ref 5–17)
APTT BLD: 33.5 SEC — SIGNIFICANT CHANGE UP (ref 24.5–35.6)
AST SERPL-CCNC: 17 U/L — SIGNIFICANT CHANGE UP (ref 10–40)
BASOPHILS # BLD AUTO: 0.04 K/UL — SIGNIFICANT CHANGE UP (ref 0–0.2)
BASOPHILS NFR BLD AUTO: 0.4 % — SIGNIFICANT CHANGE UP (ref 0–2)
BILIRUB SERPL-MCNC: 0.5 MG/DL — SIGNIFICANT CHANGE UP (ref 0.2–1.2)
BUN SERPL-MCNC: 17 MG/DL — SIGNIFICANT CHANGE UP (ref 7–18)
CALCIUM SERPL-MCNC: 8.7 MG/DL — SIGNIFICANT CHANGE UP (ref 8.4–10.5)
CHLORIDE SERPL-SCNC: 112 MMOL/L — HIGH (ref 96–108)
CHOLEST SERPL-MCNC: 125 MG/DL — SIGNIFICANT CHANGE UP
CO2 SERPL-SCNC: 26 MMOL/L — SIGNIFICANT CHANGE UP (ref 22–31)
CORTIS AM PEAK SERPL-MCNC: 14.4 UG/DL — SIGNIFICANT CHANGE UP (ref 6–18.4)
CREAT SERPL-MCNC: 1.01 MG/DL — SIGNIFICANT CHANGE UP (ref 0.5–1.3)
CULTURE RESULTS: SIGNIFICANT CHANGE UP
EGFR: 59 ML/MIN/1.73M2 — LOW
EOSINOPHIL # BLD AUTO: 0.42 K/UL — SIGNIFICANT CHANGE UP (ref 0–0.5)
EOSINOPHIL NFR BLD AUTO: 4 % — SIGNIFICANT CHANGE UP (ref 0–6)
ESTIMATED AVERAGE GLUCOSE: 157 MG/DL — HIGH (ref 68–114)
GLUCOSE BLDC GLUCOMTR-MCNC: 129 MG/DL — HIGH (ref 70–99)
GLUCOSE BLDC GLUCOMTR-MCNC: 132 MG/DL — HIGH (ref 70–99)
GLUCOSE BLDC GLUCOMTR-MCNC: 151 MG/DL — HIGH (ref 70–99)
GLUCOSE BLDC GLUCOMTR-MCNC: 273 MG/DL — HIGH (ref 70–99)
GLUCOSE SERPL-MCNC: 141 MG/DL — HIGH (ref 70–99)
HCT VFR BLD CALC: 39.1 % — SIGNIFICANT CHANGE UP (ref 34.5–45)
HCV AB S/CO SERPL IA: 0.1 S/CO — SIGNIFICANT CHANGE UP (ref 0–0.99)
HCV AB SERPL-IMP: SIGNIFICANT CHANGE UP
HDLC SERPL-MCNC: 56 MG/DL — SIGNIFICANT CHANGE UP
HGB BLD-MCNC: 12.3 G/DL — SIGNIFICANT CHANGE UP (ref 11.5–15.5)
IMM GRANULOCYTES NFR BLD AUTO: 0.6 % — SIGNIFICANT CHANGE UP (ref 0–0.9)
INR BLD: 0.96 RATIO — SIGNIFICANT CHANGE UP (ref 0.85–1.18)
LIPID PNL WITH DIRECT LDL SERPL: 37 MG/DL — SIGNIFICANT CHANGE UP
LYMPHOCYTES # BLD AUTO: 3.3 K/UL — SIGNIFICANT CHANGE UP (ref 1–3.3)
LYMPHOCYTES # BLD AUTO: 31.5 % — SIGNIFICANT CHANGE UP (ref 13–44)
MAGNESIUM SERPL-MCNC: 2.4 MG/DL — SIGNIFICANT CHANGE UP (ref 1.6–2.6)
MCHC RBC-ENTMCNC: 27.1 PG — SIGNIFICANT CHANGE UP (ref 27–34)
MCHC RBC-ENTMCNC: 31.5 GM/DL — LOW (ref 32–36)
MCV RBC AUTO: 86.1 FL — SIGNIFICANT CHANGE UP (ref 80–100)
MONOCYTES # BLD AUTO: 0.75 K/UL — SIGNIFICANT CHANGE UP (ref 0–0.9)
MONOCYTES NFR BLD AUTO: 7.2 % — SIGNIFICANT CHANGE UP (ref 2–14)
NEUTROPHILS # BLD AUTO: 5.91 K/UL — SIGNIFICANT CHANGE UP (ref 1.8–7.4)
NEUTROPHILS NFR BLD AUTO: 56.3 % — SIGNIFICANT CHANGE UP (ref 43–77)
NON HDL CHOLESTEROL: 69 MG/DL — SIGNIFICANT CHANGE UP
NRBC # BLD: 0 /100 WBCS — SIGNIFICANT CHANGE UP (ref 0–0)
PHOSPHATE SERPL-MCNC: 2.3 MG/DL — LOW (ref 2.5–4.5)
PLATELET # BLD AUTO: 265 K/UL — SIGNIFICANT CHANGE UP (ref 150–400)
POTASSIUM SERPL-MCNC: 4.9 MMOL/L — SIGNIFICANT CHANGE UP (ref 3.5–5.3)
POTASSIUM SERPL-SCNC: 4.9 MMOL/L — SIGNIFICANT CHANGE UP (ref 3.5–5.3)
PROT SERPL-MCNC: 6.6 G/DL — SIGNIFICANT CHANGE UP (ref 6–8.3)
PROTHROM AB SERPL-ACNC: 11 SEC — SIGNIFICANT CHANGE UP (ref 9.5–13)
RBC # BLD: 4.54 M/UL — SIGNIFICANT CHANGE UP (ref 3.8–5.2)
RBC # FLD: 14.1 % — SIGNIFICANT CHANGE UP (ref 10.3–14.5)
SODIUM SERPL-SCNC: 141 MMOL/L — SIGNIFICANT CHANGE UP (ref 135–145)
SPECIMEN SOURCE: SIGNIFICANT CHANGE UP
TRIGL SERPL-MCNC: 161 MG/DL — HIGH
WBC # BLD: 10.48 K/UL — SIGNIFICANT CHANGE UP (ref 3.8–10.5)
WBC # FLD AUTO: 10.48 K/UL — SIGNIFICANT CHANGE UP (ref 3.8–10.5)

## 2024-02-12 PROCEDURE — 99222 1ST HOSP IP/OBS MODERATE 55: CPT

## 2024-02-12 PROCEDURE — 99232 SBSQ HOSP IP/OBS MODERATE 35: CPT | Mod: FS

## 2024-02-12 RX ORDER — SITAGLIPTIN 50 MG/1
1 TABLET, FILM COATED ORAL
Refills: 0 | DISCHARGE

## 2024-02-12 RX ORDER — METFORMIN HYDROCHLORIDE 850 MG/1
1 TABLET ORAL
Refills: 0 | DISCHARGE

## 2024-02-12 RX ORDER — ROSUVASTATIN CALCIUM 5 MG/1
1 TABLET ORAL
Refills: 0 | DISCHARGE

## 2024-02-12 RX ORDER — SODIUM ZIRCONIUM CYCLOSILICATE 10 G/10G
10 POWDER, FOR SUSPENSION ORAL
Refills: 0 | DISCHARGE

## 2024-02-12 RX ORDER — LEVOTHYROXINE SODIUM 125 MCG
1 TABLET ORAL
Refills: 0 | DISCHARGE

## 2024-02-12 RX ORDER — MECLIZINE HCL 12.5 MG
1 TABLET ORAL
Refills: 0 | DISCHARGE

## 2024-02-12 RX ADMIN — Medication 75 MICROGRAM(S): at 05:25

## 2024-02-12 RX ADMIN — Medication 3: at 21:48

## 2024-02-12 RX ADMIN — SODIUM CHLORIDE 75 MILLILITER(S): 9 INJECTION, SOLUTION INTRAVENOUS at 02:08

## 2024-02-12 RX ADMIN — Medication 1: at 11:27

## 2024-02-12 RX ADMIN — ENOXAPARIN SODIUM 40 MILLIGRAM(S): 100 INJECTION SUBCUTANEOUS at 21:47

## 2024-02-12 RX ADMIN — PANTOPRAZOLE SODIUM 40 MILLIGRAM(S): 20 TABLET, DELAYED RELEASE ORAL at 11:26

## 2024-02-12 RX ADMIN — SODIUM ZIRCONIUM CYCLOSILICATE 10 GRAM(S): 10 POWDER, FOR SUSPENSION ORAL at 11:26

## 2024-02-12 RX ADMIN — ATORVASTATIN CALCIUM 40 MILLIGRAM(S): 80 TABLET, FILM COATED ORAL at 21:47

## 2024-02-12 NOTE — PROGRESS NOTE ADULT - ASSESSMENT
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 Admitted to Telemetry for Near syncope and Abdominal pain crum.       Patient has different MRNs: [id number] & [id number]

## 2024-02-12 NOTE — PROGRESS NOTE ADULT - PROBLEM SELECTOR PLAN 4
- P/w Hypotension treated w/ IVF  - BP stable not on BP meds  - See above   - Continue to monitor BP and adjust meds accordingly

## 2024-02-12 NOTE — CONSULT NOTE ADULT - ASSESSMENT
Patient is a 73F with a PMHx of RCC s/p R nephrectomy and IVC tumor thrombectomy with bovine pericardial patch venoplasty (11/22/19), osteoporosis, HTN, DM, HLD, hypothyroid, H. pylori (treated), ventral hernia repair with mesh (6/12/22), diverticulosis c/b diverticulitis (2021), vertigo 2/2 Menieres disease, known lung nodules (bx neg, 2019), who presented to the ED with epigastric pain. GI was consulted for r/o peptic ulcer on abnormal CT.    #Abdominal pain  #Nausea  #Gastritis  #Proctocolitis  #Diverticulosis  #Hx of diverticulitis (2021)  #Hx of H. Pylori infection (treated and eradicated)  Patient presented with abdominal pain and reflux symptoms, found to have gastritis and proctocolitis on imaging. Recent colonoscopy 2 weeks ago notable for polyps. Differentials include gastropathy vs GERD due to hiatal hernia or scleroderma vs ulcer vs functional dyspepsia vs IBS vs malignancy vs other. Recommend conservative management, high dose PPI, stool h. pylori Ag, and close outpatient follow up with primary GI.     	- IV Pantoprazole 40mg BID, OK to transition to PO upon discharge  	- PRN IV anti-emetic  	- Diet as tolerated, smaller frequent meals  	- Avoid PO intake at least 3 hours before lying down to sleep  	- Antacids for symptomatic relief e.g. Maalox, Tums, etc.  	- GERD diet  	- Avoid NSAIDs  	- Stool H. pylori Ag, treat if positive  	- Close outpatient follow up with primary GI    This note and its recommendations herein are preliminary until such time as cosigned by an attending.    Thank you for this consult!

## 2024-02-12 NOTE — PROGRESS NOTE ADULT - SUBJECTIVE AND OBJECTIVE BOX
NP Note discussed with  primary attending    Patient is a 73y old  Female who presents with a chief complaint of Near syncope and Abdominal pain (11 Feb 2024 09:59)      INTERVAL HPI/OVERNIGHT EVENTS: Pt seen w/ dtr-Mirena translating.  Dtr and pt deny abdominal pain at this time but report epigastric abdominal pain is "on and off", intermittent.  Denies Encinas, dizziness, spinning sensation, lightheadedness, SOB, or CP.   Reports LBM today, formed.      MEDICATIONS  (STANDING):  atorvastatin 40 milliGRAM(s) Oral at bedtime  dextrose 5%. 1000 milliLiter(s) (50 mL/Hr) IV Continuous <Continuous>  dextrose 5%. 1000 milliLiter(s) (100 mL/Hr) IV Continuous <Continuous>  dextrose 50% Injectable 25 Gram(s) IV Push once  dextrose 50% Injectable 25 Gram(s) IV Push once  dextrose 50% Injectable 12.5 Gram(s) IV Push once  enoxaparin Injectable 40 milliGRAM(s) SubCutaneous every 24 hours  glucagon  Injectable 1 milliGRAM(s) IntraMuscular once  insulin lispro (ADMELOG) corrective regimen sliding scale   SubCutaneous Before meals and at bedtime  lactated ringers. 1000 milliLiter(s) (75 mL/Hr) IV Continuous <Continuous>  levothyroxine 75 MICROGram(s) Oral daily  pantoprazole  Injectable 40 milliGRAM(s) IV Push daily  sodium zirconium cyclosilicate 10 Gram(s) Oral daily    MEDICATIONS  (PRN):  acetaminophen     Tablet .. 650 milliGRAM(s) Oral every 6 hours PRN Temp greater or equal to 38C (100.4F), Mild Pain (1 - 3)  aluminum hydroxide/magnesium hydroxide/simethicone Suspension 30 milliLiter(s) Oral every 4 hours PRN Dyspepsia  dextrose Oral Gel 15 Gram(s) Oral once PRN Blood Glucose LESS THAN 70 milliGRAM(s)/deciliter  ondansetron Injectable 4 milliGRAM(s) IV Push every 8 hours PRN Nausea and/or Vomiting      __________________________________________________  REVIEW OF SYSTEMS:    CONSTITUTIONAL: No fever  EYES: No acute visual disturbances  NECK: No pain or stiffness  RESPIRATORY: No cough; No shortness of breath  CARDIOVASCULAR: No chest pain, no palpitations  GASTROINTESTINAL: Intermittent epigastric abdominal pain. No nausea or vomiting.  No diarrhea   NEUROLOGICAL: No headache or numbness, no tremors  MUSCULOSKELETAL: No joint pain, no muscle pain  GENITOURINARY: No dysuria, no frequency, no hesitancy  PSYCHIATRY: No depression, no anxiety  ALL OTHER  ROS negative        Vital Signs Last 24 Hrs  T(C): 36.7 (12 Feb 2024 11:07), Max: 36.9 (11 Feb 2024 19:44)  T(F): 98.1 (12 Feb 2024 11:07), Max: 98.4 (11 Feb 2024 19:44)  HR: 62 (12 Feb 2024 11:07) (60 - 72)  BP: 135/67 (12 Feb 2024 11:07) (120/67 - 153/69)  RR: 18 (12 Feb 2024 11:07) (16 - 18)  SpO2: 98% (12 Feb 2024 11:07) (95% - 98%)    Parameters below as of 12 Feb 2024 11:07  Patient On (Oxygen Delivery Method): room air        ________________________________________________  PHYSICAL EXAM:  GENERAL: NAD  HEENT: Normocephalic;  conjunctivae and sclerae clear; moist mucous membranes.  (+) L. facial weakness, and left eye blindness.    NECK : Supple  CHEST/LUNG: Clear to auscultation bilaterally with good air entry   HEART: S1 S2  regular; no murmurs, gallops or rubs  ABDOMEN: Soft, Nontender, Nondistended; Bowel sounds present x 4 quad  EXTREMITIES: No cyanosis; no edema; no calf tenderness  SKIN: Warm and dry; no rash  NERVOUS SYSTEM:  Awake and alert; Oriented to place and person, not time; no new deficits    _________________________________________________  LABS:                        12.3   10.48 )-----------( 265      ( 12 Feb 2024 06:06 )             39.1     02-12    141  |  112<H>  |  17  ----------------------------<  141<H>  4.9   |  26  |  1.01    Ca    8.7      12 Feb 2024 06:06  Phos  2.3     02-12  Mg     2.4     02-12    TPro  6.6  /  Alb  3.2<L>  /  TBili  0.5  /  DBili  x   /  AST  17  /  ALT  22  /  AlkPhos  56  02-12    PT/INR - ( 12 Feb 2024 06:06 )   PT: 11.0 sec;   INR: 0.96 ratio         PTT - ( 12 Feb 2024 06:06 )  PTT:33.5 sec  Urinalysis Basic - ( 12 Feb 2024 06:06 )    Color: x / Appearance: x / SG: x / pH: x  Gluc: 141 mg/dL / Ketone: x  / Bili: x / Urobili: x   Blood: x / Protein: x / Nitrite: x   Leuk Esterase: x / RBC: x / WBC x   Sq Epi: x / Non Sq Epi: x / Bacteria: x      CAPILLARY BLOOD GLUCOSE      POCT Blood Glucose.: 151 mg/dL (12 Feb 2024 11:25)  POCT Blood Glucose.: 129 mg/dL (12 Feb 2024 08:06)  POCT Blood Glucose.: 134 mg/dL (11 Feb 2024 23:15)  POCT Blood Glucose.: 141 mg/dL (11 Feb 2024 16:45)        RADIOLOGY & ADDITIONAL TESTS:    Imaging Personally Reviewed:  YES    Consultant(s) Notes Reviewed:   YES    Care Discussed with Consultants :     Plan of care was discussed with patient and /or primary care giver; all questions and concerns were addressed and care was aligned with patient's wishes.

## 2024-02-12 NOTE — PROGRESS NOTE ADULT - NS ATTEND AMEND GEN_ALL_CORE FT
Patient was seen and examined with daughter at bedside. Reports abd pain has improved as well as nausea    ICU Vital Signs Last 24 Hrs  T(C): 36.7 (12 Feb 2024 21:01), Max: 36.9 (12 Feb 2024 17:50)  T(F): 98.1 (12 Feb 2024 21:01), Max: 98.4 (12 Feb 2024 17:50)  HR: 71 (12 Feb 2024 21:01) (62 - 71)  BP: 129/63 (12 Feb 2024 21:01) (125/72 - 145/71)  BP(mean): 81 (12 Feb 2024 15:52) (81 - 81)  ABP: --  ABP(mean): --  RR: 18 (12 Feb 2024 21:01) (17 - 18)  SpO2: 96% (12 Feb 2024 21:01) (95% - 98%)    O2 Parameters below as of 12 Feb 2024 21:01  Patient On (Oxygen Delivery Method): room air      P/E:  NAD  AAOx3, no focal deficit   CTABL  S1S2 WNL  Abd soft, mild epigastric tenderness on deep palpation   BLLE no edema or calf tenderness     Labs noted     A/P:  Cont PPI. GI consult noted. Cont to monitor CBC daily. 11 days ago patient had a colonoscopy, found to have a polyp as per daughter, unknown if was benign or malignant. Abd pain improved without any abx, will hold off abx, will clinically monitor for s/s of colitis.   CT findings noted. will discuss with patient's daughter who is an NP about further work up for malignancy. Patient was seen and examined with daughter at bedside. Reports abd pain has improved as well as nausea    ICU Vital Signs Last 24 Hrs  T(C): 36.7 (12 Feb 2024 21:01), Max: 36.9 (12 Feb 2024 17:50)  T(F): 98.1 (12 Feb 2024 21:01), Max: 98.4 (12 Feb 2024 17:50)  HR: 71 (12 Feb 2024 21:01) (62 - 71)  BP: 129/63 (12 Feb 2024 21:01) (125/72 - 145/71)  BP(mean): 81 (12 Feb 2024 15:52) (81 - 81)  ABP: --  ABP(mean): --  RR: 18 (12 Feb 2024 21:01) (17 - 18)  SpO2: 96% (12 Feb 2024 21:01) (95% - 98%)    O2 Parameters below as of 12 Feb 2024 21:01  Patient On (Oxygen Delivery Method): room air      P/E:  NAD  AAOx3, no focal deficit   CTABL  S1S2 WNL  Abd soft, mild epigastric tenderness on deep palpation   BLLE no edema or calf tenderness     Labs noted     A/P:  Cont IV PPI. GI consult noted. Cont to monitor CBC daily. 11 days ago patient had a colonoscopy, found to have a polyp as per daughter, unknown if was benign or malignant. Abd pain improved without any abx, will hold off abx, will clinically monitor for s/s of colitis. Hold off Lokelma now. Monitor K in BMP.  CT findings noted. will discuss with patient's daughter who is an NP about further work up for malignancy. Patient was seen and examined with daughter at bedside. Reports abd pain has improved as well as nausea    ICU Vital Signs Last 24 Hrs  T(C): 36.7 (12 Feb 2024 21:01), Max: 36.9 (12 Feb 2024 17:50)  T(F): 98.1 (12 Feb 2024 21:01), Max: 98.4 (12 Feb 2024 17:50)  HR: 71 (12 Feb 2024 21:01) (62 - 71)  BP: 129/63 (12 Feb 2024 21:01) (125/72 - 145/71)  BP(mean): 81 (12 Feb 2024 15:52) (81 - 81)  ABP: --  ABP(mean): --  RR: 18 (12 Feb 2024 21:01) (17 - 18)  SpO2: 96% (12 Feb 2024 21:01) (95% - 98%)    O2 Parameters below as of 12 Feb 2024 21:01  Patient On (Oxygen Delivery Method): room air      P/E:  NAD  AAOx3, no focal deficit   CTABL  S1S2 WNL  Abd soft, mild epigastric tenderness on deep palpation   BLLE no edema or calf tenderness     Labs noted     A/P:  Cont IV PPI. GI consult noted. Cont to monitor CBC daily. 11 days ago patient had a colonoscopy, found to have a polyp as per daughter, unknown if was benign or malignant. Abd pain improved without any abx, will hold off abx, will clinically monitor for s/s of colitis. Hold off Lokelma now. Monitor K in BMP.  BP stable   CT findings noted. will discuss with patient's daughter who is an NP about further work up for malignancy.  TTE  Cont tele monitoring

## 2024-02-12 NOTE — CONSULT NOTE ADULT - SUBJECTIVE AND OBJECTIVE BOX
INITIAL GI CONSULTATION    Patient is a 73y old  Female who presents with a chief complaint of Near syncope and Abdominal pain (11 Feb 2024 09:59)    GI HPI:  Patient is a 73F with a PMHx of RCC s/p R nephrectomy and IVC tumor thrombectomy with bovine pericardial patch venoplasty (11/22/19), osteoporosis, HTN, DM, HLD, hypothyroid, H. pylori (treated), ventral hernia repair with mesh (6/12/22), diverticulosis c/b diverticulitis (2021), vertigo 2/2 Menieres disease, known lung nodules (bx neg, 2019), who presented to the ED with epigastric pain. GI was consulted for r/o peptic ulcer on abnormal CT.    Patient's daughter at bedside to provide Tamazight interpretation, declined .  Patient has had a burning sensation that is up and down her throat associated with abdominal pain, chest pain, that subsequently became diffuse abdominal pain for the past 10 days, unable to identify trigger. Her daughter is an ED nurse practitioner, so she provided mylanta and pepcid at home with some relief. She initially presented to [facility] ED on 2/9 for similar symptoms, received GI cocktail with relief, low c/f ACS, EKG non-ischemic, lipase 66, discharged for OP follow up.   At this time, she continues to endorse abdominal pain with intermittent chest discomfort that she attributes to severe reflux, otherwise denies vomiting, headache, vision changes, dysphagia, odynophagia, urinary symptoms including dysuria and hematuria, hematochezia, and melena. Last BM x 1 yesterday, soft-formed.     In the ED, labs notable for BUN 23, Cr 1.19, LFTs wnl, lipase 45, WBC 12.29 -> 15.06. CTAP noncon - proctocolitis and gastritis.     Patient with different MRNs  [id number]   [id number]         PMH/PSH:  PAST MEDICAL & SURGICAL HISTORY:  HTN (hypertension)      HLD (hyperlipidemia)      Renal cell carcinoma      Menetrier disease      Hemangioma      Hypothyroidism      Hyperkalemia      Lung nodules      S/p nephrectomy            FH:  FAMILY HISTORY:  Family history unknown          MEDS:  MEDICATIONS  (STANDING):  atorvastatin 40 milliGRAM(s) Oral at bedtime  dextrose 5%. 1000 milliLiter(s) (50 mL/Hr) IV Continuous <Continuous>  dextrose 5%. 1000 milliLiter(s) (100 mL/Hr) IV Continuous <Continuous>  dextrose 50% Injectable 25 Gram(s) IV Push once  dextrose 50% Injectable 25 Gram(s) IV Push once  dextrose 50% Injectable 12.5 Gram(s) IV Push once  enoxaparin Injectable 40 milliGRAM(s) SubCutaneous every 24 hours  glucagon  Injectable 1 milliGRAM(s) IntraMuscular once  insulin lispro (ADMELOG) corrective regimen sliding scale   SubCutaneous Before meals and at bedtime  lactated ringers. 1000 milliLiter(s) (75 mL/Hr) IV Continuous <Continuous>  levothyroxine 75 MICROGram(s) Oral daily  pantoprazole  Injectable 40 milliGRAM(s) IV Push daily  sodium zirconium cyclosilicate 10 Gram(s) Oral daily    MEDICATIONS  (PRN):  acetaminophen     Tablet .. 650 milliGRAM(s) Oral every 6 hours PRN Temp greater or equal to 38C (100.4F), Mild Pain (1 - 3)  aluminum hydroxide/magnesium hydroxide/simethicone Suspension 30 milliLiter(s) Oral every 4 hours PRN Dyspepsia  dextrose Oral Gel 15 Gram(s) Oral once PRN Blood Glucose LESS THAN 70 milliGRAM(s)/deciliter  ondansetron Injectable 4 milliGRAM(s) IV Push every 8 hours PRN Nausea and/or Vomiting    Allergies  No Known Allergies  Intolerances      ROS: A detailed set of ROS were asked and negative except those outlined in GI HPI.  ______________________________________________________________________  PHYSICAL EXAM:  T(C): 36.7 (02-12-24 @ 11:07), Max: 36.9 (02-11-24 @ 19:44)  HR: 62 (02-12-24 @ 11:07)  BP: 135/67 (02-12-24 @ 11:07)  RR: 18 (02-12-24 @ 11:07)  SpO2: 98% (02-12-24 @ 11:07)  Wt(kg): --      GEN: NAD  HEENT: EOMI, conjunctivae anicteric, neck supple, moist mucous membranes  PULM: LSCTAB, no wheezing, rales, or rhonchi  CV: RRR, no m/r/b  GI: Soft, NT, ND; +BS in all four quadrants, no ascites, no Aden's sign  MSK: DE [place] SANTOS, no edema  NEURO: A&O x 3, no gross deficits  ______________________________________________________________________  LABS:                        12.3   10.48 )-----------( 265      ( 12 Feb 2024 06:06 )             39.1     02-12    141  |  112<H>  |  17  ----------------------------<  141<H>  4.9   |  26  |  1.01    Ca    8.7      12 Feb 2024 06:06  Phos  2.3     02-12  Mg     2.4     02-12    TPro  6.6  /  Alb  3.2<L>  /  TBili  0.5  /  DBili  x   /  AST  17  /  ALT  22  /  AlkPhos  56  02-12    LIVER FUNCTIONS - ( 12 Feb 2024 06:06 )  Alb: 3.2 g/dL / Pro: 6.6 g/dL / ALK PHOS: 56 U/L / ALT: 22 U/L DA / AST: 17 U/L / GGT: x           PT/INR - ( 12 Feb 2024 06:06 )   PT: 11.0 sec;   INR: 0.96 ratio         PTT - ( 12 Feb 2024 06:06 )  PTT:33.5 sec  ____________________________________________    IMAGING:    CTAP noncontrast (2/11/24):  IMPRESSION:  Mild wall thickening of the rectosigmoid colon, correlate for   proctocolitis.    Mild diffuse gastric wall thickening, correlate for gastritis. Consider   endoscopic evaluation as clinically indicated.    Multiple pleural-based nodules within the visualized bilateral lower   lobes. Patient is status post right nephrectomy. If the patient has   history of prior malignancy, findings are concerning for metastatic   disease. Recommend further evaluation with PET/CT and/or tissue sampling.

## 2024-02-12 NOTE — PROGRESS NOTE ADULT - PROBLEM SELECTOR PLAN 2
- P/w Epigastric abdominal pain,   - CT A/P showed mild wall thickening of the rectosigmoid colon, correlate for proctocolitis. Mild diffuse gastric wall thickening correlate for gastritis.  (+) Multiple b/l lower lobe pleural nodules ? CA vs. Mets.     - C/w Protonix  - H Pylori pending-f/u results   - GI- Dr. Monsalve consult pending-f/u recommendations - P/w Epigastric abdominal pain,   - CT A/P showed mild wall thickening of the rectosigmoid colon, correlate for proctocolitis. Mild diffuse gastric wall thickening correlate for gastritis.  (+) Multiple b/l lower lobe pleural nodules ? CA vs. Mets.     - CT findings noted discussed w/ Attending.  Attending to f/u abx, scans, consult, etc.    - C/w Protonix  - H Pylori pending-f/u results   - GI- Dr. Monsalve consult pending-f/u recommendations

## 2024-02-12 NOTE — PROGRESS NOTE ADULT - PROBLEM SELECTOR PLAN 1
- P/w Near syncope at home-orthostatic vs. vasovagal.   - S/p EKG showed NSR. Troponin negative.   - S/p CT head showed  discrete hypoattenuating focus in the left corona radiata likely represents an old white matter infarct.  - Continue to monitor orthostatic VS  - Maintain fall precautions   - C/w Telemetry  - PT consult pending-f/u recommendations

## 2024-02-13 ENCOUNTER — TRANSCRIPTION ENCOUNTER (OUTPATIENT)
Age: 74
End: 2024-02-13

## 2024-02-13 VITALS
DIASTOLIC BLOOD PRESSURE: 94 MMHG | HEART RATE: 70 BPM | TEMPERATURE: 98 F | OXYGEN SATURATION: 96 % | RESPIRATION RATE: 18 BRPM | SYSTOLIC BLOOD PRESSURE: 129 MMHG

## 2024-02-13 LAB
ANION GAP SERPL CALC-SCNC: 6 MMOL/L — SIGNIFICANT CHANGE UP (ref 5–17)
BUN SERPL-MCNC: 20 MG/DL — HIGH (ref 7–18)
CALCIUM SERPL-MCNC: 9.2 MG/DL — SIGNIFICANT CHANGE UP (ref 8.4–10.5)
CHLORIDE SERPL-SCNC: 109 MMOL/L — HIGH (ref 96–108)
CO2 SERPL-SCNC: 25 MMOL/L — SIGNIFICANT CHANGE UP (ref 22–31)
CREAT SERPL-MCNC: 1.02 MG/DL — SIGNIFICANT CHANGE UP (ref 0.5–1.3)
EGFR: 58 ML/MIN/1.73M2 — LOW
GLUCOSE BLDC GLUCOMTR-MCNC: 138 MG/DL — HIGH (ref 70–99)
GLUCOSE BLDC GLUCOMTR-MCNC: 202 MG/DL — HIGH (ref 70–99)
GLUCOSE SERPL-MCNC: 157 MG/DL — HIGH (ref 70–99)
HCT VFR BLD CALC: 39.4 % — SIGNIFICANT CHANGE UP (ref 34.5–45)
HGB BLD-MCNC: 12.4 G/DL — SIGNIFICANT CHANGE UP (ref 11.5–15.5)
MAGNESIUM SERPL-MCNC: 2.5 MG/DL — SIGNIFICANT CHANGE UP (ref 1.6–2.6)
MCHC RBC-ENTMCNC: 27.1 PG — SIGNIFICANT CHANGE UP (ref 27–34)
MCHC RBC-ENTMCNC: 31.5 GM/DL — LOW (ref 32–36)
MCV RBC AUTO: 86.2 FL — SIGNIFICANT CHANGE UP (ref 80–100)
NRBC # BLD: 0 /100 WBCS — SIGNIFICANT CHANGE UP (ref 0–0)
PHOSPHATE SERPL-MCNC: 2.9 MG/DL — SIGNIFICANT CHANGE UP (ref 2.5–4.5)
PLATELET # BLD AUTO: 277 K/UL — SIGNIFICANT CHANGE UP (ref 150–400)
POTASSIUM SERPL-MCNC: 4.2 MMOL/L — SIGNIFICANT CHANGE UP (ref 3.5–5.3)
POTASSIUM SERPL-SCNC: 4.2 MMOL/L — SIGNIFICANT CHANGE UP (ref 3.5–5.3)
RBC # BLD: 4.57 M/UL — SIGNIFICANT CHANGE UP (ref 3.8–5.2)
RBC # FLD: 14 % — SIGNIFICANT CHANGE UP (ref 10.3–14.5)
SODIUM SERPL-SCNC: 140 MMOL/L — SIGNIFICANT CHANGE UP (ref 135–145)
WBC # BLD: 10.38 K/UL — SIGNIFICANT CHANGE UP (ref 3.8–10.5)
WBC # FLD AUTO: 10.38 K/UL — SIGNIFICANT CHANGE UP (ref 3.8–10.5)

## 2024-02-13 PROCEDURE — 36415 COLL VENOUS BLD VENIPUNCTURE: CPT

## 2024-02-13 PROCEDURE — 86803 HEPATITIS C AB TEST: CPT

## 2024-02-13 PROCEDURE — 83735 ASSAY OF MAGNESIUM: CPT

## 2024-02-13 PROCEDURE — 96374 THER/PROPH/DIAG INJ IV PUSH: CPT

## 2024-02-13 PROCEDURE — 96375 TX/PRO/DX INJ NEW DRUG ADDON: CPT

## 2024-02-13 PROCEDURE — 83690 ASSAY OF LIPASE: CPT

## 2024-02-13 PROCEDURE — 87637 SARSCOV2&INF A&B&RSV AMP PRB: CPT

## 2024-02-13 PROCEDURE — 70450 CT HEAD/BRAIN W/O DYE: CPT | Mod: MA

## 2024-02-13 PROCEDURE — 82533 TOTAL CORTISOL: CPT

## 2024-02-13 PROCEDURE — 85730 THROMBOPLASTIN TIME PARTIAL: CPT

## 2024-02-13 PROCEDURE — 87086 URINE CULTURE/COLONY COUNT: CPT

## 2024-02-13 PROCEDURE — 0225U NFCT DS DNA&RNA 21 SARSCOV2: CPT

## 2024-02-13 PROCEDURE — 82550 ASSAY OF CK (CPK): CPT

## 2024-02-13 PROCEDURE — 80048 BASIC METABOLIC PNL TOTAL CA: CPT

## 2024-02-13 PROCEDURE — 93005 ELECTROCARDIOGRAM TRACING: CPT

## 2024-02-13 PROCEDURE — 83036 HEMOGLOBIN GLYCOSYLATED A1C: CPT

## 2024-02-13 PROCEDURE — 84100 ASSAY OF PHOSPHORUS: CPT

## 2024-02-13 PROCEDURE — 85025 COMPLETE CBC W/AUTO DIFF WBC: CPT

## 2024-02-13 PROCEDURE — 80061 LIPID PANEL: CPT

## 2024-02-13 PROCEDURE — 99239 HOSP IP/OBS DSCHRG MGMT >30: CPT

## 2024-02-13 PROCEDURE — 82962 GLUCOSE BLOOD TEST: CPT

## 2024-02-13 PROCEDURE — T1013: CPT

## 2024-02-13 PROCEDURE — 84443 ASSAY THYROID STIM HORMONE: CPT

## 2024-02-13 PROCEDURE — 74176 CT ABD & PELVIS W/O CONTRAST: CPT | Mod: MA

## 2024-02-13 PROCEDURE — 81003 URINALYSIS AUTO W/O SCOPE: CPT

## 2024-02-13 PROCEDURE — 85045 AUTOMATED RETICULOCYTE COUNT: CPT

## 2024-02-13 PROCEDURE — 85027 COMPLETE CBC AUTOMATED: CPT

## 2024-02-13 PROCEDURE — 80053 COMPREHEN METABOLIC PANEL: CPT

## 2024-02-13 PROCEDURE — 99285 EMERGENCY DEPT VISIT HI MDM: CPT

## 2024-02-13 PROCEDURE — 85610 PROTHROMBIN TIME: CPT

## 2024-02-13 PROCEDURE — 84484 ASSAY OF TROPONIN QUANT: CPT

## 2024-02-13 RX ORDER — LEVOTHYROXINE SODIUM 125 MCG
1 TABLET ORAL
Qty: 0 | Refills: 0 | DISCHARGE

## 2024-02-13 RX ORDER — OMEPRAZOLE 10 MG/1
1 CAPSULE, DELAYED RELEASE ORAL
Qty: 30 | Refills: 0
Start: 2024-02-13 | End: 2024-03-13

## 2024-02-13 RX ORDER — ROSUVASTATIN CALCIUM 5 MG/1
0 TABLET ORAL
Refills: 0 | DISCHARGE

## 2024-02-13 RX ORDER — FAMOTIDINE 10 MG/ML
1 INJECTION INTRAVENOUS
Qty: 0 | Refills: 0 | DISCHARGE

## 2024-02-13 RX ORDER — METFORMIN HYDROCHLORIDE 850 MG/1
1 TABLET ORAL
Qty: 0 | Refills: 0 | DISCHARGE

## 2024-02-13 RX ORDER — SOTAGLIFLOZIN 200 MG/1
1 TABLET ORAL
Refills: 0 | DISCHARGE

## 2024-02-13 RX ORDER — AMLODIPINE BESYLATE 2.5 MG/1
1 TABLET ORAL
Refills: 0 | DISCHARGE

## 2024-02-13 RX ADMIN — Medication 2: at 11:30

## 2024-02-13 RX ADMIN — PANTOPRAZOLE SODIUM 40 MILLIGRAM(S): 20 TABLET, DELAYED RELEASE ORAL at 12:26

## 2024-02-13 RX ADMIN — Medication 75 MICROGRAM(S): at 05:24

## 2024-02-13 NOTE — DISCHARGE NOTE PROVIDER - NSDCFUSCHEDAPPT_GEN_ALL_CORE_FT
Tavares Judd  [facility] PreAdmits  Scheduled Appointment: 02/18/2024    [facility]  [facility]  [address]  Scheduled Appointment: 02/18/2024    [facility]  MRI  [address]  Scheduled Appointment: 02/18/2024

## 2024-02-13 NOTE — PATIENT PROFILE ADULT - FALL HARM RISK - HARM RISK INTERVENTIONS
Assistance with ambulation/Assistance OOB with selected safe patient handling equipment/Communicate Risk of Fall with Harm to all staff/Monitor gait and stability/Reinforce activity limits and safety measures with patient and family/Sit up slowly, dangle for a short time, stand at bedside before walking/Tailored Fall Risk Interventions/Visual Cue: Yellow wristband and red socks/Bed in lowest position, wheels locked, appropriate side rails in place/Call bell, personal items and telephone in reach/Instruct patient to call for assistance before getting out of bed or chair/Non-slip footwear when patient is out of bed/[place] to call system/Physically safe environment - no spills, clutter or unnecessary equipment/Purposeful Proactive Rounding/Room/bathroom lighting operational, light cord in reach

## 2024-02-13 NOTE — DISCHARGE NOTE PROVIDER - ATTENDING DISCHARGE PHYSICAL EXAMINATION:
GENERAL: NAD, well-developed, sitting up in the bed, well appearing  HEAD:  Atraumatic, Normocephalic  NECK: Supple, No JVD  CHEST/LUNG: Clear to auscultation bilaterally; No wheeze  HEART: Regular rate and rhythm; No murmurs, rubs, or gallops  ABDOMEN: Soft, Nontender, Nondistended; Bowel sounds present; no tenderness to palpation in the epigastric region   EXTREMITIES:  2+ Peripheral Pulses, No clubbing, cyanosis, or edema  PSYCH: AAOx3  NEUROLOGY: non-focal  SKIN: No rashes or lesions

## 2024-02-13 NOTE — DISCHARGE NOTE PROVIDER - NSDCCPCAREPLAN_GEN_ALL_CORE_FT
PRINCIPAL DISCHARGE DIAGNOSIS  Diagnosis: Intractable abdominal pain  Assessment and Plan of Treatment: You presented with epigastric abdominal pain.  Your CT abdomen and pelvis showed mild wall thickening of the rectosigmoid colon, correlate for proctocolitis. Mild diffuse gastric wall thickening correlate for gastritis.  (+) Multiple b/l lower lobe pleural nodules ? CA vs. Mets.     You and your family reported a know history of pleural nodules.  While in the hospital you were seen by a Gastroenterologist with a recommendation for stool H Pylori testing.  Please follow up with your PCP and/or Gastroenterologist within 3 days to discuss this information and for continued management.          SECONDARY DISCHARGE DIAGNOSES  Diagnosis: Gastritis  Assessment and Plan of Treatment: You presented with epigastric abdominal pain.  Your CT abdomen and pelvis showed mild wall thickening of the rectosigmoid colon, correlate for proctocolitis. Mild diffuse gastric wall thickening correlate for gastritis.  (+) Multiple b/l lower lobe pleural nodules ? CA vs. Mets.     You and your family reported a know history of pleural nodules.  While in the hospital you were seen by a Gastroenterologist with a recommendation for stool H Pylori testing.  Please follow up with your PCP and/or Gastroenterologist within 3 days to discuss this information and for continued management.        Diagnosis: Near syncope  Assessment and Plan of Treatment: You presented with near syncope at home. On admission your EKG showed normal sinus rhythm. Your Troponin's were negative. 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  You were monitored on telemetry without events.  You were seen by a Physical Therapist with recommendation that you were independent and required no physical therapy.      Diagnosis: HLD (hyperlipidemia)  Assessment and Plan of Treatment: You have a history of hyperlipidemia.  Please continue taking your medication as prescribed.        Diagnosis: DM (diabetes mellitus)  Assessment and Plan of Treatment: You have a history of diabetes.  While in the hospital your A1c=7.1.  Please continue taking your home diabetes regimen.        Diagnosis: HTN (hypertension)  Assessment and Plan of Treatment: While in the hospital your blood pressure was monitored and noted to be normal.  Therefore while in the hospital your blood pressure medications were not gievn to you.  Please follow up with your PCP and/or Cardiologist within 3 days to discuss this information and for continued management.    Please resume your home blood pressure medications with caution.        Diagnosis: Hypothyroidism  Assessment and Plan of Treatment: You have a history of hypothyroidism for which you take Levothyroxine.  Please continue taking your med as prescribed.         PRINCIPAL DISCHARGE DIAGNOSIS  Diagnosis: Intractable abdominal pain  Assessment and Plan of Treatment: You presented with epigastric abdominal pain.  Your CT abdomen and pelvis showed mild wall thickening of the rectosigmoid colon, correlate for proctocolitis. Mild diffuse gastric wall thickening correlate for gastritis.  (+) Multiple b/l lower lobe pleural nodules ? CA vs. Mets.     You and your family reported a known history of pleural nodules.  While in the hospital you were seen by a Gastroenterologist with a recommendation for stool H Pylori testing.  Please follow up with your PCP and/or Gastroenterologist within 3 days to discuss this information and for continued management.          SECONDARY DISCHARGE DIAGNOSES  Diagnosis: Gastritis  Assessment and Plan of Treatment: You presented with epigastric abdominal pain.  Your CT abdomen and pelvis showed mild wall thickening of the rectosigmoid colon, correlate for proctocolitis. Mild diffuse gastric wall thickening correlate for gastritis.  (+) Multiple b/l lower lobe pleural nodules ? CA vs. Mets.     You and your family reported a known history of pleural nodules.  While in the hospital you were seen by a Gastroenterologist with a recommendation for stool H Pylori testing.  Please follow up with your PCP and/or Gastroenterologist within 3 days to discuss this information and for continued management.        Diagnosis: Near syncope  Assessment and Plan of Treatment: You presented with near syncope at home. On admission your EKG showed normal sinus rhythm. Your Troponin's were negative. 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  You were monitored on telemetry without events.  You were seen by a Physical Therapist with recommendation that you were independent and required no physical therapy.  While in the hospital your blood pressure was monitored and noted to be normal.  Therefore while in the hospital your blood pressure medications were not gievn to you.  Please follow up with your PCP and/or Cardiologist within 3 days to discuss this information and for continued management.    Please resume your home blood pressure medications with caution.        Diagnosis: HLD (hyperlipidemia)  Assessment and Plan of Treatment: You have a history of hyperlipidemia.  Please continue taking your medication as prescribed.        Diagnosis: DM (diabetes mellitus)  Assessment and Plan of Treatment: You have a history of diabetes.  While in the hospital your A1c=7.1.  Please continue taking your home diabetes regimen.        Diagnosis: HTN (hypertension)  Assessment and Plan of Treatment: While in the hospital your blood pressure was monitored and noted to be normal.  Therefore while in the hospital your blood pressure medications were not gievn to you.  Please follow up with your PCP and/or Cardiologist within 3 days to discuss this information and for continued management.    Please resume your home blood pressure medications with caution.        Diagnosis: Hypothyroidism  Assessment and Plan of Treatment: You have a history of hypothyroidism for which you take Levothyroxine.  Please continue taking your med as prescribed.

## 2024-02-13 NOTE — CHART NOTE - NSCHARTNOTEFT_GEN_A_CORE
I have called and provided a sign out to Dr. Erazo at number listed in the chart. She knows the patient well.

## 2024-02-13 NOTE — DISCHARGE NOTE NURSING/CASE MANAGEMENT/SOCIAL WORK - PATIENT PORTAL LINK FT
You can access the FollowMyHealth Patient Portal offered by [facility] by registering at the following website: [email or web address]. By joining [employer]’s FollowMyHealth portal, you will also be able to view your health information using other applications (apps) compatible with our system.

## 2024-02-13 NOTE — DISCHARGE NOTE PROVIDER - CARE PROVIDER_API CALL
Delaney Erazo  Internal Medicine  [address] [place] [id number]  Phone: [phone]  Fax: [phone]  Follow Up Time:

## 2024-02-13 NOTE — DISCHARGE NOTE PROVIDER - HOSPITAL COURSE
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 Presents to the ED complaining of epigastric pain that started 10 days ago. Pain does not radiate, slowly progressive, dull, 7/10 in intensity, intermittent, and triggered moderate to severe acid reflux and associated nausea, postprandial fullness, early satiety w/o anorexia. Denies hematochezia, melena, fever, chills, diarrhea, constipation, or rectal pain. Patient was at [facility] [place] 2 days ago for this matter and at that time there was suspicion for gastritis, possible ulcer and GERD, but low concerns for ACS. Last night, patient developed nausea and prior to an episode of emesis, she felt lightheadedness and called her daughter. Patient remembers everything and recalls hearing her daughter asking questions, but she did not want to talk due to fear of vomiting. Denies hematemesis. No LOC or head trauma. Denies any chest pain, dyspnea, palpitations, chills, numbness, headache, visual spots, hearing/taste/smell changes, or weakness. No witnessed tonic clonic movements, tongue biting, urinary or stool incontinence. She does not have any other complaints.  Admitted to Telemetry for Near syncope and Abdominal pain workup.       THIS IS A BRIEF SUMMARY.  FOR A FULL HOSPITAL COURSE SEE MEDICAL RECORDS OR [facility] PORTAL.    Patient is medically cleared by Attending for discharge. 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 Presents to the ED complaining of epigastric pain that started 10 days ago. Pain does not radiate, slowly progressive, dull, 7/10 in intensity, intermittent, and triggered moderate to severe acid reflux and associated nausea, postprandial fullness, early satiety w/o anorexia. Denies hematochezia, melena, fever, chills, diarrhea, constipation, or rectal pain. Patient was at [facility] 2 days ago for this matter and at that time there was suspicion for gastritis, possible ulcer and GERD, but low concerns for ACS. Last night, patient developed nausea and prior to an episode of emesis, she felt lightheadedness and called her daughter. Patient remembers everything and recalls hearing her daughter asking questions, but she did not want to talk due to fear of vomiting. Denies hematemesis. No LOC or head trauma. Denies any chest pain, dyspnea, palpitations, chills, numbness, headache, visual spots, hearing/taste/smell changes, or weakness. No witnessed tonic clonic movements, tongue biting, urinary or stool incontinence. She does not have any other complaints.  Admitted to Telemetry for Near syncope and Abdominal pain workup.       THIS IS A BRIEF SUMMARY.  FOR A FULL HOSPITAL COURSE SEE MEDICAL RECORDS OR [facility] PORTAL.    Patient is medically cleared by Attending for discharge.      Attending Attestation:     Agree with the above. Patient was admitted for epigastric abdominal pain and found to have gastritis on her admission imaging. GI was consulted and recommended conservative management with PPI. Patient had no anemia on CBC. Patient's symptoms improved and she rated her pain a 1/10 at bedside on 2/13 and she was cleared for DC. She will follow up with her outpatient Gastroenterologist who is reportedly considering EGD.     Patient was found to have lung nodules as well on her admission imaging, notable given her renal cell carcinoma history. This information was shared with patient at bedside on 2/13. She and her daughter state that these nodules are known and she undergoes surveillance MR of the chest and abdomen periodically.     She was counseled to return for any new or worsening symptoms.

## 2024-02-13 NOTE — DISCHARGE NOTE PROVIDER - NSDCMRMEDTOKEN_GEN_ALL_CORE_FT
famotidine 40 mg oral tablet: 1 tab(s) orally once a day  Januvia 100 mg oral tablet: 1 tab(s) orally once a day  levothyroxine 75 mcg (0.075 mg) oral tablet: 1 tab(s) orally once a day  Lokelma 10 g oral powder for reconstitution: 10 gram(s) orally once a day  meclizine 25 mg oral tablet: 1 tab(s) orally 2 times a day as needed for  dizziness  metFORMIN 500 mg oral tablet: 1 tab(s) orally 2 times a day  omeprazole 40 mg oral delayed release capsule: 1 cap(s) orally once a day  rosuvastatin 10 mg oral tablet: 1 tab(s) orally  Vitamin D3 50,000 intl units (1250 mcg) oral capsule: orally once a week

## 2024-02-18 ENCOUNTER — RESULT REVIEW (OUTPATIENT)
Age: 74
End: 2024-02-18

## 2024-02-18 ENCOUNTER — OUTPATIENT (OUTPATIENT)
Dept: OUTPATIENT SERVICES | Facility: HOSPITAL | Age: 74
LOS: 1 days | End: 2024-02-18
Payer: MEDICARE

## 2024-02-18 ENCOUNTER — APPOINTMENT (OUTPATIENT)
Dept: MRI IMAGING | Facility: HOSPITAL | Age: 74
End: 2024-02-18
Payer: MEDICARE

## 2024-02-18 ENCOUNTER — APPOINTMENT (OUTPATIENT)
Dept: CT IMAGING | Facility: HOSPITAL | Age: 74
End: 2024-02-18
Payer: MEDICARE

## 2024-02-18 DIAGNOSIS — Z90.5 ACQUIRED ABSENCE OF KIDNEY: Chronic | ICD-10-CM

## 2024-02-18 DIAGNOSIS — E89.2 POSTPROCEDURAL HYPOPARATHYROIDISM: Chronic | ICD-10-CM

## 2024-02-18 DIAGNOSIS — Z90.710 ACQUIRED ABSENCE OF BOTH CERVIX AND UTERUS: Chronic | ICD-10-CM

## 2024-02-18 PROCEDURE — 74183 MRI ABD W/O CNTR FLWD CNTR: CPT

## 2024-02-18 PROCEDURE — 74019 RADEX ABDOMEN 2 VIEWS: CPT

## 2024-02-18 PROCEDURE — 74183 MRI ABD W/O CNTR FLWD CNTR: CPT | Mod: 26,MH

## 2024-02-18 PROCEDURE — 74019 RADEX ABDOMEN 2 VIEWS: CPT | Mod: 26

## 2024-02-18 PROCEDURE — 71250 CT THORAX DX C-: CPT

## 2024-02-18 PROCEDURE — 71250 CT THORAX DX C-: CPT | Mod: 26,MH

## 2024-02-28 ENCOUNTER — NON-APPOINTMENT (OUTPATIENT)
Age: 74
End: 2024-02-28

## 2024-03-01 DIAGNOSIS — R14.3 FLATULENCE: ICD-10-CM

## 2024-03-01 DIAGNOSIS — R91.8 OTHER NONSPECIFIC ABNORMAL FINDING OF LUNG FIELD: ICD-10-CM

## 2024-03-01 DIAGNOSIS — R91.1 SOLITARY PULMONARY NODULE: ICD-10-CM

## 2024-03-01 DIAGNOSIS — Z90.5 ACQUIRED ABSENCE OF KIDNEY: ICD-10-CM

## 2024-03-20 NOTE — PATIENT PROFILE ADULT - BILL PAYMENT
Detail Level: Simple Additional Notes: Patient have breakthrough flare on her palms despite follow treatment plan with Dupixent. She states it is mild but is requesting betamethasone lotion over clobetasol ointment. She does not like the consistency of the ointment on her hands Render Risk Assessment In Note?: no no

## 2024-06-03 NOTE — PROGRESS NOTE ADULT - PROBLEM/PLAN-5
Pt has 1 IV access but receiving blood. Pt also has IV abx scheduled and needs an additional unit of blood. Dr. Zacarias at bedside and was notified. Dr. Zacarias stated to finish 1st unit of blood first , run both abx, then start the 2nd unit of blood. MD notified of time it takes to run both abx. ICU and Ed both called to see if they could start an IV via ultrasound but no one was available, MD notified.    DISPLAY PLAN FREE TEXT

## 2024-06-18 NOTE — ED ADULT NURSE NOTE - GASTROINTESTINAL ASSESSMENT
Use the azelastine nasal spray twice daily as prescribed.     Restart daily zyrtec.     Instructions for middle-ear pressure and congestion:    Hot moist compresses under affected ears for 5 minutes every 4 hours, followed by gentle downward rubbing with your fingertips over the side of the neck.    After 48-72 hours, start \"intentional ear popping\" by blowing out through your nose while pinching your nostrils shut.  Do this repeatedly (such as 10 times in a row, 3 times per day).  This should help your ear drainage-tubes to gradually release the pressure/fluid from behind your eardrums.  If you have trouble doing that maneuver:  Try to blow up a balloon using one nostril while plugging your other nostril and closing your mouth.    If no improvement in symptoms after 72 hours or if having fevers greater than 101 degrees, contact the urgent care provider or your primary care doctor to discuss treatment options (antibiotics, oral steroids).    ----------------------------   
- - -

## 2024-11-19 NOTE — ED PROVIDER NOTE - WR ORDER NAME 1
.  Contacted the [facility] [place] to inquire about bed availability in their crisis residential-they states they have no bed availability currently but can try back after 0900 in the morning.        Xray Chest 1 View-PORTABLE IMMEDIATE

## 2025-01-06 NOTE — PROGRESS NOTE ADULT - SUBJECTIVE AND OBJECTIVE BOX
NURSE ANTICOAGULATION VISIT    Jimi Crain 1946 presents to clinic today for 4 week INR appointment    No outpatient medications have been marked as taking for the 1/6/25 encounter (Anti-Coag) with [facility].       INR (no units)   Date Value   01/06/2025 2.4        GOAL RANGE: 2.0-3.0    ALLERGIES:   Allergen Reactions    Corticosteroids Other (See Comments)     Can cause recurrence of central serous retinopathy. Please use sparingly and only if absolutely necessary. Please refer to retina notes.      Lipitor [Atorvastatin Calcium]     Penicillins        Patient Active Problem List   Diagnosis    Long term (current) use of anticoagulants    H/O mechanical aortic valve replacement    Hyperlipoproteinemia    CAD (coronary artery disease)    Osteopenia    LVH (left ventricular hypertrophy)    GERD with stricture    History of Zenker's diverticulum removal    Pericardial effusion (CMD)    Vitamin D deficiency    Impaired fasting blood sugar    Choroidal neovascularization, right eye     (central serous retinopathy) both eyes    Diverticulosis    Exudative age-related macular degeneration of right eye (CMD)    Pseudophakia, right    Encounter for therapeutic drug monitoring       PATIENT REPORTED CHANGES: No changes with medications/diet or concerns currently per patient.     NURSE DOSING ADJUSTMENTS AND EDUCATION: Continue current dose.   New dosage sheet provided.        Patient will recheck INR in 4 week, sooner if changes or concerns develop.  Warfarin Management done via face to face.  Reviewed signs and symptoms of bleeding and clotting with patient.  Reviewed and reinforced with patient the importance of calling the clinic with any medication, diet, and health related changes.  Importance of adherence to consistent diet in Vitamin K reviewed.  Affects of alcohol consumption and with concurrent use of Warfarin explained to patient.  Patient verbalized understanding.  Sent to   Patient seen and examined and case discussed with urologic oncologist Dr. Villafuerte. Patient with multiple pleural nodules which should be ruled out for metastatic RCC. Please set up IR biopsy. If pleural nodules are negative for malignancy may be candidate for surgical excision with vascular assistance of renal mass with IVC thrombus. Will defer anticoagulation plan to vascular team. Will continue to follow.     ICU Vital Signs Last 24 Hrs  T(C): 36.5 (17 Nov 2019 05:08), Max: 36.9 (16 Nov 2019 10:13)  T(F): 97.7 (17 Nov 2019 05:08), Max: 98.4 (16 Nov 2019 10:13)  HR: 67 (17 Nov 2019 05:08) (67 - 80)  BP: 122/72 (17 Nov 2019 05:08) (122/72 - 148/80)  BP(mean): --  ABP: --  ABP(mean): --  RR: 18 (17 Nov 2019 05:08) (15 - 18)  SpO2: 100% (17 Nov 2019 05:08) (95% - 100%)                          10.8   5.83  )-----------( 300      ( 17 Nov 2019 08:06 )             35.9   11-17    143  |  104  |  11  ----------------------------<  163<H>  4.6   |  29  |  0.81    Ca    9.6      17 Nov 2019 08:06  Phos  2.2     11-17  Mg     2.1     11-17    TPro  7.7  /  Alb  4.4  /  TBili  0.4  /  DBili  x   /  AST  15  /  ALT  10  /  AlkPhos  75  11-16 Saige for review and signature.      Luo Fuentes RN

## 2025-02-06 ENCOUNTER — NON-APPOINTMENT (OUTPATIENT)
Age: 75
End: 2025-02-06

## 2025-02-06 ENCOUNTER — APPOINTMENT (OUTPATIENT)
Dept: UROLOGY | Facility: CLINIC | Age: 75
End: 2025-02-06
Payer: MEDICARE

## 2025-02-06 VITALS
HEART RATE: 70 BPM | SYSTOLIC BLOOD PRESSURE: 151 MMHG | DIASTOLIC BLOOD PRESSURE: 81 MMHG | TEMPERATURE: 97.5 F | OXYGEN SATURATION: 100 %

## 2025-02-06 PROBLEM — I10 ESSENTIAL (PRIMARY) HYPERTENSION: Chronic | Status: ACTIVE | Noted: 2024-02-11

## 2025-02-06 PROBLEM — E03.9 HYPOTHYROIDISM, UNSPECIFIED: Chronic | Status: ACTIVE | Noted: 2024-02-11

## 2025-02-06 PROBLEM — R91.8 OTHER NONSPECIFIC ABNORMAL FINDING OF LUNG FIELD: Chronic | Status: ACTIVE | Noted: 2024-02-11

## 2025-02-06 PROBLEM — K29.60 OTHER GASTRITIS WITHOUT BLEEDING: Chronic | Status: ACTIVE | Noted: 2024-02-11

## 2025-02-06 PROBLEM — C64.9 MALIGNANT NEOPLASM OF UNSPECIFIED KIDNEY, EXCEPT RENAL PELVIS: Chronic | Status: ACTIVE | Noted: 2024-02-11

## 2025-02-06 PROBLEM — E78.5 HYPERLIPIDEMIA, UNSPECIFIED: Chronic | Status: ACTIVE | Noted: 2024-02-11

## 2025-02-06 PROBLEM — E87.5 HYPERKALEMIA: Chronic | Status: ACTIVE | Noted: 2024-02-11

## 2025-02-06 PROBLEM — D18.00 HEMANGIOMA UNSPECIFIED SITE: Chronic | Status: ACTIVE | Noted: 2024-02-11

## 2025-02-06 PROCEDURE — 99213 OFFICE O/P EST LOW 20 MIN: CPT

## 2025-02-12 NOTE — ED PROVIDER NOTE - MUSCULOSKELETAL, MLM
Medication: Esomerazole  Last office visit date: 2/8/24  Medication Refill Protocol Failed.  Protocol approved due to: identified sig mis match, same prescription.      Spine and extremities grossly normal, range of motion is not limited, no muscle or joint tenderness

## 2025-02-21 ENCOUNTER — APPOINTMENT (OUTPATIENT)
Dept: CT IMAGING | Facility: CLINIC | Age: 75
End: 2025-02-21
Payer: MEDICARE

## 2025-02-21 ENCOUNTER — APPOINTMENT (OUTPATIENT)
Dept: MRI IMAGING | Facility: CLINIC | Age: 75
End: 2025-02-21
Payer: MEDICARE

## 2025-02-21 PROCEDURE — 71250 CT THORAX DX C-: CPT

## 2025-02-21 PROCEDURE — A9585: CPT

## 2025-02-21 PROCEDURE — A9585: CPT | Mod: JW

## 2025-02-21 PROCEDURE — 74183 MRI ABD W/O CNTR FLWD CNTR: CPT | Mod: TC

## 2025-03-04 ENCOUNTER — EMERGENCY (EMERGENCY)
Facility: HOSPITAL | Age: 75
LOS: 1 days | Discharge: HOME | End: 2025-03-04
Attending: EMERGENCY MEDICINE | Admitting: EMERGENCY MEDICINE
Payer: MEDICARE

## 2025-03-04 VITALS
HEART RATE: 91 BPM | RESPIRATION RATE: 16 BRPM | SYSTOLIC BLOOD PRESSURE: 127 MMHG | OXYGEN SATURATION: 96 % | TEMPERATURE: 99 F | DIASTOLIC BLOOD PRESSURE: 78 MMHG

## 2025-03-04 VITALS
SYSTOLIC BLOOD PRESSURE: 162 MMHG | TEMPERATURE: 98 F | WEIGHT: 125 LBS | RESPIRATION RATE: 18 BRPM | DIASTOLIC BLOOD PRESSURE: 77 MMHG | OXYGEN SATURATION: 99 % | HEART RATE: 104 BPM | HEIGHT: 63 IN

## 2025-03-04 DIAGNOSIS — Z90.5 ACQUIRED ABSENCE OF KIDNEY: Chronic | ICD-10-CM

## 2025-03-04 DIAGNOSIS — E89.2 POSTPROCEDURAL HYPOPARATHYROIDISM: Chronic | ICD-10-CM

## 2025-03-04 DIAGNOSIS — Z90.710 ACQUIRED ABSENCE OF BOTH CERVIX AND UTERUS: Chronic | ICD-10-CM

## 2025-03-04 LAB
ADD ON TEST-SPECIMEN IN LAB: SIGNIFICANT CHANGE UP
ANION GAP SERPL CALC-SCNC: 14 MMOL/L — SIGNIFICANT CHANGE UP (ref 5–17)
APPEARANCE UR: CLEAR — SIGNIFICANT CHANGE UP
BASOPHILS # BLD AUTO: 0.06 K/UL — SIGNIFICANT CHANGE UP (ref 0–0.2)
BASOPHILS NFR BLD AUTO: 0.3 % — SIGNIFICANT CHANGE UP (ref 0–2)
BILIRUB UR-MCNC: NEGATIVE — SIGNIFICANT CHANGE UP
BUN SERPL-MCNC: 18 MG/DL — SIGNIFICANT CHANGE UP (ref 7–23)
CALCIUM SERPL-MCNC: 10.1 MG/DL — SIGNIFICANT CHANGE UP (ref 8.4–10.5)
CHLORIDE SERPL-SCNC: 98 MMOL/L — SIGNIFICANT CHANGE UP (ref 96–108)
CO2 SERPL-SCNC: 27 MMOL/L — SIGNIFICANT CHANGE UP (ref 22–31)
COLOR SPEC: YELLOW — SIGNIFICANT CHANGE UP
CREAT SERPL-MCNC: 0.97 MG/DL — SIGNIFICANT CHANGE UP (ref 0.5–1.3)
DIFF PNL FLD: NEGATIVE — SIGNIFICANT CHANGE UP
EGFR: 62 ML/MIN/1.73M2 — SIGNIFICANT CHANGE UP
EOSINOPHIL # BLD AUTO: 0.45 K/UL — SIGNIFICANT CHANGE UP (ref 0–0.5)
EOSINOPHIL NFR BLD AUTO: 2.6 % — SIGNIFICANT CHANGE UP (ref 0–6)
FLUAV AG NPH QL: SIGNIFICANT CHANGE UP
FLUBV AG NPH QL: SIGNIFICANT CHANGE UP
GLUCOSE SERPL-MCNC: 149 MG/DL — HIGH (ref 70–99)
GLUCOSE UR QL: NEGATIVE MG/DL — SIGNIFICANT CHANGE UP
HCT VFR BLD CALC: 42.9 % — SIGNIFICANT CHANGE UP (ref 34.5–45)
HGB BLD-MCNC: 13.6 G/DL — SIGNIFICANT CHANGE UP (ref 11.5–15.5)
IMM GRANULOCYTES NFR BLD AUTO: 0.5 % — SIGNIFICANT CHANGE UP (ref 0–0.9)
KETONES UR-MCNC: ABNORMAL MG/DL
LEUKOCYTE ESTERASE UR-ACNC: NEGATIVE — SIGNIFICANT CHANGE UP
LYMPHOCYTES # BLD AUTO: 18.1 % — SIGNIFICANT CHANGE UP (ref 13–44)
LYMPHOCYTES # BLD AUTO: 3.17 K/UL — SIGNIFICANT CHANGE UP (ref 1–3.3)
MCHC RBC-ENTMCNC: 27.3 PG — SIGNIFICANT CHANGE UP (ref 27–34)
MCHC RBC-ENTMCNC: 31.7 G/DL — LOW (ref 32–36)
MCV RBC AUTO: 86.1 FL — SIGNIFICANT CHANGE UP (ref 80–100)
MONOCYTES # BLD AUTO: 1.12 K/UL — HIGH (ref 0–0.9)
MONOCYTES NFR BLD AUTO: 6.4 % — SIGNIFICANT CHANGE UP (ref 2–14)
NEUTROPHILS # BLD AUTO: 12.62 K/UL — HIGH (ref 1.8–7.4)
NEUTROPHILS NFR BLD AUTO: 72.1 % — SIGNIFICANT CHANGE UP (ref 43–77)
NITRITE UR-MCNC: NEGATIVE — SIGNIFICANT CHANGE UP
NRBC BLD AUTO-RTO: 0 /100 WBCS — SIGNIFICANT CHANGE UP (ref 0–0)
PH UR: 6.5 — SIGNIFICANT CHANGE UP (ref 5–8)
PLATELET # BLD AUTO: 298 K/UL — SIGNIFICANT CHANGE UP (ref 150–400)
POTASSIUM SERPL-MCNC: 5.1 MMOL/L — SIGNIFICANT CHANGE UP (ref 3.5–5.3)
POTASSIUM SERPL-SCNC: 5.1 MMOL/L — SIGNIFICANT CHANGE UP (ref 3.5–5.3)
PROT UR-MCNC: SIGNIFICANT CHANGE UP MG/DL
RBC # BLD: 4.98 M/UL — SIGNIFICANT CHANGE UP (ref 3.8–5.2)
RBC # FLD: 13.3 % — SIGNIFICANT CHANGE UP (ref 10.3–14.5)
RSV RNA NPH QL NAA+NON-PROBE: SIGNIFICANT CHANGE UP
SARS-COV-2 RNA SPEC QL NAA+PROBE: SIGNIFICANT CHANGE UP
SODIUM SERPL-SCNC: 139 MMOL/L — SIGNIFICANT CHANGE UP (ref 135–145)
SP GR SPEC: 1.01 — SIGNIFICANT CHANGE UP (ref 1–1.03)
UROBILINOGEN FLD QL: 0.2 MG/DL — SIGNIFICANT CHANGE UP (ref 0.2–1)
WBC # BLD: 17.5 K/UL — HIGH (ref 3.8–10.5)
WBC # FLD AUTO: 17.5 K/UL — HIGH (ref 3.8–10.5)

## 2025-03-04 PROCEDURE — 96375 TX/PRO/DX INJ NEW DRUG ADDON: CPT

## 2025-03-04 PROCEDURE — 85025 COMPLETE CBC W/AUTO DIFF WBC: CPT

## 2025-03-04 PROCEDURE — 80076 HEPATIC FUNCTION PANEL: CPT

## 2025-03-04 PROCEDURE — 99285 EMERGENCY DEPT VISIT HI MDM: CPT | Mod: 25

## 2025-03-04 PROCEDURE — 87637 SARSCOV2&INF A&B&RSV AMP PRB: CPT

## 2025-03-04 PROCEDURE — 36415 COLL VENOUS BLD VENIPUNCTURE: CPT

## 2025-03-04 PROCEDURE — 74177 CT ABD & PELVIS W/CONTRAST: CPT | Mod: MC

## 2025-03-04 PROCEDURE — 80048 BASIC METABOLIC PNL TOTAL CA: CPT

## 2025-03-04 PROCEDURE — 81003 URINALYSIS AUTO W/O SCOPE: CPT

## 2025-03-04 PROCEDURE — 93010 ELECTROCARDIOGRAM REPORT: CPT

## 2025-03-04 PROCEDURE — 96374 THER/PROPH/DIAG INJ IV PUSH: CPT | Mod: XU

## 2025-03-04 PROCEDURE — 74177 CT ABD & PELVIS W/CONTRAST: CPT | Mod: 26

## 2025-03-04 PROCEDURE — 93005 ELECTROCARDIOGRAM TRACING: CPT

## 2025-03-04 PROCEDURE — 83690 ASSAY OF LIPASE: CPT

## 2025-03-04 PROCEDURE — 99285 EMERGENCY DEPT VISIT HI MDM: CPT

## 2025-03-04 RX ORDER — AMOXICILLIN AND CLAVULANATE POTASSIUM 500; 125 MG/1; MG/1
875 TABLET, FILM COATED ORAL
Qty: 20 | Refills: 0
Start: 2025-03-04 | End: 2025-03-13

## 2025-03-04 RX ORDER — ONDANSETRON HCL/PF 4 MG/2 ML
4 VIAL (ML) INJECTION ONCE
Refills: 0 | Status: COMPLETED | OUTPATIENT
Start: 2025-03-04 | End: 2025-03-04

## 2025-03-04 RX ORDER — PIPERACILLIN-TAZO-DEXTROSE,ISO 3.375G/5
3.38 IV SOLUTION, PIGGYBACK PREMIX FROZEN(ML) INTRAVENOUS ONCE
Refills: 0 | Status: COMPLETED | OUTPATIENT
Start: 2025-03-04 | End: 2025-03-04

## 2025-03-04 RX ORDER — OXYCODONE HYDROCHLORIDE AND ACETAMINOPHEN 10; 325 MG/1; MG/1
1 TABLET ORAL
Qty: 20 | Refills: 0
Start: 2025-03-04

## 2025-03-04 RX ORDER — IOHEXOL 350 MG/ML
30 INJECTION, SOLUTION INTRAVENOUS ONCE
Refills: 0 | Status: COMPLETED | OUTPATIENT
Start: 2025-03-04 | End: 2025-03-04

## 2025-03-04 RX ADMIN — Medication 1000 MILLILITER(S): at 18:16

## 2025-03-04 RX ADMIN — Medication 200 GRAM(S): at 21:10

## 2025-03-04 RX ADMIN — Medication 4 MILLIGRAM(S): at 18:27

## 2025-03-04 RX ADMIN — IOHEXOL 30 MILLILITER(S): 350 INJECTION, SOLUTION INTRAVENOUS at 18:28

## 2025-03-04 NOTE — ED ADULT NURSE NOTE - OBJECTIVE STATEMENT
73 y/o F c/o abdominal pain x1 day, abdominal bloating, passed gas x1 today, small stool today (denies blood or black tarry stool). Pt denies chest pain, SOB, N/V/D, dizziness, lightheadedness, numbness, tingling. Pt A&Ox4, respirations even and unlabored, skin color WDL warm and dry, pt is ambulatory with a steady gait. No acute distress observed.

## 2025-03-04 NOTE — ED ADULT NURSE REASSESSMENT NOTE - NS ED NURSE REASSESS COMMENT FT1
Received verbal endorsement from RNRodrigo. Pt awake, AOX4. Pt verbalized feeling better. Pt awaiting CTr.

## 2025-03-04 NOTE — ED PROVIDER NOTE - PATIENT PORTAL LINK FT
You can access the FollowMyHealth Patient Portal offered by [facility] by registering at the following website: [email or web address]. By joining [email or web address]’s FollowMyHealth portal, you will also be able to view your health information using other applications (apps) compatible with our system.

## 2025-03-04 NOTE — ED ADULT NURSE NOTE - CHIEF COMPLAINT QUOTE
Pt present to ED with daughter c/o abdomin al pain with nausea x 1 day. PMhx of HTN, DM and kidney CA not on any treatment. Pt A&Ox4, NAD. Ivorian speaking only.

## 2025-03-04 NOTE — ED ADULT TRIAGE NOTE - CHIEF COMPLAINT QUOTE
Pt present to ED with daughter c/o abdomin al pain with nausea x 1 day. PMhx of HTN, DM and kidney CA not on any treatment. Pt A&Ox4, NAD. German speaking only.

## 2025-03-04 NOTE — ED PROVIDER NOTE - NSFOLLOWUPINSTRUCTIONS_ED_ALL_ED_FT
Please follow-up with your doctor in 2 to 3 days.    You can take over-the-counter ibuprofen or Tylenol as needed and as prescribed for the pain.    You can take the prescribed Percocet for severe pain.  Please do not take Tylenol and Percocet together.  Please take antibiotics to completion.    Return to the emergency department if you develop any concerning symptoms.    Diverticulitis    Diverticulitis is inflammation or infection of small pouches in your colon that form when you HAVE a condition called diverticulosis. This condition can range from mild to severe potentially leading to perforation or obstructions of your colon. Symptoms include abdominal pain, fever/chills, nausea, vomiting, diarrhea, constipation, or blood in your stool. If you were prescribed an antibiotic medicine, take it as told by your health care provider. Do not stop taking the antibiotic even if you start to feel better.    SEEK IMMEDIATE MEDICAL CARE IF YOU HAVE ANY OF THE FOLLOWING SYMPTOMS: worsening abdominal pain, high fever, inability to hold down liquids or medication, black or bloody stools, inability to pass gas, lightheadedness/dizziness, or a change in mental status.

## 2025-03-04 NOTE — ED ADULT NURSE NOTE - RELATIONSHIP TO PATIENT
central lesion, evaluate signs of infarct What is the sedation requirement?-> FINDINGS: INTRACRANIAL STRUCTURES/VENTRICLES: There is no acute infarct. No mass effect or midline shift. No evidence of an acute intracranial hemorrhage.  The ventricles and sulci are normal in size and configuration.  The sellar/suprasellar regions appear unremarkable.  The normal signal voids within the major intracranial vessels appear maintained. ORBITS: The visualized portion of the orbits demonstrate no acute abnormality. SINUSES: The visualized paranasal sinuses and mastoid air cells demonstrate no acute abnormality. BONES/SOFT TISSUES: The bone marrow signal intensity appears normal. The soft tissues demonstrate no acute abnormality.   Unremarkable exam     CTA HEAD W CONTRAST  Result Date: 8/21/2024  EXAMINATION: CTA OF THE HEAD WITH CONTRAST; CTA OF THE NECK 8/21/2024 5:27 pm: TECHNIQUE: CTA of the head/brain was performed with the administration of intravenous contrast. Multiplanar reformatted images are provided for review.  MIP images are provided for review. Automated exposure control, iterative reconstruction, and/or weight based adjustment of the mA/kV was utilized to reduce the radiation dose to as low as reasonably achievable.; CTA of the neck was performed with the administration of intravenous contrast. Multiplanar reformatted images are provided for review.  MIP images are provided for review. Stenosis of the internal carotid arteries measured using NASCET criteria. Automated exposure control, iterative reconstruction, and/or weight based adjustment of the mA/kV was utilized to reduce the radiation dose to as low as reasonably achievable. COMPARISON: None. HISTORY: ORDERING SYSTEM PROVIDED HISTORY: Right arm tingling, right leg weakness, dizziness last known well 2 PM. Hints exam to tease out peripheral versus central vertigo was equivocal TECHNOLOGIST PROVIDED HISTORY: Reason for exam:->Right arm tingling, right leg  weakness, dizziness last known well 2 PM. Hints exam to tease out peripheral versus central vertigo was equivocal Has a \"code stroke\" or \"stroke alert\" been called?->Yes FINDINGS: CT head: No acute intracranial hemorrhage or edema.  No abnormal extra-axial fluid collections.  No hydrocephalus.  Visualized paranasal sinuses and mastoid air cells are clear. CTA head: Anterior cerebral arteries, middle cerebral arteries, and posterior cerebral arteries are patent.  No stenosis involving the basilar artery.  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  No findings to suggest dural venous sinus thrombosis.  No intracranial aneurysm. CTA neck: Common carotid arteries are patent without evidence of stenosis.  Carotid bulbs are unremarkable.  No stenosis involving proximal or distal cervical internal carotid arteries.  Both vertebral arteries are patent without evidence of stenosis or dissection.  Note made of venous structures along margins of right and left vertebral arteries.   1. No acute intracranial hemorrhage or edema. 2. No intracranial arterial occlusion or obvious stenosis. 3. No stenosis involving the carotid arteries or vertebral arteries.     CTA NECK W CONTRAST  Result Date: 8/21/2024  EXAMINATION: CTA OF THE HEAD WITH CONTRAST; CTA OF THE NECK 8/21/2024 5:27 pm: TECHNIQUE: CTA of the head/brain was performed with the administration of intravenous contrast. Multiplanar reformatted images are provided for review.  MIP images are provided for review. Automated exposure control, iterative reconstruction, and/or weight based adjustment of the mA/kV was utilized to reduce the radiation dose to as low as reasonably achievable.; CTA of the neck was performed with the administration of intravenous contrast. Multiplanar reformatted images are provided for review.  MIP images are provided for review. Stenosis of the internal carotid arteries measured using NASCET criteria. Automated exposure  control, iterative reconstruction, and/or weight based adjustment of the mA/kV was utilized to reduce the radiation dose to as low as reasonably achievable. COMPARISON: None. HISTORY: ORDERING SYSTEM PROVIDED HISTORY: Right arm tingling, right leg weakness, dizziness last known well 2 PM. Hints exam to tease out peripheral versus central vertigo was equivocal TECHNOLOGIST PROVIDED HISTORY: Reason for exam:->Right arm tingling, right leg weakness, dizziness last known well 2 PM. Hints exam to tease out peripheral versus central vertigo was equivocal Has a \"code stroke\" or \"stroke alert\" been called?->Yes FINDINGS: CT head: No acute intracranial hemorrhage or edema.  No abnormal extra-axial fluid collections.  No hydrocephalus.  Visualized paranasal sinuses and mastoid air cells are clear. CTA head: Anterior cerebral arteries, middle cerebral arteries, and posterior cerebral arteries are patent.  No stenosis involving the basilar artery.  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  No findings to suggest dural venous sinus thrombosis.  No intracranial aneurysm. CTA neck: Common carotid arteries are patent without evidence of stenosis.  Carotid bulbs are unremarkable.  No stenosis involving proximal or distal cervical internal carotid arteries.  Both vertebral arteries are patent without evidence of stenosis or dissection.  Note made of venous structures along margins of right and left vertebral arteries.     1. No acute intracranial hemorrhage or edema. 2. No intracranial arterial occlusion or obvious stenosis. 3. No stenosis involving the carotid arteries or vertebral arteries.     CT HEAD WO CONTRAST    Addendum Date: 8/21/2024    ADDENDUM: The impression of this report was read to MINDY HOGUE at 6:01 pm on 8/21/2024 by a member of the core team support staff.     Result Date: 8/21/2024  EXAMINATION: CT OF THE HEAD WITHOUT CONTRAST  8/21/2024 5:27 pm TECHNIQUE: CT of the head was performed  Daughter

## 2025-03-04 NOTE — ED PROVIDER NOTE - CLINICAL SUMMARY MEDICAL DECISION MAKING FREE TEXT BOX
Patient is Upper sorbian speaking and prefers her daughter to translate.    74-year-old female with past medical history of renal cancer status post right nephrectomy, hypertension, diabetes, high cholesterol, hypothyroidism, diverticulitis, past surgical history of right nephrectomy, hernia repair, hysterectomy secondary to "precancerous cells", presents today with left lower quadrant abdominal pain with nausea that started yesterday. No vomiting, Denies fevers, chills, constipation, diarrhea, bloody stools, chest pain, shortness of breath or urinary symptoms.  Per daughter, patient was unable to walk secondary to severe left-sided abd pain today and presents to ED for further evaluation. No other complaints.     ED course: Vital signs noted.  Patient afebrile.  Noted to be hypertensive with BPs 160/ 70s and mildly tachycardic with heart rate 104. [VS normalized after pain meds and IVF].  Patient appears to be uncomfortable with left lower quadrant abdominal tenderness to palpation with guarding.  Labs noted with elevated WBC of 17. Rest of labs noted and unremarkable. UA with positive ketones, no infection. CT A/P ordered to rule out diverticulitis/intra-abdominal process. Patient is Azeri speaking and prefers her daughter to translate.    74-year-old female with past medical history of renal cancer status post right nephrectomy, hypertension, diabetes, high cholesterol, hypothyroidism, diverticulitis, past surgical history of right nephrectomy, hernia repair, hysterectomy secondary to "precancerous cells", presents today with left lower quadrant abdominal pain with nausea that started yesterday. No vomiting, Denies fevers, chills, constipation, diarrhea, bloody stools, chest pain, shortness of breath or urinary symptoms.  Per daughter, patient was unable to walk secondary to severe left-sided abd pain today and presents to ED for further evaluation. No other complaints.     ED course: Vital signs noted.  Patient afebrile.  Noted to be hypertensive with BPs 160/ 70s and mildly tachycardic with heart rate 104. [VS normalized after pain meds and IVF].  Patient appears to be uncomfortable with left lower quadrant abdominal tenderness to palpation with guarding.  Labs noted with elevated WBC of 17. Rest of labs noted and unremarkable. UA with positive ketones, no infection. CT A/P shows mild sigmoid colon diverticulitis without abscess. IVF and IV Zosyn given and Rx given for Augmentin. Non-toxic appearing and stable for discharge. To follow up outpatient. Strict return precautions given. Patient is Spanish speaking and prefers her daughter to translate.    74-year-old female with past medical history of renal cancer status post right nephrectomy, hypertension, diabetes, high cholesterol, hypothyroidism, diverticulitis, past surgical history of right nephrectomy, hernia repair, hysterectomy secondary to "precancerous cells", presents today with left lower quadrant abdominal pain with nausea that started yesterday. No vomiting, Denies fevers, chills, constipation, diarrhea, bloody stools, chest pain, shortness of breath or urinary symptoms.  Per daughter, patient was unable to walk secondary to severe left-sided abd pain today and presents to ED for further evaluation. No other complaints.     ED course: Vital signs noted.  Patient afebrile.  Noted to be hypertensive with BPs 160/ 70s and mildly tachycardic with heart rate 104. [VS normalized after pain meds and IVF].  Patient appears to be uncomfortable with left lower quadrant abdominal tenderness to palpation with guarding. IVF, IV Morphine and IV Zofran given with improvement in sxs. Labs noted with elevated WBC of 17. Rest of labs noted and unremarkable. UA with positive ketones, no infection. CT A/P shows mild uncomplicated sigmoid diverticulitis without abscess. IVF and IV Zosyn given. Rx given for Augmentin and Percocet for pain. Non-toxic appearing and stable for discharge. To follow up outpatient. Strict return precautions given.

## 2025-03-04 NOTE — ED ADULT NURSE NOTE - HOW PATIENT ADDRESSED, PROFILE
RECORDS RECEIVED FROM: left hip surgery consult per Dr. Franklin.  avail at [phone] option 1 or please use iPad.   DATE RECEIVED: Mar 3, 2022     NOTES STATUS DETAILS   OFFICE NOTE from referring provider Internal Joe Franklin MD        MEDICATION LIST Internal    XRAYS (IMAGES & REPORTS) Internal 3/2/22     
Ms. Morse

## 2025-03-04 NOTE — ED PROVIDER NOTE - CARE PLAN
1 Principal Discharge DX:	Abdominal pain   Principal Discharge DX:	Abdominal pain  Secondary Diagnosis:	Diverticulitis

## 2025-07-17 NOTE — ED ADULT NURSE NOTE - ISAR MEDICATION
3 wk ago    CYTOLOGY-GYN CYTOLOGY GYNECOLOGICAL REPORT    Name: DANITA ALCALA  EPI#: [id number]  Acct#: [id number]    Case #: [id number]        Final Cytologic Diagnosis  A.  Thinprep Cervical/Endocervical with HPV screen and 16/18 Genotyping if  Indicated:  Adequacy:       Satisfactory for evaluation, endocervical transformation zone component  present.  Interpretation:       Negative for intraepithelial lesion or malignancy.        This specimen has been analyzed by the [employer] Imaging System ([employer]  an automated imaging and review system, which assists the cytotechnologist  and/or pathologist in evaluation of cells on Thinprep Pap tests.  Electronically Signed Out By ADALI Wilkerson(ASCP)             HPV HIGH RISK WITH REFLEX      Not Detected        6/26/2025               Text/Comments:  This test was performed using the FDA Approved APTIMA HPV mRNA assay  which detects E6/E7 messenger RNA of High Risk HPV types (16, 18, 31,  33, 35, 39, 45, 51, 52, 56, 58, 59, 66, and 68).    This assay is intended for use in women 21 years or older with ASC-US  cervical cytology or women 30 years or older. This assay is not  intended to substitute for regular cervical cytology screening.  Detection of HPV using the APTIMA HPV Assay does not differentiate  HPV types and cannot evaluate persistence of any one type. The use of  this assay has not been evaluated for the management of HPV  vaccinated women, women with prior ablative or excisional therapy,  hysterectomy, or who are pregnant. Sensitivities may be affected by  collection methods, stage of infection, and the presence of  interfering substances. Results of this assay should be interpreted  in conjunction with other available laboratory and clinical data.                   Thin Prep        6/24/2025                     CERVIX        6/24/2025              Source of Specimen(s)  A: Thinprep Cervical/Endocervical with HPV screen and 16/18 Genotyping  Yes

## 2025-07-23 ENCOUNTER — EMERGENCY (EMERGENCY)
Facility: HOSPITAL | Age: 75
LOS: 1 days | End: 2025-07-23
Attending: EMERGENCY MEDICINE | Admitting: EMERGENCY MEDICINE
Payer: MEDICARE

## 2025-07-23 VITALS
TEMPERATURE: 98 F | HEIGHT: 63 IN | RESPIRATION RATE: 18 BRPM | SYSTOLIC BLOOD PRESSURE: 133 MMHG | HEART RATE: 103 BPM | WEIGHT: 125 LBS | OXYGEN SATURATION: 99 % | DIASTOLIC BLOOD PRESSURE: 77 MMHG

## 2025-07-23 VITALS
TEMPERATURE: 98 F | OXYGEN SATURATION: 99 % | DIASTOLIC BLOOD PRESSURE: 77 MMHG | HEART RATE: 87 BPM | RESPIRATION RATE: 16 BRPM | SYSTOLIC BLOOD PRESSURE: 129 MMHG

## 2025-07-23 DIAGNOSIS — R19.7 DIARRHEA, UNSPECIFIED: ICD-10-CM

## 2025-07-23 DIAGNOSIS — Z90.5 ACQUIRED ABSENCE OF KIDNEY: Chronic | ICD-10-CM

## 2025-07-23 DIAGNOSIS — I10 ESSENTIAL (PRIMARY) HYPERTENSION: ICD-10-CM

## 2025-07-23 DIAGNOSIS — R10.9 UNSPECIFIED ABDOMINAL PAIN: ICD-10-CM

## 2025-07-23 DIAGNOSIS — E89.2 POSTPROCEDURAL HYPOPARATHYROIDISM: Chronic | ICD-10-CM

## 2025-07-23 DIAGNOSIS — Z85.53 PERSONAL HISTORY OF MALIGNANT NEOPLASM OF RENAL PELVIS: ICD-10-CM

## 2025-07-23 DIAGNOSIS — Z90.710 ACQUIRED ABSENCE OF BOTH CERVIX AND UTERUS: Chronic | ICD-10-CM

## 2025-07-23 DIAGNOSIS — E11.9 TYPE 2 DIABETES MELLITUS WITHOUT COMPLICATIONS: ICD-10-CM

## 2025-07-23 DIAGNOSIS — E78.5 HYPERLIPIDEMIA, UNSPECIFIED: ICD-10-CM

## 2025-07-23 DIAGNOSIS — Z87.19 PERSONAL HISTORY OF OTHER DISEASES OF THE DIGESTIVE SYSTEM: ICD-10-CM

## 2025-07-23 DIAGNOSIS — Z90.710 ACQUIRED ABSENCE OF BOTH CERVIX AND UTERUS: ICD-10-CM

## 2025-07-23 DIAGNOSIS — E03.9 HYPOTHYROIDISM, UNSPECIFIED: ICD-10-CM

## 2025-07-23 DIAGNOSIS — Z90.5 ACQUIRED ABSENCE OF KIDNEY: ICD-10-CM

## 2025-07-23 LAB
ALBUMIN SERPL ELPH-MCNC: 4.1 G/DL — SIGNIFICANT CHANGE UP (ref 3.3–5)
ALP SERPL-CCNC: 69 U/L — SIGNIFICANT CHANGE UP (ref 40–120)
ALT FLD-CCNC: 38 U/L — SIGNIFICANT CHANGE UP (ref 10–45)
ANION GAP SERPL CALC-SCNC: 13 MMOL/L — SIGNIFICANT CHANGE UP (ref 5–17)
AST SERPL-CCNC: 31 U/L — SIGNIFICANT CHANGE UP (ref 10–40)
BASOPHILS # BLD AUTO: 0.05 K/UL — SIGNIFICANT CHANGE UP (ref 0–0.2)
BASOPHILS NFR BLD AUTO: 0.4 % — SIGNIFICANT CHANGE UP (ref 0–2)
BILIRUB SERPL-MCNC: 0.4 MG/DL — SIGNIFICANT CHANGE UP (ref 0.2–1.2)
BUN SERPL-MCNC: 6 MG/DL — LOW (ref 7–23)
C CAYETANENSIS DNA STL QL NAA+NON-PROBE: SIGNIFICANT CHANGE UP
CALCIUM SERPL-MCNC: 8 MG/DL — LOW (ref 8.4–10.5)
CHLORIDE SERPL-SCNC: 108 MMOL/L — SIGNIFICANT CHANGE UP (ref 96–108)
CO2 SERPL-SCNC: 22 MMOL/L — SIGNIFICANT CHANGE UP (ref 22–31)
CREAT SERPL-MCNC: 0.85 MG/DL — SIGNIFICANT CHANGE UP (ref 0.5–1.3)
EGFR: 72 ML/MIN/1.73M2 — SIGNIFICANT CHANGE UP
EGFR: 72 ML/MIN/1.73M2 — SIGNIFICANT CHANGE UP
EOSINOPHIL # BLD AUTO: 0.44 K/UL — SIGNIFICANT CHANGE UP (ref 0–0.5)
EOSINOPHIL NFR BLD AUTO: 3.7 % — SIGNIFICANT CHANGE UP (ref 0–6)
FLUAV AG NPH QL: SIGNIFICANT CHANGE UP
FLUBV AG NPH QL: SIGNIFICANT CHANGE UP
GI PCR PANEL: SIGNIFICANT CHANGE UP
GLUCOSE SERPL-MCNC: 166 MG/DL — HIGH (ref 70–99)
HCT VFR BLD CALC: 39.6 % — SIGNIFICANT CHANGE UP (ref 34.5–45)
HGB BLD-MCNC: 12.8 G/DL — SIGNIFICANT CHANGE UP (ref 11.5–15.5)
IMM GRANULOCYTES # BLD AUTO: 0.17 K/UL — HIGH (ref 0–0.07)
IMM GRANULOCYTES NFR BLD AUTO: 1.4 % — HIGH (ref 0–0.9)
LACTATE SERPL-SCNC: 1.6 MMOL/L — SIGNIFICANT CHANGE UP (ref 0.5–2)
LIDOCAIN IGE QN: 52 U/L — SIGNIFICANT CHANGE UP (ref 7–60)
LYMPHOCYTES # BLD AUTO: 1.97 K/UL — SIGNIFICANT CHANGE UP (ref 1–3.3)
LYMPHOCYTES NFR BLD AUTO: 16.5 % — SIGNIFICANT CHANGE UP (ref 13–44)
MAGNESIUM SERPL-MCNC: 1.7 MG/DL — SIGNIFICANT CHANGE UP (ref 1.6–2.6)
MCHC RBC-ENTMCNC: 27.6 PG — SIGNIFICANT CHANGE UP (ref 27–34)
MCHC RBC-ENTMCNC: 32.3 G/DL — SIGNIFICANT CHANGE UP (ref 32–36)
MCV RBC AUTO: 85.3 FL — SIGNIFICANT CHANGE UP (ref 80–100)
MONOCYTES # BLD AUTO: 1.17 K/UL — HIGH (ref 0–0.9)
MONOCYTES NFR BLD AUTO: 9.8 % — SIGNIFICANT CHANGE UP (ref 2–14)
NEUTROPHILS # BLD AUTO: 8.15 K/UL — HIGH (ref 1.8–7.4)
NEUTROPHILS NFR BLD AUTO: 68.2 % — SIGNIFICANT CHANGE UP (ref 43–77)
NOROVIRUS GI+II RNA STL QL NAA+NON-PROBE: ABNORMAL
NRBC # BLD AUTO: 0 K/UL — SIGNIFICANT CHANGE UP (ref 0–0)
NRBC # FLD: 0 K/UL — SIGNIFICANT CHANGE UP (ref 0–0)
NRBC BLD AUTO-RTO: 0 /100 WBCS — SIGNIFICANT CHANGE UP (ref 0–0)
PLATELET # BLD AUTO: 390 K/UL — SIGNIFICANT CHANGE UP (ref 150–400)
PMV BLD: 10 FL — SIGNIFICANT CHANGE UP (ref 7–13)
POTASSIUM SERPL-MCNC: 4.4 MMOL/L — SIGNIFICANT CHANGE UP (ref 3.5–5.3)
POTASSIUM SERPL-SCNC: 4.4 MMOL/L — SIGNIFICANT CHANGE UP (ref 3.5–5.3)
PROT SERPL-MCNC: 7.1 G/DL — SIGNIFICANT CHANGE UP (ref 6–8.3)
RBC # BLD: 4.64 M/UL — SIGNIFICANT CHANGE UP (ref 3.8–5.2)
RBC # FLD: 14.6 % — HIGH (ref 10.3–14.5)
RSV RNA NPH QL NAA+NON-PROBE: SIGNIFICANT CHANGE UP
SARS-COV-2 RNA SPEC QL NAA+PROBE: SIGNIFICANT CHANGE UP
SODIUM SERPL-SCNC: 143 MMOL/L — SIGNIFICANT CHANGE UP (ref 135–145)
SOURCE RESPIRATORY: SIGNIFICANT CHANGE UP
WBC # BLD: 11.95 K/UL — HIGH (ref 3.8–10.5)
WBC # FLD AUTO: 11.95 K/UL — HIGH (ref 3.8–10.5)

## 2025-07-23 PROCEDURE — 74177 CT ABD & PELVIS W/CONTRAST: CPT

## 2025-07-23 PROCEDURE — 83690 ASSAY OF LIPASE: CPT

## 2025-07-23 PROCEDURE — 80053 COMPREHEN METABOLIC PANEL: CPT

## 2025-07-23 PROCEDURE — 87449 NOS EACH ORGANISM AG IA: CPT

## 2025-07-23 PROCEDURE — 83605 ASSAY OF LACTIC ACID: CPT

## 2025-07-23 PROCEDURE — 74177 CT ABD & PELVIS W/CONTRAST: CPT | Mod: 26

## 2025-07-23 PROCEDURE — 87637 SARSCOV2&INF A&B&RSV AMP PRB: CPT

## 2025-07-23 PROCEDURE — 85025 COMPLETE CBC W/AUTO DIFF WBC: CPT

## 2025-07-23 PROCEDURE — 99285 EMERGENCY DEPT VISIT HI MDM: CPT | Mod: FS

## 2025-07-23 PROCEDURE — 87324 CLOSTRIDIUM AG IA: CPT

## 2025-07-23 PROCEDURE — 36415 COLL VENOUS BLD VENIPUNCTURE: CPT

## 2025-07-23 PROCEDURE — 87507 IADNA-DNA/RNA PROBE TQ 12-25: CPT

## 2025-07-23 PROCEDURE — 96374 THER/PROPH/DIAG INJ IV PUSH: CPT | Mod: XU

## 2025-07-23 PROCEDURE — 99284 EMERGENCY DEPT VISIT MOD MDM: CPT | Mod: 25

## 2025-07-23 PROCEDURE — 83735 ASSAY OF MAGNESIUM: CPT

## 2025-07-23 RX ORDER — ONDANSETRON HCL/PF 4 MG/2 ML
4 VIAL (ML) INJECTION ONCE
Refills: 0 | Status: COMPLETED | OUTPATIENT
Start: 2025-07-23 | End: 2025-07-23

## 2025-07-23 RX ADMIN — Medication 1000 MILLILITER(S): at 06:08

## 2025-07-23 RX ADMIN — Medication 4 MILLIGRAM(S): at 06:36

## 2025-07-23 NOTE — ED ADULT NURSE NOTE - CHIEF COMPLAINT QUOTE
Patient Tajik speaking, family used as ; recent travel from [place] (returned 07/08) and has had 1 week of n/v/d 5-6 episodes a day along with 101 fever for past 3 days at home

## 2025-07-23 NOTE — ED PROVIDER NOTE - CARE PROVIDER_API CALL
Godfrey Jurado ()  Gastroenterology  [address]  [place] [address]  Phone: [phone]  Fax: [phone]  Follow Up Time:

## 2025-07-23 NOTE — ED PROVIDER NOTE - NSFOLLOWUPINSTRUCTIONS_ED_ALL_ED_FT
Drink fluids, stay hydrated.  Try a bland diet until you are feeling better.  Avoid greasy and spicy foods, caffeine and alcohol.     Follow up with your primary care doctor within 1 week for continued evaluation.   You should also follow up with a GASTROENTEROLOGIST if your symptoms improve. See office information listed here.     Return to this Emergency Department if your symptoms are persistent, worsening or new symptoms arise such as chest pain, shortness of breath, uncontrollable nausea/vomiting, vomiting blood, severe abdominal pain, blood in stool or black tarry stools, or any other concerns. Drink fluids, stay hydrated.  Your CT scan today showed no severe infection.     You can finish the azithromycin pack that you started    Try a bland diet until you are feeling better.  Avoid greasy and spicy foods, caffeine and alcohol.     Follow up with your primary care doctor within 1 week for continued evaluation.   You should also follow up with a GASTROENTEROLOGIST if your symptoms do not improve. See office information listed here.     Return to this Emergency Department if your symptoms are persistent, worsening or new symptoms arise such as chest pain, shortness of breath, uncontrollable nausea/vomiting, vomiting blood, severe abdominal pain, blood in stool or black tarry stools, or any other concerns.

## 2025-07-23 NOTE — ED PROVIDER NOTE - PRINCIPAL DIAGNOSIS
Internal Medicine progress note    Presenting Complaint:  Aortic stenosis, acute renal failure.    Subjective:  Awake, comfortable.  Denies any chest pain, shortness of breath.  No dizziness.    Physical Exam:  GENERAL: Alert and oriented x3, interacts very well, follows commands. Memory, mood and affect are at baseline.    Visit Vitals  BP (!) 160/82 (BP Location: RUE - Right upper extremity)   Pulse 80   Temp 97.8 °F (36.6 °C) (Oral)   Resp 18   Ht 5' 2\" (1.575 m)   Wt 87.9 kg   SpO2 97%   BMI 35.43 kg/m²       SKIN: Warm and dry.  HEENT: Both pupils are equal and responding to light. Nose: Both nostrils patent.  EXTREMITIES: Trace edema. Pedal pulses are very palpable.  NECK: Supple. Lymph nodes are not felt in the neck region.       LUNGS:  Lungs show coarse breath sounds bilaterally with moist crackles  CARDIOVASCULAR: S1, S2 with displaced PMI.ESM  ABDOMEN: Soft, bowel sounds positive, nontender.  RECTAL AND PELVIC: Not performed.  CENTRAL NERVOUS SYSTEM: Cranial nerves are grossly intact 2-12. Motor strength both upper and lower extremities, positive 5/5. Reflexes, bulk, tone baseline. Sensory: Fine touch is intact. Gait not assessed.       Labs:  Recent Labs   Lab 03/06/21  0427   WBC 5.7   RBC 4.73   HGB 12.8*   HCT 40.1        Recent Labs   Lab 03/06/21  0427 03/05/21  1142   SODIUM 138 135   POTASSIUM 4.0 4.0   CHLORIDE 106 103   CO2 25 26   BUN 21* 24*   CREATININE 1.61* 1.91*   GLUCOSE 86 100*   CALCIUM 8.8 9.0     ECHO 02/03/2021  Moderate low-flow, low-gradient aortic valve stenosis. Mean gradient = 12 mmHg; JOSH = 1.1 cm2; DI = 0.35.  Normal LV size and systolic function, EF 52%. GLS -5%. SV = 49 mL. Regional wall motion abnormalities (see diagram).  Thickened mitral valve with MAC; mean gradient 5 mmHg (HR 82 bmp). Mild MR.  Mildly increased RV size with normal systolic function.  S/P walking: Aortic valve mean gradient increased to 18 mmHg; mitral valve mean gradient increased to 9 mmHg.  No  pericardial effusion. No prior study available for comparison.     Cardiac catheterization:  · Ost LAD to Mid LAD lesion with 100% stenosis.  · Ost Cx to Mid Cx lesion with 90% stenosis.  · Origin to Prox Graft lesion before 1st Diag with 100% stenosis.  · Prox RCA to Dist RCA lesion with 100% stenosis.  · Prox Graft lesion with 90% stenosis.  · Dist Graft lesion with 80% stenosis.    Impression:   N17.9 Acute kidney failure, unspecified  I35.0 Nonrheumatic aortic (valve) stenosis  I25.10 Atherosclerotic heart disease of native coronary artery without angina pectoris  I10 Essential Hypertension  N18.3 Chronic kidney disease, stage 3 (moderate)  I73.9 Peripheral vascular disease, unspecified          Plan:  S/p cardiac catheterization, results noted as above.  Plan for PCI placement once renal status optimized  IV fluids given, creatinine is back at baseline  Continue aspirin, statin, Plavix  Continue metoprolol, Norvasc  TAVR evaluation by Cardiology  Heparin for DVT prophylaxis    Jassi Rodriguez MD  03/06/2021         Diarrhea

## 2025-07-23 NOTE — ED PROVIDER NOTE - OBJECTIVE STATEMENT
75 yr old female, history of renal ca s/p R nephrectomy, HTN, HLD, DM, hypothyroid, diverticulitis, sp hernia repair, hysterectomy, presents to the Emergency Department w diarrhea. pt returned from trip to [facility] on 7/10. has been having diarrhea since then. multiple episodes of water, nonbloody stools per day. intermittent abd cramping - diffuse. this week fevers, tmax 101. yesterday highest temp 99.9. no fever today. daughter has given her iv fluids at home and also prescribed azithro - today is last dose of it.   no uri sx, cough, cp, sob, n/v, urinary symptoms, headache, dizziness, extremity weakness / numbness, speech or gait changes.

## 2025-07-23 NOTE — ED PROVIDER NOTE - PROGRESS NOTE DETAILS
olivia - wbc count 11.95. labs otherwise unremarkable - creatinine and electrolytes wnl, lactate neg. signed out to day team pending CT

## 2025-07-23 NOTE — ED ADULT TRIAGE NOTE - CHIEF COMPLAINT QUOTE
Patient Croatian speaking, family used as ; recent travel from [place] (returned 07/08) and has had 1 week of n/v/d 5-6 episodes a day along with 101 fever for past 3 days at home

## 2025-07-23 NOTE — ED PROVIDER NOTE - PHYSICAL EXAMINATION
Constitutional :  non-toxic, no acute distress. awake, alert, oriented to person, place, time/situation.  Head : head normocephalic, atraumatic  EENMT : eyes clear bilaterally, PERRL, EOMI. airway patent. moist mucous membranes. neck supple.  Cardiac : Normal rate, regular rhythm. No murmur appreciated, no LE edema.  Resp : Breath sounds clear and equal bilaterally. Respirations even and unlabored.   Gastro : abdomen soft, nontender, nondistended. no rebound or guarding.   MSK :  range of motion is not limited, no muscle or joint tenderness  Back : No evidence of trauma. No spinal or CVA tenderness.  Vasc : Extremities warm and well perfused. 2+ radial and DP pulses. cap refill <2 seconds  Neuro : Alert and oriented, CNII-XII grossly intact, no motor or sensory deficits.  Skin : Skin normal color for race, warm, dry and intact. No evidence of rash.  Psych : Alert and oriented to person, place, time/situation. normal mood and affect. no apparent risk to self or others.

## 2025-07-23 NOTE — ED PROVIDER NOTE - CLINICAL SUMMARY MEDICAL DECISION MAKING FREE TEXT BOX
history of renal ca s/p R nephrectomy, HTN, HLD, DM, hypothyroid, diverticulitis, sp hernia repair, hysterectomy, here w ongoing diarrhea. watery, nonbloody since returning from [facility] on 7/10. no relief w iv fluids at home and few days azithro. diffuse abd cramping intermittent. fevers [id number] earlier in the week. none x2 days. no uri sx, n/v, urinary symptoms.  pt nontoxic appearing, HR 103bpm (?dehydration), vitals otherwise ok - no fever, normotensive, exam w/o abd tenderness.   suspect viral illness, travelers diarrhea, gastroenteritis,   hx of diverticulitis will get CT r/o diverticulitis or other intra-abdominal pathology.  otherwise labs assess for electrolyte derangement, dehydration  send gi pcr / cdiff if able to give sample  otherwise iv fluids, gi meds prn  serial abd exams

## 2025-07-23 NOTE — ED PROVIDER NOTE - PATIENT PORTAL LINK FT
You can access the FollowMyHealth Patient Portal offered by [facility] by registering at the following website: [email or web address]. By joining [employer]’s FollowMyHealth portal, you will also be able to view your health information using other applications (apps) compatible with our system.

## 2025-07-25 RX ORDER — SULFAMETHOXAZOLE/TRIMETHOPRIM 800-160 MG
1 TABLET ORAL
Qty: 20 | Refills: 0
Start: 2025-07-25 | End: 2025-08-03

## 2025-07-30 LAB
ADV 40+41 DNA STL QL NAA+NON-PROBE: SIGNIFICANT CHANGE UP
ASTROVIRUS: SIGNIFICANT CHANGE UP
CAMPYLOBACTER DNA SPEC NAA+PROBE: SIGNIFICANT CHANGE UP
CRYPTOSP DNA STL QL NAA+PROBE: SIGNIFICANT CHANGE UP
E COLI SXT SPEC: SIGNIFICANT CHANGE UP
E HISTOLYT DNA STL QL NAA+NON-PROBE: SIGNIFICANT CHANGE UP
EC EAEA GENE STL QL NAA+PROBE: SIGNIFICANT CHANGE UP
EPEC DNA STL QL NAA+PROBE: SIGNIFICANT CHANGE UP
ETEC DNA STL QL NAA+PROBE: SIGNIFICANT CHANGE UP
G LAMBLIA DNA STL QL NAA+NON-PROBE: SIGNIFICANT CHANGE UP
P SHIGELLOIDES DNA STL QL NAA+NON-PROBE: SIGNIFICANT CHANGE UP
RV RNA STL NAA+PROBE: SIGNIFICANT CHANGE UP
SALMONELLA DNA STL QL NAA+PROBE: SIGNIFICANT CHANGE UP
SAPOVIRUS (GENOGROUPS I, II, IV, AND V): SIGNIFICANT CHANGE UP
SHIGELLA DNA SPEC QL NAA+PROBE: SIGNIFICANT CHANGE UP
VIBRIO CHOL TOXIN CTXA STL QL NAA+PROBE: SIGNIFICANT CHANGE UP
VIBRIO CHOL TOXIN CTXA STL QL NAA+PROBE: SIGNIFICANT CHANGE UP
Y ENTEROCOL DNA STL QL NAA+NON-PROBE: SIGNIFICANT CHANGE UP

## (undated) DEVICE — SUT VICRYL 2-0 27" SH

## (undated) DEVICE — DRAPE IOBAN 23" X 23"

## (undated) DEVICE — GLV 7 PROTEXIS (WHITE)

## (undated) DEVICE — SLV COMPRESSION KNEE MED

## (undated) DEVICE — DRSG DERMABOND 0.7ML

## (undated) DEVICE — POSITIONER FOAM EGG CRATE ULNAR 2PCS (PINK)

## (undated) DEVICE — SUPP ATHLETIC MALE XLG 44-55IN

## (undated) DEVICE — DRSG SUPPORTER ADULT 3" WAISTBAND MED

## (undated) DEVICE — DRSG SUPPORTER ADULT 3" WAISTBAND LRG

## (undated) DEVICE — WARMING BLANKET LOWER ADULT

## (undated) DEVICE — DRSG STERISTRIPS 0.5 X 4"

## (undated) DEVICE — SUT MAXON 0 30" GS-11

## (undated) DEVICE — ELCTR STRYKER NEPTUNE SMOKE EVACUATION PENCIL (GREEN)

## (undated) DEVICE — ELCTR BOVIE TIP BLADE INSULATED 2.75" EDGE

## (undated) DEVICE — SUT PROLENE 0 30" CT-2

## (undated) DEVICE — SUT MONOCRYL 4-0 27" PS-2 UNDYED

## (undated) DEVICE — ELCTR GROUNDING PAD ADULT COVIDIEN

## (undated) DEVICE — Device